# Patient Record
Sex: MALE | Race: BLACK OR AFRICAN AMERICAN | NOT HISPANIC OR LATINO | Employment: FULL TIME | URBAN - METROPOLITAN AREA
[De-identification: names, ages, dates, MRNs, and addresses within clinical notes are randomized per-mention and may not be internally consistent; named-entity substitution may affect disease eponyms.]

---

## 2017-01-03 ENCOUNTER — TELEPHONE (OUTPATIENT)
Dept: NEPHROLOGY | Facility: CLINIC | Age: 47
End: 2017-01-03

## 2017-01-03 NOTE — TELEPHONE ENCOUNTER
Travon, this is  office from [facility] at the [facility]. We are calling to remind you of your upcoming Nephrology appointment on 01/09./17 at 405pm. Please arrive about 1 hours prior to your appointment time for labs. Also, please bring an updated medication list or your labeled medication bottles with you to your appointment. If you have any questions or would like to cancel or reschedule your appointment, please call us at [phone].     You are welcome to have your labs done up to a week before your appointment at any [place] or [facility] facility. If you have your labs completed before your appointment, please still come 30 minutes early for check-in  ROEL ROBLERO CMA

## 2017-01-09 ENCOUNTER — OFFICE VISIT (OUTPATIENT)
Dept: NEPHROLOGY | Facility: CLINIC | Age: 47
End: 2017-01-09
Attending: INTERNAL MEDICINE
Payer: MEDICARE

## 2017-01-09 ENCOUNTER — RESEARCH ENCOUNTER (OUTPATIENT)
Dept: RESEARCH | Facility: CLINIC | Age: 47
End: 2017-01-09

## 2017-01-09 VITALS
HEART RATE: 87 BPM | SYSTOLIC BLOOD PRESSURE: 153 MMHG | OXYGEN SATURATION: 100 % | TEMPERATURE: 99.1 F | DIASTOLIC BLOOD PRESSURE: 91 MMHG | HEIGHT: 70 IN

## 2017-01-09 DIAGNOSIS — N18.9 ANEMIA IN CHRONIC KIDNEY DISEASE: ICD-10-CM

## 2017-01-09 DIAGNOSIS — N25.0 MIXED RENAL OSTEODYSTROPHY: ICD-10-CM

## 2017-01-09 DIAGNOSIS — D84.9 IMMUNOSUPPRESSED STATUS (H): ICD-10-CM

## 2017-01-09 DIAGNOSIS — D63.1 ANEMIA IN CHRONIC KIDNEY DISEASE: ICD-10-CM

## 2017-01-09 DIAGNOSIS — E55.9 VITAMIN D DEFICIENCY: ICD-10-CM

## 2017-01-09 DIAGNOSIS — T86.19 OTHER COMPLICATION OF KIDNEY TRANSPLANT: Primary | ICD-10-CM

## 2017-01-09 DIAGNOSIS — T86.11 ANTIBODY MEDIATED REJECTION OF KIDNEY TRANSPLANT: ICD-10-CM

## 2017-01-09 DIAGNOSIS — E55.9 HYPOVITAMINOSIS D: ICD-10-CM

## 2017-01-09 DIAGNOSIS — N18.4 CKD (CHRONIC KIDNEY DISEASE) STAGE 4, GFR 15-29 ML/MIN (H): ICD-10-CM

## 2017-01-09 DIAGNOSIS — Z94.0 KIDNEY REPLACED BY TRANSPLANT: ICD-10-CM

## 2017-01-09 DIAGNOSIS — T86.11 KIDNEY TRANSPLANT REJECTION: ICD-10-CM

## 2017-01-09 PROCEDURE — 99213 OFFICE O/P EST LOW 20 MIN: CPT | Mod: ZF

## 2017-01-09 ASSESSMENT — PAIN SCALES - GENERAL: PAINLEVEL: NO PAIN (0)

## 2017-01-09 NOTE — Clinical Note
1/9/2017       RE: Tristian Mckeon  [address]  [facility] [address]     Dear Colleague,    Thank you for referring your patient, Tristian Mckeon, to the [facility] NEPHROLOGY at [facility]. Please see a copy of my visit note below.    No notes on file    Again, thank you for allowing me to participate in the care of your patient.      Sincerely,    Kole Collins MD

## 2017-01-09 NOTE — NURSING NOTE
"Chief Complaint   Patient presents with     RECHECK     follow up post KD       Initial /91 mmHg  Pulse 87  Temp(Src) 99.1  F (37.3  C) (Oral)  Ht 1.778 m (5' 10\")  SpO2 100% Estimated body mass index is 33.59 kg/(m^2) as calculated from the following:    Height as of this encounter: 1.778 m (5' 10\").    Weight as of 5/19/16: 106.187 kg (234 lb 1.6 oz).  BP completed using cuff size: dontrell Castro M.cma      "

## 2017-01-12 DIAGNOSIS — Z79.899 ENCOUNTER FOR LONG-TERM CURRENT USE OF MEDICATION: ICD-10-CM

## 2017-01-12 DIAGNOSIS — E55.9 HYPOVITAMINOSIS D: ICD-10-CM

## 2017-01-12 DIAGNOSIS — D63.1 ANEMIA IN CHRONIC KIDNEY DISEASE: ICD-10-CM

## 2017-01-12 DIAGNOSIS — T86.19 OTHER COMPLICATION OF KIDNEY TRANSPLANT: ICD-10-CM

## 2017-01-12 DIAGNOSIS — Z48.298 AFTERCARE FOLLOWING ORGAN TRANSPLANT: ICD-10-CM

## 2017-01-12 DIAGNOSIS — Z94.0 KIDNEY REPLACED BY TRANSPLANT: ICD-10-CM

## 2017-01-12 DIAGNOSIS — N18.9 ANEMIA IN CHRONIC KIDNEY DISEASE: ICD-10-CM

## 2017-01-12 LAB
ALBUMIN SERPL-MCNC: 3.6 G/DL (ref 3.4–5)
ANION GAP SERPL CALCULATED.3IONS-SCNC: 9 MMOL/L (ref 3–14)
BUN SERPL-MCNC: 48 MG/DL (ref 7–30)
CALCIUM SERPL-MCNC: 7.6 MG/DL (ref 8.5–10.1)
CD3 CELLS # BLD: 125 CELLS/UL (ref 603–2990)
CD3 CELLS NFR BLD: 51 % (ref 49–84)
CD3+CD4+ CELLS # BLD: 60 CELLS/UL (ref 441–2156)
CD3+CD4+ CELLS NFR BLD: 24 % (ref 28–63)
CD3+CD4+ CELLS/CD3+CD8+ CLL BLD: 0.92 % (ref 1.4–2.6)
CD3+CD8+ CELLS # BLD: 64 CELLS/UL (ref 125–1312)
CD3+CD8+ CELLS NFR BLD: 26 % (ref 10–40)
CHLORIDE SERPL-SCNC: 112 MMOL/L (ref 94–109)
CO2 SERPL-SCNC: 20 MMOL/L (ref 20–32)
CREAT SERPL-MCNC: 2.95 MG/DL (ref 0.66–1.25)
DEPRECATED CALCIDIOL+CALCIFEROL SERPL-MC: 33 UG/L (ref 20–75)
ERYTHROCYTE [DISTWIDTH] IN BLOOD BY AUTOMATED COUNT: 15.1 % (ref 10–15)
FERRITIN SERPL-MCNC: 371 NG/ML (ref 26–388)
FOLATE SERPL-MCNC: 9.7 NG/ML
GFR SERPL CREATININE-BSD FRML MDRD: 23 ML/MIN/1.7M2
GLUCOSE SERPL-MCNC: 134 MG/DL (ref 70–99)
HCT VFR BLD AUTO: 27.8 % (ref 40–53)
HGB BLD-MCNC: 8.9 G/DL (ref 13.3–17.7)
IFC SPECIMEN: ABNORMAL
IMMUNODEFICIENCY MARKERS SPEC-IMP: ABNORMAL
IRON SATN MFR SERPL: 38 % (ref 15–46)
IRON SERPL-MCNC: 74 UG/DL (ref 35–180)
MCH RBC QN AUTO: 27.9 PG (ref 26.5–33)
MCHC RBC AUTO-ENTMCNC: 32 G/DL (ref 31.5–36.5)
MCV RBC AUTO: 87 FL (ref 78–100)
PHOSPHATE SERPL-MCNC: 3.9 MG/DL (ref 2.5–4.5)
PLATELET # BLD AUTO: 129 10E9/L (ref 150–450)
POTASSIUM SERPL-SCNC: 4.4 MMOL/L (ref 3.4–5.3)
PTH-INTACT SERPL-MCNC: 134 PG/ML (ref 12–72)
RBC # BLD AUTO: 3.19 10E12/L (ref 4.4–5.9)
SODIUM SERPL-SCNC: 140 MMOL/L (ref 133–144)
TACROLIMUS BLD-MCNC: ABNORMAL UG/L (ref 5–15)
TIBC SERPL-MCNC: 194 UG/DL (ref 240–430)
TME LAST DOSE: ABNORMAL H
VIT B12 SERPL-MCNC: 1093 PG/ML (ref 193–986)
WBC # BLD AUTO: 3 10E9/L (ref 4–11)

## 2017-01-12 PROCEDURE — 86359 T CELLS TOTAL COUNT: CPT | Performed by: INTERNAL MEDICINE

## 2017-01-12 PROCEDURE — 80180 DRUG SCRN QUAN MYCOPHENOLATE: CPT | Performed by: INTERNAL MEDICINE

## 2017-01-12 PROCEDURE — 80197 ASSAY OF TACROLIMUS: CPT | Performed by: INTERNAL MEDICINE

## 2017-01-12 PROCEDURE — 82306 VITAMIN D 25 HYDROXY: CPT | Performed by: INTERNAL MEDICINE

## 2017-01-12 PROCEDURE — 86360 T CELL ABSOLUTE COUNT/RATIO: CPT | Performed by: INTERNAL MEDICINE

## 2017-01-12 PROCEDURE — 83970 ASSAY OF PARATHORMONE: CPT | Performed by: INTERNAL MEDICINE

## 2017-01-12 PROCEDURE — 82746 ASSAY OF FOLIC ACID SERUM: CPT | Performed by: INTERNAL MEDICINE

## 2017-01-13 ENCOUNTER — TELEPHONE (OUTPATIENT)
Dept: TRANSPLANT | Facility: CLINIC | Age: 47
End: 2017-01-13

## 2017-01-13 DIAGNOSIS — Z94.0 KIDNEY TRANSPLANTED: Primary | ICD-10-CM

## 2017-01-13 DIAGNOSIS — D84.9 IMMUNOSUPPRESSED STATUS (H): ICD-10-CM

## 2017-01-13 DIAGNOSIS — Z94.0 IMMUNOSUPPRESSIVE MANAGEMENT ENCOUNTER FOLLOWING KIDNEY TRANSPLANT: ICD-10-CM

## 2017-01-13 DIAGNOSIS — T86.10 COMPLICATIONS, KIDNEY TRANSPLANT: ICD-10-CM

## 2017-01-13 DIAGNOSIS — Z48.298 AFTERCARE FOLLOWING ORGAN TRANSPLANT: ICD-10-CM

## 2017-01-13 DIAGNOSIS — Z79.899 IMMUNOSUPPRESSIVE MANAGEMENT ENCOUNTER FOLLOWING KIDNEY TRANSPLANT: ICD-10-CM

## 2017-01-13 DIAGNOSIS — Z79.899 ENCOUNTER FOR LONG-TERM CURRENT USE OF MEDICATION: ICD-10-CM

## 2017-01-13 DIAGNOSIS — Z94.0 S/P KIDNEY TRANSPLANT: ICD-10-CM

## 2017-01-13 DIAGNOSIS — Z94.0 KIDNEY REPLACED BY TRANSPLANT: ICD-10-CM

## 2017-01-13 NOTE — TELEPHONE ENCOUNTER
ISSUE: tacrolimus = > 3.0    Confirmed no missed doses. Confirmed good 12 hour level.  Confirmed current dose is Astagraf XL 4 caps (20 mg) daily.  Increase to 5 caps (25 mg).  Recheck levels in 1 - 2 weeks. -- Orders placed.

## 2017-01-16 DIAGNOSIS — Z94.0 KIDNEY TRANSPLANTED: ICD-10-CM

## 2017-01-16 DIAGNOSIS — T86.11 KIDNEY TRANSPLANT REJECTION: Primary | ICD-10-CM

## 2017-01-16 LAB
MYCOPHENOLATE SERPL LC/MS/MS-MCNC: 4.51 MG/L (ref 1–3.5)
MYCOPHENOLATE-G SERPL LC/MS/MS-MCNC: 100.9 MG/L (ref 30–95)
TME LAST DOSE: ABNORMAL H

## 2017-01-16 RX ORDER — PREDNISONE 5 MG/1
5 TABLET ORAL DAILY
Qty: 30 TABLET | Refills: 0 | Status: CANCELLED | OUTPATIENT
Start: 2017-01-16

## 2017-01-16 RX ORDER — MYCOPHENOLIC ACID 360 MG/1
1080 TABLET, DELAYED RELEASE ORAL 2 TIMES DAILY
Qty: 180 TABLET | Refills: 0 | Status: CANCELLED | OUTPATIENT
Start: 2017-01-16

## 2017-01-16 NOTE — TELEPHONE ENCOUNTER
Drug Name: prednisone 5mg  Last Fill Date: 12/20/16  Quantity: 30    Drug Name: mycophenolate 360mg  Last Fill Date: 12/20/16  Quantity: 180    Mely Davis   [facility]  [phone]

## 2017-01-16 NOTE — PROGRESS NOTES
ASSESSMENT AND PLAN:   1.  Status post kidney transplant in a highly sensitized patient complicated by multiple episodes of rejection, last one  showing mild glomerulonephritis,persistent, with early transplant glomerulopathy, and severe arteriopathy.  At this point, it very important to maintain his immunosuppression therapeutic and he was switched to long-acting Prograf, which may have had less variability.  Fortunately his creatinine is stable at 3.6mg/dl. He is not high interested in retransplant   2.  Lower extremity edema.  Continue lasix.  3.  Secondary hyperparathyroidism, status post parathyroidectomy on calcium supplements.   4.  Metabolic acidosis secondary to CKAD continue bicarbonate.   5.  Anemia.  Hemoglobin is low at 9.3 gram/dL but stable, may be due to decreased kidney function.  He does not have recent iron studies which I will order for next time.  6. Renal osteodystrophy     Will check labs once a month.    REASON FOR VISIT  Mr. Mckeon is here for follow up.    HPI: Tristian Mckeon is a 46 year old male with ESKD from HTN and is status post third DDKT on 3/12/15. He has  had  2 previous kidney transplants in 1994 and 2000, both of which had failed and he was back on hemodialysis since 2007. He received   DDKT 3/12/15 complicated by DGF.  He is highly sensitized and did have low level historical DSA to A1 (797) and A30 (1092) from 2012 serum sample, but no DSA at time of transplant and crossmatch was negative.  Patient underwent a kidney transplant biopsy on 3/25/15 due to no improvement in kidney function and was found to have T cell mediated and antibody-mediated rejection.  In addition, it did show moderate interstitial fibrosis and tubular atrophy. He was treated with PP/IVIG and Thymoglobulin. Creatinine subsequently improved to around 2.2 mg/dL; however, has crept up to more than 3.5 mg/dL;  kidney biopsy was repeated on 05/08/2015 and showed T cell mediated rejection and acute antibody  mediated rejection.   He was subsequently treated with steroids, IVIG, plasmapheresis and Bortezomib. He tolerated treatment well with improved creatinine to about 2s. Creatinine again increased to 3.0mg/dl and he underwent under biopsy on 9/28 /2015, which showed isolated V lesion  -thought to be secondary to AMR, although [place] 2 A T cell emdiated rejection was considered.He was again treated with IVIG/PP with improvement in creatinine and subsequent biopsy on 12/23/2015 showed no V lesion but mild glomerulitis and capillary C4d staining consistent with mild, persistent antibody-mediated rejection. Patient has significant moderate to severe arteriopathy. Patient received a course of steroids. He has had fluctuating prograf level and has been Diltiazem. He was switched to Astagraf in Jan 2016.   Since he was seen last, he had no new complaints. Works part time. His leg swelling has been manageable. He has no fevers or chills     Transplant Hx:         Tx: DDKT  Date: 3/12/15       Present Maintenance IS: Tacrolimus, Mycophenolic acid and Prednisone       Baseline Creatinine: 2-2.5        Recent DSA: Yes(  trending down) PRA last checked ( July 2015) Class I: 99 Class II: 99       Biopsy: Yes, 3/25/15; borderline acute cellular rejection, antibody-mediated rejection and moderate interstitial fibrosis and tubular atrophy; 5/8-AMR, borderline T cell mediated rejection,  9/28/15: Antibody mediated Rejection, 12/23/2015-mild glomerulitis and capillary C4d staining consistent with mild, persistent antibody-mediated rejection, moderate to severe arteriopathy      ROS:    A comprehensive review of systems was obtained and negative, except as noted in the HPI or PMH.    Active Medical Problems:  Patient Active Problem List   Diagnosis     Hypertension     Tobacco abuse     Depression     Kidney replaced by transplant     Immunosuppressed status (H)     Anemia in chronic renal disease     High risk medication use      "Secondary renal hyperparathyroidism (H)     Vitamin D deficiency     Kidney transplant rejection     Steroid-induced hyperglycemia     Metabolic acidosis     Antibody mediated rejection of kidney transplant     Hyperglycemia     Hemorrhage following kidney biopsy     Thrombocytopenia (H)       Personal Hx:  Social History     Social History     Marital Status: Single     Spouse Name: N/A     Number of Children: 2     Years of Education: N/A     Occupational History      Unemployed     Social History Main Topics     Smoking status: Light Tobacco Smoker     Types: Cigars     Smokeless tobacco: Not on file     Alcohol Use: 0.0 oz/week     0 Standard drinks or equivalent per week      Comment: Minimal     Drug Use: No     Sexual Activity: Not on file     Other Topics Concern     Blood Transfusions Yes     Caffeine Concern No     Social History Narrative       Allergies:  Allergies   Allergen Reactions     Cephalosporins Unknown     Cyclosporine      Duricef [Cefadroxil]        Vitals:  /91 mmHg  Pulse 87  Temp(Src) 99.1  F (37.3  C) (Oral)  Ht 1.778 m (5' 10\")  SpO2 100%  Exam:   GENERAL APPEARANCE: alert and no distress  HENT: mouth without ulcers or lesions  LYMPHATICS: no cervical or supraclavicular nodes, palpable left inguinal node  RESP: lungs clear to auscultation - no rales, rhonchi or wheezes  CV: regular rhythm, normal rate, no rub, no murmur  EDEMA: 1+ LE edema bilaterally ( L >R)  ABDOMEN: soft, nondistended, nontender, bowel sounds normal  MS: extremities normal - no gross deformities noted, no evidence of inflammation in joints, no muscle tenderness  SKIN: no rash    Results:  Recent Results (from the past 336 hour(s))   T cell subset profile    Collection Time: 01/12/17 11:21 AM   Result Value Ref Range    IFC Specimen Blood     CD3 Mature T 51 49 - 84 %    CD4 [place] T 24 (L) 28 - 63 %    CD8 Suppressor T 26 10 - 40 %    CD4:CD8 Ratio 0.92 (L) 1.40 - 2.60    Absolute CD3 125 (L) 603 - 2990 " cells/uL    Absolute CD4 60 (L) 441 - 2156 cells/uL    Absolute CD8 64 (L) 125 - 1312 cells/uL    T Cell Subset Profile Antibody Interpretation << Do Not Report >>    Renal panel    Collection Time: 01/12/17 11:21 AM   Result Value Ref Range    Sodium 140 133 - 144 mmol/L    Potassium 4.4 3.4 - 5.3 mmol/L    Chloride 112 (H) 94 - 109 mmol/L    Carbon Dioxide 20 20 - 32 mmol/L    Anion Gap 9 3 - 14 mmol/L    Glucose 134 (H) 70 - 99 mg/dL    Urea Nitrogen 48 (H) 7 - 30 mg/dL    Creatinine 2.95 (H) 0.66 - 1.25 mg/dL    GFR Estimate 23 (L) >60 mL/min/1.7m2    GFR Estimate If Black 28 (L) >60 mL/min/1.7m2    Calcium 7.6 (L) 8.5 - 10.1 mg/dL    Phosphorus 3.9 2.5 - 4.5 mg/dL    Albumin 3.6 3.4 - 5.0 g/dL   Iron and iron binding capacity    Collection Time: 01/12/17 11:21 AM   Result Value Ref Range    Iron 74 35 - 180 ug/dL    Iron Binding Cap 194 (L) 240 - 430 ug/dL    Iron Saturation Index 38 15 - 46 %   Ferritin    Collection Time: 01/12/17 11:21 AM   Result Value Ref Range    Ferritin 371 26 - 388 ng/mL   Vitamin B12    Collection Time: 01/12/17 11:21 AM   Result Value Ref Range    Vitamin B12 1093 (H) 193 - 986 pg/mL   Vitamin D Deficiency    Collection Time: 01/12/17 11:21 AM   Result Value Ref Range    Vitamin D Deficiency screening 33 20 - 75 ug/L   Tacrolimus level    Collection Time: 01/12/17 11:21 AM   Result Value Ref Range    Tacrolimus Last Dose 01/11/17   2130     Tacrolimus Level (L) 5.0 - 15.0 ug/L     <3.0  Tacrolimus Reference Range   Kidney Transplant   Pediatric                      ug/L     0-3 months post transplant   10-12     3-6 months post transplant   8-10     6-12 months post transplant  6-8     >12 months post transplant   4-7   Adult     0-6 months post transplant   8-10     6-12 months post transplant  6-8     >12 months post transplant   4-6     >5 years post transplant     3-5   Heart Transplant   Pediatric     0-12 months post transplant  10-15     >12 months post transplant   5-10    Adult     0-3 months post transplant   10-15     3-6 months post transplant   8-12     6-12 months post transplant  6-12     >12 months post transplant   6-10   Lung Transplant     0-12 months post transplant  10-15     >12 months post transplant   8-12   Liver Transplant   Pediatric     0-3 months post transplant   10-15     3-6 months post transplant   8-10     >6 months post transplant    6-8   Adult     0-3 months post transplant   10-12     3-6 months post transplant   8-10     >6  months post transplant    6-8   Pancreas Transplant     0-6 months post transplant   8-10     >6 months post transplant    5-8   This test was developed and its performance characteristics determined by the   [facility],  Special [facility] Laboratory. It has   not been cleared or approved by the FDA. The laboratory is regulated under CLIA   as qualified to perform high-complexity testing. This test is used for clinical   purposes. It should not be regarded as investigational or for research.     CBC with platelets    Collection Time: 01/12/17 11:21 AM   Result Value Ref Range    WBC 3.0 (L) 4.0 - 11.0 10e9/L    RBC Count 3.19 (L) 4.4 - 5.9 10e12/L    Hemoglobin 8.9 (L) 13.3 - 17.7 g/dL    Hematocrit 27.8 (L) 40.0 - 53.0 %    MCV 87 78 - 100 fl    MCH 27.9 26.5 - 33.0 pg    MCHC 32.0 31.5 - 36.5 g/dL    RDW 15.1 (H) 10.0 - 15.0 %    Platelet Count 129 (L) 150 - 450 10e9/L   Folate    Collection Time: 01/12/17 11:21 AM   Result Value Ref Range    Folate 9.7 >5.4 ng/mL   Parathyroid Hormone Intact    Collection Time: 01/12/17 11:21 AM   Result Value Ref Range    Parathyroid Hormone Intact 134 (H) 12 - 72 pg/mL   Mycophenolic acid    Collection Time: 01/12/17 11:21 AM   Result Value Ref Range    Last Dose Mycophenolic Acid 01/11/17  2130     Mycophenolic Acid Mg/L 4.51 (H) 1.00 - 3.50 mg/L    MPA Glucuronide Level 100.9 (H) 30.0 - 95.0 mg/L

## 2017-01-17 DIAGNOSIS — I10 HTN (HYPERTENSION): Primary | ICD-10-CM

## 2017-01-17 RX ORDER — HYDRALAZINE HYDROCHLORIDE 25 MG/1
50 TABLET, FILM COATED ORAL 3 TIMES DAILY
Qty: 180 TABLET | Refills: 11 | Status: SHIPPED
Start: 2017-01-17 | End: 2018-05-07

## 2017-01-17 NOTE — TELEPHONE ENCOUNTER
Last Office Visit with Nephrologist:  1/16/17.  Medication refilled per Nephrology Clinic protocol.     Erin Jefferson RN

## 2017-01-17 NOTE — TELEPHONE ENCOUNTER
Hydralazine 25mg tabs      Last Written Prescription Date: 12/16/2016  Last Fill Quantity: 90,  # refills: 1   Last Office Visit with [facility], [facility] or [facility] prescribing provider: 01/09/2017

## 2017-01-18 RX ORDER — PREDNISONE 5 MG/1
5 TABLET ORAL DAILY
Qty: 30 TABLET | Refills: 0 | Status: SHIPPED | OUTPATIENT
Start: 2017-01-18 | End: 2017-02-27

## 2017-01-18 RX ORDER — MYCOPHENOLIC ACID 360 MG/1
1080 TABLET, DELAYED RELEASE ORAL 2 TIMES DAILY
Qty: 180 TABLET | Refills: 0 | Status: SHIPPED | OUTPATIENT
Start: 2017-01-18 | End: 2018-02-14

## 2017-01-30 ENCOUNTER — TELEPHONE (OUTPATIENT)
Dept: TRANSPLANT | Facility: CLINIC | Age: 47
End: 2017-01-30

## 2017-01-30 DIAGNOSIS — Z94.0 IMMUNOSUPPRESSIVE MANAGEMENT ENCOUNTER FOLLOWING KIDNEY TRANSPLANT: ICD-10-CM

## 2017-01-30 DIAGNOSIS — Z79.899 IMMUNOSUPPRESSIVE MANAGEMENT ENCOUNTER FOLLOWING KIDNEY TRANSPLANT: ICD-10-CM

## 2017-01-30 DIAGNOSIS — Z94.0 KIDNEY TRANSPLANTED: Primary | ICD-10-CM

## 2017-01-30 DIAGNOSIS — Z48.298 AFTERCARE FOLLOWING ORGAN TRANSPLANT: ICD-10-CM

## 2017-01-30 RX ORDER — MYCOPHENOLIC ACID 360 MG/1
1080 TABLET, DELAYED RELEASE ORAL 2 TIMES DAILY
Qty: 180 TABLET | Refills: 11 | Status: SHIPPED | OUTPATIENT
Start: 2017-01-30 | End: 2018-05-07

## 2017-01-30 NOTE — TELEPHONE ENCOUNTER
D: MA Lapsed  I/A: This writer spoke with pharmacy who reported patient still does not have MA and has almost a $1,000.00 bill with pharmacy. This writer called patient and spoke with him about his insurance. Patient reported he reapplied earlier in the month and spoke with the county last week who reported his application is still being processed. Explained that if patient's MA is not reinstated soon, he will need to owe money for his medications as MA is only able to back pay for three months. Patient stated he understood.   P: This writer updated pharmacy. Await approval from MA.     Richelle Downey [facility]    Kidney/Pancreas/Auto Islet Transplant Programs

## 2017-02-13 DIAGNOSIS — R60.0 BILATERAL LOWER EXTREMITY EDEMA: ICD-10-CM

## 2017-02-13 RX ORDER — FUROSEMIDE 40 MG
40 TABLET ORAL 3 TIMES DAILY
Qty: 270 TABLET | Refills: 3 | Status: SHIPPED | OUTPATIENT
Start: 2017-02-13 | End: 2018-04-11

## 2017-02-13 NOTE — TELEPHONE ENCOUNTER
Last Office Visit with Nephrologist:  1/9/17.  Medication refilled per Nephrology Clinic protocol.     Erin Jefferson RN

## 2017-02-13 NOTE — TELEPHONE ENCOUNTER
Furosemide      Last Written Prescription Date: 1/19/16  Last Fill Quantity: 180,  # refills: 8   Last Office Visit with [facility] or [facility] prescribing provider: 1/9/17                                             [facility]  [phone] and [phone]

## 2017-02-22 NOTE — PROGRESS NOTES
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    The subject was told:  -that the study involves research  -the purpose of the research study  -the expected duration of the study and the approximate number of subject sought  -of procedures that are identified as experimental  -of reasonably foreseeable risks or discomforts to the subject  -of any benefits to the subject or others that may be expected from the research  -of alternative procedures and/or treatment  -how the confidentiality of records would be maintained  -whether or not compensation and medical treatments are available should injury occur as a result of the study  -who to contact if they have questions related to the research study or questions regarding research subjects' rights  -that participation is completely voluntary and that their decision to or not to participate will have no impact on their relationships with the N and the staff    No study procedures were completed prior to the consent being obtained.  The use of historical information (lab or assessments) used for the purpose of the study was approved by subject.  The subject was fully aware that we would be reviewing their medical record for the study.  The subject demonstrated an understanding of what the study involved.  Specifically, how this study differed from standard of care at our center and what was required of the subject as part of the study.  The subject reviewed the consent form and was given the opportunity to ask questions before signing.  Questions and concerns were answered by the study staff and/or study physician.  The subject  was offered a copy of the signed informed consent but declined.  The consent require the use of a :   [] Yes [x]  No     A 'short form' consent was used:     [] Yes [x] No         If yes, provide name of witness: N/A  The subject required a legally-authorized representative (LAR) to sign on their behalf:                                                    [] Yes  [x] No   If yes, please record name of LAR: N/A    Questions to Evaluate Subject Comprehension of Study  Adult or Legally Authorized Representative (LAR) for a Dependent:  Question: Adequate Response? If No, explain what actions were taken   What is the purpose of this study? [x] Yes  [] No    What will you be asked to do to participate in this study?  [x] Yes  [] No    How many study visits will there be? [x] Yes  [] No

## 2017-02-27 DIAGNOSIS — T86.11 KIDNEY TRANSPLANT REJECTION: ICD-10-CM

## 2017-02-27 RX ORDER — PREDNISONE 5 MG/1
5 TABLET ORAL DAILY
Qty: 30 TABLET | Refills: 3 | Status: SHIPPED | OUTPATIENT
Start: 2017-02-27 | End: 2017-03-07

## 2017-02-27 NOTE — TELEPHONE ENCOUNTER
Prednisone 5mg      Last Written Prescription Date: 01/18/2017   Last Fill Quantity: 30,  # refills: 0   Last Office Visit with [facility], [facility] or [facility] prescribing provider: 01/09/2017    Vijay Varghese  [facility]  [phone]

## 2017-03-07 ENCOUNTER — TELEPHONE (OUTPATIENT)
Dept: PHARMACY | Facility: CLINIC | Age: 47
End: 2017-03-07

## 2017-03-07 DIAGNOSIS — D84.9 IMMUNOSUPPRESSED STATUS (H): ICD-10-CM

## 2017-03-07 DIAGNOSIS — T86.11 KIDNEY TRANSPLANT REJECTION: ICD-10-CM

## 2017-03-07 DIAGNOSIS — Z94.0 S/P KIDNEY TRANSPLANT: ICD-10-CM

## 2017-03-07 RX ORDER — SULFAMETHOXAZOLE AND TRIMETHOPRIM 400; 80 MG/1; MG/1
1 TABLET ORAL
Qty: 12 TABLET | Refills: 11 | Status: SHIPPED | OUTPATIENT
Start: 2017-03-08 | End: 2018-05-07

## 2017-03-07 RX ORDER — PREDNISONE 5 MG/1
5 TABLET ORAL DAILY
Qty: 30 TABLET | Refills: 3 | Status: SHIPPED | OUTPATIENT
Start: 2017-03-07 | End: 2017-07-17

## 2017-03-23 DIAGNOSIS — I10 ESSENTIAL HYPERTENSION: ICD-10-CM

## 2017-03-23 RX ORDER — DILTIAZEM HYDROCHLORIDE 240 MG/1
240 CAPSULE, COATED, EXTENDED RELEASE ORAL DAILY
Qty: 90 CAPSULE | Refills: 3 | Status: SHIPPED | OUTPATIENT
Start: 2017-03-23 | End: 2018-02-12

## 2017-03-23 NOTE — TELEPHONE ENCOUNTER
Drug: Diltiazem  Last Fill Date: 12/27/2016  Quantity: 90    [facility]  Phone: [phone]

## 2017-07-17 DIAGNOSIS — T86.11 KIDNEY TRANSPLANT REJECTION: ICD-10-CM

## 2017-07-17 NOTE — TELEPHONE ENCOUNTER
Prednisone 5mg      Last Written Prescription Date: 03/07/17  Last Fill Quantity: 30,  # refills: 3   Last Office Visit with [facility], [facility] or [facility] prescribing provider: 01/09/2017    Vijay Varghese  [facility]  [phone]

## 2017-07-18 RX ORDER — PREDNISONE 5 MG/1
5 TABLET ORAL DAILY
Qty: 30 TABLET | Refills: 0 | Status: SHIPPED | OUTPATIENT
Start: 2017-07-18 | End: 2017-08-14

## 2017-08-03 ENCOUNTER — RESULTS ONLY (OUTPATIENT)
Dept: OTHER | Facility: CLINIC | Age: 47
End: 2017-08-03

## 2017-08-03 DIAGNOSIS — Z79.899 IMMUNOSUPPRESSIVE MANAGEMENT ENCOUNTER FOLLOWING KIDNEY TRANSPLANT: ICD-10-CM

## 2017-08-03 DIAGNOSIS — Z48.298 AFTERCARE FOLLOWING ORGAN TRANSPLANT: ICD-10-CM

## 2017-08-03 DIAGNOSIS — Z79.899 ENCOUNTER FOR LONG-TERM CURRENT USE OF MEDICATION: ICD-10-CM

## 2017-08-03 DIAGNOSIS — D84.9 IMMUNOSUPPRESSED STATUS (H): ICD-10-CM

## 2017-08-03 DIAGNOSIS — T86.10 COMPLICATIONS, KIDNEY TRANSPLANT: ICD-10-CM

## 2017-08-03 DIAGNOSIS — Z94.0 KIDNEY REPLACED BY TRANSPLANT: ICD-10-CM

## 2017-08-03 DIAGNOSIS — Z94.0 IMMUNOSUPPRESSIVE MANAGEMENT ENCOUNTER FOLLOWING KIDNEY TRANSPLANT: ICD-10-CM

## 2017-08-03 DIAGNOSIS — Z94.0 S/P KIDNEY TRANSPLANT: ICD-10-CM

## 2017-08-03 DIAGNOSIS — Z94.0 KIDNEY TRANSPLANTED: ICD-10-CM

## 2017-08-03 LAB
ANION GAP SERPL CALCULATED.3IONS-SCNC: 10 MMOL/L (ref 3–14)
BUN SERPL-MCNC: 48 MG/DL (ref 7–30)
CALCIUM SERPL-MCNC: 8.2 MG/DL (ref 8.5–10.1)
CHLORIDE SERPL-SCNC: 110 MMOL/L (ref 94–109)
CO2 SERPL-SCNC: 19 MMOL/L (ref 20–32)
CREAT SERPL-MCNC: 2.74 MG/DL (ref 0.66–1.25)
CREAT UR-MCNC: 110 MG/DL
ERYTHROCYTE [DISTWIDTH] IN BLOOD BY AUTOMATED COUNT: 15.8 % (ref 10–15)
GFR SERPL CREATININE-BSD FRML MDRD: 25 ML/MIN/1.7M2
GLUCOSE SERPL-MCNC: 80 MG/DL (ref 70–99)
HCT VFR BLD AUTO: 31.7 % (ref 40–53)
HGB BLD-MCNC: 10 G/DL (ref 13.3–17.7)
MCH RBC QN AUTO: 28.1 PG (ref 26.5–33)
MCHC RBC AUTO-ENTMCNC: 31.5 G/DL (ref 31.5–36.5)
MCV RBC AUTO: 89 FL (ref 78–100)
PLATELET # BLD AUTO: 140 10E9/L (ref 150–450)
POTASSIUM SERPL-SCNC: 4.1 MMOL/L (ref 3.4–5.3)
PRA DONOR SPECIFIC ABY: NORMAL
PROT UR-MCNC: 0.17 G/L
PROT/CREAT 24H UR: 0.16 G/G CR (ref 0–0.2)
RBC # BLD AUTO: 3.56 10E12/L (ref 4.4–5.9)
SODIUM SERPL-SCNC: 139 MMOL/L (ref 133–144)
TACROLIMUS BLD-MCNC: 3.9 UG/L (ref 5–15)
TME LAST DOSE: ABNORMAL H
WBC # BLD AUTO: 3.4 10E9/L (ref 4–11)

## 2017-08-03 PROCEDURE — 86833 HLA CLASS II HIGH DEFIN QUAL: CPT | Performed by: INTERNAL MEDICINE

## 2017-08-03 PROCEDURE — 87799 DETECT AGENT NOS DNA QUANT: CPT | Performed by: INTERNAL MEDICINE

## 2017-08-03 PROCEDURE — 86832 HLA CLASS I HIGH DEFIN QUAL: CPT | Performed by: INTERNAL MEDICINE

## 2017-08-03 PROCEDURE — 80197 ASSAY OF TACROLIMUS: CPT | Performed by: INTERNAL MEDICINE

## 2017-08-04 LAB
BKV DNA # SPEC NAA+PROBE: NORMAL COPIES/ML
BKV DNA SPEC NAA+PROBE-LOG#: NORMAL LOG COPIES/ML
SPECIMEN SOURCE: NORMAL

## 2017-08-14 DIAGNOSIS — T86.11 KIDNEY TRANSPLANT REJECTION: Primary | ICD-10-CM

## 2017-08-14 RX ORDER — PREDNISONE 5 MG/1
5 TABLET ORAL DAILY
Qty: 30 TABLET | Refills: 11 | Status: SHIPPED | OUTPATIENT
Start: 2017-08-14 | End: 2018-05-07

## 2017-08-17 LAB
A1: 1430
A30: 4294
B13: 1338
CW6: 2139
DONOR IDENTIFICATION: NORMAL
DSA COMMENTS: NORMAL
DSA PRESENT: YES
DSA TEST METHOD: NORMAL
ORGAN: NORMAL
SA1 CELL: NORMAL
SA1 COMMENTS: NORMAL
SA1 HI RISK ABY: NORMAL
SA1 MOD RISK ABY: NORMAL
SA1 TEST METHOD: NORMAL
SA2 CELL: NORMAL
SA2 COMMENTS: NORMAL
SA2 HI RISK ABY UA: NORMAL
SA2 MOD RISK ABY: NORMAL
SA2 TEST METHOD: NORMAL
UNOS CPRA: 100

## 2018-01-18 DIAGNOSIS — D84.9 IMMUNOSUPPRESSED STATUS (H): ICD-10-CM

## 2018-01-18 DIAGNOSIS — Z94.0 KIDNEY REPLACED BY TRANSPLANT: ICD-10-CM

## 2018-01-18 DIAGNOSIS — Z94.0 S/P KIDNEY TRANSPLANT: ICD-10-CM

## 2018-01-18 NOTE — LETTER
January 18, 2018    Dear Tristian Mckeon,    The federal rules and regulations that govern the refilling of medications by physicians and pharmacies no longer allow us to refill mediations without a yearly face-to-face visit between the patient and physician.      In your case, this requires you to make an appointment to see a Kidney Transplant Nephrologist before we can refill your anti-rejection medication for more than one month at a time.  Please call [phone] and ask to speak with a Transplant  to make a kidney transplant follow-up appointment with a Transplant Nephrologist and monthly lab work.    We also require follow-up laboratory work to monitor the effects of your immunosuppressive medications.  A copy of your lab order is included with this letter.    Sincerely,    Post Kidney Transplant Coordinator

## 2018-01-23 ENCOUNTER — TELEPHONE (OUTPATIENT)
Dept: TRANSPLANT | Facility: CLINIC | Age: 48
End: 2018-01-23

## 2018-01-23 NOTE — TELEPHONE ENCOUNTER
Patient Call: Medication Refill  Instruct the patient to first contact their pharmacy. If they have called their pharmacy and require further assistance, route to LPN.  [facility]  Pharmacy Location: [facility]  Name of Medication: Asrograf  When will the patient be out of this medication? OUT

## 2018-01-23 NOTE — TELEPHONE ENCOUNTER
ISSUE:  Call back to patient. Reports he is currently unable to pay for his copay for Astagraf.     PLAN:  Spoke w/ pharmacy, financial advocates, and sent message to SW.   Patient able to  his medication today. Working on getting his insurance reinstated.

## 2018-01-23 NOTE — TELEPHONE ENCOUNTER
Transplant Social Work Services Phone Call      Data: MA lapsed  Intervention: Patient's MA has lapsed and his renewal (which he states he has submitted) has not been approved. Patient has one dose of his Astagraf left. This writer contacted [facility] pharmacy and informed them that patient is MA pending since his renewal has been submitted just hasn't been approved. Pharmacy to put medication on an account. If MA is not renewed, bill weill be patient's responsibility (one time use of transplant funds may be used to help patient if he cannot afford it).  Assessment: Patient often has issues with his MA at the beginning of each year and they typically get resolved.  Education provided by SW: MA pending status  Plan: This writer contacted patient and informed him of plan to put medication on an account. Patient to  prescriptions at [facility] pharmacy.     Richelle Downey [facility]    Kidney/Pancreas/Auto Islet Transplant Programs

## 2018-02-12 ENCOUNTER — TELEPHONE (OUTPATIENT)
Dept: TRANSPLANT | Facility: CLINIC | Age: 48
End: 2018-02-12

## 2018-02-12 DIAGNOSIS — I10 ESSENTIAL HYPERTENSION: ICD-10-CM

## 2018-02-12 RX ORDER — DILTIAZEM HYDROCHLORIDE 240 MG/1
240 CAPSULE, COATED, EXTENDED RELEASE ORAL DAILY
Qty: 90 CAPSULE | Refills: 3 | Status: SHIPPED | OUTPATIENT
Start: 2018-02-12 | End: 2018-06-08

## 2018-02-12 NOTE — TELEPHONE ENCOUNTER
Patient Call: Medication Refill  Instruct the patient to first contact their pharmacy. If they have called their pharmacy and require further assistance, route to LPN.  Pharmacy Name: [facility]  Pharmacy Location: [facility]  Name of Medication: Cardizem  When will the patient be out of this medication? Out this weekend- call pt back and he will drive over to

## 2018-02-14 DIAGNOSIS — Z94.0 KIDNEY TRANSPLANTED: ICD-10-CM

## 2018-02-14 RX ORDER — MYCOPHENOLIC ACID 360 MG/1
1080 TABLET, DELAYED RELEASE ORAL 2 TIMES DAILY
Qty: 180 TABLET | Refills: 11 | Status: SHIPPED | OUTPATIENT
Start: 2018-02-14 | End: 2019-02-22

## 2018-03-06 ENCOUNTER — TELEPHONE (OUTPATIENT)
Dept: PHARMACY | Facility: CLINIC | Age: 48
End: 2018-03-06

## 2018-04-11 ENCOUNTER — TELEPHONE (OUTPATIENT)
Dept: NEPHROLOGY | Facility: CLINIC | Age: 48
End: 2018-04-11

## 2018-04-11 DIAGNOSIS — R60.0 BILATERAL LOWER EXTREMITY EDEMA: ICD-10-CM

## 2018-04-11 RX ORDER — FUROSEMIDE 40 MG
40 TABLET ORAL 3 TIMES DAILY
Qty: 270 TABLET | Refills: 3 | Status: SHIPPED | OUTPATIENT
Start: 2018-04-11 | End: 2019-01-24

## 2018-04-12 DIAGNOSIS — Z48.298 AFTERCARE FOLLOWING ORGAN TRANSPLANT: Primary | ICD-10-CM

## 2018-04-12 DIAGNOSIS — D84.9 IMMUNOSUPPRESSED STATUS (H): ICD-10-CM

## 2018-04-12 DIAGNOSIS — Z94.0 KIDNEY REPLACED BY TRANSPLANT: ICD-10-CM

## 2018-04-12 DIAGNOSIS — Z94.0 S/P KIDNEY TRANSPLANT: ICD-10-CM

## 2018-04-13 ENCOUNTER — TELEPHONE (OUTPATIENT)
Dept: TRANSPLANT | Facility: CLINIC | Age: 48
End: 2018-04-13

## 2018-05-01 DIAGNOSIS — D63.1 ANEMIA IN CHRONIC KIDNEY DISEASE (CODE): ICD-10-CM

## 2018-05-01 DIAGNOSIS — E55.9 VITAMIN D DEFICIENCY: ICD-10-CM

## 2018-05-01 DIAGNOSIS — Z94.0 KIDNEY REPLACED BY TRANSPLANT: Primary | ICD-10-CM

## 2018-05-02 ENCOUNTER — TELEPHONE (OUTPATIENT)
Dept: NEPHROLOGY | Facility: CLINIC | Age: 48
End: 2018-05-02

## 2018-05-02 NOTE — TELEPHONE ENCOUNTER
Left voicemail for patient to call back (follow up from last transplant appointment).    Erin Jefferson RN

## 2018-05-03 NOTE — TELEPHONE ENCOUNTER
Spoke with patient. States he's doing well. Would like to discuss possible re-evaluation for another transplant. Denied questions at this time.    Erin Jefferson RN

## 2018-05-07 ENCOUNTER — OFFICE VISIT (OUTPATIENT)
Dept: NEPHROLOGY | Facility: CLINIC | Age: 48
End: 2018-05-07
Attending: INTERNAL MEDICINE
Payer: MEDICARE

## 2018-05-07 VITALS
BODY MASS INDEX: 29.69 KG/M2 | WEIGHT: 207.4 LBS | SYSTOLIC BLOOD PRESSURE: 174 MMHG | HEIGHT: 70 IN | DIASTOLIC BLOOD PRESSURE: 92 MMHG | HEART RATE: 73 BPM | RESPIRATION RATE: 18 BRPM

## 2018-05-07 DIAGNOSIS — D84.9 IMMUNOSUPPRESSED STATUS (H): ICD-10-CM

## 2018-05-07 DIAGNOSIS — Z94.0 S/P KIDNEY TRANSPLANT: ICD-10-CM

## 2018-05-07 DIAGNOSIS — I15.1 HYPERTENSION SECONDARY TO OTHER RENAL DISORDERS: ICD-10-CM

## 2018-05-07 DIAGNOSIS — E55.9 VITAMIN D DEFICIENCY: ICD-10-CM

## 2018-05-07 DIAGNOSIS — N25.81 SECONDARY RENAL HYPERPARATHYROIDISM (H): Primary | ICD-10-CM

## 2018-05-07 DIAGNOSIS — Z94.0 KIDNEY REPLACED BY TRANSPLANT: ICD-10-CM

## 2018-05-07 DIAGNOSIS — T86.11 KIDNEY TRANSPLANT REJECTION: ICD-10-CM

## 2018-05-07 PROCEDURE — G0463 HOSPITAL OUTPT CLINIC VISIT: HCPCS | Mod: ZF

## 2018-05-07 RX ORDER — SODIUM BICARBONATE 650 MG/1
1300 TABLET ORAL 2 TIMES DAILY
Qty: 360 TABLET | Refills: 3 | Status: SHIPPED | OUTPATIENT
Start: 2018-05-07 | End: 2019-01-24

## 2018-05-07 RX ORDER — PREDNISONE 5 MG/1
5 TABLET ORAL DAILY
Qty: 90 TABLET | Refills: 3 | Status: SHIPPED | OUTPATIENT
Start: 2018-05-07 | End: 2018-11-13

## 2018-05-07 RX ORDER — HYDRALAZINE HYDROCHLORIDE 25 MG/1
50 TABLET, FILM COATED ORAL 3 TIMES DAILY
Qty: 180 TABLET | Refills: 11 | Status: SHIPPED | OUTPATIENT
Start: 2018-05-07 | End: 2018-05-23 | Stop reason: SINTOL

## 2018-05-07 ASSESSMENT — PAIN SCALES - GENERAL: PAINLEVEL: NO PAIN (0)

## 2018-05-07 NOTE — NURSING NOTE
Chief Complaint   Patient presents with     RECHECK     Kidney follow up     Pt is roomed and ready,medicatios are updated  ROEL ROBLERO CMA

## 2018-05-07 NOTE — MR AVS SNAPSHOT
After Visit Summary   5/7/2018    Tristian Mckeon    MRN: [id number]           Patient Information     Date Of Birth          1970        Visit Information        Provider Department      5/7/2018 4:05 PM Kole Collins MD [facility]        Today's Diagnoses     Secondary renal hyperparathyroidism (H)    -  1    S/P kidney transplant        Immunosuppressed status (H)        Vitamin D deficiency        Kidney transplant rejection        Kidney replaced by transplant        Hypertension secondary to other renal disorders           Follow-ups after your visit        Follow-up notes from your care team     Return in about 3 months (around 8/7/2018).      Your next 10 appointments already scheduled     May 08, 2018  8:00 AM CDT   LAB with  LAB   [facility] ([facility])    [address] [id number] [phone]           Please do not eat 10-12 hours before your appointment if you are coming in fasting for labs on lipids, cholesterol, or glucose (sugar). This does not apply to pregnant women. Water, hot tea and black coffee (with nothing added) are okay. Do not drink other fluids, diet soda or chew gum.            Aug 09, 2018  8:00 AM CDT   Lab with  LAB   [facility] ([facility])    [address] [id number] [phone]            Aug 10, 2018  1:05 PM CDT   (Arrive by 12:35 PM)   Return Kidney Transplant with  Kidney/Pancreas Recipient   [facility] ([facility])    [address] [place] [address]  [address] [place] [id number] [phone]              Future tests that were ordered for you today     Open Future Orders        Priority Expected Expires Ordered    T cell subset profile Routine  5/7/2019 5/7/2018    Renal panel Routine  6/6/2018 5/7/2018    Parathyroid Hormone Intact Routine  6/6/2018 5/7/2018    Vitamin D  "Deficiency Routine  6/6/2018 5/7/2018    Renal panel Routine  6/6/2018 5/7/2018    CBC with platelets differential Routine  6/6/2018 5/7/2018    Mycophenolic acid Routine  6/6/2018 5/7/2018    Tacrolimus level Routine  6/6/2018 5/7/2018            Who to contact     If you have questions or need follow up information about today's clinic visit or your schedule please contact [facility] directly at [phone].  Normal or non-critical lab and imaging results will be communicated to you by [employer], letter or phone within 4 business days after the clinic has received the results. If you do not hear from us within 7 days, please contact the clinic through [employer] or phone. If you have a critical or abnormal lab result, we will notify you by phone as soon as possible.  Submit refill requests through [employer] or call your pharmacy and they will forward the refill request to us. Please allow 3 business days for your refill to be completed.          Additional Information About Your Visit        [employer] Information     [employer] gives you secure access to your electronic health record. If you see a primary care provider, you can also send messages to your care team and make appointments. If you have questions, please call your primary care clinic.  If you do not have a primary care provider, please call [phone] and they will assist you.        Care EveryWhere ID     This is your Care EveryWhere ID. This could be used by other organizations to access your [place] medical records  [id number]        Your Vitals Were     Pulse Respirations Height BMI (Body Mass Index)          73 18 1.778 m (5' 10\") 29.76 kg/m2         Blood Pressure from Last 3 Encounters:   05/07/18 (!) 174/92   01/09/17 (!) 153/91   05/19/16 146/68    Weight from Last 3 Encounters:   05/07/18 94.1 kg (207 lb 6.4 oz)   05/19/16 106.2 kg (234 lb 1.6 oz)   01/24/16 111.1 kg (245 lb)                 Today's Medication Changes          These " changes are accurate as of 5/7/18  4:33 PM.  If you have any questions, ask your nurse or doctor.               These medicines have changed or have updated prescriptions.        Dose/Directions    ASTAGRAF XL 5 MG 24 hr capsule   This may have changed:  how much to take   Used for:  Kidney replaced by transplant, S/P kidney transplant, Immunosuppressed status (H)   Generic drug:  tacrolimus   Changed by:  Kole Collins MD        Dose:  20 mg   Take 4 capsules (20 mg) by mouth every morning (before breakfast)   Quantity:  150 capsule   Refills:  1       mycophenolic acid 360 MG EC tablet   This may have changed:  Another medication with the same name was removed. Continue taking this medication, and follow the directions you see here.   Used for:  Kidney transplanted   Changed by:  Kole Collins MD        Dose:  1080 mg   Take 3 tablets (1,080 mg) by mouth 2 times daily   Quantity:  180 tablet   Refills:  11         Stop taking these medicines if you haven't already. Please contact your care team if you have questions.     sulfamethoxazole-trimethoprim 400-80 MG per tablet   Commonly known as:  BACTRIM/SEPTRA   Stopped by:  Kole Collins MD                Where to get your medicines      These medications were sent to [address] [place] [id number]    Hours:  TRANSPLANT PHONE NUMBER [phone] Phone:  [phone]     hydrALAZINE 25 MG tablet    predniSONE 5 MG tablet    sodium bicarbonate 650 MG tablet                Primary Care Provider Fax #    Physician No Ref-Primary [phone]       No address on file        Equal Access to Services     [facility] AH: Juancho Garrett, waaxda luqadaha, qaybta kaalmada yared, hunter morrissey. So [facility] [phone].    ATENCIÓN: Si habla español, tiene a altman disposición servicios gratuitos de asistencia lingüística. Llame  al [phone].    We comply with applicable federal civil rights laws and [place] laws. We do not discriminate on the basis of race, color, national origin, age, disability, sex, sexual orientation, or gender identity.            Thank you!     Thank you for choosing [facility]  for your care. Our goal is always to provide you with excellent care. Hearing back from our patients is one way we can continue to improve our services. Please take a few minutes to complete the written survey that you may receive in the mail after your visit with us. Thank you!             Your Updated Medication List - Protect others around you: Learn how to safely use, store and throw away your medicines at www.disposemymeds.org.          This list is accurate as of 5/7/18  4:33 PM.  Always use your most recent med list.                   Brand Name Dispense Instructions for use Diagnosis    ASTAGRAF XL 5 MG 24 hr capsule   Generic drug:  tacrolimus     150 capsule    Take 4 capsules (20 mg) by mouth every morning (before breakfast)    Kidney replaced by transplant, S/P kidney transplant, Immunosuppressed status (H)       Blood Pressure Monitor Kit     1 kit    Automatic Blood Pressure Monitor    Hypertension       calcium carbonate-vitamin D 500-400 MG-UNIT Tabs per tablet     360 tablet    Take 1 tablet by mouth 4 times daily    S/P kidney transplant       COMPOUNDED NON-CONTROLLED SUBSTANCE - PHARMACY TO MIX COMPOUNDED MEDICATION    CMPD RX    5 mL    COMPOUND (CMPD RX) - PHARMACY TO MIX COMPOUNDED MEDICATION      5 mL      3      Sig: Trimix # 8 inject 0.1 to 0.2 ml intra corporeally        Notes to Pharmacy: 20 mcgm alprostadil, 1 mg phentolamine, 27 mg papaverine 5 cc total volume Use 0.1 to 0.2 cc initially intra corporeally. Provide 1 cc 30 gauge (1/2 inch needle) syringe for intra corporeal injection    ED (erectile dysfunction)       cyanocobalamin 1000 MCG tablet    vitamin  B-12     Take 1,000 mcg by mouth daily         DAILY MULTIVITAMIN PO      Take by mouth daily    Organ transplant candidate, ESRD (end stage renal disease) on dialysis (H)       diltiazem 240 MG 24 hr capsule    CARDIZEM CD    90 capsule    Take 1 capsule (240 mg) by mouth daily    Essential hypertension       furosemide 40 MG tablet    LASIX    270 tablet    Take 1 tablet (40 mg) by mouth 3 times daily    Bilateral lower extremity edema       hydrALAZINE 25 MG tablet    APRESOLINE    180 tablet    Take 2 tablets (50 mg) by mouth 3 times daily    Hypertension secondary to other renal disorders       mycophenolic acid 360 MG EC tablet     180 tablet    Take 3 tablets (1,080 mg) by mouth 2 times daily    Kidney transplanted       predniSONE 5 MG tablet    DELTASONE    90 tablet    Take 1 tablet (5 mg) by mouth daily    Kidney transplant rejection       sodium bicarbonate 650 MG tablet     360 tablet    Take 2 tablets (1,300 mg) by mouth 2 times daily    S/P kidney transplant

## 2018-05-07 NOTE — LETTER
5/7/2018      RE: Tristian Mckeon  [address]  [facility] [id number]       ASSESSMENT AND PLAN:   1.  Status post kidney transplant in a highly sensitized patient complicated by multiple episodes of rejection, last one  showing mild glomerulonephritis,persistent, with early transplant glomerulopathy, and severe arteriopathy.  At this point, it very important to maintain his immunosuppression therapeutic. We switched him to long acting tacrolimus with detectable levels and stable creatinine. Unfortunately he has not been getting his labs as frequent as he should of the degree of the renal dysfunction. We discussed compliance. He is interested in reevaluation for repeat kidney transplant. This will be his 4th engraftment episode. 6 months compliance will be needed.    2.  Lower extremity edema.  Well controlled on lasix   3.  Secondary hyperparathyroidism, status post parathyroidectomy on calcium supplements. Will update PTH   4.  Metabolic acidosis secondary to CKAD resume  Bicarbonate. He self stopped.   5.  Anemia stable, will refer to anemia clinic based on his repeat labs   6. Renal osteodystrophy   7. Hypertension: will resume his hydralazine 50 mg po TID, next step will be adding Imdur.   8. Non adherence: we discussed this and its implication on repeat transplant. The reason that we think he may do better is, he is now tolerant of his prograf with stable level.   9. Immunosuppression prophylaxis, will need to resume SS bactrim on MWF    REASON FOR VISIT  Mr. Mckeon is here for follow up.    HPI: Tristian Mckeon is a 46 year old male with ESKD from HTN and is status post third DDKT on 3/12/15. He has  had  2 previous kidney transplants in 1994 and 2000, both of which had failed and he was back on hemodialysis since 2007. He received   DDKT 3/12/15 complicated by DGF.  He is highly sensitized and did have low level historical DSA to A1 (797) and A30 (1092) from 2012 serum sample, but no DSA at time  of transplant and crossmatch was negative.  Patient underwent a kidney transplant biopsy on 3/25/15 due to no improvement in kidney function and was found to have T cell mediated and antibody-mediated rejection.  In addition, it did show moderate interstitial fibrosis and tubular atrophy. He was treated with PP/IVIG and Thymoglobulin. Creatinine subsequently improved to around 2.2 mg/dL; however, has crept up to more than 3.5 mg/dL;  kidney biopsy was repeated on 05/08/2015 and showed T cell mediated rejection and acute antibody mediated rejection.   He was subsequently treated with steroids, IVIG, plasmapheresis and Bortezomib. He tolerated treatment well with improved creatinine to about 2s. Creatinine again increased to 3.0mg/dl and he underwent under biopsy on 9/28 /2015, which showed isolated V lesion  -thought to be secondary to AMR, although [place] 2 A T cell emdiated rejection was considered.He was again treated with IVIG/PP with improvement in creatinine and subsequent biopsy on 12/23/2015 showed no V lesion but mild glomerulitis and capillary C4d staining consistent with mild, persistent antibody-mediated rejection. Patient has significant moderate to severe arteriopathy. Patient received a course of steroids. He has had fluctuating prograf level and has been Diltiazem. He was switched to Astagraf in Jan 2016.   Since he was seen last, he has not been keeping with is labs. Stopped several agents, his bicarbonate and hydralazine. We discussed this at length. He is willing to go back on them and he is interested in a repeat kidney transplant.     Transplant Hx:         Tx: DDKT  Date: 3/12/15       Present Maintenance IS: Tacrolimus, Mycophenolic acid and Prednisone       Baseline Creatinine: 2-2.5        Recent DSA: Yes(  trending down) PRA last checked ( July 2015) Class I: 99 Class II: 99       Biopsy: Yes, 3/25/15; borderline acute cellular rejection, antibody-mediated rejection and moderate interstitial  "fibrosis and tubular atrophy; 5/8-AMR, borderline T cell mediated rejection,  9/28/15: Antibody mediated Rejection, 12/23/2015-mild glomerulitis and capillary C4d staining consistent with mild, persistent antibody-mediated rejection, moderate to severe arteriopathy      ROS:    A comprehensive review of systems was obtained and negative, except as noted in the HPI or PMH.    Active Medical Problems:  Patient Active Problem List   Diagnosis     Hypertension     Tobacco abuse     Depression     Kidney replaced by transplant     Immunosuppressed status (H)     Anemia in chronic renal disease     High risk medication use     Secondary renal hyperparathyroidism (H)     Vitamin D deficiency     Kidney transplant rejection     Steroid-induced hyperglycemia     Metabolic acidosis     Antibody mediated rejection of kidney transplant     Hyperglycemia     Hemorrhage following kidney biopsy     Thrombocytopenia (H)       Personal Hx:  Social History     Social History     Marital status: Single     Spouse name: N/A     Number of children: 2     Years of education: N/A     Occupational History      Unemployed     Social History Main Topics     Smoking status: Light Tobacco Smoker     Types: Cigars     Smokeless tobacco: Never Used     Alcohol use 0.0 oz/week     0 Standard drinks or equivalent per week      Comment: Minimal     Drug use: No     Sexual activity: Yes     Partners: Female     Other Topics Concern     Blood Transfusions Yes     Caffeine Concern No     Social History Narrative       Allergies:  Allergies   Allergen Reactions     Cephalosporins Unknown     Cyclosporine      Duricef [Cefadroxil]        Vitals:  BP (!) 174/92  Pulse 73  Resp 18  Ht 1.778 m (5' 10\")  Wt 94.1 kg (207 lb 6.4 oz)  BMI 29.76 kg/m2  Exam:   GENERAL APPEARANCE: alert and no distress  HENT: mouth without ulcers or lesions  LYMPHATICS: no cervical or supraclavicular nodes, palpable left inguinal node  RESP: lungs clear to auscultation - no " rales, rhonchi or wheezes  CV: regular rhythm, normal rate, no rub, no murmur  EDEMA: trace LE edema bilaterally historically ( L >R) more even today   ABDOMEN: soft, nondistended, nontender, bowel sounds normal  MS: extremities normal - no gross deformities noted, no evidence of inflammation in joints, no muscle tenderness  SKIN: no rash    Results:  Recent Results (from the past 336 hour(s))   Renal panel    Collection Time: 05/08/18  8:12 AM   Result Value Ref Range    Sodium 140 133 - 144 mmol/L    Potassium 4.5 3.4 - 5.3 mmol/L    Chloride 113 (H) 94 - 109 mmol/L    Carbon Dioxide 18 (L) 20 - 32 mmol/L    Anion Gap 9 3 - 14 mmol/L    Glucose 138 (H) 70 - 99 mg/dL    Urea Nitrogen 45 (H) 7 - 30 mg/dL    Creatinine 2.73 (H) 0.66 - 1.25 mg/dL    GFR Estimate 25 (L) >60 mL/min/1.7m2    GFR Estimate If Black 30 (L) >60 mL/min/1.7m2    Calcium 8.3 (L) 8.5 - 10.1 mg/dL    Phosphorus 4.0 2.5 - 4.5 mg/dL    Albumin 3.9 3.4 - 5.0 g/dL   Protein  random urine with Creat Ratio    Collection Time: 05/08/18  8:12 AM   Result Value Ref Range    Protein Random Urine 0.17 g/L    Protein Total Urine g/gr Creatinine 0.15 0 - 0.2 g/g Cr   Ferritin    Collection Time: 05/08/18  8:12 AM   Result Value Ref Range    Ferritin 414 (H) 26 - 388 ng/mL   Iron and iron binding capacity    Collection Time: 05/08/18  8:12 AM   Result Value Ref Range    Iron 46 35 - 180 ug/dL    Iron Binding Cap 204 (L) 240 - 430 ug/dL    Iron Saturation Index 23 15 - 46 %   Parathyroid Hormone Intact    Collection Time: 05/08/18  8:12 AM   Result Value Ref Range    Parathyroid Hormone Intact 110 (H) 18 - 80 pg/mL   Vitamin D Deficiency    Collection Time: 05/08/18  8:12 AM   Result Value Ref Range    Vitamin D Deficiency screening 42 20 - 75 ug/L   T cell subset profile    Collection Time: 05/08/18  8:12 AM   Result Value Ref Range    IFC Specimen Blood     CD3 Mature T 52 49 - 84 %    CD4 Shedd T 28 28 - 63 %    CD8 Suppressor T 27 10 - 40 %    CD4:CD8  Ratio 1.04 (L) 1.40 - 2.60    Absolute CD3 249 (L) 603 - 2990 cells/uL    Absolute CD4 136 (L) 441 - 2156 cells/uL    Absolute CD8 132 125 - 1312 cells/uL   CBC with platelets differential    Collection Time: 05/08/18  8:12 AM   Result Value Ref Range    WBC 3.5 (L) 4.0 - 11.0 10e9/L    RBC Count 4.00 (L) 4.4 - 5.9 10e12/L    Hemoglobin 11.2 (L) 13.3 - 17.7 g/dL    Hematocrit 36.2 (L) 40.0 - 53.0 %    MCV 91 78 - 100 fl    MCH 28.0 26.5 - 33.0 pg    MCHC 30.9 (L) 31.5 - 36.5 g/dL    RDW 14.9 10.0 - 15.0 %    Platelet Count 145 (L) 150 - 450 10e9/L    Diff Method Automated Method     % Neutrophils 71.8 %    % Lymphocytes 9.8 %    % Monocytes 15.8 %    % Eosinophils 1.7 %    % Basophils 0.6 %    % Immature Granulocytes 0.3 %    Nucleated RBCs 0 0 /100    Absolute Neutrophil 2.5 1.6 - 8.3 10e9/L    Absolute Lymphocytes 0.3 (L) 0.8 - 5.3 10e9/L    Absolute Monocytes 0.6 0.0 - 1.3 10e9/L    Absolute Eosinophils 0.1 0.0 - 0.7 10e9/L    Absolute Basophils 0.0 0.0 - 0.2 10e9/L    Abs Immature Granulocytes 0.0 0 - 0.4 10e9/L    Absolute Nucleated RBC 0.0    Tacrolimus level    Collection Time: 05/08/18  8:13 AM   Result Value Ref Range    Tacrolimus Last Dose 0900 05/07/2018     Tacrolimus Level 3.5 (L) 5.0 - 15.0 ug/L       Kole Collins MD

## 2018-05-07 NOTE — PROGRESS NOTES
ASSESSMENT AND PLAN:   1.  Status post kidney transplant in a highly sensitized patient complicated by multiple episodes of rejection, last one  showing mild glomerulonephritis,persistent, with early transplant glomerulopathy, and severe arteriopathy.  At this point, it very important to maintain his immunosuppression therapeutic. We switched him to long acting tacrolimus with detectable levels and stable creatinine. Unfortunately he has not been getting his labs as frequent as he should of the degree of the renal dysfunction. We discussed compliance. He is interested in reevaluation for repeat kidney transplant. This will be his 4th engraftment episode. 6 months compliance will be needed.    2.  Lower extremity edema.  Well controlled on lasix   3.  Secondary hyperparathyroidism, status post parathyroidectomy on calcium supplements. Will update PTH   4.  Metabolic acidosis secondary to CKAD resume  Bicarbonate. He self stopped.   5.  Anemia stable, will refer to anemia clinic based on his repeat labs   6. Renal osteodystrophy   7. Hypertension: will resume his hydralazine 50 mg po TID, next step will be adding Imdur.   8. Non adherence: we discussed this and its implication on repeat transplant. The reason that we think he may do better is, he is now tolerant of his prograf with stable level.   9. Immunosuppression prophylaxis, will need to resume SS bactrim on MWF    REASON FOR VISIT  Mr. Mckeon is here for follow up.    HPI: Tristian Mckeon is a 46 year old male with ESKD from HTN and is status post third DDKT on 3/12/15. He has  had  2 previous kidney transplants in 1994 and 2000, both of which had failed and he was back on hemodialysis since 2007. He received   DDKT 3/12/15 complicated by DGF.  He is highly sensitized and did have low level historical DSA to A1 (797) and A30 (1092) from 2012 serum sample, but no DSA at time of transplant and crossmatch was negative.  Patient underwent a kidney transplant biopsy  on 3/25/15 due to no improvement in kidney function and was found to have T cell mediated and antibody-mediated rejection.  In addition, it did show moderate interstitial fibrosis and tubular atrophy. He was treated with PP/IVIG and Thymoglobulin. Creatinine subsequently improved to around 2.2 mg/dL; however, has crept up to more than 3.5 mg/dL;  kidney biopsy was repeated on 05/08/2015 and showed T cell mediated rejection and acute antibody mediated rejection.   He was subsequently treated with steroids, IVIG, plasmapheresis and Bortezomib. He tolerated treatment well with improved creatinine to about 2s. Creatinine again increased to 3.0mg/dl and he underwent under biopsy on 9/28 /2015, which showed isolated V lesion  -thought to be secondary to [facility], although [place] 2 A T cell emdiated rejection was considered.He was again treated with IVIG/PP with improvement in creatinine and subsequent biopsy on 12/23/2015 showed no V lesion but mild glomerulitis and capillary C4d staining consistent with mild, persistent antibody-mediated rejection. Patient has significant moderate to severe arteriopathy. Patient received a course of steroids. He has had fluctuating prograf level and has been Diltiazem. He was switched to Astagraf in Jan 2016.   Since he was seen last, he has not been keeping with is labs. Stopped several agents, his bicarbonate and hydralazine. We discussed this at length. He is willing to go back on them and he is interested in a repeat kidney transplant.     Transplant Hx:         Tx: DDKT  Date: 3/12/15       Present Maintenance IS: Tacrolimus, Mycophenolic acid and Prednisone       Baseline Creatinine: 2-2.5        Recent DSA: Yes(  trending down) PRA last checked ( July 2015) Class I: 99 Class II: 99       Biopsy: Yes, 3/25/15; borderline acute cellular rejection, antibody-mediated rejection and moderate interstitial fibrosis and tubular atrophy; 5/8-[facility], borderline T cell mediated rejection,  9/28/15:  "Antibody mediated Rejection, 12/23/2015-mild glomerulitis and capillary C4d staining consistent with mild, persistent antibody-mediated rejection, moderate to severe arteriopathy      ROS:    A comprehensive review of systems was obtained and negative, except as noted in the HPI or PMH.    Active Medical Problems:  Patient Active Problem List   Diagnosis     Hypertension     Tobacco abuse     Depression     Kidney replaced by transplant     Immunosuppressed status (H)     Anemia in chronic renal disease     High risk medication use     Secondary renal hyperparathyroidism (H)     Vitamin D deficiency     Kidney transplant rejection     Steroid-induced hyperglycemia     Metabolic acidosis     Antibody mediated rejection of kidney transplant     Hyperglycemia     Hemorrhage following kidney biopsy     Thrombocytopenia (H)       Personal Hx:  Social History     Social History     Marital status: Single     Spouse name: N/A     Number of children: 2     Years of education: N/A     Occupational History      Unemployed     Social History Main Topics     Smoking status: Light Tobacco Smoker     Types: Cigars     Smokeless tobacco: Never Used     Alcohol use 0.0 oz/week     0 Standard drinks or equivalent per week      Comment: Minimal     Drug use: No     Sexual activity: Yes     Partners: Female     Other Topics Concern     Blood Transfusions Yes     Caffeine Concern No     Social History Narrative       Allergies:  Allergies   Allergen Reactions     Cephalosporins Unknown     Cyclosporine      Duricef [Cefadroxil]        Vitals:  BP (!) 174/92  Pulse 73  Resp 18  Ht 1.778 m (5' 10\")  Wt 94.1 kg (207 lb 6.4 oz)  BMI 29.76 kg/m2  Exam:   GENERAL APPEARANCE: alert and no distress  HENT: mouth without ulcers or lesions  LYMPHATICS: no cervical or supraclavicular nodes, palpable left inguinal node  RESP: lungs clear to auscultation - no rales, rhonchi or wheezes  CV: regular rhythm, normal rate, no rub, no murmur  EDEMA: " trace LE edema bilaterally historically ( L >R) more even today   ABDOMEN: soft, nondistended, nontender, bowel sounds normal  MS: extremities normal - no gross deformities noted, no evidence of inflammation in joints, no muscle tenderness  SKIN: no rash    Results:  Recent Results (from the past 336 hour(s))   Renal panel    Collection Time: 05/08/18  8:12 AM   Result Value Ref Range    Sodium 140 133 - 144 mmol/L    Potassium 4.5 3.4 - 5.3 mmol/L    Chloride 113 (H) 94 - 109 mmol/L    Carbon Dioxide 18 (L) 20 - 32 mmol/L    Anion Gap 9 3 - 14 mmol/L    Glucose 138 (H) 70 - 99 mg/dL    Urea Nitrogen 45 (H) 7 - 30 mg/dL    Creatinine 2.73 (H) 0.66 - 1.25 mg/dL    GFR Estimate 25 (L) >60 mL/min/1.7m2    GFR Estimate If Black 30 (L) >60 mL/min/1.7m2    Calcium 8.3 (L) 8.5 - 10.1 mg/dL    Phosphorus 4.0 2.5 - 4.5 mg/dL    Albumin 3.9 3.4 - 5.0 g/dL   Protein  random urine with Creat Ratio    Collection Time: 05/08/18  8:12 AM   Result Value Ref Range    Protein Random Urine 0.17 g/L    Protein Total Urine g/gr Creatinine 0.15 0 - 0.2 g/g Cr   Ferritin    Collection Time: 05/08/18  8:12 AM   Result Value Ref Range    Ferritin 414 (H) 26 - 388 ng/mL   Iron and iron binding capacity    Collection Time: 05/08/18  8:12 AM   Result Value Ref Range    Iron 46 35 - 180 ug/dL    Iron Binding Cap 204 (L) 240 - 430 ug/dL    Iron Saturation Index 23 15 - 46 %   Parathyroid Hormone Intact    Collection Time: 05/08/18  8:12 AM   Result Value Ref Range    Parathyroid Hormone Intact 110 (H) 18 - 80 pg/mL   Vitamin D Deficiency    Collection Time: 05/08/18  8:12 AM   Result Value Ref Range    Vitamin D Deficiency screening 42 20 - 75 ug/L   T cell subset profile    Collection Time: 05/08/18  8:12 AM   Result Value Ref Range    IFC Specimen Blood     CD3 Mature T 52 49 - 84 %    CD4 Hull T 28 28 - 63 %    CD8 Suppressor T 27 10 - 40 %    CD4:CD8 Ratio 1.04 (L) 1.40 - 2.60    Absolute CD3 249 (L) 603 - 2990 cells/uL    Absolute CD4 136  (L) 441 - 2156 cells/uL    Absolute CD8 132 125 - 1312 cells/uL   CBC with platelets differential    Collection Time: 05/08/18  8:12 AM   Result Value Ref Range    WBC 3.5 (L) 4.0 - 11.0 10e9/L    RBC Count 4.00 (L) 4.4 - 5.9 10e12/L    Hemoglobin 11.2 (L) 13.3 - 17.7 g/dL    Hematocrit 36.2 (L) 40.0 - 53.0 %    MCV 91 78 - 100 fl    MCH 28.0 26.5 - 33.0 pg    MCHC 30.9 (L) 31.5 - 36.5 g/dL    RDW 14.9 10.0 - 15.0 %    Platelet Count 145 (L) 150 - 450 10e9/L    Diff Method Automated Method     % Neutrophils 71.8 %    % Lymphocytes 9.8 %    % Monocytes 15.8 %    % Eosinophils 1.7 %    % Basophils 0.6 %    % Immature Granulocytes 0.3 %    Nucleated RBCs 0 0 /100    Absolute Neutrophil 2.5 1.6 - 8.3 10e9/L    Absolute Lymphocytes 0.3 (L) 0.8 - 5.3 10e9/L    Absolute Monocytes 0.6 0.0 - 1.3 10e9/L    Absolute Eosinophils 0.1 0.0 - 0.7 10e9/L    Absolute Basophils 0.0 0.0 - 0.2 10e9/L    Abs Immature Granulocytes 0.0 0 - 0.4 10e9/L    Absolute Nucleated RBC 0.0    Tacrolimus level    Collection Time: 05/08/18  8:13 AM   Result Value Ref Range    Tacrolimus Last Dose 0900 05/07/2018     Tacrolimus Level 3.5 (L) 5.0 - 15.0 ug/L

## 2018-05-08 ENCOUNTER — RESULTS ONLY (OUTPATIENT)
Dept: OTHER | Facility: CLINIC | Age: 48
End: 2018-05-08

## 2018-05-08 ENCOUNTER — TELEPHONE (OUTPATIENT)
Dept: NEPHROLOGY | Facility: CLINIC | Age: 48
End: 2018-05-08

## 2018-05-08 DIAGNOSIS — D84.9 IMMUNOSUPPRESSED STATUS (H): ICD-10-CM

## 2018-05-08 DIAGNOSIS — Z48.298 AFTERCARE FOLLOWING ORGAN TRANSPLANT: ICD-10-CM

## 2018-05-08 DIAGNOSIS — E55.9 VITAMIN D DEFICIENCY: ICD-10-CM

## 2018-05-08 DIAGNOSIS — Z94.0 STATUS POST KIDNEY TRANSPLANT: ICD-10-CM

## 2018-05-08 DIAGNOSIS — D63.1 ANEMIA IN CHRONIC KIDNEY DISEASE (CODE): ICD-10-CM

## 2018-05-08 DIAGNOSIS — Z94.0 KIDNEY REPLACED BY TRANSPLANT: ICD-10-CM

## 2018-05-08 DIAGNOSIS — D64.9 ANEMIA: Primary | ICD-10-CM

## 2018-05-08 LAB
ALBUMIN SERPL-MCNC: 3.9 G/DL (ref 3.4–5)
ANION GAP SERPL CALCULATED.3IONS-SCNC: 9 MMOL/L (ref 3–14)
BASOPHILS # BLD AUTO: 0 10E9/L (ref 0–0.2)
BASOPHILS NFR BLD AUTO: 0.6 %
BUN SERPL-MCNC: 45 MG/DL (ref 7–30)
CALCIUM SERPL-MCNC: 8.3 MG/DL (ref 8.5–10.1)
CD3 CELLS # BLD: 249 CELLS/UL (ref 603–2990)
CD3 CELLS NFR BLD: 52 % (ref 49–84)
CD3+CD4+ CELLS # BLD: 136 CELLS/UL (ref 441–2156)
CD3+CD4+ CELLS NFR BLD: 28 % (ref 28–63)
CD3+CD4+ CELLS/CD3+CD8+ CLL BLD: 1.04 % (ref 1.4–2.6)
CD3+CD8+ CELLS # BLD: 132 CELLS/UL (ref 125–1312)
CD3+CD8+ CELLS NFR BLD: 27 % (ref 10–40)
CHLORIDE SERPL-SCNC: 113 MMOL/L (ref 94–109)
CO2 SERPL-SCNC: 18 MMOL/L (ref 20–32)
CREAT SERPL-MCNC: 2.73 MG/DL (ref 0.66–1.25)
DEPRECATED CALCIDIOL+CALCIFEROL SERPL-MC: 42 UG/L (ref 20–75)
DIFFERENTIAL METHOD BLD: ABNORMAL
EOSINOPHIL # BLD AUTO: 0.1 10E9/L (ref 0–0.7)
EOSINOPHIL NFR BLD AUTO: 1.7 %
ERYTHROCYTE [DISTWIDTH] IN BLOOD BY AUTOMATED COUNT: 14.9 % (ref 10–15)
FERRITIN SERPL-MCNC: 414 NG/ML (ref 26–388)
GFR SERPL CREATININE-BSD FRML MDRD: 25 ML/MIN/1.7M2
GLUCOSE SERPL-MCNC: 138 MG/DL (ref 70–99)
HCT VFR BLD AUTO: 36.2 % (ref 40–53)
HGB BLD-MCNC: 11.2 G/DL (ref 13.3–17.7)
IFC SPECIMEN: ABNORMAL
IMM GRANULOCYTES # BLD: 0 10E9/L (ref 0–0.4)
IMM GRANULOCYTES NFR BLD: 0.3 %
IRON SATN MFR SERPL: 23 % (ref 15–46)
IRON SERPL-MCNC: 46 UG/DL (ref 35–180)
LYMPHOCYTES # BLD AUTO: 0.3 10E9/L (ref 0.8–5.3)
LYMPHOCYTES NFR BLD AUTO: 9.8 %
MCH RBC QN AUTO: 28 PG (ref 26.5–33)
MCHC RBC AUTO-ENTMCNC: 30.9 G/DL (ref 31.5–36.5)
MCV RBC AUTO: 91 FL (ref 78–100)
MONOCYTES # BLD AUTO: 0.6 10E9/L (ref 0–1.3)
MONOCYTES NFR BLD AUTO: 15.8 %
NEUTROPHILS # BLD AUTO: 2.5 10E9/L (ref 1.6–8.3)
NEUTROPHILS NFR BLD AUTO: 71.8 %
NRBC # BLD AUTO: 0 10*3/UL
NRBC BLD AUTO-RTO: 0 /100
PHOSPHATE SERPL-MCNC: 4 MG/DL (ref 2.5–4.5)
PLATELET # BLD AUTO: 145 10E9/L (ref 150–450)
POTASSIUM SERPL-SCNC: 4.5 MMOL/L (ref 3.4–5.3)
PROT UR-MCNC: 0.17 G/L
PROT/CREAT 24H UR: 0.15 G/G CR (ref 0–0.2)
PTH-INTACT SERPL-MCNC: 110 PG/ML (ref 18–80)
RBC # BLD AUTO: 4 10E12/L (ref 4.4–5.9)
SODIUM SERPL-SCNC: 140 MMOL/L (ref 133–144)
TACROLIMUS BLD-MCNC: 3.5 UG/L (ref 5–15)
TIBC SERPL-MCNC: 204 UG/DL (ref 240–430)
TME LAST DOSE: ABNORMAL H
WBC # BLD AUTO: 3.5 10E9/L (ref 4–11)

## 2018-05-08 PROCEDURE — 87799 DETECT AGENT NOS DNA QUANT: CPT | Performed by: INTERNAL MEDICINE

## 2018-05-08 PROCEDURE — 80197 ASSAY OF TACROLIMUS: CPT | Performed by: INTERNAL MEDICINE

## 2018-05-08 PROCEDURE — 86359 T CELLS TOTAL COUNT: CPT | Performed by: INTERNAL MEDICINE

## 2018-05-08 PROCEDURE — 83970 ASSAY OF PARATHORMONE: CPT | Performed by: INTERNAL MEDICINE

## 2018-05-08 PROCEDURE — 86832 HLA CLASS I HIGH DEFIN QUAL: CPT | Performed by: INTERNAL MEDICINE

## 2018-05-08 PROCEDURE — 86833 HLA CLASS II HIGH DEFIN QUAL: CPT | Performed by: INTERNAL MEDICINE

## 2018-05-08 PROCEDURE — 82306 VITAMIN D 25 HYDROXY: CPT | Performed by: INTERNAL MEDICINE

## 2018-05-08 PROCEDURE — 86360 T CELL ABSOLUTE COUNT/RATIO: CPT | Performed by: INTERNAL MEDICINE

## 2018-05-08 PROCEDURE — 80180 DRUG SCRN QUAN MYCOPHENOLATE: CPT | Performed by: INTERNAL MEDICINE

## 2018-05-08 RX ORDER — FERROUS SULFATE 325(65) MG
1 TABLET ORAL DAILY
Qty: 90 TABLET | Refills: 3 | Status: ON HOLD | OUTPATIENT
Start: 2018-05-08 | End: 2021-02-26

## 2018-05-08 RX ORDER — SULFAMETHOXAZOLE AND TRIMETHOPRIM 400; 80 MG/1; MG/1
1 TABLET ORAL
Qty: 45 TABLET | Refills: 3 | Status: SHIPPED | OUTPATIENT
Start: 2018-05-09 | End: 2020-11-02

## 2018-05-08 NOTE — TELEPHONE ENCOUNTER
Per Dr. Collins:    Needs to start daily iron supplement 325 mg daily    Called patient, who agreed to plan. Rx sent.    Erin Jefferson RN

## 2018-05-09 LAB
BKV DNA # SPEC NAA+PROBE: NORMAL COPIES/ML
BKV DNA SPEC NAA+PROBE-LOG#: NORMAL LOG COPIES/ML
MYCOPHENOLATE SERPL LC/MS/MS-MCNC: 1.63 MG/L (ref 1–3.5)
MYCOPHENOLATE-G SERPL LC/MS/MS-MCNC: 128.9 MG/L (ref 30–95)
PRA DONOR SPECIFIC ABY: NORMAL
PROTOCOL CUTOFF: NORMAL
SPECIMEN SOURCE: NORMAL
TME LAST DOSE: ABNORMAL H
UNACCEPTABLE ANTIGEN: NORMAL

## 2018-05-10 DIAGNOSIS — Z94.0 S/P KIDNEY TRANSPLANT: ICD-10-CM

## 2018-05-10 DIAGNOSIS — Z94.0 KIDNEY REPLACED BY TRANSPLANT: Primary | ICD-10-CM

## 2018-05-10 DIAGNOSIS — D84.9 IMMUNOSUPPRESSED STATUS (H): ICD-10-CM

## 2018-05-10 LAB
A1: 1334
A30: 4052
B13: 1340
CW6: 2002
DONOR IDENTIFICATION: NORMAL
DSA COMMENTS: NORMAL
DSA PRESENT: YES
DSA TEST METHOD: NORMAL
ORGAN: NORMAL
SA1 CELL: NORMAL
SA1 COMMENTS: NORMAL
SA1 HI RISK ABY: NORMAL
SA1 MOD RISK ABY: NORMAL
SA1 TEST METHOD: NORMAL
SA2 CELL: NORMAL
SA2 COMMENTS: NORMAL
SA2 HI RISK ABY UA: NORMAL
SA2 MOD RISK ABY: NORMAL
SA2 TEST METHOD: NORMAL

## 2018-05-10 NOTE — TELEPHONE ENCOUNTER
Kole Collins MD Etcheverry, Katherine, RN                     Please increase enversus to 22 mg daily   Ask him to refill bactrim ss to take M,w,f (I sent the script ot the mail order pharmacy

## 2018-05-10 NOTE — TELEPHONE ENCOUNTER
Call placed to patient:  Patient verbalize understanding to increase his astagraf dose to 22 mg QD, begin bactrim as ordered and repeat labs next week. Order placed.

## 2018-05-22 ENCOUNTER — CARE COORDINATION (OUTPATIENT)
Dept: NEPHROLOGY | Facility: CLINIC | Age: 48
End: 2018-05-22

## 2018-05-23 NOTE — PROGRESS NOTES
Reason for Call    Spoke with patient. He has not been checking BP at home. He stopped the medication a week ago due to severe headaches. He agreed to start checking BP daily.    Collaboration    Dr. Collins updated.    Patient Education    1. Call this nurse if systolic (top number) blood pressure is consistently <110 or >160 for further instructions.     Plan    1. Start monitoring BP daily  2. Follow up in 2 weeks    Patient was given an opportunity to ask questions and have those questions answered to his satisfaction.  Patient verbalized understanding of instructions provided and agreed to plan of care.    Erin Jefferson RN

## 2018-05-24 DIAGNOSIS — N25.81 SECONDARY RENAL HYPERPARATHYROIDISM (H): ICD-10-CM

## 2018-05-24 DIAGNOSIS — E55.9 VITAMIN D DEFICIENCY: ICD-10-CM

## 2018-05-24 DIAGNOSIS — Z48.298 AFTERCARE FOLLOWING ORGAN TRANSPLANT: ICD-10-CM

## 2018-05-24 DIAGNOSIS — Z94.0 KIDNEY REPLACED BY TRANSPLANT: ICD-10-CM

## 2018-05-24 DIAGNOSIS — D84.9 IMMUNOSUPPRESSED STATUS (H): ICD-10-CM

## 2018-05-24 DIAGNOSIS — Z94.0 S/P KIDNEY TRANSPLANT: ICD-10-CM

## 2018-05-24 LAB
ALBUMIN SERPL-MCNC: 3.6 G/DL (ref 3.4–5)
ANION GAP SERPL CALCULATED.3IONS-SCNC: 7 MMOL/L (ref 3–14)
BUN SERPL-MCNC: 37 MG/DL (ref 7–30)
CALCIUM SERPL-MCNC: 7.7 MG/DL (ref 8.5–10.1)
CHLORIDE SERPL-SCNC: 110 MMOL/L (ref 94–109)
CO2 SERPL-SCNC: 22 MMOL/L (ref 20–32)
CREAT SERPL-MCNC: 2.97 MG/DL (ref 0.66–1.25)
DEPRECATED CALCIDIOL+CALCIFEROL SERPL-MC: 39 UG/L (ref 20–75)
ERYTHROCYTE [DISTWIDTH] IN BLOOD BY AUTOMATED COUNT: 15.3 % (ref 10–15)
GFR SERPL CREATININE-BSD FRML MDRD: 23 ML/MIN/1.7M2
GLUCOSE SERPL-MCNC: 105 MG/DL (ref 70–99)
HCT VFR BLD AUTO: 31.5 % (ref 40–53)
HGB BLD-MCNC: 9.9 G/DL (ref 13.3–17.7)
MCH RBC QN AUTO: 28.4 PG (ref 26.5–33)
MCHC RBC AUTO-ENTMCNC: 31.4 G/DL (ref 31.5–36.5)
MCV RBC AUTO: 91 FL (ref 78–100)
PHOSPHATE SERPL-MCNC: 4.1 MG/DL (ref 2.5–4.5)
PLATELET # BLD AUTO: 110 10E9/L (ref 150–450)
POTASSIUM SERPL-SCNC: 4.5 MMOL/L (ref 3.4–5.3)
PTH-INTACT SERPL-MCNC: 120 PG/ML (ref 18–80)
RBC # BLD AUTO: 3.48 10E12/L (ref 4.4–5.9)
SODIUM SERPL-SCNC: 140 MMOL/L (ref 133–144)
TACROLIMUS BLD-MCNC: <3 UG/L (ref 5–15)
TME LAST DOSE: ABNORMAL H
WBC # BLD AUTO: 3 10E9/L (ref 4–11)

## 2018-05-24 PROCEDURE — 80197 ASSAY OF TACROLIMUS: CPT | Performed by: INTERNAL MEDICINE

## 2018-05-24 PROCEDURE — 82306 VITAMIN D 25 HYDROXY: CPT | Performed by: INTERNAL MEDICINE

## 2018-05-24 PROCEDURE — 83970 ASSAY OF PARATHORMONE: CPT | Performed by: INTERNAL MEDICINE

## 2018-05-25 ENCOUNTER — TELEPHONE (OUTPATIENT)
Dept: TRANSPLANT | Facility: CLINIC | Age: 48
End: 2018-05-25

## 2018-05-25 DIAGNOSIS — Z94.0 KIDNEY TRANSPLANTED: Primary | ICD-10-CM

## 2018-05-25 DIAGNOSIS — Z94.0 KIDNEY REPLACED BY TRANSPLANT: ICD-10-CM

## 2018-05-25 DIAGNOSIS — D84.9 IMMUNOSUPPRESSED STATUS (H): ICD-10-CM

## 2018-05-25 DIAGNOSIS — Z94.0 S/P KIDNEY TRANSPLANT: ICD-10-CM

## 2018-05-25 NOTE — TELEPHONE ENCOUNTER
ISSUE:  Tacrolimus level <3.  Goal 4-6    OUTCOME:   Spoke with pt regarding drug level.  Pt verbalizes good trough level and denies missing any doses.  Pt confirms he has been taking Astagraf 22 mg QD since 5/10/18. Pt encouraged to increase dose to 25 mg QD and will recheck labs next week.      PT questions answered, pt v/u.

## 2018-06-05 DIAGNOSIS — Z94.0 KIDNEY TRANSPLANTED: ICD-10-CM

## 2018-06-05 DIAGNOSIS — Z48.298 AFTERCARE FOLLOWING ORGAN TRANSPLANT: ICD-10-CM

## 2018-06-05 DIAGNOSIS — Z94.0 KIDNEY REPLACED BY TRANSPLANT: ICD-10-CM

## 2018-06-05 LAB
ANION GAP SERPL CALCULATED.3IONS-SCNC: 8 MMOL/L (ref 3–14)
BUN SERPL-MCNC: 34 MG/DL (ref 7–30)
CALCIUM SERPL-MCNC: 7.9 MG/DL (ref 8.5–10.1)
CHLORIDE SERPL-SCNC: 110 MMOL/L (ref 94–109)
CO2 SERPL-SCNC: 23 MMOL/L (ref 20–32)
CREAT SERPL-MCNC: 2.99 MG/DL (ref 0.66–1.25)
ERYTHROCYTE [DISTWIDTH] IN BLOOD BY AUTOMATED COUNT: 15.1 % (ref 10–15)
GFR SERPL CREATININE-BSD FRML MDRD: 23 ML/MIN/1.7M2
GLUCOSE SERPL-MCNC: 77 MG/DL (ref 70–99)
HCT VFR BLD AUTO: 33.5 % (ref 40–53)
HGB BLD-MCNC: 10.2 G/DL (ref 13.3–17.7)
MCH RBC QN AUTO: 27.6 PG (ref 26.5–33)
MCHC RBC AUTO-ENTMCNC: 30.4 G/DL (ref 31.5–36.5)
MCV RBC AUTO: 91 FL (ref 78–100)
PLATELET # BLD AUTO: 118 10E9/L (ref 150–450)
POTASSIUM SERPL-SCNC: 4.3 MMOL/L (ref 3.4–5.3)
RBC # BLD AUTO: 3.69 10E12/L (ref 4.4–5.9)
SODIUM SERPL-SCNC: 141 MMOL/L (ref 133–144)
TACROLIMUS BLD-MCNC: <3 UG/L (ref 5–15)
TME LAST DOSE: ABNORMAL H
WBC # BLD AUTO: 3 10E9/L (ref 4–11)

## 2018-06-05 PROCEDURE — 80197 ASSAY OF TACROLIMUS: CPT | Performed by: INTERNAL MEDICINE

## 2018-06-06 ENCOUNTER — TELEPHONE (OUTPATIENT)
Dept: TRANSPLANT | Facility: CLINIC | Age: 48
End: 2018-06-06

## 2018-06-06 DIAGNOSIS — Z94.0 KIDNEY TRANSPLANTED: Primary | ICD-10-CM

## 2018-06-06 NOTE — TELEPHONE ENCOUNTER
ISSUE:  Tacrolimus level <3    OUTCOME:   Confirm accurate trough level.  Pt denies missing any doses, recent illnesses or medication changes.  Pt currently taking astagraf 25 mg QD.  Pt will increase to 27 mg QD and recheck level next Tuesday 6/12/18.   Pt questions answered, pt v/u.

## 2018-06-07 ENCOUNTER — TELEPHONE (OUTPATIENT)
Dept: TRANSPLANT | Facility: CLINIC | Age: 48
End: 2018-06-07

## 2018-06-07 DIAGNOSIS — I10 ESSENTIAL HYPERTENSION: ICD-10-CM

## 2018-06-07 NOTE — TELEPHONE ENCOUNTER
Post Kidney and Pancreas Transplant Team Conference  Date: 6/7/2018  Transplant Coordinator: Aundrea Cruz, Lety Ornelas     Attendees:    []  Dr. Chavis  [x] Yelitza Cruz, ELIU  [x] Enedina Araiza LPN    []  Dr. Collins  [] Lorie Jones RN  [] Linn Paiz LPN  [x]  Dr. Marrero   [] Daja Thompson RN  []  Dr. Johnson   [x] Katarzyna Wadsworth RN  [] Dr. Canas  [] Aundrea Oreilly RN  [] Dr. Romeo   [] Cipriano Art RN  [] Dr. Ordonez [x] Maggy Brian RN  [] Surgery Fellow [x] Jocelyn Najera RN  [] Violette Chairez NP    Verbal Plan Read Back:   Increase Cardizem 480 mg daily.  Check to see if patient is taking Astagraf on an empty stomach?      Routed to RN Coordinator   Enedina Araiza

## 2018-06-08 RX ORDER — DILTIAZEM HYDROCHLORIDE 240 MG/1
480 CAPSULE, COATED, EXTENDED RELEASE ORAL DAILY
Qty: 90 CAPSULE | Refills: 3 | Status: SHIPPED | OUTPATIENT
Start: 2018-06-08 | End: 2018-06-15

## 2018-06-08 NOTE — TELEPHONE ENCOUNTER
ISSUE:  Pt reviewed in [facility] Kidney meeting.  Pt tac level <3 on astagraf 27 mg QD.     PLAN:   Increase Cardizem 480 mg daily.  Check to see if patient is taking Astagraf on an empty stomach?      OUTCOME:   Spoke with pt regarding above plan.  Pt verbalizes he is taking Astagraf on an empty stomach-pt encouraged to cont taking on empty stomach d/t absorption.  Pt will increase his Cardizem dose to 480 starting tomorrow.  Pt educated to monitor BP and HR daily. Pt will f/u with Erin CAMARILLO RN regarding his vital signs.  Pt to report any dizziness or lightheadedness from possible low BP.  Erin CAMARILLO RN notified of dose increase.    Pt questions answered, pt v/u.

## 2018-06-14 DIAGNOSIS — Z94.0 KIDNEY TRANSPLANTED: ICD-10-CM

## 2018-06-14 LAB
TACROLIMUS BLD-MCNC: 3.2 UG/L (ref 5–15)
TME LAST DOSE: ABNORMAL H

## 2018-06-14 PROCEDURE — 80197 ASSAY OF TACROLIMUS: CPT | Performed by: INTERNAL MEDICINE

## 2018-06-15 DIAGNOSIS — Z94.0 S/P KIDNEY TRANSPLANT: ICD-10-CM

## 2018-06-15 DIAGNOSIS — I10 ESSENTIAL HYPERTENSION: ICD-10-CM

## 2018-06-15 DIAGNOSIS — D84.9 IMMUNOSUPPRESSED STATUS (H): ICD-10-CM

## 2018-06-15 DIAGNOSIS — Z94.0 KIDNEY REPLACED BY TRANSPLANT: Primary | ICD-10-CM

## 2018-06-15 RX ORDER — DILTIAZEM HYDROCHLORIDE 240 MG/1
240 CAPSULE, COATED, EXTENDED RELEASE ORAL DAILY
Qty: 90 CAPSULE | Refills: 3 | Status: SHIPPED | OUTPATIENT
Start: 2018-06-15 | End: 2019-01-24

## 2018-06-15 NOTE — TELEPHONE ENCOUNTER
ISSUE:  Tacro level 3.2.  Goal 4-6.  Current Astagraf dose 27 mg QD.      PLAN:   Per Verbal order from Dr Collins:  Pt to decrease his diltiazem back to 240 mg QD.  Pt to report his BP and HR from this last week.  Pt to cont monitoring BP and HR and report it to nephrology nurses.    Pt to increase his Astagraf to 30 mg QD and repeat labs in 1 week following dose change.      LPN TASK:   Please call pt to review above plan with him.  Please ensure accurate 12 hour trough.

## 2018-06-15 NOTE — TELEPHONE ENCOUNTER
Call placed to patient. Patient confirms current Astrograf dose of 25 mg QD and accurate trough level. Patient v\u to.... Send [employer] message to nephrology nurse with updated BP and HR... Decrease diltiazem to 240 mg QD... Increase Astrograf to 28 mg QD and repeat level in 1 week. Order/rx sent

## 2018-06-28 ENCOUNTER — TELEPHONE (OUTPATIENT)
Dept: TRANSPLANT | Facility: CLINIC | Age: 48
End: 2018-06-28

## 2018-06-28 NOTE — TELEPHONE ENCOUNTER
ISSUE:  Astagraf dose change 6/15/18.  No repeat lab    OUTCOME:   Attempted to reach pt, no answer.  Left detailed message letting him know we will want him to repeat labs to check his drug level this week or beginning of next week.  Pt encouraged to return call.

## 2018-06-29 ENCOUNTER — CARE COORDINATION (OUTPATIENT)
Dept: NEPHROLOGY | Facility: CLINIC | Age: 48
End: 2018-06-29

## 2018-07-03 ENCOUNTER — DOCUMENTATION ONLY (OUTPATIENT)
Dept: TRANSPLANT | Facility: CLINIC | Age: 48
End: 2018-07-03

## 2018-07-03 NOTE — PROGRESS NOTES
Per workflow and recommendations from PFR Maureen Pittman,, kidney transplant referral is closed due to insurance issues.

## 2018-07-05 ENCOUNTER — TELEPHONE (OUTPATIENT)
Dept: TRANSPLANT | Facility: CLINIC | Age: 48
End: 2018-07-05

## 2018-07-05 NOTE — TELEPHONE ENCOUNTER
ISSUE:  No repeat labs since Astagraf dose change 6/15/18  Prior tacrolimus levels have been subtherapeutic.     OUTCOME:   Spoke with pt regarding need to repeat drug level to ensure appropriate Asagraf dose. Pt confirmed he is on astagraf 28 mg QD.    Pt verbalized he will have transplant labs done first of next week.   Pt has standing orders.     Pt questions answered, pt v/u.

## 2018-07-18 DIAGNOSIS — Z94.0 KIDNEY REPLACED BY TRANSPLANT: ICD-10-CM

## 2018-07-18 DIAGNOSIS — Z48.298 AFTERCARE FOLLOWING ORGAN TRANSPLANT: ICD-10-CM

## 2018-07-18 LAB
ANION GAP SERPL CALCULATED.3IONS-SCNC: 8 MMOL/L (ref 3–14)
BUN SERPL-MCNC: 41 MG/DL (ref 7–30)
CALCIUM SERPL-MCNC: 7.7 MG/DL (ref 8.5–10.1)
CHLORIDE SERPL-SCNC: 112 MMOL/L (ref 94–109)
CO2 SERPL-SCNC: 19 MMOL/L (ref 20–32)
CREAT SERPL-MCNC: 2.95 MG/DL (ref 0.66–1.25)
ERYTHROCYTE [DISTWIDTH] IN BLOOD BY AUTOMATED COUNT: 15.4 % (ref 10–15)
GFR SERPL CREATININE-BSD FRML MDRD: 23 ML/MIN/1.7M2
GLUCOSE SERPL-MCNC: 105 MG/DL (ref 70–99)
HCT VFR BLD AUTO: 32.6 % (ref 40–53)
HGB BLD-MCNC: 10.2 G/DL (ref 13.3–17.7)
MCH RBC QN AUTO: 28.2 PG (ref 26.5–33)
MCHC RBC AUTO-ENTMCNC: 31.3 G/DL (ref 31.5–36.5)
MCV RBC AUTO: 90 FL (ref 78–100)
PLATELET # BLD AUTO: 125 10E9/L (ref 150–450)
POTASSIUM SERPL-SCNC: 4.4 MMOL/L (ref 3.4–5.3)
RBC # BLD AUTO: 3.62 10E12/L (ref 4.4–5.9)
SODIUM SERPL-SCNC: 139 MMOL/L (ref 133–144)
TACROLIMUS BLD-MCNC: 4 UG/L (ref 5–15)
TME LAST DOSE: ABNORMAL H
WBC # BLD AUTO: 3.1 10E9/L (ref 4–11)

## 2018-07-18 PROCEDURE — 80197 ASSAY OF TACROLIMUS: CPT | Performed by: INTERNAL MEDICINE

## 2018-07-19 ENCOUNTER — TELEPHONE (OUTPATIENT)
Dept: TRANSPLANT | Facility: CLINIC | Age: 48
End: 2018-07-19

## 2018-07-19 NOTE — TELEPHONE ENCOUNTER
ISSUE:  Tacrolimus level 4 at goal    OUTCOME:   Spoke with pt regarding level.  Pt confirms he is taking Astagraf 28 mg every day.  Pt will get transplant labs in 2 weeks to ensure tacrolimus level remains at goal.  Pt will cont hydrating well.      Pt questions answered, pt v/u.

## 2018-07-20 ENCOUNTER — CARE COORDINATION (OUTPATIENT)
Dept: NEPHROLOGY | Facility: CLINIC | Age: 48
End: 2018-07-20

## 2018-07-20 NOTE — PROGRESS NOTES
Reason for Call    Spoke with patient. States he's feeling well, denied any symptoms or concerns at this time. BP remains steady around 140/90's. He has questions about transplant eval workup, thought he was supposed to have an appointment yesterday. Sent message to pre-transplant coordinator to follow up.    He also reported a change in phone numbers. His new personal cell phone will be [phone]. His work number will now be [phone]. Demographics updated.    Patient Education    1. Call this nurse if systolic (top number) blood pressure is consistently <110 or >160 for further instructions.     Plan    1. Continue to monitor BP  2. Follow up in clinic as scheduled on 8/10    Patient was given an opportunity to ask questions and have those questions answered to his satisfaction.  Patient verbalized understanding of instructions provided and agreed to plan of care.    Erin Jefferson RN

## 2018-08-08 ENCOUNTER — RESULTS ONLY (OUTPATIENT)
Dept: OTHER | Facility: CLINIC | Age: 48
End: 2018-08-08

## 2018-08-08 DIAGNOSIS — N18.9 CHRONIC RENAL FAILURE: ICD-10-CM

## 2018-08-08 DIAGNOSIS — N39.0 CHRONIC UTI (URINARY TRACT INFECTION): ICD-10-CM

## 2018-08-08 DIAGNOSIS — E78.5 HYPERLIPIDEMIA: ICD-10-CM

## 2018-08-08 DIAGNOSIS — Z11.59 ENCOUNTER FOR SCREENING FOR OTHER VIRAL DISEASES (CODE): ICD-10-CM

## 2018-08-08 DIAGNOSIS — Z87.891 HISTORY OF TOBACCO USE: ICD-10-CM

## 2018-08-08 DIAGNOSIS — T86.12 KIDNEY TRANSPLANT FAILURE: ICD-10-CM

## 2018-08-08 DIAGNOSIS — Z91.199 PERSONAL HISTORY OF NONCOMPLIANCE WITH MEDICAL TREATMENT, PRESENTING HAZARDS TO HEALTH: ICD-10-CM

## 2018-08-08 DIAGNOSIS — I25.10 CARDIOVASCULAR DISEASE: ICD-10-CM

## 2018-08-08 DIAGNOSIS — T86.91 TRANSPLANT FAILURE DUE TO REJECTION: ICD-10-CM

## 2018-08-08 DIAGNOSIS — Z76.82 ORGAN TRANSPLANT CANDIDATE: ICD-10-CM

## 2018-08-08 DIAGNOSIS — I10 ESSENTIAL HYPERTENSION: ICD-10-CM

## 2018-08-08 DIAGNOSIS — Z12.5 ENCOUNTER FOR SCREENING FOR MALIGNANT NEOPLASM OF PROSTATE: ICD-10-CM

## 2018-08-08 LAB
ABO + RH BLD: NORMAL
ALBUMIN SERPL-MCNC: 3.5 G/DL (ref 3.4–5)
ALP SERPL-CCNC: 47 U/L (ref 40–150)
ALT SERPL W P-5'-P-CCNC: 12 U/L (ref 0–70)
ANION GAP SERPL CALCULATED.3IONS-SCNC: 8 MMOL/L (ref 3–14)
APTT PPP: 35 SEC (ref 22–37)
AST SERPL W P-5'-P-CCNC: 12 U/L (ref 0–45)
BASOPHILS # BLD AUTO: 0 10E9/L (ref 0–0.2)
BASOPHILS NFR BLD AUTO: 0.7 %
BILIRUB SERPL-MCNC: 0.3 MG/DL (ref 0.2–1.3)
BLD GP AB SCN SERPL QL: NORMAL
BLD GP AB SCN TITR SERPL: NORMAL {TITER}
BLOOD BANK CMNT PATIENT-IMP: NORMAL
BLOOD BANK CMNT PATIENT-IMP: NORMAL
BUN SERPL-MCNC: 38 MG/DL (ref 7–30)
CALCIUM SERPL-MCNC: 8 MG/DL (ref 8.5–10.1)
CARDIOLIPIN ANTIBODY IGG: 1.7 GPL-U/ML (ref 0–19.9)
CARDIOLIPIN ANTIBODY IGM: 27.3 MPL-U/ML (ref 0–19.9)
CHLORIDE SERPL-SCNC: 109 MMOL/L (ref 94–109)
CHOLEST SERPL-MCNC: 129 MG/DL
CMV IGG SERPL QL IA: >8 AI (ref 0–0.8)
CO2 SERPL-SCNC: 21 MMOL/L (ref 20–32)
CREAT SERPL-MCNC: 2.6 MG/DL (ref 0.66–1.25)
DIFFERENTIAL METHOD BLD: ABNORMAL
EBV VCA IGG SER QL IA: 7.3 AI (ref 0–0.8)
EOSINOPHIL # BLD AUTO: 0.1 10E9/L (ref 0–0.7)
EOSINOPHIL NFR BLD AUTO: 2.1 %
ERYTHROCYTE [DISTWIDTH] IN BLOOD BY AUTOMATED COUNT: 15.2 % (ref 10–15)
GFR SERPL CREATININE-BSD FRML MDRD: 27 ML/MIN/1.7M2
GLUCOSE SERPL-MCNC: 76 MG/DL (ref 70–99)
HBV CORE AB SERPL QL IA: NONREACTIVE
HBV SURFACE AB SERPL IA-ACNC: 18.94 M[IU]/ML
HBV SURFACE AG SERPL QL IA: NONREACTIVE
HCT VFR BLD AUTO: 32.8 % (ref 40–53)
HCV AB SERPL QL IA: NONREACTIVE
HDLC SERPL-MCNC: 59 MG/DL
HGB BLD-MCNC: 10.3 G/DL (ref 13.3–17.7)
HIV 1+2 AB+HIV1 P24 AG SERPL QL IA: NONREACTIVE
IMM GRANULOCYTES # BLD: 0 10E9/L (ref 0–0.4)
IMM GRANULOCYTES NFR BLD: 0 %
INR PPP: 1.23 (ref 0.86–1.14)
LDLC SERPL CALC-MCNC: 58 MG/DL
LYMPHOCYTES # BLD AUTO: 0.3 10E9/L (ref 0.8–5.3)
LYMPHOCYTES NFR BLD AUTO: 8.6 %
MCH RBC QN AUTO: 27.8 PG (ref 26.5–33)
MCHC RBC AUTO-ENTMCNC: 31.4 G/DL (ref 31.5–36.5)
MCV RBC AUTO: 89 FL (ref 78–100)
MONOCYTES # BLD AUTO: 0.4 10E9/L (ref 0–1.3)
MONOCYTES NFR BLD AUTO: 14.4 %
NEUTROPHILS # BLD AUTO: 2.2 10E9/L (ref 1.6–8.3)
NEUTROPHILS NFR BLD AUTO: 74.2 %
NONHDLC SERPL-MCNC: 71 MG/DL
NRBC # BLD AUTO: 0 10*3/UL
NRBC BLD AUTO-RTO: 0 /100
PHOSPHATE SERPL-MCNC: 4.1 MG/DL (ref 2.5–4.5)
PLATELET # BLD AUTO: 116 10E9/L (ref 150–450)
POTASSIUM SERPL-SCNC: 4.3 MMOL/L (ref 3.4–5.3)
PROT SERPL-MCNC: 6.4 G/DL (ref 6.8–8.8)
PSA SERPL-ACNC: 1.71 UG/L (ref 0–4)
RBC # BLD AUTO: 3.7 10E12/L (ref 4.4–5.9)
SODIUM SERPL-SCNC: 139 MMOL/L (ref 133–144)
SPECIMEN EXP DATE BLD: NORMAL
SPECIMEN EXP DATE BLD: NORMAL
T PALLIDUM AB SER QL: NONREACTIVE
THROMBIN TIME: 16 SEC (ref 13–19)
TRIGL SERPL-MCNC: 63 MG/DL
VZV IGG SER QL IA: 4.6 AI (ref 0–0.8)
WBC # BLD AUTO: 2.9 10E9/L (ref 4–11)

## 2018-08-08 PROCEDURE — 86147 CARDIOLIPIN ANTIBODY EA IG: CPT | Performed by: PHYSICIAN ASSISTANT

## 2018-08-08 PROCEDURE — 86886 COOMBS TEST INDIRECT TITER: CPT | Performed by: PHYSICIAN ASSISTANT

## 2018-08-08 PROCEDURE — 86706 HEP B SURFACE ANTIBODY: CPT | Performed by: PHYSICIAN ASSISTANT

## 2018-08-08 PROCEDURE — 40000866 ZZHCL STATISTIC HIV 1/2 ANTIGEN/ANTIBODY PRETRANSPLANT ONLY: Performed by: PHYSICIAN ASSISTANT

## 2018-08-08 PROCEDURE — 86480 TB TEST CELL IMMUN MEASURE: CPT | Performed by: PHYSICIAN ASSISTANT

## 2018-08-08 PROCEDURE — 85597 PHOSPHOLIPID PLTLT NEUTRALIZ: CPT | Performed by: PHYSICIAN ASSISTANT

## 2018-08-08 PROCEDURE — 86787 VARICELLA-ZOSTER ANTIBODY: CPT | Performed by: PHYSICIAN ASSISTANT

## 2018-08-08 PROCEDURE — 85613 RUSSELL VIPER VENOM DILUTED: CPT | Performed by: PHYSICIAN ASSISTANT

## 2018-08-08 PROCEDURE — 86803 HEPATITIS C AB TEST: CPT | Performed by: PHYSICIAN ASSISTANT

## 2018-08-08 PROCEDURE — 85730 THROMBOPLASTIN TIME PARTIAL: CPT | Performed by: PHYSICIAN ASSISTANT

## 2018-08-08 PROCEDURE — G0499 HEPB SCREEN HIGH RISK INDIV: HCPCS | Performed by: PHYSICIAN ASSISTANT

## 2018-08-08 PROCEDURE — 86665 EPSTEIN-BARR CAPSID VCA: CPT | Performed by: PHYSICIAN ASSISTANT

## 2018-08-08 PROCEDURE — 86832 HLA CLASS I HIGH DEFIN QUAL: CPT | Performed by: INTERNAL MEDICINE

## 2018-08-08 PROCEDURE — 86780 TREPONEMA PALLIDUM: CPT | Performed by: PHYSICIAN ASSISTANT

## 2018-08-08 PROCEDURE — 86644 CMV ANTIBODY: CPT | Performed by: PHYSICIAN ASSISTANT

## 2018-08-08 PROCEDURE — 86905 BLOOD TYPING RBC ANTIGENS: CPT | Performed by: PHYSICIAN ASSISTANT

## 2018-08-08 PROCEDURE — 81376 HLA II TYPING 1 LOCUS LR: CPT | Performed by: INTERNAL MEDICINE

## 2018-08-08 PROCEDURE — 85732 THROMBOPLASTIN TIME PARTIAL: CPT | Performed by: PHYSICIAN ASSISTANT

## 2018-08-08 PROCEDURE — 86833 HLA CLASS II HIGH DEFIN QUAL: CPT | Performed by: INTERNAL MEDICINE

## 2018-08-08 PROCEDURE — 86704 HEP B CORE ANTIBODY TOTAL: CPT | Performed by: PHYSICIAN ASSISTANT

## 2018-08-08 PROCEDURE — 87799 DETECT AGENT NOS DNA QUANT: CPT | Performed by: PHYSICIAN ASSISTANT

## 2018-08-08 PROCEDURE — 85670 THROMBIN TIME PLASMA: CPT | Performed by: PHYSICIAN ASSISTANT

## 2018-08-09 LAB
BKV DNA # SPEC NAA+PROBE: NORMAL COPIES/ML
BKV DNA SPEC NAA+PROBE-LOG#: NORMAL LOG COPIES/ML
HLA TYPING COMPLETE SOT RECIPIENT: NORMAL
LA PPP-IMP: NEGATIVE
PRA SINGLE ANTIGEN IGG ANTIBODY: NORMAL
SPECIMEN SOURCE: NORMAL

## 2018-08-10 LAB
A1: 671
A30: 2273
B13: 998
CW6: 806
DONOR IDENTIFICATION: NORMAL
DSA COMMENTS: NORMAL
DSA PRESENT: YES
DSA TEST METHOD: NORMAL
GAMMA INTERFERON BACKGROUND BLD IA-ACNC: 0.03 IU/ML
M TB IFN-G BLD-IMP: NEGATIVE
M TB IFN-G CD4+ BCKGRND COR BLD-ACNC: 3.97 IU/ML
MITOGEN IGNF BCKGRD COR BLD-ACNC: 0 IU/ML
MITOGEN IGNF BCKGRD COR BLD-ACNC: 0 IU/ML
ORGAN: NORMAL
SA1 CELL: NORMAL
SA1 COMMENTS: NORMAL
SA1 HI RISK ABY: NORMAL
SA1 MOD RISK ABY: NORMAL
SA1 TEST METHOD: NORMAL
SA2 CELL: NORMAL
SA2 COMMENTS: NORMAL
SA2 HI RISK ABY UA: NORMAL
SA2 MOD RISK ABY: NORMAL
SA2 TEST METHOD: NORMAL

## 2018-08-13 LAB — COPATH REPORT: NORMAL

## 2018-09-18 ENCOUNTER — TELEPHONE (OUTPATIENT)
Dept: TRANSPLANT | Facility: CLINIC | Age: 48
End: 2018-09-18

## 2018-09-18 NOTE — TELEPHONE ENCOUNTER
Transplant Social Work Services Phone Call      Data: Out of Astagraf  Intervention: Received a message from pharmacy liaison requesting social work assistance for 8 days of Astagraf 1mg until Medicare pays again as patient ran out of 5mg which caused him to go through his 1mg's faster than normal while being out of town.  Assessment: Patient is unable to fill early due to Medicare guidelines. Transplant funds used to pay for 8 days of Astagraf, which cost $151.97.  Education provided by SW: n/a  Plan: Transplant funds used. SW to follow as needed.     Richelle Downey [facility]    Kidney/Pancreas/[facility] Transplant Programs

## 2018-10-29 ENCOUNTER — TELEPHONE (OUTPATIENT)
Dept: NEPHROLOGY | Facility: CLINIC | Age: 48
End: 2018-10-29

## 2018-10-29 NOTE — TELEPHONE ENCOUNTER
[facility] Call Center    Phone Message    May a detailed message be left on voicemail: yes    Reason for Call: Other: Anusha is a care coordinator for this pt, so she wanted you to know in case you needed anything regarding the tgransplant. Please call Anusha at [phone]. This is a private-direct line. Thanks.      Action Taken: Message routed to:  [facility]

## 2018-10-30 NOTE — TELEPHONE ENCOUNTER
RNCC spoke with Anusha, care coordinator through pt ins.  She is wondering if we have any questions or concerns with pt.  Anusha notified at this time we don't have any questions or concerns.      Call placed to Tristian in regards to needing updated labs.  Pt reports he will get in for labs this week. Pt encouraged to get labs monthly so we can cont monitoring his kidney fx and ensure drug level is at goal.   Pt questions answered, pt v/u.

## 2018-10-30 NOTE — TELEPHONE ENCOUNTER
Provider Call: General  Route to LPN    Reason for call: Care coordinator left  returning the call 10/30/18 at 1132a. Please return the call when available.     Call back needed? Yes    Return Call Needed  Same as documented in contacts section  When to return call?: Same day: Route High Priority

## 2018-11-13 DIAGNOSIS — D84.9 IMMUNOSUPPRESSED STATUS (H): ICD-10-CM

## 2018-11-13 DIAGNOSIS — T86.11 KIDNEY TRANSPLANT REJECTION: ICD-10-CM

## 2018-11-13 DIAGNOSIS — Z94.0 KIDNEY REPLACED BY TRANSPLANT: ICD-10-CM

## 2018-11-13 DIAGNOSIS — I10 ESSENTIAL HYPERTENSION: ICD-10-CM

## 2018-11-13 DIAGNOSIS — Z94.0 S/P KIDNEY TRANSPLANT: ICD-10-CM

## 2018-11-13 RX ORDER — PREDNISONE 5 MG/1
5 TABLET ORAL DAILY
Qty: 30 TABLET | Refills: 0 | Status: SHIPPED | OUTPATIENT
Start: 2018-11-13 | End: 2018-12-18

## 2018-11-13 NOTE — TELEPHONE ENCOUNTER
Drug: Astagraf 1 mg  Last Fill Date: 9/27/2018  Quantity: 90          Drug: Astagraf 5 mg  Last Fill Date: 10/16/2018  Quantity: 150          Drug: Prednisone  Last Fill Date: 4/9/2018  Quantity: 30

## 2018-12-18 DIAGNOSIS — Z94.0 S/P KIDNEY TRANSPLANT: ICD-10-CM

## 2018-12-18 DIAGNOSIS — T86.11 KIDNEY TRANSPLANT REJECTION: ICD-10-CM

## 2018-12-18 DIAGNOSIS — Z94.0 KIDNEY REPLACED BY TRANSPLANT: ICD-10-CM

## 2018-12-18 DIAGNOSIS — D84.9 IMMUNOSUPPRESSED STATUS (H): ICD-10-CM

## 2018-12-18 DIAGNOSIS — I10 ESSENTIAL HYPERTENSION: ICD-10-CM

## 2018-12-18 RX ORDER — PREDNISONE 5 MG/1
5 TABLET ORAL DAILY
Qty: 30 TABLET | Refills: 0 | Status: SHIPPED | OUTPATIENT
Start: 2018-12-18 | End: 2019-01-22

## 2018-12-18 NOTE — TELEPHONE ENCOUNTER
Please send asap- pt has zero left    Prednisone 5mg  Last FIlled Date 11/14  Qty dispensed 30    astagraf xl 5mg  Last FIlled Date 11/14  Qty dispensed 150    astagraf xl 1mg  Last FIlled Date 11/14  Qty dispensed 90    Thank you very kindly!  Maureen Zaragoza CPhT  [facility]

## 2019-01-21 ENCOUNTER — MYC REFILL (OUTPATIENT)
Dept: TRANSPLANT | Facility: CLINIC | Age: 49
End: 2019-01-21

## 2019-01-21 DIAGNOSIS — Z94.0 S/P KIDNEY TRANSPLANT: ICD-10-CM

## 2019-01-21 DIAGNOSIS — Z94.0 KIDNEY REPLACED BY TRANSPLANT: ICD-10-CM

## 2019-01-21 DIAGNOSIS — I10 ESSENTIAL HYPERTENSION: ICD-10-CM

## 2019-01-21 DIAGNOSIS — D84.9 IMMUNOSUPPRESSED STATUS (H): ICD-10-CM

## 2019-01-21 DIAGNOSIS — T86.11 KIDNEY TRANSPLANT REJECTION: ICD-10-CM

## 2019-01-21 RX ORDER — PREDNISONE 5 MG/1
5 TABLET ORAL DAILY
Qty: 30 TABLET | Refills: 0 | Status: CANCELLED | OUTPATIENT
Start: 2019-01-21

## 2019-01-22 DIAGNOSIS — I10 ESSENTIAL HYPERTENSION: ICD-10-CM

## 2019-01-22 DIAGNOSIS — Z94.0 KIDNEY REPLACED BY TRANSPLANT: ICD-10-CM

## 2019-01-22 DIAGNOSIS — Z94.0 S/P KIDNEY TRANSPLANT: ICD-10-CM

## 2019-01-22 DIAGNOSIS — D84.9 IMMUNOSUPPRESSED STATUS (H): ICD-10-CM

## 2019-01-22 DIAGNOSIS — T86.11 KIDNEY TRANSPLANT REJECTION: Primary | ICD-10-CM

## 2019-01-22 DIAGNOSIS — T86.11 KIDNEY TRANSPLANT REJECTION: ICD-10-CM

## 2019-01-22 RX ORDER — PREDNISONE 5 MG/1
5 TABLET ORAL DAILY
Qty: 30 TABLET | Refills: 0 | Status: SHIPPED | OUTPATIENT
Start: 2019-01-22 | End: 2019-01-25

## 2019-01-22 RX ORDER — PREDNISONE 5 MG/1
5 TABLET ORAL DAILY
Qty: 30 TABLET | Refills: 0 | Status: SHIPPED | OUTPATIENT
Start: 2019-01-22 | End: 2019-01-22

## 2019-01-22 NOTE — TELEPHONE ENCOUNTER
ISSUE:  Refill request for tac and pred.    Last set of transplant labs 7/2018.      OUTCOME:   Call placed to pt.  Pt educated of importance of lab compliance.  Pt v/u and reports he will be able to get in for his transplant labs this week.  Pt aware 1 month supply will be given with no refills.  Pt educated once he gets labs done, RNCC will be able to give further refills.     Pt questions answered, pt v/u.

## 2019-01-22 NOTE — TELEPHONE ENCOUNTER
[facility] Call Center    Phone Message    May a detailed message be left on voicemail: yes    Reason for Call: Other: Patient sent a mychart stating he is completely out of his medication and was wondering if it could be expedited     Action Taken: Message routed to:  [facility] Med Refill

## 2019-01-23 ENCOUNTER — RESULTS ONLY (OUTPATIENT)
Dept: OTHER | Facility: CLINIC | Age: 49
End: 2019-01-23

## 2019-01-23 DIAGNOSIS — Z48.298 AFTERCARE FOLLOWING ORGAN TRANSPLANT: ICD-10-CM

## 2019-01-23 DIAGNOSIS — Z94.0 KIDNEY REPLACED BY TRANSPLANT: ICD-10-CM

## 2019-01-23 LAB
ANION GAP SERPL CALCULATED.3IONS-SCNC: 9 MMOL/L (ref 3–14)
BUN SERPL-MCNC: 40 MG/DL (ref 7–30)
CALCIUM SERPL-MCNC: 8.1 MG/DL (ref 8.5–10.1)
CHLORIDE SERPL-SCNC: 109 MMOL/L (ref 94–109)
CO2 SERPL-SCNC: 21 MMOL/L (ref 20–32)
CREAT SERPL-MCNC: 2.88 MG/DL (ref 0.66–1.25)
CREAT UR-MCNC: 116 MG/DL
ERYTHROCYTE [DISTWIDTH] IN BLOOD BY AUTOMATED COUNT: 15.9 % (ref 10–15)
GFR SERPL CREATININE-BSD FRML MDRD: 25 ML/MIN/{1.73_M2}
GLUCOSE SERPL-MCNC: 95 MG/DL (ref 70–99)
HCT VFR BLD AUTO: 30.7 % (ref 40–53)
HGB BLD-MCNC: 9.3 G/DL (ref 13.3–17.7)
MCH RBC QN AUTO: 26.6 PG (ref 26.5–33)
MCHC RBC AUTO-ENTMCNC: 30.3 G/DL (ref 31.5–36.5)
MCV RBC AUTO: 88 FL (ref 78–100)
PLATELET # BLD AUTO: 122 10E9/L (ref 150–450)
POTASSIUM SERPL-SCNC: 4.2 MMOL/L (ref 3.4–5.3)
PROT UR-MCNC: 0.88 G/L
PROT/CREAT 24H UR: 0.76 G/G CR (ref 0–0.2)
RBC # BLD AUTO: 3.49 10E12/L (ref 4.4–5.9)
SODIUM SERPL-SCNC: 139 MMOL/L (ref 133–144)
TACROLIMUS BLD-MCNC: <3 UG/L (ref 5–15)
TME LAST DOSE: ABNORMAL H
WBC # BLD AUTO: 2.7 10E9/L (ref 4–11)

## 2019-01-23 PROCEDURE — 86833 HLA CLASS II HIGH DEFIN QUAL: CPT | Performed by: INTERNAL MEDICINE

## 2019-01-23 PROCEDURE — 87799 DETECT AGENT NOS DNA QUANT: CPT | Performed by: INTERNAL MEDICINE

## 2019-01-23 PROCEDURE — 80197 ASSAY OF TACROLIMUS: CPT | Performed by: INTERNAL MEDICINE

## 2019-01-23 PROCEDURE — 86832 HLA CLASS I HIGH DEFIN QUAL: CPT | Performed by: INTERNAL MEDICINE

## 2019-01-23 NOTE — TELEPHONE ENCOUNTER
ISSUE:  Tacrolimus <3  Current astagraf dose 28 mg daily  Goal 4-6  Pt also on dilt to help increase levels.   UPC elevated at 0.76    OUTCOME:   Call placed to pt, no answer.  Left message for pt to return call.

## 2019-01-24 ENCOUNTER — OFFICE VISIT (OUTPATIENT)
Dept: NEPHROLOGY | Facility: CLINIC | Age: 49
End: 2019-01-24
Attending: INTERNAL MEDICINE
Payer: MEDICARE

## 2019-01-24 ENCOUNTER — TELEPHONE (OUTPATIENT)
Dept: NEPHROLOGY | Facility: CLINIC | Age: 49
End: 2019-01-24

## 2019-01-24 VITALS
WEIGHT: 207.23 LBS | HEIGHT: 71 IN | DIASTOLIC BLOOD PRESSURE: 114 MMHG | SYSTOLIC BLOOD PRESSURE: 200 MMHG | BODY MASS INDEX: 29.01 KG/M2 | HEART RATE: 78 BPM | OXYGEN SATURATION: 100 %

## 2019-01-24 DIAGNOSIS — D84.9 IMMUNOSUPPRESSION (H): ICD-10-CM

## 2019-01-24 DIAGNOSIS — D63.1 ANEMIA IN STAGE 4 CHRONIC KIDNEY DISEASE (H): ICD-10-CM

## 2019-01-24 DIAGNOSIS — Z48.298 AFTERCARE FOLLOWING ORGAN TRANSPLANT: ICD-10-CM

## 2019-01-24 DIAGNOSIS — Z94.0 HTN, KIDNEY TRANSPLANT RELATED: Primary | ICD-10-CM

## 2019-01-24 DIAGNOSIS — Z94.0 S/P KIDNEY TRANSPLANT: ICD-10-CM

## 2019-01-24 DIAGNOSIS — Z94.0 KIDNEY REPLACED BY TRANSPLANT: ICD-10-CM

## 2019-01-24 DIAGNOSIS — N18.4 ANEMIA IN STAGE 4 CHRONIC KIDNEY DISEASE (H): ICD-10-CM

## 2019-01-24 DIAGNOSIS — E55.9 VITAMIN D DEFICIENCY: ICD-10-CM

## 2019-01-24 DIAGNOSIS — I15.1 HTN, KIDNEY TRANSPLANT RELATED: Primary | ICD-10-CM

## 2019-01-24 DIAGNOSIS — N25.81 SECONDARY RENAL HYPERPARATHYROIDISM (H): ICD-10-CM

## 2019-01-24 DIAGNOSIS — I10 ESSENTIAL HYPERTENSION: ICD-10-CM

## 2019-01-24 LAB
A1: 580
A30: 1878
B13: 934
CW6: 805
DONOR IDENTIFICATION: NORMAL
DSA COMMENTS: NORMAL
DSA PRESENT: YES
DSA TEST METHOD: NORMAL
ORGAN: NORMAL
SA1 CELL: NORMAL
SA1 COMMENTS: NORMAL
SA1 HI RISK ABY: NORMAL
SA1 MOD RISK ABY: NORMAL
SA1 TEST METHOD: NORMAL
SA2 CELL: NORMAL
SA2 COMMENTS: NORMAL
SA2 HI RISK ABY UA: NORMAL
SA2 MOD RISK ABY: NORMAL
SA2 TEST METHOD: NORMAL
UNACCEPTABLE ANTIGEN: NORMAL
UNOS CPRA: 100

## 2019-01-24 PROCEDURE — G0463 HOSPITAL OUTPT CLINIC VISIT: HCPCS | Mod: ZF

## 2019-01-24 RX ORDER — CHLORTHALIDONE 25 MG/1
25 TABLET ORAL DAILY
Qty: 90 TABLET | Refills: 3 | Status: SHIPPED | OUTPATIENT
Start: 2019-01-24 | End: 2020-03-17

## 2019-01-24 RX ORDER — LOSARTAN POTASSIUM 25 MG/1
25 TABLET ORAL DAILY
Qty: 90 TABLET | Refills: 11 | Status: SHIPPED | OUTPATIENT
Start: 2019-01-24 | End: 2020-02-04

## 2019-01-24 RX ORDER — DILTIAZEM HYDROCHLORIDE 240 MG/1
240 CAPSULE, COATED, EXTENDED RELEASE ORAL DAILY
Qty: 90 CAPSULE | Refills: 3 | Status: SHIPPED | OUTPATIENT
Start: 2019-01-24 | End: 2020-02-04

## 2019-01-24 ASSESSMENT — PAIN SCALES - GENERAL: PAINLEVEL: NO PAIN (0)

## 2019-01-24 ASSESSMENT — MIFFLIN-ST. JEOR: SCORE: 1837.13

## 2019-01-24 NOTE — PROGRESS NOTES
CHRONIC TRANSPLANT NEPHROLOGY VISIT    Assessment & Plan        # DDKT: Stable.  With drug compliance issues and multiple rejections he was relisted for the fourth Tx. He is a high cPRA patient with issues with compliance and repeated rejection. Will try to optimize the current transplant since he will need to show compliance with labs and medications prior to reconsideration for transplant.   - Baseline Cr ~ 2.6-2.9;    - Proteinuria: Minimal   - Date of DSA last checked: 8/2018 Latest DSA: Yes, A 30 MFI 2273, B 13 , Cw 806 MFI   - BK Viremia: No   - Kidney Tx Biopsy: 3/25/15; borderline acute cellular rejection, antibody-mediated rejection and moderate interstitial fibrosis and tubular atrophy;             5/8-[facility], borderline T cell mediated rejection             9/28/15: Antibody mediated Rejection,             12/23/2015-mild glomerulitis and capillary C4d staining consistent with mild, persistent antibody-mediated rejection, moderate to severe arteriopathy    # Immunosuppression: Tacrolimus extended release (goal  4-6), Mycophenolic acid (goal  1-3.5) and Prednisone (dose  5 mg daily)   - Changes: No    # Hypertension: Inadequate control; Goal BP: < 130/80   - Changes: Yes, he will continue on Diltiazem for tacrolimus (that has been undetectable) and will add Chlothalidone and Losartan   - Educated on low salt diet and monitoring BP at home and will get our nurse to call him for BP logs    # Prophylaxis :   - PJP: Sulfa/TMP (Bactrim)    # Anemia in chronic renal disease: Hgb: Stable   - Iron studies: Replete    # Mineral Bone Disorder:    - Secondary renal hyperparathyroidism; PTH level is: Minimally elevated, SP parathyroidectomy   - Vitamin D; level is: Normal   - Calcium; level is: Normal   - Phosphorus; level is: Normal     # Electrolytes:   - Potassium; level: Normal  - Magnesium; level: Not checked recently  - Bicarbonate; level: Normal    # Skin Cancer Risk:    - Discussed sun protection and  recommend regular follow up with Dermatology.    # Medical Compliance: No.  Discussed importance of checking labs regularly as recommended, taking medications as prescribed and attending scheduled medical appointments.  He has been non compliant with meds and has not communicated effectively any issues with his meds and stops his medications on his own. BP control and IS along with multiple rejection episodes have been complications of such behavior.  He was counseled at length that this will be a limitation for future transplant.    Return visit: Return in about 6 months (around 2019).    # Transplant History:  Etiology of kidney failure: hypertension  Tx: DDKT  Transplant: 3/12/2015 (Kidney), 1994 (Kidney), 10/1/2000 (Kidney)  Donor Type:  - Brain Death Donor Class: Standard Criteria Donor  Crossmatch at time of Tx: negative  DSA at time of Tx: Yes  History of BK viremia: No  History of CMV viremia: No  History of EBV viremia: No  Significant changes in immunosuppression: None  Significant transplant-related complications: None    Transplant Office Phone Number: [phone]    Assessment and plan was discussed with the patient and he voiced his understanding and agreement.    Estela Hines MD     Attestation:  This patient has been seen and evaluated by me, Jonathan Chavis MD.  I have reviewed the note and agree with plan of care as documented by the fellow.       Chief Complaint   Mr. Mckeon is a 48 year old here for routine follow up.    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 He has had 2 previous kidney transplants in  and , both of which had failed and he was back on hemodialysis since . He received DDKT 3/12/15 complicated by DGF.  He is highly sensitized and did have low level historical DSA to A1 (797) and A30 (1092) from 2012 serum sample, but no DSA at time of transplant and crossmatch was negative.  Patient underwent a  kidney transplant biopsy on 3/25/15 due to no improvement in kidney function and was found to have T cell mediated and antibody-mediated rejection.  In addition, it did show moderate interstitial fibrosis and tubular atrophy. He was treated with PP/IVIG and Thymoglobulin. Creatinine subsequently improved to around 2.2 mg/dL; however, has crept up to more than 3.5 mg/dL;  kidney biopsy was repeated on 05/08/2015 and showed T cell mediated rejection and acute antibody mediated rejection.   He was subsequently treated with steroids, IVIG, plasmapheresis and Bortezomib. He tolerated treatment well with improved creatinine to about 2s. Creatinine again increased to 3.0mg/dl and he underwent under biopsy on 9/28 /2015, which showed isolated V lesion  -thought to be secondary to [facility], although [place] 2 A T cell emdiated rejection was considered.He was again treated with IVIG/PP with improvement in creatinine and subsequent biopsy on 12/23/2015 showed no V lesion but mild glomerulitis and capillary C4d staining consistent with mild, persistent antibody-mediated rejection. Patient has significant moderate to severe arteriopathy. Patient received a course of steroids. He has had fluctuating prograf level and has been Diltiazem. He was switched to Astagraf in Jan 2016.   He reports no symptoms except work related fatigue  He has not taken lasix recently and stopped Hydralazine in summer after two doses sec to headaches.    Recent Hospitalizations:  [x] No [] Yes    New Medical Issues: [x] No [] Yes    Decreased energy: [] No [x] Yes Fatigue related to work   Chest pain or SOB with exertion:  [x] No [] Yes    Appetite change or weight change: [x] No [] Yes    Nausea, vomiting or diarrhea:  [x] No [] Yes    Fever, sweats or chills: [x] No [] Yes    Leg swelling: [] No [x] Yes On and off     Other medical issues:  No    Home BP: Not checked    Review of Systems   A comprehensive review of systems was obtained and negative, except  as noted in the HPI or PMH.    Problem List   Patient Active Problem List   Diagnosis     Hypertension     Tobacco abuse     Depression     Kidney replaced by transplant     Immunosuppressed status (H)     Anemia in chronic renal disease     High risk medication use     Secondary renal hyperparathyroidism (H)     Vitamin D deficiency     Kidney transplant rejection     Steroid-induced hyperglycemia     Metabolic acidosis     Antibody mediated rejection of kidney transplant     Hyperglycemia     Hemorrhage following kidney biopsy     Thrombocytopenia (H)       Social History   Social History     Tobacco Use     Smoking status: Light Tobacco Smoker     Types: Cigars     Smokeless tobacco: Never Used   Substance Use Topics     Alcohol use: Yes     Alcohol/week: 0.0 oz     Comment: Minimal     Drug use: No       Allergies   Allergies   Allergen Reactions     Cephalosporins Unknown     Cyclosporine      Duricef [Cefadroxil]        Medications   Current Outpatient Medications   Medication Sig     Blood Pressure Monitor KIT Automatic Blood Pressure Monitor     chlorthalidone (HYGROTON) 25 MG tablet Take 1 tablet (25 mg) by mouth daily     cyanocobalamin (VITAMIN  B-12) 1000 MCG tablet Take 1,000 mcg by mouth daily     diltiazem ER COATED BEADS (CARDIZEM CD) 240 MG 24 hr capsule Take 1 capsule (240 mg) by mouth daily     ferrous sulfate (IRON) 325 (65 Fe) MG tablet Take 1 tablet (325 mg) by mouth daily     losartan (COZAAR) 25 MG tablet Take 1 tablet (25 mg) by mouth daily     MYFORTIC (BRAND) 360 MG EC TABLET Take 3 tablets (1,080 mg) by mouth 2 times daily     predniSONE (DELTASONE) 5 MG tablet Take 5 mg by mouth daily.     sulfamethoxazole-trimethoprim (BACTRIM/SEPTRA) 400-80 MG per tablet Take 1 tablet by mouth Every Mon, Wed, Fri Morning     tacrolimus (ASTAGRAF XL) 1 MG 24 hr capsule Take 3 capsules (3 mg) by mouth every morning (before breakfast) Total dose 28 mg daily     tacrolimus (ASTAGRAF XL) 5 MG 24 hr capsule  "Take 5 capsules (25 mg) by mouth every morning (before breakfast) Total dose 28 mg daily     Multiple Vitamin (DAILY MULTIVITAMIN PO) Take by mouth daily     No current facility-administered medications for this visit.      Medications Discontinued During This Encounter   Medication Reason     diltiazem (CARDIZEM CD) 240 MG 24 hr capsule      sodium bicarbonate 650 MG tablet      calcium carbonate-vitamin D 500-400 MG-UNIT TABS tablt      COMPOUND (CMPD RX) - PHARMACY TO MIX COMPOUNDED MEDICATION      furosemide (LASIX) 40 MG tablet        Physical Exam   Vital Signs: BP (!) 200/114   Pulse 78   Ht 1.803 m (5' 11\")   Wt 94 kg (207 lb 3.7 oz)   SpO2 100%   BMI 28.90 kg/m      GENERAL APPEARANCE: alert and no distress  HENT: mouth without ulcers or lesions  LYMPHATICS: no cervical or supraclavicular nodes  RESP: lungs clear to auscultation - no rales, rhonchi or wheezes  CV: regular rhythm, normal rate, no rub, no murmur  EDEMA: no LE edema bilaterally  ABDOMEN: soft, nondistended, nontender, bowel sounds normal  MS: extremities normal - no gross deformities noted, no evidence of inflammation in joints, no muscle tenderness  SKIN: no rash      Data     Renal Latest Ref Rng & Units 1/23/2019 8/8/2018 7/18/2018   Na 133 - 144 mmol/L 139 139 139   K 3.4 - 5.3 mmol/L 4.2 4.3 4.4   Cl 94 - 109 mmol/L 109 109 112(H)   CO2 20 - 32 mmol/L 21 21 19(L)   BUN 7 - 30 mg/dL 40(H) 38(H) 41(H)   Cr 0.66 - 1.25 mg/dL 2.88(H) 2.60(H) 2.95(H)   Glucose 70 - 99 mg/dL 95 76 105(H)   Ca  8.5 - 10.1 mg/dL 8.1(L) 8.0(L) 7.7(L)   Mg 1.6 - 2.3 mg/dL - - -     Bone Health Latest Ref Rng & Units 8/8/2018 5/24/2018 5/8/2018   Phos 2.5 - 4.5 mg/dL 4.1 4.1 4.0   PTHi 18 - 80 pg/mL - 120(H) 110(H)   Vit D Def 20 - 75 ug/L - 39 42     Heme Latest Ref Rng & Units 1/23/2019 8/8/2018 7/18/2018   WBC 4.0 - 11.0 10e9/L 2.7(L) 2.9(L) 3.1(L)   Hgb 13.3 - 17.7 g/dL 9.3(L) 10.3(L) 10.2(L)   Plt 150 - 450 10e9/L 122(L) 116(L) 125(L)     Liver Latest Ref " Rng & Units 8/8/2018 5/24/2018 5/8/2018   AP 40 - 150 U/L 47 - -   TBili 0.2 - 1.3 mg/dL 0.3 - -   DBili 0.0 - 0.2 mg/dL - - -   ALT 0 - 70 U/L 12 - -   AST 0 - 45 U/L 12 - -   Tot Protein 6.8 - 8.8 g/dL 6.4(L) - -   Albumin 3.4 - 5.0 g/dL 3.5 3.6 3.9     Pancreas Latest Ref Rng & Units 5/8/2015 3/11/2015 1/23/2009   A1C 4.3 - 6.0 % - 5.2 5.3   Lipase 73 - 393 U/L 127 - -     Iron studies Latest Ref Rng & Units 5/8/2018 1/12/2017 3/20/2015   Iron 35 - 180 ug/dL 46 74 51   Iron sat 15 - 46 % 23 38 33   Ferritin 26 - 388 ng/mL 414(H) 371 1,172(H)     [facility] Txp Virology Latest Ref Rng & Units 1/23/2019 8/8/2018 5/8/2018   CMV IgG EU/mL - - -   CVM DNA Quant - - - -   BK Spec - Plasma, EDTA anticoagulant Plasma Plasma   BK Res BKNEG:BK Virus DNA Not Detected copies/mL PENDING BK Virus DNA Not Detected BK Virus DNA Not Detected   BK Log <2.7 Log copies/mL PENDING Not Calculated Not Calculated   EBV IgG - - - -   Hep B Core NR:Nonreactive - Nonreactive -   Hep B Surf - - - -   HIV 1&2 NEG - - -        Recent Labs   Lab Test 06/14/18  0923 07/18/18  0831 01/23/19  0846   DOSTAC 0900 06/13/2018 2100,7/17/18 1/22/19 0900   TACROL 3.2* 4.0* <3.0*     Recent Labs   Lab Test 01/19/16  1349 01/12/17  1121 05/08/18  0813   DOSMPA 2,130 01/11/17  2130 0900 05/07/2018   MPACID 11.03* 4.51* 1.63   MPAG 126.7* 100.9* 128.9*

## 2019-01-24 NOTE — NURSING NOTE
"Chief Complaint   Patient presents with     RECHECK     f/u for kidney tx     BP (!) 200/114   Pulse 78   Ht 1.803 m (5' 11\")   Wt 94 kg (207 lb 3.7 oz)   SpO2 100%   BMI 28.90 kg/m    Anusha Abel CMA    "

## 2019-01-24 NOTE — TELEPHONE ENCOUNTER
"Spoke with pt regarding his tacrolimus level <3.  Pt reports he ran out of his 5 mg Astagraf tabs 2 days ago and realized he is only taking 7 mg total, not 28 mg. Pt reports he hadn't been taking 28 mg daily as prescribed, instead he dropped his dose to 27 mg daily \"a while ago\".  Pt aware he should reach out to  mail order to overnight RX to his home.  Pt confirms he is still taking dilt.  Pt aware he is to increase his dose to 28 mg daily and recheck level next week.     Pt aware his UPC elevated.  Pt denies increase in protein intake in diet or s/s UTI.  Pt reports he hasn't been checking his BP at home.  Pt encouraged to start checking BP at home with his home monitor.  Pt aware elevated BP can increase his UPC.  Pt has an appointment in clinic today where this will be discussed further.     RNCC spoke with Robert pharmacist at  mail order pharmacy.  He voiced pt [place] [facility] [place] and is ready for pt to .     Call placed to pt.  Pt aware he should  RX while his is at clinic today.    Pt questions answered, pt v/u.   "

## 2019-01-24 NOTE — LETTER
1/24/2019      RE: Tristian Mckeon  [address]  [facility] [id number]       CHRONIC TRANSPLANT NEPHROLOGY VISIT    Assessment & Plan        # DDKT: Stable.  With drug compliance issues and multiple rejections he was relisted for the fourth Tx. He is a high cPRA patient with issues with compliance and repeated rejection. Will try to optimize the current transplant since he will need to show compliance with labs and medications prior to reconsideration for transplant.   - Baseline Cr ~ 2.6-2.9;    - Proteinuria: Minimal   - Date of DSA last checked: 8/2018 Latest DSA: Yes, A 30 MFI 2273, B 13 , Cw 806 MFI   - BK Viremia: No   - Kidney Tx Biopsy: 3/25/15; borderline acute cellular rejection, antibody-mediated rejection and moderate interstitial fibrosis and tubular atrophy;             5/8-[facility], borderline T cell mediated rejection             9/28/15: Antibody mediated Rejection,             12/23/2015-mild glomerulitis and capillary C4d staining consistent with mild, persistent antibody-mediated rejection, moderate to severe arteriopathy    # Immunosuppression: Tacrolimus extended release (goal  4-6), Mycophenolic acid (goal  1-3.5) and Prednisone (dose  5 mg daily)   - Changes: No    # Hypertension: Inadequate control; Goal BP: < 130/80   - Changes: Yes, he will continue on Diltiazem for tacrolimus (that has been undetectable) and will add Chlothalidone and Losartan   - Educated on low salt diet and monitoring BP at home and will get our nurse to call him for BP logs    # Prophylaxis :   - PJP: Sulfa/TMP (Bactrim)    # Anemia in chronic renal disease: Hgb: Stable   - Iron studies: Replete    # Mineral Bone Disorder:    - Secondary renal hyperparathyroidism; PTH level is: Minimally elevated, SP parathyroidectomy   - Vitamin D; level is: Normal   - Calcium; level is: Normal   - Phosphorus; level is: Normal     # Electrolytes:   - Potassium; level: Normal  - Magnesium; level: Not checked recently  -  Bicarbonate; level: Normal    # Skin Cancer Risk:    - Discussed sun protection and recommend regular follow up with Dermatology.    # Medical Compliance: No.  Discussed importance of checking labs regularly as recommended, taking medications as prescribed and attending scheduled medical appointments.  He has been non compliant with meds and has not communicated effectively any issues with his meds and stops his medications on his own. BP control and IS along with multiple rejection episodes have been complications of such behavior.  He was counseled at length that this will be a limitation for future transplant.    Return visit: Return in about 6 months (around 2019).    # Transplant History:  Etiology of kidney failure: hypertension  Tx: DDKT  Transplant: 3/12/2015 (Kidney), 1994 (Kidney), 10/1/2000 (Kidney)  Donor Type:  - Brain Death Donor Class: Standard Criteria Donor  Crossmatch at time of Tx: negative  DSA at time of Tx: Yes  History of BK viremia: No  History of CMV viremia: No  History of EBV viremia: No  Significant changes in immunosuppression: None  Significant transplant-related complications: None    Transplant Office Phone Number: [phone]    Assessment and plan was discussed with the patient and he voiced his understanding and agreement.    Estela Hines MD     Attestation:  This patient has been seen and evaluated by me, Jonathan Chavis MD.  I have reviewed the note and agree with plan of care as documented by the fellow.       Chief Complaint   Mr. Mckeno is a 48 year old here for routine follow up.    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 He has had 2 previous kidney transplants in  and , both of which had failed and he was back on hemodialysis since . He received DDKT 3/12/15 complicated by DGF.  He is highly sensitized and did have low level historical DSA to A1 (797) and A30 (1092) from 2012 serum sample, but  no DSA at time of transplant and crossmatch was negative.  Patient underwent a kidney transplant biopsy on 3/25/15 due to no improvement in kidney function and was found to have T cell mediated and antibody-mediated rejection.  In addition, it did show moderate interstitial fibrosis and tubular atrophy. He was treated with PP/IVIG and Thymoglobulin. Creatinine subsequently improved to around 2.2 mg/dL; however, has crept up to more than 3.5 mg/dL;  kidney biopsy was repeated on 05/08/2015 and showed T cell mediated rejection and acute antibody mediated rejection.   He was subsequently treated with steroids, IVIG, plasmapheresis and Bortezomib. He tolerated treatment well with improved creatinine to about 2s. Creatinine again increased to 3.0mg/dl and he underwent under biopsy on 9/28 /2015, which showed isolated V lesion  -thought to be secondary to [facility], although [place] 2 A T cell emdiated rejection was considered.He was again treated with IVIG/PP with improvement in creatinine and subsequent biopsy on 12/23/2015 showed no V lesion but mild glomerulitis and capillary C4d staining consistent with mild, persistent antibody-mediated rejection. Patient has significant moderate to severe arteriopathy. Patient received a course of steroids. He has had fluctuating prograf level and has been Diltiazem. He was switched to Astagraf in Jan 2016.   He reports no symptoms except work related fatigue  He has not taken lasix recently and stopped Hydralazine in summer after two doses sec to headaches.    Recent Hospitalizations:  [x] No [] Yes    New Medical Issues: [x] No [] Yes    Decreased energy: [] No [x] Yes Fatigue related to work   Chest pain or SOB with exertion:  [x] No [] Yes    Appetite change or weight change: [x] No [] Yes    Nausea, vomiting or diarrhea:  [x] No [] Yes    Fever, sweats or chills: [x] No [] Yes    Leg swelling: [] No [x] Yes On and off     Other medical issues:  No    Home BP: Not checked    Review of  Systems   A comprehensive review of systems was obtained and negative, except as noted in the HPI or PMH.    Problem List   Patient Active Problem List   Diagnosis     Hypertension     Tobacco abuse     Depression     Kidney replaced by transplant     Immunosuppressed status (H)     Anemia in chronic renal disease     High risk medication use     Secondary renal hyperparathyroidism (H)     Vitamin D deficiency     Kidney transplant rejection     Steroid-induced hyperglycemia     Metabolic acidosis     Antibody mediated rejection of kidney transplant     Hyperglycemia     Hemorrhage following kidney biopsy     Thrombocytopenia (H)       Social History   Social History     Tobacco Use     Smoking status: Light Tobacco Smoker     Types: Cigars     Smokeless tobacco: Never Used   Substance Use Topics     Alcohol use: Yes     Alcohol/week: 0.0 oz     Comment: Minimal     Drug use: No       Allergies   Allergies   Allergen Reactions     Cephalosporins Unknown     Cyclosporine      Duricef [Cefadroxil]        Medications   Current Outpatient Medications   Medication Sig     Blood Pressure Monitor KIT Automatic Blood Pressure Monitor     chlorthalidone (HYGROTON) 25 MG tablet Take 1 tablet (25 mg) by mouth daily     cyanocobalamin (VITAMIN  B-12) 1000 MCG tablet Take 1,000 mcg by mouth daily     diltiazem ER COATED BEADS (CARDIZEM CD) 240 MG 24 hr capsule Take 1 capsule (240 mg) by mouth daily     ferrous sulfate (IRON) 325 (65 Fe) MG tablet Take 1 tablet (325 mg) by mouth daily     losartan (COZAAR) 25 MG tablet Take 1 tablet (25 mg) by mouth daily     MYFORTIC (BRAND) 360 MG EC TABLET Take 3 tablets (1,080 mg) by mouth 2 times daily     predniSONE (DELTASONE) 5 MG tablet Take 5 mg by mouth daily.     sulfamethoxazole-trimethoprim (BACTRIM/SEPTRA) 400-80 MG per tablet Take 1 tablet by mouth Every Mon, Wed, Fri Morning     tacrolimus (ASTAGRAF XL) 1 MG 24 hr capsule Take 3 capsules (3 mg) by mouth every morning (before  "breakfast) Total dose 28 mg daily     tacrolimus (ASTAGRAF XL) 5 MG 24 hr capsule Take 5 capsules (25 mg) by mouth every morning (before breakfast) Total dose 28 mg daily     Multiple Vitamin (DAILY MULTIVITAMIN PO) Take by mouth daily     No current facility-administered medications for this visit.      Medications Discontinued During This Encounter   Medication Reason     diltiazem (CARDIZEM CD) 240 MG 24 hr capsule      sodium bicarbonate 650 MG tablet      calcium carbonate-vitamin D 500-400 MG-UNIT TABS tablt      COMPOUND (CMPD RX) - PHARMACY TO MIX COMPOUNDED MEDICATION      furosemide (LASIX) 40 MG tablet        Physical Exam   Vital Signs: BP (!) 200/114   Pulse 78   Ht 1.803 m (5' 11\")   Wt 94 kg (207 lb 3.7 oz)   SpO2 100%   BMI 28.90 kg/m       GENERAL APPEARANCE: alert and no distress  HENT: mouth without ulcers or lesions  LYMPHATICS: no cervical or supraclavicular nodes  RESP: lungs clear to auscultation - no rales, rhonchi or wheezes  CV: regular rhythm, normal rate, no rub, no murmur  EDEMA: no LE edema bilaterally  ABDOMEN: soft, nondistended, nontender, bowel sounds normal  MS: extremities normal - no gross deformities noted, no evidence of inflammation in joints, no muscle tenderness  SKIN: no rash      Data     Renal Latest Ref Rng & Units 1/23/2019 8/8/2018 7/18/2018   Na 133 - 144 mmol/L 139 139 139   K 3.4 - 5.3 mmol/L 4.2 4.3 4.4   Cl 94 - 109 mmol/L 109 109 112(H)   CO2 20 - 32 mmol/L 21 21 19(L)   BUN 7 - 30 mg/dL 40(H) 38(H) 41(H)   Cr 0.66 - 1.25 mg/dL 2.88(H) 2.60(H) 2.95(H)   Glucose 70 - 99 mg/dL 95 76 105(H)   Ca  8.5 - 10.1 mg/dL 8.1(L) 8.0(L) 7.7(L)   Mg 1.6 - 2.3 mg/dL - - -     Bone Health Latest Ref Rng & Units 8/8/2018 5/24/2018 5/8/2018   Phos 2.5 - 4.5 mg/dL 4.1 4.1 4.0   PTHi 18 - 80 pg/mL - 120(H) 110(H)   Vit D Def 20 - 75 ug/L - 39 42     Heme Latest Ref Rng & Units 1/23/2019 8/8/2018 7/18/2018   WBC 4.0 - 11.0 10e9/L 2.7(L) 2.9(L) 3.1(L)   Hgb 13.3 - 17.7 g/dL " 9.3(L) 10.3(L) 10.2(L)   Plt 150 - 450 10e9/L 122(L) 116(L) 125(L)     Liver Latest Ref Rng & Units 8/8/2018 5/24/2018 5/8/2018   AP 40 - 150 U/L 47 - -   TBili 0.2 - 1.3 mg/dL 0.3 - -   DBili 0.0 - 0.2 mg/dL - - -   ALT 0 - 70 U/L 12 - -   AST 0 - 45 U/L 12 - -   Tot Protein 6.8 - 8.8 g/dL 6.4(L) - -   Albumin 3.4 - 5.0 g/dL 3.5 3.6 3.9     Pancreas Latest Ref Rng & Units 5/8/2015 3/11/2015 1/23/2009   A1C 4.3 - 6.0 % - 5.2 5.3   Lipase 73 - 393 U/L 127 - -     Iron studies Latest Ref Rng & Units 5/8/2018 1/12/2017 3/20/2015   Iron 35 - 180 ug/dL 46 74 51   Iron sat 15 - 46 % 23 38 33   Ferritin 26 - 388 ng/mL 414(H) 371 1,172(H)     [facility] Txp Virology Latest Ref Rng & Units 1/23/2019 8/8/2018 5/8/2018   CMV IgG EU/mL - - -   CVM DNA Quant - - - -   BK Spec - Plasma, EDTA anticoagulant Plasma Plasma   BK Res BKNEG:BK Virus DNA Not Detected copies/mL PENDING BK Virus DNA Not Detected BK Virus DNA Not Detected   BK Log <2.7 Log copies/mL PENDING Not Calculated Not Calculated   EBV IgG - - - -   Hep B Core NR:Nonreactive - Nonreactive -   Hep B Surf - - - -   HIV 1&2 NEG - - -        Recent Labs   Lab Test 06/14/18  0923 07/18/18  0831 01/23/19  0846   DOSTAC 0900 06/13/2018 2100,7/17/18 1/22/19 0900   TACROL 3.2* 4.0* <3.0*     Recent Labs   Lab Test 01/19/16  1349 01/12/17  1121 05/08/18  0813   DOSMPA 2,130 01/11/17  2130 0900 05/07/2018   MPACID 11.03* 4.51* 1.63   MPAG 126.7* 100.9* 128.9*       Jonathan Chavis MD

## 2019-01-25 DIAGNOSIS — D84.9 IMMUNOSUPPRESSED STATUS (H): ICD-10-CM

## 2019-01-25 DIAGNOSIS — I10 ESSENTIAL HYPERTENSION: ICD-10-CM

## 2019-01-25 DIAGNOSIS — T86.11 KIDNEY TRANSPLANT REJECTION: ICD-10-CM

## 2019-01-25 DIAGNOSIS — Z94.0 KIDNEY REPLACED BY TRANSPLANT: Primary | ICD-10-CM

## 2019-01-25 DIAGNOSIS — Z94.0 S/P KIDNEY TRANSPLANT: ICD-10-CM

## 2019-01-25 RX ORDER — PREDNISONE 5 MG/1
5 TABLET ORAL DAILY
Qty: 30 TABLET | Refills: 11 | Status: SHIPPED | OUTPATIENT
Start: 2019-01-25 | End: 2020-02-04

## 2019-01-28 ENCOUNTER — TELEPHONE (OUTPATIENT)
Dept: NEPHROLOGY | Facility: CLINIC | Age: 49
End: 2019-01-28

## 2019-01-28 NOTE — TELEPHONE ENCOUNTER
[facility] Call Center    Phone Message    May a detailed message be left on voicemail: yes    Reason for Call: Other: Anusha RN, Case Coordinator, [facility], calling. She is requesting a call back for update information.  She is requesting lab values, most recent GFR, creatinine, and she is wondering if there are any compliance issues.  Please contact her back, [phone], confidential voicemail, please leave the information if you do not get her.      Action Taken: Message routed to:  [facility] ([facility] Nephrology

## 2019-01-29 NOTE — TELEPHONE ENCOUNTER
ADHERENCE: issues discussed  Returned call to Anusha NOWAK, /coordinator for [facility], to discuss kidney graft function and adherence.  - GFR 29, does not yet meet listing criteria  - adherence with medications and lab monitoring of graft function and immunosuppression is a concern  - would need to fulfill compliance contract before he would be a candidate for another kidney transplant  - retransplant also would be complicated by cPRA of 100%

## 2019-02-01 DIAGNOSIS — Z94.0 KIDNEY REPLACED BY TRANSPLANT: ICD-10-CM

## 2019-02-01 DIAGNOSIS — Z94.0 S/P KIDNEY TRANSPLANT: ICD-10-CM

## 2019-02-01 LAB
TACROLIMUS BLD-MCNC: 4.5 UG/L (ref 5–15)
TME LAST DOSE: ABNORMAL H

## 2019-02-01 PROCEDURE — 80197 ASSAY OF TACROLIMUS: CPT | Performed by: INTERNAL MEDICINE

## 2019-02-20 ENCOUNTER — TELEPHONE (OUTPATIENT)
Dept: PHARMACY | Facility: CLINIC | Age: 49
End: 2019-02-20

## 2019-02-22 DIAGNOSIS — Z94.0 KIDNEY TRANSPLANTED: Primary | ICD-10-CM

## 2019-02-22 RX ORDER — MYCOPHENOLIC ACID 360 MG/1
1080 TABLET, DELAYED RELEASE ORAL 2 TIMES DAILY
Qty: 180 TABLET | Refills: 3 | Status: SHIPPED | OUTPATIENT
Start: 2019-02-22 | End: 2019-08-01

## 2019-02-22 NOTE — TELEPHONE ENCOUNTER
Need proactive refill for pt  Myfortic 360mg  Last FIlled Date 2/13  Qty dispensed 180    Thank you!  Maureen Zaragoza CPhT  [facility]

## 2019-04-26 ENCOUNTER — TELEPHONE (OUTPATIENT)
Dept: NEPHROLOGY | Facility: CLINIC | Age: 49
End: 2019-04-26

## 2019-04-26 NOTE — TELEPHONE ENCOUNTER
[facility] Call Center    Phone Message    May a detailed message be left on voicemail: yes    Reason for Call: Other: Anusha, pt's transplant care coordinator called in and stated that she would like to speak with provider or care team to discuss any updates woth pt. Please reach out to Anusha when available. Thank you.     Action Taken: Message routed to:  [facility] ([facility] Neph

## 2019-08-01 DIAGNOSIS — Z94.0 KIDNEY TRANSPLANTED: ICD-10-CM

## 2019-08-01 RX ORDER — MYCOPHENOLIC ACID 360 MG/1
1080 TABLET, DELAYED RELEASE ORAL 2 TIMES DAILY
Qty: 180 TABLET | Refills: 0 | Status: SHIPPED | OUTPATIENT
Start: 2019-08-01 | End: 2019-09-09

## 2019-08-22 ENCOUNTER — RESULTS ONLY (OUTPATIENT)
Dept: OTHER | Facility: CLINIC | Age: 49
End: 2019-08-22

## 2019-08-22 DIAGNOSIS — Z94.0 KIDNEY REPLACED BY TRANSPLANT: ICD-10-CM

## 2019-08-22 DIAGNOSIS — Z79.899 ENCOUNTER FOR LONG-TERM CURRENT USE OF MEDICATION: ICD-10-CM

## 2019-08-22 DIAGNOSIS — Z94.0 KIDNEY REPLACED BY TRANSPLANT: Primary | ICD-10-CM

## 2019-08-22 DIAGNOSIS — Z48.298 AFTERCARE FOLLOWING ORGAN TRANSPLANT: ICD-10-CM

## 2019-08-22 LAB
ANION GAP SERPL CALCULATED.3IONS-SCNC: 5 MMOL/L (ref 3–14)
BUN SERPL-MCNC: 41 MG/DL (ref 7–30)
CALCIUM SERPL-MCNC: 7.7 MG/DL (ref 8.5–10.1)
CHLORIDE SERPL-SCNC: 113 MMOL/L (ref 94–109)
CO2 SERPL-SCNC: 20 MMOL/L (ref 20–32)
CREAT SERPL-MCNC: 3.11 MG/DL (ref 0.66–1.25)
ERYTHROCYTE [DISTWIDTH] IN BLOOD BY AUTOMATED COUNT: 16.5 % (ref 10–15)
GFR SERPL CREATININE-BSD FRML MDRD: 22 ML/MIN/{1.73_M2}
GLUCOSE SERPL-MCNC: 109 MG/DL (ref 70–99)
HCT VFR BLD AUTO: 32.5 % (ref 40–53)
HGB BLD-MCNC: 9.8 G/DL (ref 13.3–17.7)
MCH RBC QN AUTO: 27.5 PG (ref 26.5–33)
MCHC RBC AUTO-ENTMCNC: 30.2 G/DL (ref 31.5–36.5)
MCV RBC AUTO: 91 FL (ref 78–100)
PLATELET # BLD AUTO: 117 10E9/L (ref 150–450)
POTASSIUM SERPL-SCNC: 4.4 MMOL/L (ref 3.4–5.3)
PROT UR-MCNC: 0.61 G/L
PROT/CREAT 24H UR: 0.54 G/G CR (ref 0–0.2)
RBC # BLD AUTO: 3.57 10E12/L (ref 4.4–5.9)
SODIUM SERPL-SCNC: 138 MMOL/L (ref 133–144)
TACROLIMUS BLD-MCNC: 4.9 UG/L (ref 5–15)
TME LAST DOSE: ABNORMAL H
WBC # BLD AUTO: 3.2 10E9/L (ref 4–11)

## 2019-08-22 PROCEDURE — 86832 HLA CLASS I HIGH DEFIN QUAL: CPT | Performed by: INTERNAL MEDICINE

## 2019-08-22 PROCEDURE — 80197 ASSAY OF TACROLIMUS: CPT | Performed by: INTERNAL MEDICINE

## 2019-08-22 PROCEDURE — 86833 HLA CLASS II HIGH DEFIN QUAL: CPT | Performed by: INTERNAL MEDICINE

## 2019-08-23 ENCOUNTER — TELEPHONE (OUTPATIENT)
Dept: TRANSPLANT | Facility: CLINIC | Age: 49
End: 2019-08-23

## 2019-08-23 DIAGNOSIS — Z94.0 KIDNEY TRANSPLANTED: Primary | ICD-10-CM

## 2019-08-23 NOTE — TELEPHONE ENCOUNTER
ISSUE:  Creatinine 3.11, up from baseline 2.6-2.9  UPC slightly improved since 1-2019.    Calcium remains low at 7.7    PLAN:   Please call pt to   -ensure pt is drinking 2-3L/day, no new illness/fever/malaise/abdominal pain/edema, medication changes, or normal UOP. If the above is normal, encourage continued hydration and repeat labs in 2 weeks. Please place order for BMP.  -ask pt re BP readings.  Has pt started a high protein diet?  Pt to f/u with nephrology re BP readings if high.  This could also contribute to elevated UPC. Pt to have this rechecked in 2 months.   -Ensure pt still taking calcium supplement.  Pt should be getting 1000 mg calcium/day.  Pt to increase calcium intake.     OUTCOME:   Call placed to pt, no answer.  Left message for pt to return call.     ADDENDUM:  Pt returned call.  He is aware of above lab results. Pt reports poor hydration and increase in caffeine intake.  Pt denies recent illnesses, medication changes or s/s UTI.  Pt aware to decrease caffeine intake and increase his hydration to 2-3L/day.  Pt confirms he isn't on a fluid restriction.  Pt aware to repeat BMP in 2 weeks.    Pt reports BP running 120-140s/70-90s.  Pt denies protein rich diet.  Pt aware UPC has decreased since 1/2019.  Pt aware to recheck level in 1-2 months with next set of transplant labs.    Pt confirms he is currently on calcium and vit D supplement at home.  Pt reports he did run out of supplement last week but has since restarted again.     Pt questions answered, and v/u of above plan.  Orders placed.

## 2019-08-26 LAB
A1: 733
A30: 1860
B13: 1138
CW6: 958
DONOR IDENTIFICATION: NORMAL
DSA COMMENTS: NORMAL
DSA PRESENT: YES
DSA TEST METHOD: NORMAL
ORGAN: NORMAL
SA1 CELL: NORMAL
SA1 COMMENTS: NORMAL
SA1 HI RISK ABY: NORMAL
SA1 MOD RISK ABY: NORMAL
SA1 TEST METHOD: NORMAL
SA2 CELL: NORMAL
SA2 COMMENTS: NORMAL
SA2 HI RISK ABY UA: NORMAL
SA2 MOD RISK ABY: NORMAL
SA2 TEST METHOD: NORMAL
UNACCEPTABLE ANTIGEN: NORMAL
UNOS CPRA: 100

## 2019-09-09 DIAGNOSIS — Z94.0 KIDNEY TRANSPLANTED: Primary | ICD-10-CM

## 2019-09-09 RX ORDER — MYCOPHENOLIC ACID 360 MG/1
1080 TABLET, DELAYED RELEASE ORAL 2 TIMES DAILY
Qty: 180 TABLET | Refills: 11 | Status: SHIPPED | OUTPATIENT
Start: 2019-09-09 | End: 2020-09-01

## 2019-09-30 ENCOUNTER — HEALTH MAINTENANCE LETTER (OUTPATIENT)
Age: 49
End: 2019-09-30

## 2019-10-04 ENCOUNTER — DOCUMENTATION ONLY (OUTPATIENT)
Dept: TRANSPLANT | Facility: CLINIC | Age: 49
End: 2019-10-04

## 2019-10-04 NOTE — PROGRESS NOTES
In office reviewing delinquent/dormant transplant referral files.  Mike was referred for re-transplant May 2018 but team decided to wait to evaluate until closer to qualifying GFR.  Review of recent labs; kidney function stable (GFR 26).  I will close this referral now and new referral can be generated when indicated.

## 2019-12-10 ENCOUNTER — OFFICE VISIT (OUTPATIENT)
Dept: NEPHROLOGY | Facility: CLINIC | Age: 49
End: 2019-12-10
Attending: INTERNAL MEDICINE
Payer: MEDICARE

## 2019-12-10 VITALS
SYSTOLIC BLOOD PRESSURE: 176 MMHG | OXYGEN SATURATION: 98 % | WEIGHT: 217 LBS | HEART RATE: 73 BPM | DIASTOLIC BLOOD PRESSURE: 94 MMHG | BODY MASS INDEX: 30.27 KG/M2 | TEMPERATURE: 98.2 F

## 2019-12-10 DIAGNOSIS — R53.83 FATIGUE, UNSPECIFIED TYPE: ICD-10-CM

## 2019-12-10 DIAGNOSIS — Z48.298 AFTERCARE FOLLOWING ORGAN TRANSPLANT: ICD-10-CM

## 2019-12-10 DIAGNOSIS — I10 HYPERTENSION: Primary | ICD-10-CM

## 2019-12-10 DIAGNOSIS — Z48.298 AFTERCARE FOLLOWING ORGAN TRANSPLANT: Primary | ICD-10-CM

## 2019-12-10 DIAGNOSIS — Z94.0 KIDNEY REPLACED BY TRANSPLANT: Primary | ICD-10-CM

## 2019-12-10 DIAGNOSIS — Z94.0 KIDNEY REPLACED BY TRANSPLANT: ICD-10-CM

## 2019-12-10 DIAGNOSIS — Z79.899 ENCOUNTER FOR LONG-TERM CURRENT USE OF MEDICATION: ICD-10-CM

## 2019-12-10 LAB
ALBUMIN SERPL-MCNC: 3.5 G/DL (ref 3.4–5)
ANION GAP SERPL CALCULATED.3IONS-SCNC: 6 MMOL/L (ref 3–14)
BUN SERPL-MCNC: 52 MG/DL (ref 7–30)
CALCIUM SERPL-MCNC: 7.7 MG/DL (ref 8.5–10.1)
CHLORIDE SERPL-SCNC: 110 MMOL/L (ref 94–109)
CO2 SERPL-SCNC: 21 MMOL/L (ref 20–32)
CREAT SERPL-MCNC: 3.41 MG/DL (ref 0.66–1.25)
ERYTHROCYTE [DISTWIDTH] IN BLOOD BY AUTOMATED COUNT: 15.6 % (ref 10–15)
GFR SERPL CREATININE-BSD FRML MDRD: 20 ML/MIN/{1.73_M2}
GLUCOSE SERPL-MCNC: 106 MG/DL (ref 70–99)
HCT VFR BLD AUTO: 32.7 % (ref 40–53)
HGB BLD-MCNC: 9.8 G/DL (ref 13.3–17.7)
MAGNESIUM SERPL-MCNC: 2.9 MG/DL (ref 1.6–2.3)
MCH RBC QN AUTO: 27.5 PG (ref 26.5–33)
MCHC RBC AUTO-ENTMCNC: 30 G/DL (ref 31.5–36.5)
MCV RBC AUTO: 92 FL (ref 78–100)
PHOSPHATE SERPL-MCNC: 5.2 MG/DL (ref 2.5–4.5)
PLATELET # BLD AUTO: 125 10E9/L (ref 150–450)
POTASSIUM SERPL-SCNC: 4.6 MMOL/L (ref 3.4–5.3)
PTH-INTACT SERPL-MCNC: 146 PG/ML (ref 18–80)
RBC # BLD AUTO: 3.57 10E12/L (ref 4.4–5.9)
SODIUM SERPL-SCNC: 137 MMOL/L (ref 133–144)
TSH SERPL DL<=0.005 MIU/L-ACNC: 2.01 MU/L (ref 0.4–4)
VIT B12 SERPL-MCNC: 1237 PG/ML (ref 193–986)
WBC # BLD AUTO: 3.3 10E9/L (ref 4–11)

## 2019-12-10 PROCEDURE — G0463 HOSPITAL OUTPT CLINIC VISIT: HCPCS | Mod: ZF

## 2019-12-10 PROCEDURE — 82607 VITAMIN B-12: CPT | Performed by: INTERNAL MEDICINE

## 2019-12-10 PROCEDURE — 85027 COMPLETE CBC AUTOMATED: CPT | Performed by: INTERNAL MEDICINE

## 2019-12-10 PROCEDURE — 36415 COLL VENOUS BLD VENIPUNCTURE: CPT | Performed by: INTERNAL MEDICINE

## 2019-12-10 PROCEDURE — 84443 ASSAY THYROID STIM HORMONE: CPT | Performed by: INTERNAL MEDICINE

## 2019-12-10 PROCEDURE — 83970 ASSAY OF PARATHORMONE: CPT | Performed by: INTERNAL MEDICINE

## 2019-12-10 PROCEDURE — 80069 RENAL FUNCTION PANEL: CPT | Performed by: INTERNAL MEDICINE

## 2019-12-10 PROCEDURE — 80197 ASSAY OF TACROLIMUS: CPT | Performed by: INTERNAL MEDICINE

## 2019-12-10 PROCEDURE — 83735 ASSAY OF MAGNESIUM: CPT | Performed by: INTERNAL MEDICINE

## 2019-12-10 RX ORDER — KETOCONAZOLE 200 MG/1
100 TABLET ORAL DAILY
Qty: 45 TABLET | Refills: 3 | Status: SHIPPED | OUTPATIENT
Start: 2019-12-10 | End: 2020-02-17

## 2019-12-10 RX ORDER — CARVEDILOL 25 MG/1
25 TABLET ORAL 2 TIMES DAILY WITH MEALS
Qty: 180 TABLET | Refills: 3 | Status: SHIPPED | OUTPATIENT
Start: 2019-12-10 | End: 2020-11-02

## 2019-12-10 RX ORDER — CLONIDINE HYDROCHLORIDE 0.1 MG/1
0.1 TABLET ORAL 2 TIMES DAILY PRN
Qty: 60 TABLET | Refills: 1 | Status: SHIPPED | OUTPATIENT
Start: 2019-12-10 | End: 2020-11-02

## 2019-12-10 RX ORDER — CALCITRIOL 0.25 UG/1
0.25 CAPSULE, LIQUID FILLED ORAL DAILY
Qty: 90 CAPSULE | Refills: 0 | Status: SHIPPED | OUTPATIENT
Start: 2019-12-10 | End: 2020-04-27

## 2019-12-10 ASSESSMENT — PAIN SCALES - GENERAL: PAINLEVEL: NO PAIN (0)

## 2019-12-10 NOTE — PATIENT INSTRUCTIONS
1. Stop Diltiazem and start Coreg 25 mg   2. Start Ketoconazole 1/2 tablet daily to keep prograf at goal   3. Check labs weekly x 4

## 2019-12-10 NOTE — LETTER
12/10/2019      RE: Tristian Mckeon  [address]  [facility] [id number]       CHRONIC TRANSPLANT NEPHROLOGY VISIT    Assessment & Plan   # DDKT: Stable   - Baseline Cr ~ 2.6-2.9   - Proteinuria: Not checked recently   - Date DSA Last Checked: Aug/2019      Latest DSA: Yes   - BK Viremia: No   - Kidney Tx Biopsy: Dec 23, 2015; Result: Mild glomerulitis and capillary C4d staining consistent with mild, persistent antibody-mediated rejection. 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 Patient deferred today. Handout provided.    # Infection Prophylaxis:   - PJP: Sulfa/TMP (Bactrim)    # Hypertension: Inadequate control;  Goal BP: < 130/80   - Changes: Yes. Will discontinue diltiazem and continue combination 200mg ketoconazole and add carvedilol 25mg twice daily. We will need to check blood tests weekly after starting this new regimen.    # Anemia in Chronic Renal Disease: Hgb: Decreased        - Iron studies: Not checked recently    # Mineral Bone Disorder:   - Secondary renal hyperparathyroidism; PTH level: Not checked recently  - Vitamin D; level: Not checked recently        On Supplement: Yes  - Calcium; level: Low        On Supplement: Yes  - Phosphorus; level: Not checked recently          # Electrolytes:   - Potassium; level: Normal         - Magnesium; level: Not checked recently          - Bicarbonate; level: Normal          - Sodium; level: Normal           # Muscle cramping: Likely due to low calcium and vitamin D levels. I will provide calcitriol to alleviate low calcium levels.     # Skin Cancer Risk:    - Discussed sun protection and recommend regular follow up with Dermatology.    # Medical Compliance: Yes    # Transplant  History:  Etiology of Kidney Failure: Hypertension  Tx: DDKT  Transplant: 3/12/2015 (Kidney), 4/1/1994 (Kidney), 10/1/2000 (Kidney)  Donor Type: Donation after Brain Death Donor Class: Standard Criteria Donor  Significant changes in immunosuppression: None  Significant transplant-related complications: None    Transplant Office Phone Number: [phone]    Assessment and plan was discussed with the patient and he voiced his understanding and agreement.    Return visit: Return in about 3 months (around 3/10/2020).      Chief Complaint   Mr. Mckeon is a 49 year old here for routine follow up.    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 He has had 2 previous kidney transplants in 1994 and 2000, both of which had failed and he was back on hemodialysis since 2007. He received DDKT 3/12/15 complicated by DGF.  He is highly sensitized and did have low level historical DSA to A1 (797) and A30 (1092) from 2012 serum sample, but no DSA at time of transplant and crossmatch was negative.  Patient underwent a kidney transplant biopsy on 3/25/15 due to no improvement in kidney function and was found to have T cell mediated and antibody-mediated rejection.  In addition, it did show moderate interstitial fibrosis and tubular atrophy. He was treated with PP/IVIG and Thymoglobulin. Creatinine subsequently improved to around 2.2 mg/dL; however, has crept up to more than 3.5 mg/dL;  kidney biopsy was repeated on 05/08/2015 and showed T cell mediated rejection and acute antibody mediated rejection.   He was subsequently treated with steroids, IVIG, plasmapheresis and Bortezomib. He tolerated treatment well with improved creatinine to about 2s. Creatinine again increased to 3.0mg/dl and he underwent under biopsy on 9/28 /2015, which showed isolated V lesion  -thought to be secondary to AMR, although [place] 2 A T cell emdiated rejection was considered.He was again treated with IVIG/PP with  improvement in creatinine and subsequent biopsy on 12/23/2015 showed no V lesion but mild glomerulitis and capillary C4d staining consistent with mild, persistent antibody-mediated rejection. Patient has significant moderate to severe arteriopathy. Patient received a course of steroids. He has had fluctuating prograf level and has been Diltiazem. He was switched to Astagraf in Jan 2016.     Patient reports some concern with muscle cramping, especially after a long day at work. Otherwise, he denies any recent hospitalizations. Denies chest pain or shortness of breath with exertion. Energy level is stable. No recent changes in weight or appetite. No nausea, vomiting, or diarrhea. No fevers, chills, or sweats. Trace lower extremity edema.       Recent Hospitalizations:  [x] No [] Yes    New Medical Issues: [] No [x] Yes Muscle cramping   Decreased energy: [x] No [] Yes    Chest pain or SOB with exertion:  [x] No [] Yes    Appetite change or weight change: [x] No [] Yes    Nausea, vomiting or diarrhea:  [x] No [] Yes    Fever, sweats or chills: [x] No [] Yes    Leg swelling: [] No [x] Yes Trace      Home BP: elevated    Review of Systems   A comprehensive review of systems was obtained and negative, except as noted in the HPI or PMH.    Problem List   Patient Active Problem List   Diagnosis     HTN, kidney transplant related     Tobacco abuse     Depression     Kidney replaced by transplant     Immunosuppression (H)     Anemia in chronic renal disease     Aftercare following organ transplant     Secondary renal hyperparathyroidism (H)     Vitamin D deficiency     Kidney transplant rejection     Steroid-induced hyperglycemia     Metabolic acidosis     Antibody mediated rejection of kidney transplant     Hyperglycemia     Hemorrhage following kidney biopsy     Thrombocytopenia (H)       Social History   Social History     Tobacco Use     Smoking status: Light Tobacco Smoker     Types: Cigars     Smokeless tobacco: Never  Used   Substance Use Topics     Alcohol use: Yes     Alcohol/week: 0.0 standard drinks     Comment: Minimal     Drug use: No       Allergies   Allergies   Allergen Reactions     Cephalosporins Unknown     Cyclosporine      Duricef [Cefadroxil]        Medications   Current Outpatient Medications   Medication Sig     Blood Pressure Monitor KIT Automatic Blood Pressure Monitor     calcitRIOL (ROCALTROL) 0.25 MCG capsule Take 1 capsule (0.25 mcg) by mouth daily     carvedilol (COREG) 25 MG tablet Take 1 tablet (25 mg) by mouth 2 times daily (with meals)     chlorthalidone (HYGROTON) 25 MG tablet Take 1 tablet (25 mg) by mouth daily     cyanocobalamin (VITAMIN  B-12) 1000 MCG tablet Take 1,000 mcg by mouth daily     diltiazem ER COATED BEADS (CARDIZEM CD) 240 MG 24 hr capsule Take 1 capsule (240 mg) by mouth daily     ferrous sulfate (IRON) 325 (65 Fe) MG tablet Take 1 tablet (325 mg) by mouth daily     ketoconazole (NIZORAL) 200 MG tablet Take 0.5 tablets (100 mg) by mouth daily     losartan (COZAAR) 25 MG tablet Take 1 tablet (25 mg) by mouth daily     MYFORTIC (BRAND) 360 MG EC tablet Take 3 tablets (1,080 mg) by mouth 2 times daily     predniSONE (DELTASONE) 5 MG tablet Take 5 mg by mouth daily.     tacrolimus (ASTAGRAF XL) 1 MG 24 hr capsule Take 3 capsules (3 mg) by mouth every morning (before breakfast) Total dose 28 mg daily     tacrolimus (ASTAGRAF XL) 5 MG 24 hr capsule Take 5 capsules (25 mg) by mouth every morning (before breakfast) Total dose 28 mg daily     Multiple Vitamin (DAILY MULTIVITAMIN PO) Take by mouth daily     sulfamethoxazole-trimethoprim (BACTRIM/SEPTRA) 400-80 MG per tablet Take 1 tablet by mouth Every Mon, Wed, Fri Morning (Patient not taking: Reported on 12/10/2019)     No current facility-administered medications for this visit.      There are no discontinued medications.    Physical Exam   Vital Signs: BP (!) 176/94 (BP Location: Left arm, Cuff Size: Adult Large)   Pulse 73   Temp 98.2   F (36.8  C) (Oral)   Wt 98.4 kg (217 lb)   SpO2 98%   BMI 30.27 kg/m       GENERAL APPEARANCE: alert and no distress  HENT: mouth without ulcers or lesions  LYMPHATICS: no cervical or supraclavicular nodes  RESP: lungs clear to auscultation - no rales, rhonchi or wheezes  CV: regular rhythm, normal rate, no rub, no murmur  EDEMA: trace LE edema bilaterally  ABDOMEN: soft, nondistended, nontender, bowel sounds normal  MS: extremities normal - no gross deformities noted, no evidence of inflammation in joints, no muscle tenderness  SKIN: no rash      Data     Renal Latest Ref Rng & Units 12/10/2019 8/22/2019 1/23/2019   Na 133 - 144 mmol/L 137 138 139   K 3.4 - 5.3 mmol/L 4.6 4.4 4.2   Cl 94 - 109 mmol/L 110(H) 113(H) 109   CO2 20 - 32 mmol/L 21 20 21   BUN 7 - 30 mg/dL 52(H) 41(H) 40(H)   Cr 0.66 - 1.25 mg/dL 3.41(H) 3.11(H) 2.88(H)   Glucose 70 - 99 mg/dL 106(H) 109(H) 95   Ca  8.5 - 10.1 mg/dL 7.7(L) 7.7(L) 8.1(L)   Mg 1.6 - 2.3 mg/dL - - -     Bone Health Latest Ref Rng & Units 8/8/2018 5/24/2018 5/8/2018   Phos 2.5 - 4.5 mg/dL 4.1 4.1 4.0   PTHi 18 - 80 pg/mL - 120(H) 110(H)   Vit D Def 20 - 75 ug/L - 39 42     Heme Latest Ref Rng & Units 12/10/2019 8/22/2019 1/23/2019   WBC 4.0 - 11.0 10e9/L 3.3(L) 3.2(L) 2.7(L)   Hgb 13.3 - 17.7 g/dL 9.8(L) 9.8(L) 9.3(L)   Plt 150 - 450 10e9/L 125(L) 117(L) 122(L)     Liver Latest Ref Rng & Units 8/8/2018 5/24/2018 5/8/2018   AP 40 - 150 U/L 47 - -   TBili 0.2 - 1.3 mg/dL 0.3 - -   DBili 0.0 - 0.2 mg/dL - - -   ALT 0 - 70 U/L 12 - -   AST 0 - 45 U/L 12 - -   Tot Protein 6.8 - 8.8 g/dL 6.4(L) - -   Albumin 3.4 - 5.0 g/dL 3.5 3.6 3.9     Pancreas Latest Ref Rng & Units 5/8/2015 3/11/2015 1/23/2009   A1C 4.3 - 6.0 % - 5.2 5.3   Lipase 73 - 393 U/L 127 - -     Iron studies Latest Ref Rng & Units 5/8/2018 1/12/2017 3/20/2015   Iron 35 - 180 ug/dL 46 74 51   Iron sat 15 - 46 % 23 38 33   Ferritin 26 - 388 ng/mL 414(H) 371 1,172(H)     [facility] Txp Virology Latest Ref Rng & Units  1/23/2019 8/8/2018 5/8/2018   CMV IgG EU/mL - - -   CVM DNA Quant - - - -   BK Spec - Plasma, EDTA anticoagulant Plasma Plasma   BK Res BKNEG:BK Virus DNA Not Detected copies/mL BK Virus DNA Not Detected BK Virus DNA Not Detected BK Virus DNA Not Detected   BK Log <2.7 Log copies/mL Not Calculated Not Calculated Not Calculated   EBV IgG - - - -   Hep B Core NR:Nonreactive - Nonreactive -   Hep B Surf - - - -   HIV 1&2 NEG - - -        Recent Labs   Lab Test 01/23/19  0846 02/01/19  0735 08/22/19  1028   DOSTAC 1/22/19 0900 1/31/19 0830 0900 08/21/2019   TACROL <3.0* 4.5* 4.9*     Recent Labs   Lab Test 01/19/16  1349 01/12/17  1121 05/08/18  0813   DOSMPA 2,130 01/11/17  2130 0900 05/07/2018   MPACID 11.03* 4.51* 1.63   MPAG 126.7* 100.9* 128.9*       Scribe Disclosure:  I, Antione Caldwell, am serving as a scribe to document services personally performed by Koel Collins MD at this visit, based upon the provider's statements to me. All documentation has been reviewed by the aforementioned provider prior to being entered into the official medical record.    Kidney/Pancreas Recipient

## 2019-12-10 NOTE — NURSING NOTE
Tristian Mckeon was seen today in clinic by this writer. Transplant coordinators introduced, contact information given. Medications, lab orders, lab frequency, and necessary follow up discussed with patient. Patient voiced understanding and agreement of education and plan.     Pt requesting assistance navigating getting health insurance again for next year. Will send message to JOSE Najera RN

## 2019-12-10 NOTE — NURSING NOTE
"Chief Complaint   Patient presents with     RECHECK     Follow Up Kidney TX     Vital signs:  Temp: 98.2  F (36.8  C) Temp src: Oral BP: (!) 176/94 Pulse: 73     SpO2: 98 %       Weight: 98.4 kg (217 lb)  Estimated body mass index is 30.27 kg/m  as calculated from the following:    Height as of 1/24/19: 1.803 m (5' 11\").    Weight as of this encounter: 98.4 kg (217 lb).      Eleonora Cantu, CMA    "

## 2019-12-10 NOTE — PROGRESS NOTES
CHRONIC TRANSPLANT NEPHROLOGY VISIT    Assessment & Plan   # DDKT: Stable   - Baseline Cr ~ 2.6-2.9   - Proteinuria: Not checked recently   - Date DSA Last Checked: Aug/2019      Latest DSA: Yes   - BK Viremia: No   - Kidney Tx Biopsy: Dec 23, 2015; Result: Mild glomerulitis and capillary C4d staining consistent with mild, persistent antibody-mediated rejection. 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 Patient deferred today. Handout provided.    # Infection Prophylaxis:   - PJP: Sulfa/TMP (Bactrim)    # Hypertension: Inadequate control;  Goal BP: < 130/80   - Changes: Yes. Will discontinue diltiazem and continue combination 200mg ketoconazole and add carvedilol 25mg twice daily. We will need to check blood tests weekly after starting this new regimen.    # Anemia in Chronic Renal Disease: Hgb: Decreased        - Iron studies: Not checked recently    # Mineral Bone Disorder:   - Secondary renal hyperparathyroidism; PTH level: Not checked recently  - Vitamin D; level: Not checked recently        On Supplement: Yes  - Calcium; level: Low        On Supplement: Yes  - Phosphorus; level: Not checked recently          # Electrolytes:   - Potassium; level: Normal         - Magnesium; level: Not checked recently          - Bicarbonate; level: Normal          - Sodium; level: Normal           # Muscle cramping: Likely due to low calcium and vitamin D levels. I will provide calcitriol to alleviate low calcium levels.     # Skin Cancer Risk:    - Discussed sun protection and recommend regular follow up with Dermatology.    # Medical Compliance: Yes    # Transplant History:  Etiology of Kidney Failure: Hypertension  Tx: DDKT  Transplant: 3/12/2015 (Kidney),  4/1/1994 (Kidney), 10/1/2000 (Kidney)  Donor Type: Donation after Brain Death Donor Class: Standard Criteria Donor  Significant changes in immunosuppression: None  Significant transplant-related complications: None    Transplant Office Phone Number: [phone]    Assessment and plan was discussed with the patient and he voiced his understanding and agreement.    Return visit: Return in about 3 months (around 3/10/2020).      Chief Complaint   Mr. Mckeon is a 49 year old here for routine follow up.    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 He has had 2 previous kidney transplants in 1994 and 2000, both of which had failed and he was back on hemodialysis since 2007. He received DDKT 3/12/15 complicated by DGF.  He is highly sensitized and did have low level historical DSA to A1 (797) and A30 (1092) from 2012 serum sample, but no DSA at time of transplant and crossmatch was negative.  Patient underwent a kidney transplant biopsy on 3/25/15 due to no improvement in kidney function and was found to have T cell mediated and antibody-mediated rejection.  In addition, it did show moderate interstitial fibrosis and tubular atrophy. He was treated with PP/IVIG and Thymoglobulin. Creatinine subsequently improved to around 2.2 mg/dL; however, has crept up to more than 3.5 mg/dL;  kidney biopsy was repeated on 05/08/2015 and showed T cell mediated rejection and acute antibody mediated rejection.   He was subsequently treated with steroids, IVIG, plasmapheresis and Bortezomib. He tolerated treatment well with improved creatinine to about 2s. Creatinine again increased to 3.0mg/dl and he underwent under biopsy on 9/28 /2015, which showed isolated V lesion  -thought to be secondary to [facility], although [place] 2 A T cell emdiated rejection was considered.He was again treated with IVIG/PP with improvement in creatinine and subsequent biopsy on 12/23/2015 showed no V lesion but mild  glomerulitis and capillary C4d staining consistent with mild, persistent antibody-mediated rejection. Patient has significant moderate to severe arteriopathy. Patient received a course of steroids. He has had fluctuating prograf level and has been Diltiazem. He was switched to Astagraf in Jan 2016.     Patient reports some concern with muscle cramping, especially after a long day at work. Otherwise, he denies any recent hospitalizations. Denies chest pain or shortness of breath with exertion. Energy level is stable. No recent changes in weight or appetite. No nausea, vomiting, or diarrhea. No fevers, chills, or sweats. Trace lower extremity edema.       Recent Hospitalizations:  [x] No [] Yes    New Medical Issues: [] No [x] Yes Muscle cramping   Decreased energy: [x] No [] Yes    Chest pain or SOB with exertion:  [x] No [] Yes    Appetite change or weight change: [x] No [] Yes    Nausea, vomiting or diarrhea:  [x] No [] Yes    Fever, sweats or chills: [x] No [] Yes    Leg swelling: [] No [x] Yes Trace      Home BP: elevated    Review of Systems   A comprehensive review of systems was obtained and negative, except as noted in the HPI or PMH.    Problem List   Patient Active Problem List   Diagnosis     HTN, kidney transplant related     Tobacco abuse     Depression     Kidney replaced by transplant     Immunosuppression (H)     Anemia in chronic renal disease     Aftercare following organ transplant     Secondary renal hyperparathyroidism (H)     Vitamin D deficiency     Kidney transplant rejection     Steroid-induced hyperglycemia     Metabolic acidosis     Antibody mediated rejection of kidney transplant     Hyperglycemia     Hemorrhage following kidney biopsy     Thrombocytopenia (H)       Social History   Social History     Tobacco Use     Smoking status: Light Tobacco Smoker     Types: Cigars     Smokeless tobacco: Never Used   Substance Use Topics     Alcohol use: Yes     Alcohol/week: 0.0 standard drinks      Comment: Minimal     Drug use: No       Allergies   Allergies   Allergen Reactions     Cephalosporins Unknown     Cyclosporine      Duricef [Cefadroxil]        Medications   Current Outpatient Medications   Medication Sig     Blood Pressure Monitor KIT Automatic Blood Pressure Monitor     calcitRIOL (ROCALTROL) 0.25 MCG capsule Take 1 capsule (0.25 mcg) by mouth daily     carvedilol (COREG) 25 MG tablet Take 1 tablet (25 mg) by mouth 2 times daily (with meals)     chlorthalidone (HYGROTON) 25 MG tablet Take 1 tablet (25 mg) by mouth daily     cyanocobalamin (VITAMIN  B-12) 1000 MCG tablet Take 1,000 mcg by mouth daily     diltiazem ER COATED BEADS (CARDIZEM CD) 240 MG 24 hr capsule Take 1 capsule (240 mg) by mouth daily     ferrous sulfate (IRON) 325 (65 Fe) MG tablet Take 1 tablet (325 mg) by mouth daily     ketoconazole (NIZORAL) 200 MG tablet Take 0.5 tablets (100 mg) by mouth daily     losartan (COZAAR) 25 MG tablet Take 1 tablet (25 mg) by mouth daily     MYFORTIC (BRAND) 360 MG EC tablet Take 3 tablets (1,080 mg) by mouth 2 times daily     predniSONE (DELTASONE) 5 MG tablet Take 5 mg by mouth daily.     tacrolimus (ASTAGRAF XL) 1 MG 24 hr capsule Take 3 capsules (3 mg) by mouth every morning (before breakfast) Total dose 28 mg daily     tacrolimus (ASTAGRAF XL) 5 MG 24 hr capsule Take 5 capsules (25 mg) by mouth every morning (before breakfast) Total dose 28 mg daily     Multiple Vitamin (DAILY MULTIVITAMIN PO) Take by mouth daily     sulfamethoxazole-trimethoprim (BACTRIM/SEPTRA) 400-80 MG per tablet Take 1 tablet by mouth Every Mon, Wed, Fri Morning (Patient not taking: Reported on 12/10/2019)     No current facility-administered medications for this visit.      There are no discontinued medications.    Physical Exam   Vital Signs: BP (!) 176/94 (BP Location: Left arm, Cuff Size: Adult Large)   Pulse 73   Temp 98.2  F (36.8  C) (Oral)   Wt 98.4 kg (217 lb)   SpO2 98%   BMI 30.27 kg/m      GENERAL  APPEARANCE: alert and no distress  HENT: mouth without ulcers or lesions  LYMPHATICS: no cervical or supraclavicular nodes  RESP: lungs clear to auscultation - no rales, rhonchi or wheezes  CV: regular rhythm, normal rate, no rub, no murmur  EDEMA: trace LE edema bilaterally  ABDOMEN: soft, nondistended, nontender, bowel sounds normal  MS: extremities normal - no gross deformities noted, no evidence of inflammation in joints, no muscle tenderness  SKIN: no rash      Data     Renal Latest Ref Rng & Units 12/10/2019 8/22/2019 1/23/2019   Na 133 - 144 mmol/L 137 138 139   K 3.4 - 5.3 mmol/L 4.6 4.4 4.2   Cl 94 - 109 mmol/L 110(H) 113(H) 109   CO2 20 - 32 mmol/L 21 20 21   BUN 7 - 30 mg/dL 52(H) 41(H) 40(H)   Cr 0.66 - 1.25 mg/dL 3.41(H) 3.11(H) 2.88(H)   Glucose 70 - 99 mg/dL 106(H) 109(H) 95   Ca  8.5 - 10.1 mg/dL 7.7(L) 7.7(L) 8.1(L)   Mg 1.6 - 2.3 mg/dL - - -     Bone Health Latest Ref Rng & Units 8/8/2018 5/24/2018 5/8/2018   Phos 2.5 - 4.5 mg/dL 4.1 4.1 4.0   PTHi 18 - 80 pg/mL - 120(H) 110(H)   Vit D Def 20 - 75 ug/L - 39 42     Heme Latest Ref Rng & Units 12/10/2019 8/22/2019 1/23/2019   WBC 4.0 - 11.0 10e9/L 3.3(L) 3.2(L) 2.7(L)   Hgb 13.3 - 17.7 g/dL 9.8(L) 9.8(L) 9.3(L)   Plt 150 - 450 10e9/L 125(L) 117(L) 122(L)     Liver Latest Ref Rng & Units 8/8/2018 5/24/2018 5/8/2018   AP 40 - 150 U/L 47 - -   TBili 0.2 - 1.3 mg/dL 0.3 - -   DBili 0.0 - 0.2 mg/dL - - -   ALT 0 - 70 U/L 12 - -   AST 0 - 45 U/L 12 - -   Tot Protein 6.8 - 8.8 g/dL 6.4(L) - -   Albumin 3.4 - 5.0 g/dL 3.5 3.6 3.9     Pancreas Latest Ref Rng & Units 5/8/2015 3/11/2015 1/23/2009   A1C 4.3 - 6.0 % - 5.2 5.3   Lipase 73 - 393 U/L 127 - -     Iron studies Latest Ref Rng & Units 5/8/2018 1/12/2017 3/20/2015   Iron 35 - 180 ug/dL 46 74 51   Iron sat 15 - 46 % 23 38 33   Ferritin 26 - 388 ng/mL 414(H) 371 1,172(H)     [facility] Txp Virology Latest Ref Rng & Units 1/23/2019 8/8/2018 5/8/2018   CMV IgG EU/mL - - -   CVM DNA Quant - - - -   BK Spec - Plasma,  EDTA anticoagulant Plasma Plasma   BK Res BKNEG:BK Virus DNA Not Detected copies/mL BK Virus DNA Not Detected BK Virus DNA Not Detected BK Virus DNA Not Detected   BK Log <2.7 Log copies/mL Not Calculated Not Calculated Not Calculated   EBV IgG - - - -   Hep B Core NR:Nonreactive - Nonreactive -   Hep B Surf - - - -   HIV 1&2 NEG - - -        Recent Labs   Lab Test 01/23/19  0846 02/01/19  0735 08/22/19  1028   DOSTAC 1/22/19 0900 1/31/19 0830 0900 08/21/2019   TACROL <3.0* 4.5* 4.9*     Recent Labs   Lab Test 01/19/16  1349 01/12/17  1121 05/08/18  0813   DOSMPA 2,130 01/11/17  2130 0900 05/07/2018   MPACID 11.03* 4.51* 1.63   MPAG 126.7* 100.9* 128.9*       Scribe Disclosure:  I, Antione Caldwell, am serving as a scribe to document services personally performed by Kole Collins MD at this visit, based upon the provider's statements to me. All documentation has been reviewed by the aforementioned provider prior to being entered into the official medical record.

## 2019-12-11 ENCOUNTER — TELEPHONE (OUTPATIENT)
Dept: TRANSPLANT | Facility: CLINIC | Age: 49
End: 2019-12-11

## 2019-12-11 DIAGNOSIS — Z94.0 KIDNEY REPLACED BY TRANSPLANT: Primary | ICD-10-CM

## 2019-12-11 LAB
TACROLIMUS BLD-MCNC: 11.8 UG/L (ref 5–15)
TME LAST DOSE: NORMAL H

## 2019-12-11 NOTE — TELEPHONE ENCOUNTER
IMMUNOSUPPRESSION  Tacrolimus level 11.8, reported as 6-1/2 hour peak   (drawn in [facility] at nephrology clinic appointment)  Goal for 12-hour trough 4-6  Current Astagraf dose 28 mg daily    PLAN  Repeat tacrolimus level taking care to get good 12-hour level  Verify Astagraf dose    LPN TASK  Call with questions and instructions per plan

## 2019-12-11 NOTE — TELEPHONE ENCOUNTER
Call placed to patient. Patient confirms current Astagraf dose at 28 mg daily. Patient v\u to repeat level with a accurate 24 hour trough level. Order sent

## 2019-12-11 NOTE — TELEPHONE ENCOUNTER
Left a message to return call regarding insurance questions patient had during transplant coordinator visit.     Richelle Downey, [facility]    Kidney/Pancreas/Auto Islet Transplant Programs

## 2019-12-12 ENCOUNTER — TELEPHONE (OUTPATIENT)
Dept: NEPHROLOGY | Facility: CLINIC | Age: 49
End: 2019-12-12

## 2019-12-12 NOTE — TELEPHONE ENCOUNTER
Tristian Mckeon was identified as being non-adherent to his/her clonidine, diltiazem, and losartan (medication name) in the last 30-90 days.  Reason(s) identified for non-adherence: forgetfulness forgets to fill prescriptions  Intervention(s) offered to assist patient with adherence:  Referred patient to [facility].    Recommendation to Provider: Pt is now on mail order service and trying to align meds to fill together in one order each month.  Being followed by mail service pharmacist.  Recommended follow-up: One month to see if aligned.  Completed by: Sterling Saleh PharmD  [facility]   [place] Pharmacist on behalf of [facility]

## 2019-12-20 DIAGNOSIS — Z94.0 KIDNEY REPLACED BY TRANSPLANT: ICD-10-CM

## 2019-12-20 LAB
ANION GAP SERPL CALCULATED.3IONS-SCNC: 7 MMOL/L (ref 3–14)
BASOPHILS # BLD AUTO: 0 10E9/L (ref 0–0.2)
BASOPHILS NFR BLD AUTO: 0.6 %
BUN SERPL-MCNC: 58 MG/DL (ref 7–30)
CALCIUM SERPL-MCNC: 7.7 MG/DL (ref 8.5–10.1)
CHLORIDE SERPL-SCNC: 112 MMOL/L (ref 94–109)
CO2 SERPL-SCNC: 19 MMOL/L (ref 20–32)
CREAT SERPL-MCNC: 3.55 MG/DL (ref 0.66–1.25)
DIFFERENTIAL METHOD BLD: ABNORMAL
EOSINOPHIL # BLD AUTO: 0.1 10E9/L (ref 0–0.7)
EOSINOPHIL NFR BLD AUTO: 2.1 %
ERYTHROCYTE [DISTWIDTH] IN BLOOD BY AUTOMATED COUNT: 15.7 % (ref 10–15)
GFR SERPL CREATININE-BSD FRML MDRD: 19 ML/MIN/{1.73_M2}
GLUCOSE SERPL-MCNC: 96 MG/DL (ref 70–99)
HCT VFR BLD AUTO: 33.2 % (ref 40–53)
HGB BLD-MCNC: 9.8 G/DL (ref 13.3–17.7)
IMM GRANULOCYTES # BLD: 0 10E9/L (ref 0–0.4)
IMM GRANULOCYTES NFR BLD: 0.3 %
LYMPHOCYTES # BLD AUTO: 0.4 10E9/L (ref 0.8–5.3)
LYMPHOCYTES NFR BLD AUTO: 11.3 %
MCH RBC QN AUTO: 27.1 PG (ref 26.5–33)
MCHC RBC AUTO-ENTMCNC: 29.5 G/DL (ref 31.5–36.5)
MCV RBC AUTO: 92 FL (ref 78–100)
MONOCYTES # BLD AUTO: 0.5 10E9/L (ref 0–1.3)
MONOCYTES NFR BLD AUTO: 14.7 %
NEUTROPHILS # BLD AUTO: 2.3 10E9/L (ref 1.6–8.3)
NEUTROPHILS NFR BLD AUTO: 71 %
NRBC # BLD AUTO: 0 10*3/UL
NRBC BLD AUTO-RTO: 0 /100
PLATELET # BLD AUTO: 122 10E9/L (ref 150–450)
POTASSIUM SERPL-SCNC: 4.5 MMOL/L (ref 3.4–5.3)
RBC # BLD AUTO: 3.61 10E12/L (ref 4.4–5.9)
SODIUM SERPL-SCNC: 139 MMOL/L (ref 133–144)
TACROLIMUS BLD-MCNC: 4.7 UG/L (ref 5–15)
TME LAST DOSE: ABNORMAL H
WBC # BLD AUTO: 3.3 10E9/L (ref 4–11)

## 2019-12-20 PROCEDURE — 80197 ASSAY OF TACROLIMUS: CPT | Performed by: INTERNAL MEDICINE

## 2019-12-30 ENCOUNTER — TELEPHONE (OUTPATIENT)
Dept: TRANSPLANT | Facility: CLINIC | Age: 49
End: 2019-12-30

## 2019-12-30 DIAGNOSIS — Z94.0 KIDNEY TRANSPLANTED: Primary | ICD-10-CM

## 2019-12-30 DIAGNOSIS — D84.9 IMMUNOSUPPRESSED STATUS (H): ICD-10-CM

## 2019-12-30 DIAGNOSIS — Z48.298 AFTERCARE FOLLOWING ORGAN TRANSPLANT: ICD-10-CM

## 2019-12-30 NOTE — TELEPHONE ENCOUNTER
needs labs weekly until we see stable tac   Received: 3 days ago   Message Contents   Dennis, MD Marce Rivers Christin Rebecca, RN             I took him of diltiazem because its a poor BP option but because its a tac booster,  I used a different booster that is ketoconazole 200 mg.   This later can be reduced to 100 mg if tac is stable. Will need LFTs periodically and we need to make sure the BP is stable with the changes

## 2019-12-30 NOTE — TELEPHONE ENCOUNTER
"ISSUE:   Creatinine elevated to 3.55. Baseline 2.6-2.9. Per Dr. Collins, \"Persistent antibody-mediated rejection\"    12 hr trough Tac goal is 4-6. Last level 4.7 on 12/20/19.    PLAN:   Please call Tristian to make sure he is completing weekly labs. Needs to go in this week, if possible 12/31/19. Any updated BP readings?     OUTCOME:   RNCC left VM message for Drake letting him know his Creatinine continues to rise. His Tacrolimus was at goal but need to continue weekly labs. When are you going in for labs this week? BP reading?      "

## 2019-12-31 NOTE — TELEPHONE ENCOUNTER
Assessment Plan:   How are you feeling?  Any recent illness/fever?  Any new or missed medications?  Are you drinking 2-3L water/day?  Volume status/weight/edema?  Changes in UOP? Color?  Pain over graft sites?  BP?    Plan for weekly labs per Dr. Collins. RNCC attempted to call Tristian again to address his elevated Creatinine and elevated Tacrolimus and was met with busy dial tone. Unable to leave message. Would like to see when Tristian is repeating labs as well.

## 2020-01-02 NOTE — TELEPHONE ENCOUNTER
Call placed to patient. No answer. Voice message left requesting patient return call to answer questions listed below. Instructions left for patient to improve hydration and plan for weekly labs.

## 2020-01-08 ENCOUNTER — TELEPHONE (OUTPATIENT)
Dept: NEPHROLOGY | Facility: CLINIC | Age: 50
End: 2020-01-08

## 2020-01-08 DIAGNOSIS — Z94.0 S/P KIDNEY TRANSPLANT: ICD-10-CM

## 2020-01-08 DIAGNOSIS — D84.9 IMMUNOSUPPRESSED STATUS (H): ICD-10-CM

## 2020-01-08 DIAGNOSIS — Z94.0 KIDNEY REPLACED BY TRANSPLANT: ICD-10-CM

## 2020-01-08 DIAGNOSIS — T86.11 KIDNEY TRANSPLANT REJECTION: ICD-10-CM

## 2020-01-08 DIAGNOSIS — I10 ESSENTIAL HYPERTENSION: ICD-10-CM

## 2020-01-10 ENCOUNTER — TELEPHONE (OUTPATIENT)
Dept: NEPHROLOGY | Facility: CLINIC | Age: 50
End: 2020-01-10

## 2020-01-10 NOTE — TELEPHONE ENCOUNTER
Patient returned call and states that they are working on renewal of their Medicaid insurance. They will work on submitting it today.  Was given NEISHA Peoples contact info if they have further questions. Patient will return call to Liaison if the have any updates or questions as well.    Any eligible claims should be rebilled to CO[employer] secondary insurance [place] Medicaid once the Medicaid insurance is fixed/reactivated. Patient aware of this.  Notifying LINDSEY Hernandes via [employer] tech Juanita CHUN Who is aware of this as well.

## 2020-01-10 NOTE — TELEPHONE ENCOUNTER
"Received notification from [facility] pharmacy that patient was requesting a bill be sent to them for their Astagraf and for liaison to reach out to patient about insurance issues.    Patient only has Medicare Part B insurance at this time. [place] Medicaid showing as termed on 11/30/19.  Unable to locate any other Medicare Part B supplemental insurance at this time.  If Medicaid will not be reactivated or fixed patient would be responsible for %20 coinsurance of Astagraf 1mg = $91.02 for 30 days and Astagraf 5mg = $721.70 for 30 days. Mycophenolic 360mg = $195.01 for 30 days.      [facility] has given patient a 5 day supply of Mycophenolic and 10 day supply of Astagraf.    Unable to leave a message for patient to return my call. No voicemail.  ([facility] Free Drug is an option. There is no Astagraf Free Drug program.)    Routing to  and [place] coordinator with how to proceed. Also see previous RN note \"Pt requesting assistance navigating getting health insurance again for next year.\" (Encounter 12/10/19)      "

## 2020-01-16 ENCOUNTER — TELEPHONE (OUTPATIENT)
Dept: TRANSPLANT | Facility: CLINIC | Age: 50
End: 2020-01-16

## 2020-01-16 DIAGNOSIS — Z94.0 KIDNEY TRANSPLANTED: Primary | ICD-10-CM

## 2020-01-16 DIAGNOSIS — Z48.298 AFTERCARE FOLLOWING ORGAN TRANSPLANT: ICD-10-CM

## 2020-01-16 NOTE — TELEPHONE ENCOUNTER
RNCC spoke with Tristian. He agreed to get lab work done. He has been struggling to get in at the right time for an accurate drug level. He asked if [facility] lab was open on Saturday. Provided him a phone number to call for hours. States he will call right now to set up an appt. He also verbalized understanding that he would need hepatic panel in addition to renal transplant labs.

## 2020-01-16 NOTE — TELEPHONE ENCOUNTER
labs   Received: Today   Message Contents   Arely Art RN Blaisdell, Aby Duong RN          Previous Messages      ----- Message -----   From: Kole Collins MD   Sent: 1/16/2020   2:30 PM CST   To: Arely Art RN     Needs updated labs and LFTs

## 2020-01-18 DIAGNOSIS — Z94.0 KIDNEY TRANSPLANTED: ICD-10-CM

## 2020-01-18 DIAGNOSIS — Z94.0 KIDNEY REPLACED BY TRANSPLANT: ICD-10-CM

## 2020-01-18 DIAGNOSIS — Z48.298 AFTERCARE FOLLOWING ORGAN TRANSPLANT: ICD-10-CM

## 2020-01-18 DIAGNOSIS — D84.9 IMMUNOSUPPRESSED STATUS (H): ICD-10-CM

## 2020-01-18 LAB
ALBUMIN SERPL-MCNC: 3.5 G/DL (ref 3.4–5)
ALP SERPL-CCNC: 52 U/L (ref 40–150)
ALT SERPL W P-5'-P-CCNC: 17 U/L (ref 0–70)
ANION GAP SERPL CALCULATED.3IONS-SCNC: 8 MMOL/L (ref 3–14)
AST SERPL W P-5'-P-CCNC: 6 U/L (ref 0–45)
BASOPHILS # BLD AUTO: 0 10E9/L (ref 0–0.2)
BASOPHILS NFR BLD AUTO: 0.4 %
BILIRUB DIRECT SERPL-MCNC: 0.1 MG/DL (ref 0–0.2)
BILIRUB SERPL-MCNC: 0.3 MG/DL (ref 0.2–1.3)
BUN SERPL-MCNC: 64 MG/DL (ref 7–30)
CALCIUM SERPL-MCNC: 8.1 MG/DL (ref 8.5–10.1)
CHLORIDE SERPL-SCNC: 113 MMOL/L (ref 94–109)
CO2 SERPL-SCNC: 19 MMOL/L (ref 20–32)
CREAT SERPL-MCNC: 3.53 MG/DL (ref 0.66–1.25)
DIFFERENTIAL METHOD BLD: ABNORMAL
EOSINOPHIL # BLD AUTO: 0.1 10E9/L (ref 0–0.7)
EOSINOPHIL NFR BLD AUTO: 2.1 %
ERYTHROCYTE [DISTWIDTH] IN BLOOD BY AUTOMATED COUNT: 15.5 % (ref 10–15)
GFR SERPL CREATININE-BSD FRML MDRD: 19 ML/MIN/{1.73_M2}
GLUCOSE SERPL-MCNC: 113 MG/DL (ref 70–99)
HCT VFR BLD AUTO: 31.9 % (ref 40–53)
HGB BLD-MCNC: 9.7 G/DL (ref 13.3–17.7)
IMM GRANULOCYTES # BLD: 0 10E9/L (ref 0–0.4)
IMM GRANULOCYTES NFR BLD: 0.4 %
LYMPHOCYTES # BLD AUTO: 0.3 10E9/L (ref 0.8–5.3)
LYMPHOCYTES NFR BLD AUTO: 11.3 %
MCH RBC QN AUTO: 27.1 PG (ref 26.5–33)
MCHC RBC AUTO-ENTMCNC: 30.4 G/DL (ref 31.5–36.5)
MCV RBC AUTO: 89 FL (ref 78–100)
MONOCYTES # BLD AUTO: 0.5 10E9/L (ref 0–1.3)
MONOCYTES NFR BLD AUTO: 17.7 %
NEUTROPHILS # BLD AUTO: 1.9 10E9/L (ref 1.6–8.3)
NEUTROPHILS NFR BLD AUTO: 68.1 %
NRBC # BLD AUTO: 0 10*3/UL
NRBC BLD AUTO-RTO: 0 /100
PLATELET # BLD AUTO: 146 10E9/L (ref 150–450)
POTASSIUM SERPL-SCNC: 4.7 MMOL/L (ref 3.4–5.3)
PROT SERPL-MCNC: 6.2 G/DL (ref 6.8–8.8)
RBC # BLD AUTO: 3.58 10E12/L (ref 4.4–5.9)
SODIUM SERPL-SCNC: 140 MMOL/L (ref 133–144)
TACROLIMUS BLD-MCNC: 16.6 UG/L (ref 5–15)
TME LAST DOSE: ABNORMAL H
WBC # BLD AUTO: 2.8 10E9/L (ref 4–11)

## 2020-01-18 PROCEDURE — 80197 ASSAY OF TACROLIMUS: CPT | Performed by: INTERNAL MEDICINE

## 2020-01-20 ENCOUNTER — TELEPHONE (OUTPATIENT)
Dept: TRANSPLANT | Facility: CLINIC | Age: 50
End: 2020-01-20

## 2020-01-20 DIAGNOSIS — R79.89 ELEVATED SERUM CREATININE: ICD-10-CM

## 2020-01-20 DIAGNOSIS — Z48.298 AFTERCARE FOLLOWING ORGAN TRANSPLANT: ICD-10-CM

## 2020-01-20 DIAGNOSIS — Z94.0 KIDNEY TRANSPLANTED: Primary | ICD-10-CM

## 2020-01-20 NOTE — TELEPHONE ENCOUNTER
Message   Received: Yesterday   Message Contents   Kole Collins MD Blaisdell, Christin Rebecca, RN             Please check if tac was a true trough or not

## 2020-01-20 NOTE — TELEPHONE ENCOUNTER
"ISSUE:   Tacrolimus XR level 16.6 on 1/18/20 at 11:23 AM, goal 4-6, dose 28 mg Astagraf daily.    PLAN:   Please call patient and confirm this was an accurate 24-hour trough. Verify Tacrolimus XR Astagraf dose 28 mg daily. Confirm no new medications or illness. Confirm no missed doses. If accurate trough and accurate dose, decrease Tacrolimus XR dose to 25 mg daily and repeat labs in 1 week.    OUTCOME:   Spoke with patient, he states that he knew that doctor wanted his liver function labs so he was trying to appease by doing so therefore the tac level was inaccurately timed.     He needs help finding a [facility] lab to repeat labs. Pt verbalized that he wanted to keep doing labs within the [facility] system. [facility] suggested for possibilities [facility], [facility], [facility]. Most are open from 7 am-5 or 7 pm.    Tristian voiced understanding of need for accurate trough level and will repeat his level this week.  __________________________________________  Discussed low GFR and creatinine elevation. May refer for repeat kidney transplant but first talk to patient. Patient wants to hold off at this time. He states he is not maintaining hydration like he should be and will work on that. Creatinine 3.53- likely due to \"Persistent antibody-mediated rejection.\" Repeating creatinine level this week.  "

## 2020-01-23 ENCOUNTER — TELEPHONE (OUTPATIENT)
Dept: TRANSPLANT | Facility: CLINIC | Age: 50
End: 2020-01-23

## 2020-01-23 DIAGNOSIS — Z48.298 AFTERCARE FOLLOWING ORGAN TRANSPLANT: ICD-10-CM

## 2020-01-23 DIAGNOSIS — Z94.0 KIDNEY TRANSPLANTED: Primary | ICD-10-CM

## 2020-01-23 DIAGNOSIS — Z94.0 KIDNEY TRANSPLANTED: ICD-10-CM

## 2020-01-23 LAB
ALBUMIN SERPL-MCNC: 3.6 G/DL (ref 3.4–5)
ALP SERPL-CCNC: 58 U/L (ref 40–150)
ALT SERPL W P-5'-P-CCNC: 13 U/L (ref 0–70)
AST SERPL W P-5'-P-CCNC: 10 U/L (ref 0–45)
BILIRUB DIRECT SERPL-MCNC: <0.1 MG/DL (ref 0–0.2)
BILIRUB SERPL-MCNC: 0.2 MG/DL (ref 0.2–1.3)
PROT SERPL-MCNC: 6.6 G/DL (ref 6.8–8.8)

## 2020-01-23 PROCEDURE — 36415 COLL VENOUS BLD VENIPUNCTURE: CPT | Performed by: INTERNAL MEDICINE

## 2020-01-23 PROCEDURE — 80076 HEPATIC FUNCTION PANEL: CPT | Performed by: INTERNAL MEDICINE

## 2020-01-23 NOTE — TELEPHONE ENCOUNTER
Patient Call: Transplant Lab/Orders  Route to LPN      Reason for Call: Missing labs/orders; which labs/orders? Hepatic Panel (If patient is at a clinic without orders, Marisol then page LPN)  Callback needed? No   Patient had the Hepatic Panel Drawn at a [place] lab but there is no order. Please put order in asap so the sample can be processed.

## 2020-01-23 NOTE — TELEPHONE ENCOUNTER
Order entered for hepatic panel to be processed. [facility] was contacted to let them know lab orders were in.

## 2020-02-04 DIAGNOSIS — T86.11 KIDNEY TRANSPLANT REJECTION: Primary | ICD-10-CM

## 2020-02-04 DIAGNOSIS — I10 ESSENTIAL HYPERTENSION: ICD-10-CM

## 2020-02-04 DIAGNOSIS — Z94.0 S/P KIDNEY TRANSPLANT: ICD-10-CM

## 2020-02-04 DIAGNOSIS — D84.9 IMMUNOSUPPRESSED STATUS (H): ICD-10-CM

## 2020-02-04 DIAGNOSIS — Z94.0 KIDNEY REPLACED BY TRANSPLANT: ICD-10-CM

## 2020-02-04 DIAGNOSIS — I15.1 HTN, KIDNEY TRANSPLANT RELATED: ICD-10-CM

## 2020-02-04 DIAGNOSIS — Z94.0 HTN, KIDNEY TRANSPLANT RELATED: ICD-10-CM

## 2020-02-04 RX ORDER — PREDNISONE 5 MG/1
5 TABLET ORAL DAILY
Qty: 30 TABLET | Refills: 11 | Status: ON HOLD | OUTPATIENT
Start: 2020-02-04 | End: 2021-02-26

## 2020-02-04 RX ORDER — DILTIAZEM HYDROCHLORIDE 240 MG/1
CAPSULE, EXTENDED RELEASE ORAL
Qty: 90 CAPSULE | Refills: 1 | Status: SHIPPED | OUTPATIENT
Start: 2020-02-04 | End: 2020-08-07

## 2020-02-04 RX ORDER — LOSARTAN POTASSIUM 25 MG/1
TABLET ORAL
Qty: 90 TABLET | Refills: 8 | Status: SHIPPED | OUTPATIENT
Start: 2020-02-04 | End: 2020-11-02

## 2020-02-04 NOTE — TELEPHONE ENCOUNTER
Patient was supposed to recheck labs last week. Please have him do so because tacrolimus may need to be adjusted.

## 2020-02-04 NOTE — LETTER
PHYSICIAN ORDERS      DATE & TIME ISSUED: 2020 9:07 AM  PATIENT NAME: Tristian Mckeon   : 1970     [facility] MR# [if applicable]: [id number]     DIAGNOSIS:  Kidney Transplant  ICD-10 CODE: Z94.0     Please repeat the following labs:  Tacrolimus drug level  CBC  BMP    Any questions please call: [phone]

## 2020-02-05 ENCOUNTER — TELEPHONE (OUTPATIENT)
Dept: NEPHROLOGY | Facility: CLINIC | Age: 50
End: 2020-02-05

## 2020-02-05 NOTE — TELEPHONE ENCOUNTER
[facility] Call Center    Phone Message    May a detailed message be left on voicemail: yes     Reason for Call: Other:       Tristian is calling in stating his new medication does not seem to be working for him.  Please call him back to discuss.       Action Taken: Message routed to:  [facility]): Nephrology    Travel Screening: Not Applicable

## 2020-02-05 NOTE — TELEPHONE ENCOUNTER
Pt states that Dr. Collins had changed medications around at last visit; Changes made noted on 12/10/19 under patient instructions. He calls to state that the carvedilol/coreg caused him to hold fluid and have swelling; UOP decreased and he was sleepier.     He switched back to his diltiazem 240 mg which was reordered yesterday 2/4/20 by nephrology nurses. He confirms he is taken the losartan, chlorthalidone, clonidine as needed, and ketoconazole. His BP remains elevated above goal of <130/80. On average he states his BP is around mid 160/90s. Last BP reading was 151/74. He would like to know if there is anything else he should be doing.     Nephrology Nurses:  Please connect with Patient regarding BP plan and follow up.

## 2020-02-06 NOTE — TELEPHONE ENCOUNTER
Spoke with Tristian. He switched off of carvedilol and back to diltiazem 2 weeks ago due to excessive fatigue, edema, and decreased urine output. Fatigue still remains, especially after taking clonidine. BP has been in the 150-160/90's with pulse in the mid 80's.     He is currently taking diltiazem 240mg daily, losartan 25mg daily, clonidine 0.1mg BID prn, and chlorthalidone 25mg daily.    He had been on other BP meds in the past with success: lisinopril, norvasc, and atenolol. Wondering if he should switch.    Update sent to Dr. Collins and patient's coordinator.    Erin Jefferson RN

## 2020-02-10 NOTE — TELEPHONE ENCOUNTER
Made second attempt to contact patient. Call rang out, no voicemail. Will try again at a later time.    Erin Jefferson RN

## 2020-02-17 ENCOUNTER — TELEPHONE (OUTPATIENT)
Dept: TRANSPLANT | Facility: CLINIC | Age: 50
End: 2020-02-17

## 2020-02-17 NOTE — TELEPHONE ENCOUNTER
Was able to reach Tristian today. He verbalized understanding to stop the ketoconazole and repeat labs as soon as possible. Let him know he should be getting weekly labs. He states he will get labs on Wednesday 2/19/20. He is unable to tell me what his BP is. He has not read the [employer] message from Erin CAMARILLO sent last week.

## 2020-02-17 NOTE — TELEPHONE ENCOUNTER
From: Kole Collins MD   Sent: 2/6/2020   8:52 PM CST   To: Aby Zheng RN, *     Needs labs asap   Needs to stop Ketoconazole immediately   He needs to call us before making any changes   Thanks   SR

## 2020-02-20 ENCOUNTER — TELEPHONE (OUTPATIENT)
Dept: PHARMACY | Facility: CLINIC | Age: 50
End: 2020-02-20

## 2020-02-20 NOTE — TELEPHONE ENCOUNTER
Tristian reports feeling good.  He knew his meds well.  His only concern was getting his insurance figured out.  His last BP was 150/90 not at goal.  He has not been taking his bp meds because he can not afford them.  A  figured out his insurance and he was instructed to call medicare and have them inform medicaid that he no longer has medicare.      Robert Mccoy [facility]  Specialty Pharmacist [phone]

## 2020-02-21 ENCOUNTER — RESULTS ONLY (OUTPATIENT)
Dept: OTHER | Facility: CLINIC | Age: 50
End: 2020-02-21

## 2020-02-21 DIAGNOSIS — Z79.899 ENCOUNTER FOR LONG-TERM CURRENT USE OF MEDICATION: ICD-10-CM

## 2020-02-21 DIAGNOSIS — Z94.0 KIDNEY REPLACED BY TRANSPLANT: ICD-10-CM

## 2020-02-21 DIAGNOSIS — Z48.298 AFTERCARE FOLLOWING ORGAN TRANSPLANT: ICD-10-CM

## 2020-02-21 LAB
ANION GAP SERPL CALCULATED.3IONS-SCNC: 9 MMOL/L (ref 3–14)
BASOPHILS # BLD AUTO: 0 10E9/L (ref 0–0.2)
BASOPHILS NFR BLD AUTO: 0.8 %
BUN SERPL-MCNC: 68 MG/DL (ref 7–30)
CALCIUM SERPL-MCNC: 8.1 MG/DL (ref 8.5–10.1)
CHLORIDE SERPL-SCNC: 111 MMOL/L (ref 94–109)
CO2 SERPL-SCNC: 20 MMOL/L (ref 20–32)
CREAT SERPL-MCNC: 3.76 MG/DL (ref 0.66–1.25)
DIFFERENTIAL METHOD BLD: ABNORMAL
EOSINOPHIL # BLD AUTO: 0.1 10E9/L (ref 0–0.7)
EOSINOPHIL NFR BLD AUTO: 2 %
ERYTHROCYTE [DISTWIDTH] IN BLOOD BY AUTOMATED COUNT: 15.6 % (ref 10–15)
GFR SERPL CREATININE-BSD FRML MDRD: 18 ML/MIN/{1.73_M2}
GLUCOSE SERPL-MCNC: 89 MG/DL (ref 70–99)
HCT VFR BLD AUTO: 27.5 % (ref 40–53)
HGB BLD-MCNC: 8.4 G/DL (ref 13.3–17.7)
LYMPHOCYTES # BLD AUTO: 0.4 10E9/L (ref 0.8–5.3)
LYMPHOCYTES NFR BLD AUTO: 11 %
MCH RBC QN AUTO: 26.4 PG (ref 26.5–33)
MCHC RBC AUTO-ENTMCNC: 30.5 G/DL (ref 31.5–36.5)
MCV RBC AUTO: 87 FL (ref 78–100)
MONOCYTES # BLD AUTO: 0.5 10E9/L (ref 0–1.3)
MONOCYTES NFR BLD AUTO: 13 %
NEUTROPHILS # BLD AUTO: 2.6 10E9/L (ref 1.6–8.3)
NEUTROPHILS NFR BLD AUTO: 73.2 %
PLATELET # BLD AUTO: 170 10E9/L (ref 150–450)
POTASSIUM SERPL-SCNC: 4.4 MMOL/L (ref 3.4–5.3)
PROT UR-MCNC: 0.26 G/L
PROT/CREAT 24H UR: 0.37 G/G CR (ref 0–0.2)
RBC # BLD AUTO: 3.18 10E12/L (ref 4.4–5.9)
SODIUM SERPL-SCNC: 140 MMOL/L (ref 133–144)
TACROLIMUS BLD-MCNC: 3.9 UG/L (ref 5–15)
TME LAST DOSE: ABNORMAL H
WBC # BLD AUTO: 3.5 10E9/L (ref 4–11)

## 2020-02-21 PROCEDURE — 80048 BASIC METABOLIC PNL TOTAL CA: CPT | Performed by: INTERNAL MEDICINE

## 2020-02-21 PROCEDURE — 86833 HLA CLASS II HIGH DEFIN QUAL: CPT | Performed by: INTERNAL MEDICINE

## 2020-02-21 PROCEDURE — 85025 COMPLETE CBC W/AUTO DIFF WBC: CPT | Performed by: INTERNAL MEDICINE

## 2020-02-21 PROCEDURE — 36415 COLL VENOUS BLD VENIPUNCTURE: CPT | Performed by: INTERNAL MEDICINE

## 2020-02-21 PROCEDURE — 84156 ASSAY OF PROTEIN URINE: CPT | Performed by: INTERNAL MEDICINE

## 2020-02-21 PROCEDURE — 86832 HLA CLASS I HIGH DEFIN QUAL: CPT | Performed by: INTERNAL MEDICINE

## 2020-02-21 PROCEDURE — 80197 ASSAY OF TACROLIMUS: CPT | Performed by: INTERNAL MEDICINE

## 2020-02-25 ENCOUNTER — TELEPHONE (OUTPATIENT)
Dept: TRANSPLANT | Facility: CLINIC | Age: 50
End: 2020-02-25

## 2020-02-25 DIAGNOSIS — Z94.0 KIDNEY REPLACED BY TRANSPLANT: ICD-10-CM

## 2020-02-25 DIAGNOSIS — D64.9 ANEMIA: Primary | ICD-10-CM

## 2020-02-25 DIAGNOSIS — D64.9 LOW HEMOGLOBIN: ICD-10-CM

## 2020-02-25 LAB
A30: 1452
B13: 785
CW6: 796
DONOR IDENTIFICATION: NORMAL
DSA COMMENTS: NORMAL
DSA PRESENT: YES
DSA TEST METHOD: NORMAL
ORGAN: NORMAL
SA1 CELL: NORMAL
SA1 COMMENTS: NORMAL
SA1 HI RISK ABY: NORMAL
SA1 MOD RISK ABY: NORMAL
SA1 TEST METHOD: NORMAL
SA2 CELL: NORMAL
SA2 COMMENTS: NORMAL
SA2 HI RISK ABY UA: NORMAL
SA2 MOD RISK ABY: NORMAL
SA2 TEST METHOD: NORMAL
UNACCEPTABLE ANTIGEN: NORMAL
UNOS CPRA: 100

## 2020-02-25 NOTE — TELEPHONE ENCOUNTER
"ISSUE:   1) Creatinine 3.76 on 2/21/20. Baseline 2.6-2.9. GFR <20.   2) Hemoglobin 8.4 on 2/21/20; last 9.7 on 1/18/20.     PLAN:   Message   Received: Yesterday   Message Contents   Kole Collins MD Blaisdell, Christin Rebecca, ELIU             Ok to place referral    Previous Messages            Associated Results     Result Notes for CBC with platelets differential     Notes recorded by Aby Zheng, RN on 2/21/2020 at 2:35 PM CST  BAHMAN Huggins. Creatinine continues to rise. We have discussed this before as \"Persistent antibody-mediated rejection.\"  ------    Notes recorded by Aby Zheng, RN on 2/21/2020 at 10:16 AM CST  Dr. Collins, May I place an anemia referral for Hgb dropping?     OUTCOME:   Anemia referral placed. Left patient VM message that he should be getting a phone call from them.   "

## 2020-02-27 ENCOUNTER — DOCUMENTATION ONLY (OUTPATIENT)
Dept: TRANSPLANT | Facility: CLINIC | Age: 50
End: 2020-02-27

## 2020-02-27 NOTE — PROGRESS NOTES
RNCC notified that [place] Medicaid has removed Medicare as the primary ins, but now his meds need PAs. [facility] is working on PAs.

## 2020-02-28 ENCOUNTER — TELEPHONE (OUTPATIENT)
Dept: NEPHROLOGY | Facility: CLINIC | Age: 50
End: 2020-02-28

## 2020-02-28 ENCOUNTER — TELEPHONE (OUTPATIENT)
Dept: ONCOLOGY | Facility: CLINIC | Age: 50
End: 2020-02-28

## 2020-02-28 NOTE — TELEPHONE ENCOUNTER
Prior Authorization Approval    Authorization Effective Date: 2/1/2020  Authorization Expiration Date: 2/29/2020  Medication: ASTAGRAF XL 5MG  Approved Dose/Quantity: 150  Reference #:     Insurance Company: [place] Medicaid ([employer]) - Phone [phone] Fax [phone]  Expected CoPay:       CoPay Card Available:      Foundation Assistance Needed:    Which Pharmacy is filling the prescription (Not needed for infusion/clinic administered): [facility] - [place] [address]  Pharmacy Notified: Yes  Patient Notified: Yes

## 2020-02-28 NOTE — TELEPHONE ENCOUNTER
Prior Authorization Approval    Authorization Effective Date: 2/1/2020  Authorization Expiration Date: 2/29/2020  Medication: MYCOPHENOLIC ACID   Approved Dose/Quantity: 180  Reference #:     Insurance Company: [place] Medicaid ([facility]) - Phone [phone] Fax [phone]  Expected CoPay:       CoPay Card Available:      Foundation Assistance Needed:    Which Pharmacy is filling the prescription (Not needed for infusion/clinic administered): [facility] - [place] [address]  Pharmacy Notified: Yes  Patient Notified: Yes

## 2020-02-28 NOTE — TELEPHONE ENCOUNTER
I left a voicemail for Tristian requesting a return call to schedule a med onc appt for dx anemia as requested via IB from Kathleen SAXENA (referrals)

## 2020-02-28 NOTE — TELEPHONE ENCOUNTER
Prior Authorization Approval    Authorization Effective Date: 2/1/2020  Authorization Expiration Date: 2/29/2020  Medication: ASTAGRAF XL 1MG  Approved Dose/Quantity: 90  Reference #:     Insurance Company: [place] Medicaid ([facility]) - Phone [phone] Fax [phone]  Expected CoPay:       CoPay Card Available:      Foundation Assistance Needed:    Which Pharmacy is filling the prescription (Not needed for infusion/clinic administered): [facility] - [place] [address]  Pharmacy Notified: Yes  Patient Notified: Yes

## 2020-02-28 NOTE — TELEPHONE ENCOUNTER
RE: New Anemia Services Referral   Received: Today   Message Contents   Kary Montejo Carrie; bAy Zheng, ELIU             I called Tristian & left a voicemail requesting a return call to schedule.     Thank you,   Kary

## 2020-03-03 ENCOUNTER — TELEPHONE (OUTPATIENT)
Dept: NEPHROLOGY | Facility: CLINIC | Age: 50
End: 2020-03-03

## 2020-03-03 NOTE — TELEPHONE ENCOUNTER
Prior Authorization Approval    Authorization Effective Date: 3/1/2020  Authorization Expiration Date: 3/3/2021  Medication: astagraf  Approved Dose/Quantity: 1mo  Reference #:     Insurance Company: [employer] - Phone [phone] Fax [phone]  Expected CoPay:       CoPay Card Available:      Foundation Assistance Needed:    Which Pharmacy is filling the prescription (Not needed for infusion/clinic administered): [facility] - [place] [address]  Pharmacy Notified: Yes  Patient Notified: Yes      [facility] Prior Authorization Team   Phone: [phone]  Fax: [phone]

## 2020-03-03 NOTE — TELEPHONE ENCOUNTER
Prior Authorization Approval    Authorization Effective Date: 3/1/2020  Authorization Expiration Date: 3/3/2021  Medication: mycophenolic  Approved Dose/Quantity: 180  Reference #:     Insurance Company: [employer] [facility] - Phone [phone] Fax [phone]  Expected CoPay:       CoPay Card Available:      Foundation Assistance Needed:    Which Pharmacy is filling the prescription (Not needed for infusion/clinic administered): [facility] - [place] [address]  Pharmacy Notified: Yes  Patient Notified: Yes      [facility] Prior Authorization Team   Phone: [phone]  Fax: [phone]

## 2020-03-15 ENCOUNTER — HEALTH MAINTENANCE LETTER (OUTPATIENT)
Age: 50
End: 2020-03-15

## 2020-03-16 DIAGNOSIS — I15.1 HTN, KIDNEY TRANSPLANT RELATED: ICD-10-CM

## 2020-03-16 DIAGNOSIS — Z94.0 HTN, KIDNEY TRANSPLANT RELATED: ICD-10-CM

## 2020-03-17 RX ORDER — CHLORTHALIDONE 25 MG/1
TABLET ORAL
Qty: 90 TABLET | Refills: 0 | Status: SHIPPED | OUTPATIENT
Start: 2020-03-17 | End: 2020-06-30

## 2020-04-27 DIAGNOSIS — R53.83 FATIGUE, UNSPECIFIED TYPE: ICD-10-CM

## 2020-04-27 DIAGNOSIS — Z48.298 AFTERCARE FOLLOWING ORGAN TRANSPLANT: ICD-10-CM

## 2020-04-27 RX ORDER — CALCITRIOL 0.25 UG/1
0.25 CAPSULE, LIQUID FILLED ORAL DAILY
Qty: 90 CAPSULE | Refills: 0 | Status: SHIPPED | OUTPATIENT
Start: 2020-04-27 | End: 2020-08-07

## 2020-05-05 ENCOUNTER — TELEPHONE (OUTPATIENT)
Dept: TRANSPLANT | Facility: CLINIC | Age: 50
End: 2020-05-05

## 2020-05-05 NOTE — LETTER
To Whom It Concerns:    Tristian Mckeon has no restrictions from working from the [facility] Solid Organ Transplant Department. He is able to work with precautions that align with CDC guidelines to protect him which can be found on the following website:     https://www.cdc.gov/coronavirus/2019-ncov/prevent-getting-sick/prevention.html    If necessary, please consider modifying his work responsibilities to limit exposure. Any accommodations are to be agreed upon between employee and employer. If you have any questions or concerns please contact the Solid Organ Transplant Office at [phone], option 5.      Thank you for your partnership.      Sincerely,     [facility]   Solid Organ Transplant Services

## 2020-06-30 DIAGNOSIS — Z94.0 HTN, KIDNEY TRANSPLANT RELATED: ICD-10-CM

## 2020-06-30 DIAGNOSIS — I15.1 HTN, KIDNEY TRANSPLANT RELATED: ICD-10-CM

## 2020-06-30 RX ORDER — CHLORTHALIDONE 25 MG/1
25 TABLET ORAL DAILY
Qty: 90 TABLET | Refills: 0 | Status: SHIPPED | OUTPATIENT
Start: 2020-06-30 | End: 2020-12-31

## 2020-08-06 DIAGNOSIS — I15.1 HTN, KIDNEY TRANSPLANT RELATED: ICD-10-CM

## 2020-08-06 DIAGNOSIS — Z94.0 HTN, KIDNEY TRANSPLANT RELATED: ICD-10-CM

## 2020-08-06 DIAGNOSIS — Z48.298 AFTERCARE FOLLOWING ORGAN TRANSPLANT: ICD-10-CM

## 2020-08-06 DIAGNOSIS — R53.83 FATIGUE, UNSPECIFIED TYPE: ICD-10-CM

## 2020-08-07 RX ORDER — DILTIAZEM HYDROCHLORIDE 240 MG/1
CAPSULE, EXTENDED RELEASE ORAL
Qty: 90 CAPSULE | Refills: 0 | Status: SHIPPED | OUTPATIENT
Start: 2020-08-07 | End: 2020-12-30

## 2020-08-07 RX ORDER — CALCITRIOL 0.25 UG/1
CAPSULE, LIQUID FILLED ORAL
Qty: 90 CAPSULE | Refills: 0 | Status: SHIPPED | OUTPATIENT
Start: 2020-08-07 | End: 2020-11-03

## 2020-08-24 NOTE — PATIENT INSTRUCTIONS
Continue on diltiazem.    Start chlorthalidone 25 mg daily (take in the morning).    Start losartan 25 mg nightly.    Labs in 10-14 days.   negative

## 2020-09-01 DIAGNOSIS — Z94.0 KIDNEY TRANSPLANTED: Primary | ICD-10-CM

## 2020-09-01 RX ORDER — MYCOPHENOLIC ACID 360 MG/1
1080 TABLET, DELAYED RELEASE ORAL 2 TIMES DAILY
Qty: 180 TABLET | Refills: 0 | Status: SHIPPED | OUTPATIENT
Start: 2020-09-01 | End: 2020-09-02

## 2020-09-02 ENCOUNTER — TELEPHONE (OUTPATIENT)
Dept: TRANSPLANT | Facility: CLINIC | Age: 50
End: 2020-09-02

## 2020-09-02 DIAGNOSIS — Z94.0 KIDNEY TRANSPLANTED: ICD-10-CM

## 2020-09-02 RX ORDER — MYCOPHENOLIC ACID 360 MG/1
1080 TABLET, DELAYED RELEASE ORAL 2 TIMES DAILY
Qty: 180 TABLET | Refills: 0 | Status: SHIPPED | OUTPATIENT
Start: 2020-09-02 | End: 2020-10-05

## 2020-09-02 NOTE — TELEPHONE ENCOUNTER
Medication/Refill approved per CPA:    [facility] COLLABORATIVE AGREEMENT FOR  IMMUNOSUPPRESSENT PRESCRIPTION MODIFICATION.      Routing encounter to Transplant as an FYI.    Thanks,  Laverne PalmerD  Specialty Pharmacy/Transplant Pharmacist  [facility]

## 2020-09-18 ENCOUNTER — TELEPHONE (OUTPATIENT)
Dept: TRANSPLANT | Facility: CLINIC | Age: 50
End: 2020-09-18

## 2020-09-18 DIAGNOSIS — Z94.0 KIDNEY TRANSPLANTED: ICD-10-CM

## 2020-09-18 DIAGNOSIS — N18.4 ANEMIA DUE TO STAGE 4 CHRONIC KIDNEY DISEASE (H): Primary | ICD-10-CM

## 2020-09-18 DIAGNOSIS — D63.1 ANEMIA DUE TO STAGE 4 CHRONIC KIDNEY DISEASE (H): Primary | ICD-10-CM

## 2020-09-18 DIAGNOSIS — Z94.0 KIDNEY REPLACED BY TRANSPLANT: ICD-10-CM

## 2020-09-18 LAB
ANION GAP SERPL CALCULATED.3IONS-SCNC: 7 MMOL/L (ref 3–14)
BASOPHILS # BLD AUTO: 0 10E9/L (ref 0–0.2)
BASOPHILS NFR BLD AUTO: 0.5 %
BUN SERPL-MCNC: 55 MG/DL (ref 7–30)
CALCIUM SERPL-MCNC: 7.1 MG/DL (ref 8.5–10.1)
CHLORIDE SERPL-SCNC: 108 MMOL/L (ref 94–109)
CO2 SERPL-SCNC: 21 MMOL/L (ref 20–32)
CREAT SERPL-MCNC: 3.8 MG/DL (ref 0.66–1.25)
DIFFERENTIAL METHOD BLD: ABNORMAL
EOSINOPHIL # BLD AUTO: 0.1 10E9/L (ref 0–0.7)
EOSINOPHIL NFR BLD AUTO: 2.5 %
ERYTHROCYTE [DISTWIDTH] IN BLOOD BY AUTOMATED COUNT: 16.2 % (ref 10–15)
GFR SERPL CREATININE-BSD FRML MDRD: 17 ML/MIN/{1.73_M2}
GLUCOSE SERPL-MCNC: 93 MG/DL (ref 70–99)
HCT VFR BLD AUTO: 28.2 % (ref 40–53)
HGB BLD-MCNC: 8.8 G/DL (ref 13.3–17.7)
LYMPHOCYTES # BLD AUTO: 0.4 10E9/L (ref 0.8–5.3)
LYMPHOCYTES NFR BLD AUTO: 8.8 %
MCH RBC QN AUTO: 27.7 PG (ref 26.5–33)
MCHC RBC AUTO-ENTMCNC: 31.2 G/DL (ref 31.5–36.5)
MCV RBC AUTO: 89 FL (ref 78–100)
MONOCYTES # BLD AUTO: 0.6 10E9/L (ref 0–1.3)
MONOCYTES NFR BLD AUTO: 15.7 %
NEUTROPHILS # BLD AUTO: 3 10E9/L (ref 1.6–8.3)
NEUTROPHILS NFR BLD AUTO: 72.5 %
PLATELET # BLD AUTO: 175 10E9/L (ref 150–450)
POTASSIUM SERPL-SCNC: 5.2 MMOL/L (ref 3.4–5.3)
RBC # BLD AUTO: 3.18 10E12/L (ref 4.4–5.9)
SODIUM SERPL-SCNC: 136 MMOL/L (ref 133–144)
WBC # BLD AUTO: 4.1 10E9/L (ref 4–11)

## 2020-09-18 PROCEDURE — 80197 ASSAY OF TACROLIMUS: CPT | Performed by: INTERNAL MEDICINE

## 2020-09-18 PROCEDURE — 85025 COMPLETE CBC W/AUTO DIFF WBC: CPT | Performed by: INTERNAL MEDICINE

## 2020-09-18 PROCEDURE — 80048 BASIC METABOLIC PNL TOTAL CA: CPT | Performed by: INTERNAL MEDICINE

## 2020-09-18 PROCEDURE — 36415 COLL VENOUS BLD VENIPUNCTURE: CPT | Performed by: INTERNAL MEDICINE

## 2020-09-18 NOTE — TELEPHONE ENCOUNTER
ISSUE: Hemoglobin 8.8 on 9/18/20. On last low hemoglobin level 8.4 in February an anemia services referral was placed.     PLAN: Call patient to discuss lab finding. Message to to Rachel Raman in regards to anemia referral sent Feb 2020.     OUTCOME: VM message left for Drake, informing him that his hemoglobin is still low and he should be hearing from anemia nurse in regards to referral.

## 2020-09-19 LAB
TACROLIMUS BLD-MCNC: 3 UG/L (ref 5–15)
TME LAST DOSE: ABNORMAL H

## 2020-09-21 ENCOUNTER — TELEPHONE (OUTPATIENT)
Dept: TRANSPLANT | Facility: CLINIC | Age: 50
End: 2020-09-21

## 2020-09-21 ENCOUNTER — TELEPHONE (OUTPATIENT)
Dept: PHARMACY | Facility: CLINIC | Age: 50
End: 2020-09-21

## 2020-09-21 DIAGNOSIS — D63.1 ANEMIA OF CHRONIC RENAL FAILURE, STAGE 4 (SEVERE) (H): ICD-10-CM

## 2020-09-21 DIAGNOSIS — N18.4 CKD (CHRONIC KIDNEY DISEASE) STAGE 4, GFR 15-29 ML/MIN (H): Primary | ICD-10-CM

## 2020-09-21 DIAGNOSIS — N18.4 ANEMIA OF CHRONIC RENAL FAILURE, STAGE 4 (SEVERE) (H): ICD-10-CM

## 2020-09-21 NOTE — TELEPHONE ENCOUNTER
Anemia Management Note - Enrollment  SUBJECTIVE/OBJECTIVE:    Referred by Dr. Kole Collins on 2020  Primary Diagnosis: Anemia in Chronic Kidney Disease (N18.4, D63.1)     Secondary Diagnosis:  Chronic Kidney Disease, Stage 4 (N18.4)   Kidney Tx: , , and   Hgb goal range:  9-10  Epo/Darbo: Aranesp  60 mcg  every two weeks for gb <10.   In clinic  Iron regimen:  Ferrous Sulfate  once daily  Labs : 2021  Recent RAYMOND use, transfusion, IV iron: Procrit , , and .  RX/TX plans : 2021    No history of stroke, MI and blood clots or cancers.    Contact:  NO Consent to Communicate on File.     Anemia Latest Ref Rng & Units 2019 2019 12/10/2019 2019 2020 2020 2020   Hemoglobin 13.3 - 17.7 g/dL 9.3(L) 9.8(L) 9.8(L) 9.8(L) 9.7(L) 8.4(L) 8.8(L)   TSAT 15 - 46 % - - - - - - -   Ferritin 26 - 388 ng/mL - - - - - - -       BP Readings from Last 3 Encounters:   12/10/19 (!) 176/94   19 (!) 200/114   18 (!) 174/92     Wt Readings from Last 2 Encounters:   12/10/19 98.4 kg (217 lb)   19 94 kg (207 lb 3.7 oz)     Current Outpatient Medications   Medication Sig Dispense Refill     Blood Pressure Monitor KIT Automatic Blood Pressure Monitor 1 kit 0     calcitRIOL (ROCALTROL) 0.25 MCG capsule TAKE ONE CAPSULE BY MOUTH ONCE DAILY 90 capsule 0     CARTIA  MG 24 hr capsule TAKE ONE CAPSULE BY MOUTH ONCE DAILY 90 capsule 0     carvedilol (COREG) 25 MG tablet Take 1 tablet (25 mg) by mouth 2 times daily (with meals) 180 tablet 3     chlorthalidone (HYGROTON) 25 MG tablet Take 1 tablet (25 mg) by mouth daily 90 tablet 0     cloNIDine (CATAPRES) 0.1 MG tablet Take 1 tablet (0.1 mg) by mouth 2 times daily as needed (for SBP > 170) 60 tablet 1     cyanocobalamin (VITAMIN  B-12) 1000 MCG tablet Take 1,000 mcg by mouth daily       ferrous sulfate (IRON) 325 (65 Fe) MG tablet Take 1 tablet (325 mg) by mouth daily 90 tablet 3     losartan (COZAAR) 25  MG tablet TAKE ONE TABLET BY MOUTH ONCE DAILY 90 tablet 8     Multiple Vitamin (DAILY MULTIVITAMIN PO) Take by mouth daily       MYFORTIC (BRAND) 360 MG EC tablet Take 3 tablets (1,080 mg) by mouth 2 times daily 180 tablet 0     predniSONE (DELTASONE) 5 MG tablet Take 1 tablet (5 mg) by mouth daily 30 tablet 11     sulfamethoxazole-trimethoprim (BACTRIM/SEPTRA) 400-80 MG per tablet Take 1 tablet by mouth Every Mon, Wed, Fri Morning (Patient not taking: Reported on 12/10/2019) 45 tablet 3     tacrolimus (ASTAGRAF XL) 1 MG 24 hr capsule Take 3 capsules (3 mg) by mouth every morning (before breakfast) Total dose 28 mg daily 90 capsule 11     tacrolimus (ASTAGRAF XL) 5 MG 24 hr capsule Take 5 capsules (25 mg) by mouth every morning (before breakfast) Total dose 28 mg daily 150 capsule 11     ASSESSMENT:  Hgb Not at goal/Initiation of therapy   Ferritin: Due for labs  TSat: Due for labs  Iron regimen recommended: Taking Ferrous Sulfate 1 tab daily. Iron levels are due  Recommended RAYMOND regimen: Aranesp 60mcg every 14 days  Blood Pressure: HTN.  Monitor closely.     PLAN:  1. LVM on cell  today for enrollment in Anemia Management Service.  2. Need to discuss:  anemia overview, monitoring service and goal hemoglobin range and rationale and risks of RAYMOND blood clots, stroke and increase in blood pressure  3. [place]  [facility]  4. Labs: [place]  5. Pharmacy: NA      LVM for Drake    Next call date:  9/24/20  New pt, did he call back?    Rachel Raman RN   Anemia Services  [facility]  [address] [place] [address]   [email or web address]   Office : [phone]  Fax: [phone]

## 2020-09-21 NOTE — TELEPHONE ENCOUNTER
"ISSUE:   Tacrolimus XR level 3.0 on 9/18/20 at 1036 AM, goal 4-6, dose Astragraf XL 28 mg daily. Last dose date and time recorded as 9/17/20 at 10 am.    Creatinine 3.8. Weekly labs are ordered.     PLAN:   Please call patient and confirm this was an accurate 24-hour trough. Verify Tacrolimus XR dose 28 mg daily. Confirm no new medications or illness. Confirm no missed doses. If accurate trough and accurate dose, stay on same dose Tacrolimus XR dose 28 mg daily and repeat labs in 1 week.    OUTCOME: Patient temporary phone number [phone].  Spoke with patient, he states his lab appointment was at 11 so his level may be a little late but he states no missed doses. He denies any s/s of infection or UTI, hydrates well. Confirms current dose Astragraf 28 mg daily and has been for a long time. Will review Creatinine and Tacrolimus levels with Provider for recommendation.     November 10, 2020 Tristian has transplant nephrology telephone appointment.   _________________________________________  Reviewed with Dr. Collins on 9/23/20. Tristian needs to monitor his BP 3 times per week; repeat tacrolimus level within next 4-5 days, be seen in chronic clinic-if possible need to move up Nov 10 appt.     Spoke with Tristian 9/24/20 he confirms he is taking the medications:  -Cartia  mg (1 capsule) daily  -Carvedilol 25 mg twice daily  -Chlorthalidone 25 mg daily  -Losartan 25 mg daily   -Clonidine 0.1 mg (1 tablet) 2 times daily for systolic (top number) blood pressure >170    Not taking Ketoconazole to raise his tacrolimus level. States when he was on ketoconazole \"it makes me sleepy and it increases my fluid retention significantly.\"    Voiced understanding to monitor his blood pressure & make lab appt. (Lab orders are already active in Epic.)  "

## 2020-09-24 ENCOUNTER — TEAM CONFERENCE (OUTPATIENT)
Dept: TRANSPLANT | Facility: CLINIC | Age: 50
End: 2020-09-24

## 2020-09-24 NOTE — TELEPHONE ENCOUNTER
3rd attempt to reach pt.  Called work # per pt request, No answer. Unable to LVM.    Rachel Raman RN   [facility]  [address] [address]   [email or web address]   Office : [phone]  Fax: [phone]

## 2020-09-24 NOTE — TELEPHONE ENCOUNTER
2nd  left for Drake to enroll into Anemia Services.    Rachel Raman RN   Anemia Services  [address] [address]   [email or web address]   Office : [phone]  Fax: [phone]

## 2020-09-24 NOTE — TELEPHONE ENCOUNTER
Post Kidney and Pancreas Transplant Team Conference  Date: 9/24/2020  Transplant Coordinator: Aby Zheng     Attendees:  []  Dr. Chavis  [] Enedina Araiza LPN     []  Dr. Collins [] Lorie Jones RN [] Linn Paiz LPN   []  Dr. Adams [] Daja Thompson RN    []  Dr. Pizano [] Katarzyna Wadsworth RN [] Neto Rossi, LaverneD   [] Dr. Romeo [] Aundrea Oreilly RN    [] Dr. Ordonez [] Cipriano Art RN    [] Dr. Gonzalez [] Aby Zheng RN    [] Dr. Lewis [] Jocelyn Najera RN    []  Dr. Simons [] Holly Ponce, RN    [] Surgery Fellow [] Talisha Chow RN    [] Violette Chairez NP [] Yelitza Siddiqui RN        Verbal Plan Read Back:   BP 3-5 times a week  Drug level in 4-5 days   Restart Cartia    Routed to RN Coordinator   Linn Paiz LPN

## 2020-09-29 ENCOUNTER — TELEPHONE (OUTPATIENT)
Dept: PHARMACY | Facility: CLINIC | Age: 50
End: 2020-09-29

## 2020-09-29 DIAGNOSIS — N18.4 ANEMIA OF CHRONIC RENAL FAILURE, STAGE 4 (SEVERE) (H): ICD-10-CM

## 2020-09-29 DIAGNOSIS — N18.4 CKD (CHRONIC KIDNEY DISEASE) STAGE 4, GFR 15-29 ML/MIN (H): Primary | ICD-10-CM

## 2020-09-29 DIAGNOSIS — D63.1 ANEMIA OF CHRONIC RENAL FAILURE, STAGE 4 (SEVERE) (H): ICD-10-CM

## 2020-09-29 NOTE — TELEPHONE ENCOUNTER
Anemia Management Note - Enrollment  SUBJECTIVE/OBJECTIVE:    Referred by Dr. Kole Collins on 2020  Primary Diagnosis: Anemia in Chronic Kidney Disease (N18.4, D63.1)     Secondary Diagnosis:  Chronic Kidney Disease, Stage 4 (N18.4)   Kidney Tx: , , and   Hgb goal range:  9-10  Epo/Darbo: Procrit 10,000 units weekly for Hgb <10. At home.   Iron regimen:  Ferrous Sulfate  once daily  Labs : 2021  Recent RAYMOND use, transfusion, IV iron: Procrit , , and .  RX/TX plans : 2021     No history of stroke, MI and blood clots or cancers.     Contact:            NO Consent to Communicate on File.     Anemia Latest Ref Rng & Units 2019 2019 12/10/2019 2019 2020 2020 2020   Hemoglobin 13.3 - 17.7 g/dL 9.3(L) 9.8(L) 9.8(L) 9.8(L) 9.7(L) 8.4(L) 8.8(L)   TSAT 15 - 46 % - - - - - - -   Ferritin 26 - 388 ng/mL - - - - - - -       BP Readings from Last 3 Encounters:   12/10/19 (!) 176/94   19 (!) 200/114   18 (!) 174/92     Wt Readings from Last 2 Encounters:   12/10/19 98.4 kg (217 lb)   19 94 kg (207 lb 3.7 oz)     Current Outpatient Medications   Medication Sig Dispense Refill     Blood Pressure Monitor KIT Automatic Blood Pressure Monitor 1 kit 0     calcitRIOL (ROCALTROL) 0.25 MCG capsule TAKE ONE CAPSULE BY MOUTH ONCE DAILY 90 capsule 0     CARTIA  MG 24 hr capsule TAKE ONE CAPSULE BY MOUTH ONCE DAILY 90 capsule 0     carvedilol (COREG) 25 MG tablet Take 1 tablet (25 mg) by mouth 2 times daily (with meals) 180 tablet 3     chlorthalidone (HYGROTON) 25 MG tablet Take 1 tablet (25 mg) by mouth daily 90 tablet 0     cloNIDine (CATAPRES) 0.1 MG tablet Take 1 tablet (0.1 mg) by mouth 2 times daily as needed (for SBP > 170) 60 tablet 1     cyanocobalamin (VITAMIN  B-12) 1000 MCG tablet Take 1,000 mcg by mouth daily       ferrous sulfate (IRON) 325 (65 Fe) MG tablet Take 1 tablet (325 mg) by mouth daily 90 tablet 3     losartan  (COZAAR) 25 MG tablet TAKE ONE TABLET BY MOUTH ONCE DAILY 90 tablet 8     Multiple Vitamin (DAILY MULTIVITAMIN PO) Take by mouth daily       MYFORTIC (BRAND) 360 MG EC tablet Take 3 tablets (1,080 mg) by mouth 2 times daily 180 tablet 0     predniSONE (DELTASONE) 5 MG tablet Take 1 tablet (5 mg) by mouth daily 30 tablet 11     sulfamethoxazole-trimethoprim (BACTRIM/SEPTRA) 400-80 MG per tablet Take 1 tablet by mouth Every Mon, Wed, Fri Morning (Patient not taking: Reported on 12/10/2019) 45 tablet 3     tacrolimus (ASTAGRAF XL) 1 MG 24 hr capsule Take 3 capsules (3 mg) by mouth every morning (before breakfast) Total dose 28 mg daily 90 capsule 11     tacrolimus (ASTAGRAF XL) 5 MG 24 hr capsule Take 5 capsules (25 mg) by mouth every morning (before breakfast) Total dose 28 mg daily 150 capsule 11     ASSESSMENT:  Hgb Not at goal/Initiation of therapy   Ferritin: Due for labs  TSat: Due for labs  Iron regimen recommended: Taking Ferrous Sulfate 1 tab daily. Iron levels are due  Recommended RAYMOND regimen: Aranesp 60mcg every 14 days  Blood Pressure: HTN.  Monitor closely.     PLAN:  1. Patient called today for enrollment in Anemia Management Service.  2. Discussed:  anemia overview, monitoring service and goal hemoglobin range and rationale and risks of RAYMOND blood clots, stroke and increase in blood pressure  3. Dose location: at home  4. Labs: [place]  5. Pharmacy:  Specialty       3 prior unsuccessful attempts to reach Drake to enroll him into Anemia Services .    9/29/20: Spoke with Drake briefly (he was at work), he would like to dose at home vs going to the clinic every 2 weeks. Will put through Rx for Procrit.     Next call date:  10/1/20    Rcahel Raman RN   Anemia Services  [facility]  [address] [place] [address]   [email or web address]   Office : [phone]  Fax: [phone]

## 2020-10-01 NOTE — TELEPHONE ENCOUNTER
Procrit was approved by the insurance with a $3 copay.  Specialty Pharmacy LVM for Drake on 9/29/20 to set up a delivery. No return call at this time.     Called Tristian on work #.  Cell is not working (per Tristian).  He will call the pharmacy to schedule a delivery.    Rachel Raman RN   [address] [address]   [email or web address]   Office : [phone]  Fax: [phone]

## 2020-10-05 ENCOUNTER — TELEPHONE (OUTPATIENT)
Dept: TRANSPLANT | Facility: CLINIC | Age: 50
End: 2020-10-05

## 2020-10-05 ENCOUNTER — TELEPHONE (OUTPATIENT)
Dept: NEPHROLOGY | Facility: CLINIC | Age: 50
End: 2020-10-05

## 2020-10-05 DIAGNOSIS — Z94.0 KIDNEY TRANSPLANTED: Primary | ICD-10-CM

## 2020-10-05 RX ORDER — MYCOPHENOLIC ACID 360 MG/1
1080 TABLET, DELAYED RELEASE ORAL 2 TIMES DAILY
Qty: 180 TABLET | Refills: 11 | Status: SHIPPED | OUTPATIENT
Start: 2020-10-05 | End: 2021-01-08

## 2020-10-05 NOTE — TELEPHONE ENCOUNTER
October 5, 2020 2:07 PM -  AIVERSE1: called pt to joana or move up neph appt from 11/10 to 10/23 left vm on mobile home vm full , wants to switch to in person

## 2020-10-05 NOTE — TELEPHONE ENCOUNTER
Tristian was to repeat his Tacrolimus Level and BMP within 4-5 days of 9/24/20. He did not complete. Called Tristian a second time requesting labs to be repeated. Caught him at a bad time while on the freeway. He states his grandson got a hold of his meds last weekend and they went into the toilet. Because of this he didn't take his Tacrolimus at time he usually does as he had to get more medication. He is aware he needs to go this week ASAP for tacrolimus level. Also informed him he needs other post-transplant labs done weekly as his GFR has reached 20 and for anemia. Let him know I would send message to  intake pod for re-transplant; referral sent. Also, informed Tristian that Dr. Collins wanted to see him sooner than November. Move appointment up in-person. Message sent to schedulers below:    Dr. oCllins would like patient appt moved up from November 10, 2020. There are appt Monday Oct 19, 2020. This patient would be better served in-person.     Appointment Request: Transplant nephrology  New or Return Visit: Return  In Person Visit?: Yes, if available Oct 19  Reason for Visit/Diagnosis: Worsening Kidney fxn, Anemia, HTN, Low IS levels   Orders Placed: N/A  Appointment Timeframe Requested: Move up if possible from Nov 10  Patient Aware? Yes, please call to set up appt date and time  Physician Override Approved? Per Dr Collins move up in schedule    Thank you,   Aby Zheng RN

## 2020-10-06 NOTE — TELEPHONE ENCOUNTER
Procrit is scheduled to arrive some time today (10/6/20).    Rachel Raman RN   [address] [address]   [email or web address]   Office : [phone]  Fax: [phone]

## 2020-10-07 ENCOUNTER — TELEPHONE (OUTPATIENT)
Dept: PHARMACY | Facility: CLINIC | Age: 50
End: 2020-10-07

## 2020-10-07 NOTE — TELEPHONE ENCOUNTER
Follow-up with anemia management service:    Per previous conversation with Drake, cell phone is not working.      LVM on Work # for Drake to return my call.     Did he receive Procrit?    Anemia Latest Ref Rng & Units 1/23/2019 8/22/2019 12/10/2019 12/20/2019 1/18/2020 2/21/2020 9/18/2020   Hemoglobin 13.3 - 17.7 g/dL 9.3(L) 9.8(L) 9.8(L) 9.8(L) 9.7(L) 8.4(L) 8.8(L)   TSAT 15 - 46 % - - - - - - -   Ferritin 26 - 388 ng/mL - - - - - - -           Follow-up call date: 10/9/2020    Rachel Raman RN   Anemia Services  [address] [address]   [email or web address]   Office : [phone]  Fax: [phone]

## 2020-10-09 ENCOUNTER — TELEPHONE (OUTPATIENT)
Dept: PHARMACY | Facility: CLINIC | Age: 50
End: 2020-10-09

## 2020-10-09 NOTE — TELEPHONE ENCOUNTER
Follow-up with anemia management service:    2nd Attempt: Left another VM today for Tristian.      Has he received his Retacrit delivery?    Anemia Latest Ref Rng & Units 1/23/2019 8/22/2019 12/10/2019 12/20/2019 1/18/2020 2/21/2020 9/18/2020   Hemoglobin 13.3 - 17.7 g/dL 9.3(L) 9.8(L) 9.8(L) 9.8(L) 9.7(L) 8.4(L) 8.8(L)   TSAT 15 - 46 % - - - - - - -   Ferritin 26 - 388 ng/mL - - - - - - -           Follow-up call date: 10/13/2020    Rachel Raman RN   Anemia Services  [address] [address]   [email or web address]   Office : [phone]  Fax: [phone]

## 2020-10-13 ENCOUNTER — TELEPHONE (OUTPATIENT)
Dept: PHARMACY | Facility: CLINIC | Age: 50
End: 2020-10-13

## 2020-10-13 NOTE — TELEPHONE ENCOUNTER
Follow-up with anemia management service:    3rd attempt to reach Tristian re Procrit delivery.     LVM on Cell     Spoke with Tristian on work #. He did get the Procrit delivery. He wants to have his labs drawn again before he starts. He said he will try and get in for labs this week.    Anemia Latest Ref Rng & Units 1/23/2019 8/22/2019 12/10/2019 12/20/2019 1/18/2020 2/21/2020 9/18/2020   Hemoglobin 13.3 - 17.7 g/dL 9.3(L) 9.8(L) 9.8(L) 9.8(L) 9.7(L) 8.4(L) 8.8(L)   TSAT 15 - 46 % - - - - - - -   Ferritin 26 - 388 ng/mL - - - - - - -         Follow-up call date: 10/20/20  Did he start Procrit?    Rachel Raman RN   Anemia Services  [address] [address]   [email or web address]   Office : [phone]  Fax: [phone]

## 2020-10-14 ENCOUNTER — PATIENT OUTREACH (OUTPATIENT)
Dept: NEPHROLOGY | Facility: CLINIC | Age: 50
End: 2020-10-14

## 2020-10-14 NOTE — PROGRESS NOTES
"CKD to ESRD Checklist    CKD Education:   Status: referred Date: 10/6/20   Definition/Purpose: During the encounter the patient is instructed about one or more of the following: the etiology and prognosis of their CKD; the stability (or lack thereof) of their eGFR or CrCl; medication dosing, IV contrast avoidance, or specific medications to avoid; sodium, potassium, or phosphorus restriction (or other dietary counseling).  Referral to an educator or program that offers any CKD-specific counseling is applicable. Having completed education in the previous 12 months to the encounter date is also applicable.     Modality Education:   Status: referred Date: 10/6/20   Definition/Purpose: During the encounter the patient is directly counseled and/or referred to education regarding the options for dialysis including in-center, home therapies, transplant, and/or hospice.  Patients who are at low risk of progression (by various risk calculators) may be deemed \"not a candidate.\" Completion of education in the past is also applicable.     Preferred Modality:   Definition/Purpose: During the encounter the patient indicates that they have chosen a modality of dialysis, irrespective of the estimated time to ESRD.  The goal of this measure is to reflect education and understanding.  This may change visit to visit as the patient s health status and other conditions dictate.  \"Not a Candidate\" should be selected for patients choosing hospice, are at low risk for progression (based on various scoring and/or clinician judgment), or for other medically documented exclusion.     Access Surgeon Referral:   Definition/Purpose: This measure indicates that the patient has been referred for discussion and/or evaluation by a dialysis access surgeon.  \"Not a Candidate\" should be selected for patients choosing hospice, are at low risk for progression (based on various scoring and/or clinician judgment), or for other medically documented " "exclusion.     Access Placed:   Definition/Purpose: This measure indicates that the patient has undergone surgery for dialysis access placement.   \"Not a Candidate\" should be selected for patients choosing hospice, are at low risk for progression (based on various scoring and/or clinician judgment), or for other medically documented exclusion.     Transplant Listing:   Status: referred Date: 10/7/20   Definition/Purpose: This measure indicates that the patient has actively engaged in the transplant listing process.   \"Not Eligible\" should be selected for patients choosing hospice, are at low risk for progression (based on various scoring and/or clinician judgment), or for other medically documented exclusion.         "

## 2020-10-27 ENCOUNTER — TELEPHONE (OUTPATIENT)
Dept: PHARMACY | Facility: CLINIC | Age: 50
End: 2020-10-27

## 2020-10-27 NOTE — TELEPHONE ENCOUNTER
Follow-up with anemia management service:    4th call regarding starting Procrit.    Has not started the Procrit this week.  He wants to try and have labs this week before he starts the injections.    Anemia Latest Ref Rng & Units 1/23/2019 8/22/2019 12/10/2019 12/20/2019 1/18/2020 2/21/2020 9/18/2020   Hemoglobin 13.3 - 17.7 g/dL 9.3(L) 9.8(L) 9.8(L) 9.8(L) 9.7(L) 8.4(L) 8.8(L)   TSAT 15 - 46 % - - - - - - -   Ferritin 26 - 388 ng/mL - - - - - - -           Follow-up call date: 11/3/2020    Rachel Raman RN   Anemia Services  [address] [address]   [email or web address]   Office : [phone]  Fax: [phone]

## 2020-10-30 DIAGNOSIS — D63.1 ANEMIA OF CHRONIC RENAL FAILURE, STAGE 4 (SEVERE) (H): ICD-10-CM

## 2020-10-30 DIAGNOSIS — N18.4 CKD (CHRONIC KIDNEY DISEASE) STAGE 4, GFR 15-29 ML/MIN (H): ICD-10-CM

## 2020-10-30 DIAGNOSIS — N18.4 ANEMIA OF CHRONIC RENAL FAILURE, STAGE 4 (SEVERE) (H): ICD-10-CM

## 2020-10-30 DIAGNOSIS — Z94.0 KIDNEY REPLACED BY TRANSPLANT: ICD-10-CM

## 2020-10-30 LAB
ANION GAP SERPL CALCULATED.3IONS-SCNC: 9 MMOL/L (ref 3–14)
BASOPHILS # BLD AUTO: 0 10E9/L (ref 0–0.2)
BASOPHILS NFR BLD AUTO: 0.3 %
BUN SERPL-MCNC: 49 MG/DL (ref 7–30)
CALCIUM SERPL-MCNC: 7.3 MG/DL (ref 8.5–10.1)
CHLORIDE SERPL-SCNC: 112 MMOL/L (ref 94–109)
CO2 SERPL-SCNC: 20 MMOL/L (ref 20–32)
CREAT SERPL-MCNC: 4.16 MG/DL (ref 0.66–1.25)
DIFFERENTIAL METHOD BLD: ABNORMAL
EOSINOPHIL # BLD AUTO: 0.1 10E9/L (ref 0–0.7)
EOSINOPHIL NFR BLD AUTO: 1.5 %
ERYTHROCYTE [DISTWIDTH] IN BLOOD BY AUTOMATED COUNT: 16.2 % (ref 10–15)
FERRITIN SERPL-MCNC: 445 NG/ML (ref 26–388)
GFR SERPL CREATININE-BSD FRML MDRD: 16 ML/MIN/{1.73_M2}
GLUCOSE SERPL-MCNC: 83 MG/DL (ref 70–99)
HCT VFR BLD AUTO: 28.2 % (ref 40–53)
HGB BLD-MCNC: 8.4 G/DL (ref 13.3–17.7)
IMM GRANULOCYTES # BLD: 0 10E9/L (ref 0–0.4)
IMM GRANULOCYTES NFR BLD: 0.3 %
IRON SATN MFR SERPL: 21 % (ref 15–46)
IRON SERPL-MCNC: 43 UG/DL (ref 35–180)
LYMPHOCYTES # BLD AUTO: 0.3 10E9/L (ref 0.8–5.3)
LYMPHOCYTES NFR BLD AUTO: 7.8 %
MCH RBC QN AUTO: 27.5 PG (ref 26.5–33)
MCHC RBC AUTO-ENTMCNC: 29.8 G/DL (ref 31.5–36.5)
MCV RBC AUTO: 93 FL (ref 78–100)
MONOCYTES # BLD AUTO: 0.5 10E9/L (ref 0–1.3)
MONOCYTES NFR BLD AUTO: 12.5 %
NEUTROPHILS # BLD AUTO: 3.1 10E9/L (ref 1.6–8.3)
NEUTROPHILS NFR BLD AUTO: 77.6 %
NRBC # BLD AUTO: 0 10*3/UL
NRBC BLD AUTO-RTO: 0 /100
PLATELET # BLD AUTO: 144 10E9/L (ref 150–450)
POTASSIUM SERPL-SCNC: 4.6 MMOL/L (ref 3.4–5.3)
RBC # BLD AUTO: 3.05 10E12/L (ref 4.4–5.9)
SODIUM SERPL-SCNC: 141 MMOL/L (ref 133–144)
TIBC SERPL-MCNC: 202 UG/DL (ref 240–430)
WBC # BLD AUTO: 4 10E9/L (ref 4–11)

## 2020-10-30 PROCEDURE — 80048 BASIC METABOLIC PNL TOTAL CA: CPT | Performed by: PATHOLOGY

## 2020-10-30 PROCEDURE — 83540 ASSAY OF IRON: CPT | Performed by: PATHOLOGY

## 2020-10-30 PROCEDURE — 85025 COMPLETE CBC W/AUTO DIFF WBC: CPT | Performed by: PATHOLOGY

## 2020-10-30 PROCEDURE — 82728 ASSAY OF FERRITIN: CPT | Performed by: PATHOLOGY

## 2020-10-30 PROCEDURE — 36415 COLL VENOUS BLD VENIPUNCTURE: CPT | Performed by: PATHOLOGY

## 2020-10-30 PROCEDURE — 83550 IRON BINDING TEST: CPT | Performed by: PATHOLOGY

## 2020-11-02 ENCOUNTER — TELEPHONE (OUTPATIENT)
Dept: TRANSPLANT | Facility: CLINIC | Age: 50
End: 2020-11-02

## 2020-11-02 ENCOUNTER — TELEPHONE (OUTPATIENT)
Dept: NEPHROLOGY | Facility: CLINIC | Age: 50
End: 2020-11-02

## 2020-11-02 ENCOUNTER — VIRTUAL VISIT (OUTPATIENT)
Dept: NEPHROLOGY | Facility: CLINIC | Age: 50
End: 2020-11-02
Attending: INTERNAL MEDICINE
Payer: COMMERCIAL

## 2020-11-02 VITALS — DIASTOLIC BLOOD PRESSURE: 108 MMHG | SYSTOLIC BLOOD PRESSURE: 205 MMHG

## 2020-11-02 DIAGNOSIS — Z48.298 AFTERCARE FOLLOWING ORGAN TRANSPLANT: ICD-10-CM

## 2020-11-02 DIAGNOSIS — Z94.0 KIDNEY TRANSPLANTED: Primary | ICD-10-CM

## 2020-11-02 DIAGNOSIS — D63.1 ANEMIA DUE TO STAGE 4 CHRONIC KIDNEY DISEASE (H): ICD-10-CM

## 2020-11-02 DIAGNOSIS — Z94.0 KIDNEY REPLACED BY TRANSPLANT: ICD-10-CM

## 2020-11-02 DIAGNOSIS — I15.1 HTN, KIDNEY TRANSPLANT RELATED: Primary | ICD-10-CM

## 2020-11-02 DIAGNOSIS — N18.4 ANEMIA DUE TO STAGE 4 CHRONIC KIDNEY DISEASE (H): ICD-10-CM

## 2020-11-02 DIAGNOSIS — D84.9 IMMUNOSUPPRESSION (H): ICD-10-CM

## 2020-11-02 DIAGNOSIS — Z94.0 HTN, KIDNEY TRANSPLANT RELATED: Primary | ICD-10-CM

## 2020-11-02 DIAGNOSIS — N18.4 CHRONIC KIDNEY DISEASE, STAGE IV (SEVERE) (H): ICD-10-CM

## 2020-11-02 PROCEDURE — 99214 OFFICE O/P EST MOD 30 MIN: CPT | Mod: GT

## 2020-11-02 RX ORDER — CLONIDINE HYDROCHLORIDE 0.1 MG/1
0.1 TABLET ORAL 2 TIMES DAILY
Qty: 60 TABLET | Refills: 1 | Status: SHIPPED | OUTPATIENT
Start: 2020-11-02 | End: 2020-11-18

## 2020-11-02 RX ORDER — LOSARTAN POTASSIUM 50 MG/1
50 TABLET ORAL DAILY
Qty: 90 TABLET | Refills: 2 | Status: SHIPPED | OUTPATIENT
Start: 2020-11-02 | End: 2021-01-05

## 2020-11-02 RX ORDER — BUMETANIDE 1 MG/1
2 TABLET ORAL 2 TIMES DAILY
Qty: 120 TABLET | Refills: 1 | Status: ON HOLD | OUTPATIENT
Start: 2020-11-02 | End: 2021-02-26

## 2020-11-02 ASSESSMENT — PAIN SCALES - GENERAL: PAINLEVEL: NO PAIN (0)

## 2020-11-02 NOTE — LETTER
"11/2/2020       RE: Tristian Mckeon  [address]  [place] [address]     Dear Colleague,    Thank you for referring your patient, Tristian Mckeon, to the [facility] [place] at [facility]. Please see a copy of my visit note below.    Tristian Mckeon is a 50 year old male who is being evaluated via a billable video visit.      The patient has been notified of following:     \"This video visit will be conducted via a call between you and your physician/provider. We have found that certain health care needs can be provided without the need for an in-person physical exam.  This service lets us provide the care you need with a video conversation.  If a prescription is necessary we can send it directly to your pharmacy.  If lab work is needed we can place an order for that and you can then stop by our lab to have the test done at a later time.    Video visits are billed at different rates depending on your insurance coverage.  Please reach out to your insurance provider with any questions.    If during the course of the call the physician/provider feels a video visit is not appropriate, you will not be charged for this service.\"    Patient has given verbal consent for Video visit? Yes  How would you like to obtain your AVS? MyChart  If you are dropped from the video visit, the video invite should be resent to: Send to e-mail at: [email or web address]  Will anyone else be joining your video visit? No        Video-Visit Details    Type of service:  Video Visit    Video Start Time: 1:55pm  Video End Time: 2:22PM    Originating Location (pt. Location): Home    Distant Location (provider location):  [facility] [place]     Platform used for Video Visit: Jesús Pizano MD        CHRONIC TRANSPLANT NEPHROLOGY VISIT    Assessment & Plan   # DDKT: Trend up   - Baseline Cr ~ 3.5-3.8   - Proteinuria: Minimal " (0.2-0.5 grams)   - Date DSA Last Checked: Aug/2019      Latest DSA: Yes   - BK Viremia: No   - Kidney Tx Biopsy: Dec 23, 2015; Result: Mild glomerulitis and capillary C4d staining consistent with mild, persistent antibody-mediated rejection. 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 No uremic symptoms but BP getting harder to control. Referred to [facility] for ESRD planning. Referred for evaluation for repeat transplant    # Immunosuppression: Tacrolimus extended release (goal 4-6), Mycophenolic acid (goal 1.0-3.5) and Prednisone (dose 5 mg daily)   - Changes: No    - With this poor of kidney function would not change to Belatacept at this point    # Infection Prophylaxis:   - PJP: None    # Hypertension: Inadequate control;  Goal BP: < 130/80   - Changes: Yes, change losartan to 50mg daily, start bumex 2mg BID, change clonidine to 0.1mg BID, continue diltiazem 240mg daily. May need to consider berkley as well.    -Asked coordinator to call regarding Bps. Labs must be done later this week    # Anemia in Chronic Renal Disease: Hgb: Decreased        - Iron studies: Replete Referred to anemia clinic for RAYMOND    # Mineral Bone Disorder:   - Secondary renal hyperparathyroidism; PTH level: Not checked recently. Check with next labs  - Vitamin D; level: Not checked recently        On Supplement: Yes  - Calcium; level: Low        On Supplement: Yes  - Phosphorus; level: Not checked recently. With CKD IV, check with next labs    # Electrolytes:   - Potassium; level: Normal       But has been high. Check later this week with increased losartan dose  - Magnesium; level: Not checked recently          - Bicarbonate; level: Normal          - Sodium; level: Normal           # Skin Cancer Risk:    - Discussed sun protection and recommend regular follow up with Dermatology.    # Medical Compliance: Yes    # Transplant  History:  Etiology of Kidney Failure: Hypertension  Tx: DDKT  Transplant: 3/12/2015 (Kidney), 4/1/1994 (Kidney), 10/1/2000 (Kidney)  Donor Type: Donation after Brain Death Donor Class: Standard Criteria Donor  Significant changes in immunosuppression: None  Significant transplant-related complications: None    Transplant Office Phone Number: [phone]    Assessment and plan was discussed with the patient and he voiced his understanding and agreement.    Return visit: No follow-ups on file. 1 month      Chief Complaint   Mr. Mckeon is a 50 year old here for routine follow up.    History of Present Illness   He  Had ESKD from HTN and is status post third DDKT on 3/12/15. He has had 2 previous kidney transplants in 1994 and 2000, both of which had failed and he was back on hemodialysis since 2007. He received DDKT 3/12/15 complicated by DGF.  He is highly sensitized and did have low level historical DSA to A1 (797) and A30 (1092) from 2012 serum sample, but no DSA at time of transplant and crossmatch was negative.  Patient underwent a kidney transplant biopsy on 3/25/15 due to no improvement in kidney function and was found to have T cell mediated and antibody-mediated rejection.  In addition, it did show moderate interstitial fibrosis and tubular atrophy. He was treated with PP/IVIG and Thymoglobulin. Creatinine subsequently improved to around 2.2 mg/dL; however, has crept up to more than 3.5 mg/dL;  kidney biopsy was repeated on 05/08/2015 and showed T cell mediated rejection and acute antibody mediated rejection.   He was subsequently treated with steroids, IVIG, plasmapheresis and Bortezomib. He tolerated treatment well with improved creatinine to about 2s. Creatinine again increased to 3.0mg/dl and he underwent under biopsy on 9/28 /2015, which showed isolated V lesion thought to be secondary to AMR, although [place] 2 A T cell mediated rejection was considered.He was again treated with IVIG/PP with improvement  in creatinine and subsequent biopsy on 12/23/2015 showed no V lesion but mild glomerulitis and capillary C4d staining consistent with mild, persistent antibody-mediated rejection. Patient has significant moderate to severe arteriopathy. Patient received a course of steroids. He has had fluctuating prograf level and has been Diltiazem. He was switched to Astagraf in Jan 2016.     Patient reports his BP has been elevated and was associated occasionally with tension type headaches. He denies chest pain. He states that he has had shortness of breath since he was started on Astagraf a few years ago. He denies missing medications. He states that BP has been consistently high for the past 3 weeks. He is aware that his kidney is failing. He states that he has increasing edema of his Ls.       Recent Hospitalizations:  [x] No [] Yes    New Medical Issues: [] No [x] Yes Elevated BPs   Decreased energy: [] No [x] Yes With coreg   Chest pain or SOB with exertion:  [x] No [] Yes    Appetite change or weight change: [x] No [] Yes    Nausea, vomiting or diarrhea:  [x] No [] Yes    Fever, sweats or chills: [x] No [] Yes    Leg swelling: [] No [x] Yes increasing     Home BP: 160s-200 systolic this morning    Review of Systems   A comprehensive review of systems was obtained and negative, except as noted in the HPI or PMH.    Problem List   Patient Active Problem List   Diagnosis     HTN, kidney transplant related     Tobacco abuse     Depression     Kidney replaced by transplant     Immunosuppression (H)     Anemia in chronic renal disease     Aftercare following organ transplant     Secondary renal hyperparathyroidism (H)     Vitamin D deficiency     Kidney transplant rejection     Steroid-induced hyperglycemia     Metabolic acidosis     Antibody mediated rejection of kidney transplant     Hyperglycemia     Hemorrhage following kidney biopsy     Thrombocytopenia (H)     CKD (chronic kidney disease) stage 4, GFR 15-29 ml/min (H)      Anemia of chronic renal failure, stage 4 (severe) (H)       Social History   Social History     Tobacco Use     Smoking status: Light Tobacco Smoker     Types: Cigars     Smokeless tobacco: Never Used   Substance Use Topics     Alcohol use: Yes     Alcohol/week: 0.0 standard drinks     Comment: Minimal     Drug use: No       Allergies   Allergies   Allergen Reactions     Cephalosporins Unknown     Cyclosporine      Duricef [Cefadroxil]        Medications   Current Outpatient Medications   Medication Sig     Blood Pressure Monitor KIT Automatic Blood Pressure Monitor     calcitRIOL (ROCALTROL) 0.25 MCG capsule TAKE ONE CAPSULE BY MOUTH ONCE DAILY     CARTIA  MG 24 hr capsule TAKE ONE CAPSULE BY MOUTH ONCE DAILY     carvedilol (COREG) 25 MG tablet Take 1 tablet (25 mg) by mouth 2 times daily (with meals)     cyanocobalamin (VITAMIN  B-12) 1000 MCG tablet Take 1,000 mcg by mouth daily     ferrous sulfate (IRON) 325 (65 Fe) MG tablet Take 1 tablet (325 mg) by mouth daily     losartan (COZAAR) 25 MG tablet TAKE ONE TABLET BY MOUTH ONCE DAILY     Multiple Vitamin (DAILY MULTIVITAMIN PO) Take by mouth daily     MYFORTIC (BRAND) 360 MG EC tablet Take 3 tablets (1,080 mg) by mouth 2 times daily     predniSONE (DELTASONE) 5 MG tablet Take 1 tablet (5 mg) by mouth daily     tacrolimus (ASTAGRAF XL) 1 MG 24 hr capsule Take 3 capsules (3 mg) by mouth every morning (before breakfast) Total dose 28 mg daily     tacrolimus (ASTAGRAF XL) 5 MG 24 hr capsule Take 5 capsules (25 mg) by mouth every morning (before breakfast) Total dose 28 mg daily     chlorthalidone (HYGROTON) 25 MG tablet Take 1 tablet (25 mg) by mouth daily (Patient not taking: Reported on 11/2/2020)     cloNIDine (CATAPRES) 0.1 MG tablet Take 1 tablet (0.1 mg) by mouth 2 times daily as needed (for SBP > 170)     epoetin sharon (EPOGEN/PROCRIT) 67554 UNIT/ML injection Inject 1 mL (10,000 Units) Subcutaneous every 7 days Administer for Hgb <10. HOLD dose if  systolic BP >180. (Patient not taking: Reported on 11/2/2020)     sulfamethoxazole-trimethoprim (BACTRIM/SEPTRA) 400-80 MG per tablet Take 1 tablet by mouth Every Mon, Wed, Fri Morning (Patient not taking: Reported on 11/2/2020)     No current facility-administered medications for this visit.      There are no discontinued medications.    Physical Exam   Vital Signs: BP (!) 205/108     GENERAL APPEARANCE: alert and no distress  HENT: no obvious abnormalities on appearance  RESP: breathing appears unremarkable with normal rate, no audible wheezing or cough and no apparent shortness of breath with conversation  MS: extremities normal - no gross deformities noted  SKIN: no apparent rash and normal skin tone  NEURO: speech is clear with no obvious neurological deficits  PSYCH: mentation appears normal and affect normal      Data     Renal Latest Ref Rng & Units 10/30/2020 9/18/2020 2/21/2020   Na 133 - 144 mmol/L 141 136 140   K 3.4 - 5.3 mmol/L 4.6 5.2 4.4   Cl 94 - 109 mmol/L 112(H) 108 111(H)   CO2 20 - 32 mmol/L 20 21 20   BUN 7 - 30 mg/dL 49(H) 55(H) 68(H)   Cr 0.66 - 1.25 mg/dL 4.16(H) 3.80(H) 3.76(H)   Glucose 70 - 99 mg/dL 83 93 89   Ca  8.5 - 10.1 mg/dL 7.3(L) 7.1(L) 8.1(L)   Mg 1.6 - 2.3 mg/dL - - -     Bone Health Latest Ref Rng & Units 12/10/2019 8/8/2018 5/24/2018   Phos 2.5 - 4.5 mg/dL 5.2(H) 4.1 4.1   PTHi 18 - 80 pg/mL 146(H) - 120(H)   Vit D Def 20 - 75 ug/L - - 39     Heme Latest Ref Rng & Units 10/30/2020 9/18/2020 2/21/2020   WBC 4.0 - 11.0 10e9/L 4.0 4.1 3.5(L)   Hgb 13.3 - 17.7 g/dL 8.4(L) 8.8(L) 8.4(L)   Plt 150 - 450 10e9/L 144(L) 175 170   ABSOLUTE NEUTROPHIL 1.6 - 8.3 10e9/L 3.1 3.0 2.6   ABSOLUTE LYMPHOCYTES 0.8 - 5.3 10e9/L 0.3(L) 0.4(L) 0.4(L)   ABSOLUTE MONOCYTES 0.0 - 1.3 10e9/L 0.5 0.6 0.5   ABSOLUTE EOSINOPHILS 0.0 - 0.7 10e9/L 0.1 0.1 0.1   ABSOLUTE BASOPHILS 0.0 - 0.2 10e9/L 0.0 0.0 0.0   ABS IMMATURE GRANULOCYTES 0 - 0.4 10e9/L 0.0 - -   ABSOLUTE NUCLEATED RBC - 0.0 - -     Liver  Latest Ref Rng & Units 1/23/2020 1/18/2020 12/10/2019   AP 40 - 150 U/L 58 52 -   TBili 0.2 - 1.3 mg/dL 0.2 0.3 -   DBili 0.0 - 0.2 mg/dL <0.1 0.1 -   ALT 0 - 70 U/L 13 17 -   AST 0 - 45 U/L 10 6 -   Tot Protein 6.8 - 8.8 g/dL 6.6(L) 6.2(L) -   Albumin 3.4 - 5.0 g/dL 3.6 3.5 3.5     Pancreas Latest Ref Rng & Units 5/8/2015 3/11/2015 1/23/2009   A1C 4.3 - 6.0 % - 5.2 5.3   Lipase 73 - 393 U/L 127 - -     Iron studies Latest Ref Rng & Units 10/30/2020 5/8/2018 1/12/2017   Iron 35 - 180 ug/dL 43 46 74   Iron sat 15 - 46 % 21 23 38   Ferritin 26 - 388 ng/mL 445(H) 414(H) 371     [facility] Txp Virology Latest Ref Rng & Units 1/23/2019 8/8/2018 5/8/2018   CMV IgG EU/mL - - -   CVM DNA Quant - - - -   CMV QUANT IU/ML CMVND [IU]/mL - - -   LOG IU/ML OF CMVQNT <2.1 [Log:IU]/mL - - -   BK Spec - Plasma, EDTA anticoagulant Plasma Plasma   BK Res BKNEG:BK Virus DNA Not Detected copies/mL BK Virus DNA Not Detected BK Virus DNA Not Detected BK Virus DNA Not Detected   BK Log <2.7 Log copies/mL Not Calculated Not Calculated Not Calculated   EBV IgG - - - -   EBV CAPSID ANTIBODY IGG 0.0 - 0.8 AI - 7.3(H) -   EBV DNA COPIES/ML EBVNEG [Copies]/mL - - -   EBV DNA LOG OF COPIES <2.7 [Log:copies]/mL - - -   Hep B Core NR:Nonreactive - Nonreactive -   Hep B Surf - - - -   HIV 1&2 NEG - - -        Recent Labs   Lab Test 01/18/20  1123 02/21/20  0844 09/18/20  1036   DOSTAC 1/17/20 2100 02/20/2020 09:00. 9/18/2020 10:00.   TACROL 16.6* 3.9* 3.0*     Recent Labs   Lab Test 01/19/16  1349 01/12/17  1121 05/08/18  0813   DOSMPA 2,130 01/11/17  2130 0900 05/07/2018   MPACID 11.03* 4.51* 1.63   MPAG 126.7* 100.9* 128.9*             Again, thank you for allowing me to participate in the care of your patient.      Sincerely,    Kidney/Pancreas Recipient

## 2020-11-02 NOTE — TELEPHONE ENCOUNTER
Message  Received: Today  Message Contents   Cayetano Pizano MD Blaisdell, Aby Duong, RN; Ignacia Brooks, RN             BP was obviously very high today and has been for the last 2 weeks. Kidney is failing. Please have him follow up in 1 month. Increase losartan to 50mg daily, continue dilt 240mg daily, increase clonidine to 0.1mg BID, not taking coreg because of fatigue. Start bumex 2mg BID. Please obtain labs this week. He will get them on Friday.     Ignacia,  Please check in with him this week on his Bps so I can make changes on Friday based on his labs. Thanks.    Message  Received: Today  Message Contents   Cayetano Pizano MD Blaisdell, Christin Rebecca, RN             Please make sure he is referred to [facility] for CKD IV and access planning if he doesn't already have dialysis access. He needs phos and PTH sent with next labs please. Thanks

## 2020-11-02 NOTE — PATIENT INSTRUCTIONS
Stop coreg  Start losartan 50mg twice daily  Start bumex 2mg twice daily  Change clonidine to 0.1mg twice daily  Continue Cartia XL 240mg daily

## 2020-11-02 NOTE — TELEPHONE ENCOUNTER
Tristian was calling the call center to speak to his post coordinator and was routed to me. His blood pressure is high and he would like to be seen. I sent a message to his post coordinator. Also, sent message to intake to try him again at [phone] to start his referral for re-transplant.

## 2020-11-02 NOTE — TELEPHONE ENCOUNTER
Call to patient regarding Video or in person visit today as there is an opening at 205 with Dr. Pizano. Unable to reach patient. Left voice message about appointment. Provided number for call back.  Varsha Jordan LPN  Nephrology  [phone]

## 2020-11-02 NOTE — PROGRESS NOTES
"Tristian Mckeon is a 50 year old male who is being evaluated via a billable video visit.      The patient has been notified of following:     \"This video visit will be conducted via a call between you and your physician/provider. We have found that certain health care needs can be provided without the need for an in-person physical exam.  This service lets us provide the care you need with a video conversation.  If a prescription is necessary we can send it directly to your pharmacy.  If lab work is needed we can place an order for that and you can then stop by our lab to have the test done at a later time.    Video visits are billed at different rates depending on your insurance coverage.  Please reach out to your insurance provider with any questions.    If during the course of the call the physician/provider feels a video visit is not appropriate, you will not be charged for this service.\"    Patient has given verbal consent for Video visit? Yes  How would you like to obtain your AVS? MyChart  If you are dropped from the video visit, the video invite should be resent to: Send to e-mail at: [email or web address]  Will anyone else be joining your video visit? No        Video-Visit Details    Type of service:  Video Visit    Video Start Time: 1:55pm  Video End Time: 2:22PM    Originating Location (pt. Location): Home    Distant Location (provider location):  [facility] [place]     Platform used for Video Visit: Jesús Pizano MD        CHRONIC TRANSPLANT NEPHROLOGY VISIT    Assessment & Plan   # DDKT: Trend up   - Baseline Cr ~ 3.5-3.8   - Proteinuria: Minimal (0.2-0.5 grams)   - Date DSA Last Checked: Aug/2019      Latest DSA: Yes   - BK Viremia: No   - Kidney Tx Biopsy: Dec 23, 2015; Result: Mild glomerulitis and capillary C4d staining consistent with mild, persistent antibody-mediated rejection. 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 No uremic symptoms but BP getting harder to control. Referred to kidney smart for ESRD planning. Referred for evaluation for repeat transplant    # Immunosuppression: Tacrolimus extended release (goal 4-6), Mycophenolic acid (goal 1.0-3.5) and Prednisone (dose 5 mg daily)   - Changes: No    - With this poor of kidney function would not change to Belatacept at this point    # Infection Prophylaxis:   - PJP: None    # Hypertension: Inadequate control;  Goal BP: < 130/80   - Changes: Yes, change losartan to 50mg daily, start bumex 2mg BID, change clonidine to 0.1mg BID, continue diltiazem 240mg daily. May need to consider berkley as well.    -Asked coordinator to call regarding Bps. Labs must be done later this week    # Anemia in Chronic Renal Disease: Hgb: Decreased        - Iron studies: Replete Referred to anemia clinic for RAYMOND    # Mineral Bone Disorder:   - Secondary renal hyperparathyroidism; PTH level: Not checked recently. Check with next labs  - Vitamin D; level: Not checked recently        On Supplement: Yes  - Calcium; level: Low        On Supplement: Yes  - Phosphorus; level: Not checked recently. With CKD IV, check with next labs    # Electrolytes:   - Potassium; level: Normal       But has been high. Check later this week with increased losartan dose  - Magnesium; level: Not checked recently          - Bicarbonate; level: Normal          - Sodium; level: Normal           # Skin Cancer Risk:    - Discussed sun protection and recommend regular follow up with Dermatology.    # Medical Compliance: Yes    # Transplant History:  Etiology of Kidney Failure: Hypertension  Tx: DDKT  Transplant: 3/12/2015 (Kidney), 4/1/1994 (Kidney), 10/1/2000 (Kidney)  Donor Type: Donation after Brain Death Donor Class: Standard Criteria Donor  Significant changes in immunosuppression: None  Significant transplant-related complications:  None    Transplant Office Phone Number: [phone]    Assessment and plan was discussed with the patient and he voiced his understanding and agreement.    Return visit: No follow-ups on file. 1 month      Chief Complaint   Mr. Mckeon is a 50 year old here for routine follow up.    History of Present Illness   He  Had ESKD from HTN and is status post third DDKT on 3/12/15. He has had 2 previous kidney transplants in 1994 and 2000, both of which had failed and he was back on hemodialysis since 2007. He received DDKT 3/12/15 complicated by DGF.  He is highly sensitized and did have low level historical DSA to A1 (797) and A30 (1092) from 2012 serum sample, but no DSA at time of transplant and crossmatch was negative.  Patient underwent a kidney transplant biopsy on 3/25/15 due to no improvement in kidney function and was found to have T cell mediated and antibody-mediated rejection.  In addition, it did show moderate interstitial fibrosis and tubular atrophy. He was treated with PP/IVIG and Thymoglobulin. Creatinine subsequently improved to around 2.2 mg/dL; however, has crept up to more than 3.5 mg/dL;  kidney biopsy was repeated on 05/08/2015 and showed T cell mediated rejection and acute antibody mediated rejection.   He was subsequently treated with steroids, IVIG, plasmapheresis and Bortezomib. He tolerated treatment well with improved creatinine to about 2s. Creatinine again increased to 3.0mg/dl and he underwent under biopsy on 9/28 /2015, which showed isolated V lesion thought to be secondary to AMR, although [place] 2 A T cell mediated rejection was considered.He was again treated with IVIG/PP with improvement in creatinine and subsequent biopsy on 12/23/2015 showed no V lesion but mild glomerulitis and capillary C4d staining consistent with mild, persistent antibody-mediated rejection. Patient has significant moderate to severe arteriopathy. Patient received a course of steroids. He has had fluctuating  prograf level and has been Diltiazem. He was switched to Astagraf in Jan 2016.     Patient reports his BP has been elevated and was associated occasionally with tension type headaches. He denies chest pain. He states that he has had shortness of breath since he was started on Astagraf a few years ago. He denies missing medications. He states that BP has been consistently high for the past 3 weeks. He is aware that his kidney is failing. He states that he has increasing edema of his Ls.       Recent Hospitalizations:  [x] No [] Yes    New Medical Issues: [] No [x] Yes Elevated BPs   Decreased energy: [] No [x] Yes With coreg   Chest pain or SOB with exertion:  [x] No [] Yes    Appetite change or weight change: [x] No [] Yes    Nausea, vomiting or diarrhea:  [x] No [] Yes    Fever, sweats or chills: [x] No [] Yes    Leg swelling: [] No [x] Yes increasing     Home BP: 160s-200 systolic this morning    Review of Systems   A comprehensive review of systems was obtained and negative, except as noted in the HPI or PMH.    Problem List   Patient Active Problem List   Diagnosis     HTN, kidney transplant related     Tobacco abuse     Depression     Kidney replaced by transplant     Immunosuppression (H)     Anemia in chronic renal disease     Aftercare following organ transplant     Secondary renal hyperparathyroidism (H)     Vitamin D deficiency     Kidney transplant rejection     Steroid-induced hyperglycemia     Metabolic acidosis     Antibody mediated rejection of kidney transplant     Hyperglycemia     Hemorrhage following kidney biopsy     Thrombocytopenia (H)     CKD (chronic kidney disease) stage 4, GFR 15-29 ml/min (H)     Anemia of chronic renal failure, stage 4 (severe) (H)       Social History   Social History     Tobacco Use     Smoking status: Light Tobacco Smoker     Types: Cigars     Smokeless tobacco: Never Used   Substance Use Topics     Alcohol use: Yes     Alcohol/week: 0.0 standard drinks     Comment:  Minimal     Drug use: No       Allergies   Allergies   Allergen Reactions     Cephalosporins Unknown     Cyclosporine      Duricef [Cefadroxil]        Medications   Current Outpatient Medications   Medication Sig     Blood Pressure Monitor KIT Automatic Blood Pressure Monitor     calcitRIOL (ROCALTROL) 0.25 MCG capsule TAKE ONE CAPSULE BY MOUTH ONCE DAILY     CARTIA  MG 24 hr capsule TAKE ONE CAPSULE BY MOUTH ONCE DAILY     carvedilol (COREG) 25 MG tablet Take 1 tablet (25 mg) by mouth 2 times daily (with meals)     cyanocobalamin (VITAMIN  B-12) 1000 MCG tablet Take 1,000 mcg by mouth daily     ferrous sulfate (IRON) 325 (65 Fe) MG tablet Take 1 tablet (325 mg) by mouth daily     losartan (COZAAR) 25 MG tablet TAKE ONE TABLET BY MOUTH ONCE DAILY     Multiple Vitamin (DAILY MULTIVITAMIN PO) Take by mouth daily     MYFORTIC (BRAND) 360 MG EC tablet Take 3 tablets (1,080 mg) by mouth 2 times daily     predniSONE (DELTASONE) 5 MG tablet Take 1 tablet (5 mg) by mouth daily     tacrolimus (ASTAGRAF XL) 1 MG 24 hr capsule Take 3 capsules (3 mg) by mouth every morning (before breakfast) Total dose 28 mg daily     tacrolimus (ASTAGRAF XL) 5 MG 24 hr capsule Take 5 capsules (25 mg) by mouth every morning (before breakfast) Total dose 28 mg daily     chlorthalidone (HYGROTON) 25 MG tablet Take 1 tablet (25 mg) by mouth daily (Patient not taking: Reported on 11/2/2020)     cloNIDine (CATAPRES) 0.1 MG tablet Take 1 tablet (0.1 mg) by mouth 2 times daily as needed (for SBP > 170)     epoetin sharon (EPOGEN/PROCRIT) 57792 UNIT/ML injection Inject 1 mL (10,000 Units) Subcutaneous every 7 days Administer for Hgb <10. HOLD dose if systolic BP >180. (Patient not taking: Reported on 11/2/2020)     sulfamethoxazole-trimethoprim (BACTRIM/SEPTRA) 400-80 MG per tablet Take 1 tablet by mouth Every Mon, Wed, Fri Morning (Patient not taking: Reported on 11/2/2020)     No current facility-administered medications for this visit.       There are no discontinued medications.    Physical Exam   Vital Signs: BP (!) 205/108     GENERAL APPEARANCE: alert and no distress  HENT: no obvious abnormalities on appearance  RESP: breathing appears unremarkable with normal rate, no audible wheezing or cough and no apparent shortness of breath with conversation  MS: extremities normal - no gross deformities noted  SKIN: no apparent rash and normal skin tone  NEURO: speech is clear with no obvious neurological deficits  PSYCH: mentation appears normal and affect normal      Data     Renal Latest Ref Rng & Units 10/30/2020 9/18/2020 2/21/2020   Na 133 - 144 mmol/L 141 136 140   K 3.4 - 5.3 mmol/L 4.6 5.2 4.4   Cl 94 - 109 mmol/L 112(H) 108 111(H)   CO2 20 - 32 mmol/L 20 21 20   BUN 7 - 30 mg/dL 49(H) 55(H) 68(H)   Cr 0.66 - 1.25 mg/dL 4.16(H) 3.80(H) 3.76(H)   Glucose 70 - 99 mg/dL 83 93 89   Ca  8.5 - 10.1 mg/dL 7.3(L) 7.1(L) 8.1(L)   Mg 1.6 - 2.3 mg/dL - - -     Bone Health Latest Ref Rng & Units 12/10/2019 8/8/2018 5/24/2018   Phos 2.5 - 4.5 mg/dL 5.2(H) 4.1 4.1   PTHi 18 - 80 pg/mL 146(H) - 120(H)   Vit D Def 20 - 75 ug/L - - 39     Heme Latest Ref Rng & Units 10/30/2020 9/18/2020 2/21/2020   WBC 4.0 - 11.0 10e9/L 4.0 4.1 3.5(L)   Hgb 13.3 - 17.7 g/dL 8.4(L) 8.8(L) 8.4(L)   Plt 150 - 450 10e9/L 144(L) 175 170   ABSOLUTE NEUTROPHIL 1.6 - 8.3 10e9/L 3.1 3.0 2.6   ABSOLUTE LYMPHOCYTES 0.8 - 5.3 10e9/L 0.3(L) 0.4(L) 0.4(L)   ABSOLUTE MONOCYTES 0.0 - 1.3 10e9/L 0.5 0.6 0.5   ABSOLUTE EOSINOPHILS 0.0 - 0.7 10e9/L 0.1 0.1 0.1   ABSOLUTE BASOPHILS 0.0 - 0.2 10e9/L 0.0 0.0 0.0   ABS IMMATURE GRANULOCYTES 0 - 0.4 10e9/L 0.0 - -   ABSOLUTE NUCLEATED RBC - 0.0 - -     Liver Latest Ref Rng & Units 1/23/2020 1/18/2020 12/10/2019   AP 40 - 150 U/L 58 52 -   TBili 0.2 - 1.3 mg/dL 0.2 0.3 -   DBili 0.0 - 0.2 mg/dL <0.1 0.1 -   ALT 0 - 70 U/L 13 17 -   AST 0 - 45 U/L 10 6 -   Tot Protein 6.8 - 8.8 g/dL 6.6(L) 6.2(L) -   Albumin 3.4 - 5.0 g/dL 3.6 3.5 3.5     Pancreas Latest  Ref Rng & Units 5/8/2015 3/11/2015 1/23/2009   A1C 4.3 - 6.0 % - 5.2 5.3   Lipase 73 - 393 U/L 127 - -     Iron studies Latest Ref Rng & Units 10/30/2020 5/8/2018 1/12/2017   Iron 35 - 180 ug/dL 43 46 74   Iron sat 15 - 46 % 21 23 38   Ferritin 26 - 388 ng/mL 445(H) 414(H) 371     [facility] Txp Virology Latest Ref Rng & Units 1/23/2019 8/8/2018 5/8/2018   CMV IgG EU/mL - - -   CVM DNA Quant - - - -   CMV QUANT IU/ML CMVND [IU]/mL - - -   LOG IU/ML OF CMVQNT <2.1 [Log:IU]/mL - - -   BK Spec - Plasma, EDTA anticoagulant Plasma Plasma   BK Res BKNEG:BK Virus DNA Not Detected copies/mL BK Virus DNA Not Detected BK Virus DNA Not Detected BK Virus DNA Not Detected   BK Log <2.7 Log copies/mL Not Calculated Not Calculated Not Calculated   EBV IgG - - - -   EBV CAPSID ANTIBODY IGG 0.0 - 0.8 AI - 7.3(H) -   EBV DNA COPIES/ML EBVNEG [Copies]/mL - - -   EBV DNA LOG OF COPIES <2.7 [Log:copies]/mL - - -   Hep B Core NR:Nonreactive - Nonreactive -   Hep B Surf - - - -   HIV 1&2 NEG - - -        Recent Labs   Lab Test 01/18/20  1123 02/21/20  0844 09/18/20  1036   DOSTAC 1/17/20 2100 02/20/2020 09:00. 9/18/2020 10:00.   TACROL 16.6* 3.9* 3.0*     Recent Labs   Lab Test 01/19/16  1349 01/12/17  1121 05/08/18  0813   DOSMPA 2,130 01/11/17  2130 0900 05/07/2018   MPACID 11.03* 4.51* 1.63   MPAG 126.7* 100.9* 128.9*

## 2020-11-03 ENCOUNTER — TELEPHONE (OUTPATIENT)
Dept: PHARMACY | Facility: CLINIC | Age: 50
End: 2020-11-03

## 2020-11-03 DIAGNOSIS — R53.83 FATIGUE, UNSPECIFIED TYPE: ICD-10-CM

## 2020-11-03 DIAGNOSIS — Z48.298 AFTERCARE FOLLOWING ORGAN TRANSPLANT: ICD-10-CM

## 2020-11-03 RX ORDER — CALCITRIOL 0.25 UG/1
CAPSULE, LIQUID FILLED ORAL
Qty: 90 CAPSULE | Refills: 3 | Status: ON HOLD | OUTPATIENT
Start: 2020-11-03 | End: 2022-01-06

## 2020-11-03 NOTE — TELEPHONE ENCOUNTER
Anemia Management Note  SUBJECTIVE/OBJECTIVE:  Referred by Dr. Kole Collins on 2020  Primary Diagnosis: Anemia in Chronic Kidney Disease (N18.4, D63.1)     Secondary Diagnosis:  Chronic Kidney Disease, Stage 4 (N18.4)   Kidney Tx: , , and   Hgb goal range:  9-10  Epo/Darbo: Procrit 10,000 units weekly for Hgb <10. At home.   Iron regimen:  Ferrous Sulfate  once daily  Labs : 2021  Recent RAYMOND use, transfusion, IV iron: Procrit , , and .  RX/TX plans : 2021     No history of stroke, MI and blood clots or cancers.     Contact:            NO Consent to Communicate on File.     Anemia Latest Ref Rng & Units 2019 12/10/2019 2019 2020 2020 2020 10/30/2020   Hemoglobin 13.3 - 17.7 g/dL 9.8(L) 9.8(L) 9.8(L) 9.7(L) 8.4(L) 8.8(L) 8.4(L)   TSAT 15 - 46 % - - - - - - 21   Ferritin 26 - 388 ng/mL - - - - - - 445(H)     BP Readings from Last 3 Encounters:   20 (!) 205/108   12/10/19 (!) 176/94   19 (!) 200/114     Wt Readings from Last 2 Encounters:   12/10/19 98.4 kg (217 lb)   19 94 kg (207 lb 3.7 oz)           ASSESSMENT:  Hgb:Not at goal/Known  TSat: not at goal of >30% Ferritin: At goal (>100ng/mL)    PLAN:  Spoke with Tristian he states that he will give himself the Procrit this evening when he gets home from work.      Orders needed to be renewed (for next follow-up date) in EPIC: None    Iron labs due:  4 weeks    Plan discussed with:  Ravindra  Plan provided by:  jesusita    NEXT FOLLOW-UP DATE:  11/10/20 Did he dose last week?    Rachel Raman RN   Anemia Services  [address] [address]   [email or web address]   Office : [phone]  Fax: [phone]

## 2020-11-06 ENCOUNTER — TELEPHONE (OUTPATIENT)
Dept: NEPHROLOGY | Facility: CLINIC | Age: 50
End: 2020-11-06

## 2020-11-06 NOTE — TELEPHONE ENCOUNTER
Called patient to see what his blood pressure readings have been since appointment this week.     Attempted to reach patient. No answer, left voicemail.

## 2020-11-06 NOTE — TELEPHONE ENCOUNTER
Called patient at work number again and was able to talk with him. Patient said he does have blood pressure readings at home but not with him right now. He said he will send in a [employer] message with all the readings when he gets home tonight. Patient also stated he wasn't able to get to the lab today. Told patient he needs to go as soon as he can. Patient verbalized understanding.     Will notify provider.

## 2020-11-10 ENCOUNTER — TELEPHONE (OUTPATIENT)
Dept: PHARMACY | Facility: CLINIC | Age: 50
End: 2020-11-10

## 2020-11-10 ENCOUNTER — VIRTUAL VISIT (OUTPATIENT)
Dept: NEPHROLOGY | Facility: CLINIC | Age: 50
End: 2020-11-10
Attending: INTERNAL MEDICINE
Payer: COMMERCIAL

## 2020-11-10 DIAGNOSIS — D84.9 IMMUNOSUPPRESSION (H): Primary | ICD-10-CM

## 2020-11-10 NOTE — TELEPHONE ENCOUNTER
Follow-up with anemia management service:    BOBBI for Tristian.  Has he been dosing his Procrit?    Anemia Latest Ref Rng & Units 8/22/2019 12/10/2019 12/20/2019 1/18/2020 2/21/2020 9/18/2020 10/30/2020   Hemoglobin 13.3 - 17.7 g/dL 9.8(L) 9.8(L) 9.8(L) 9.7(L) 8.4(L) 8.8(L) 8.4(L)   TSAT 15 - 46 % - - - - - - 21   Ferritin 26 - 388 ng/mL - - - - - - 445(H)           Follow-up call date: 11/12/2020    Rachel Raman RN   Anemia Services  [address] [address]   [email or web address]   Office : [phone]  Fax: [phone]

## 2020-11-10 NOTE — LETTER
11/10/2020       RE: Tristian Mckeon  [address]  [facility] [address]     Dear Colleague,    Thank you for referring your patient, Tristian Mckeon, to the [facility]PH[facility] [place] at [facility]. Please see a copy of my visit note below.    No show        Again, thank you for allowing me to participate in the care of your patient.      Sincerely,    Kidney/Pancreas Recipient

## 2020-11-11 ENCOUNTER — TELEPHONE (OUTPATIENT)
Dept: NEPHROLOGY | Facility: CLINIC | Age: 50
End: 2020-11-11

## 2020-11-12 ENCOUNTER — TELEPHONE (OUTPATIENT)
Dept: PHARMACY | Facility: CLINIC | Age: 50
End: 2020-11-12

## 2020-11-12 NOTE — TELEPHONE ENCOUNTER
Anemia Management Note  SUBJECTIVE/OBJECTIVE:  Referred by Dr. Kole Collins on 2020  Primary Diagnosis: Anemia in Chronic Kidney Disease (N18.4, D63.1)     Secondary Diagnosis:  Chronic Kidney Disease, Stage 4 (N18.4)   Kidney Tx: , , and   Hgb goal range:  9-10  Epo/Darbo: Procrit 10,000 units weekly for Hgb <10. At home.   Iron regimen:  Ferrous Sulfate  once daily  Labs : 2021  Recent RAYMOND use, transfusion, IV iron: Procrit , , and .  RX/TX plans : 2021     No history of stroke, MI and blood clots or cancers.     Contact:            NO Consent to Communicate on File.     Anemia Latest Ref Rng & Units 12/10/2019 2019 2020 2020 2020 10/30/2020 2020   RAYMOND Dose - - - - - - - 10,000 units   Hemoglobin 13.3 - 17.7 g/dL 9.8(L) 9.8(L) 9.7(L) 8.4(L) 8.8(L) 8.4(L) -   TSAT 15 - 46 % - - - - - 21 -   Ferritin 26 - 388 ng/mL - - - - - 445(H) -     BP Readings from Last 3 Encounters:   20 (!) 205/108   12/10/19 (!) 176/94   19 (!) 200/114     Wt Readings from Last 2 Encounters:   12/10/19 98.4 kg (217 lb)   19 94 kg (207 lb 3.7 oz)       Spoke with Tristian, He states he took his fist dose on Sat 20 and will dose again on 20.     ASSESSMENT:  Hgb:Not at goal/Initiation of therapy  TSat: not at goal of >30% Ferritin: At goal (>100ng/mL)    PLAN:  Dose with procrit and RTC for hgb then procrit if needed in 1 week(s)    Orders needed to be renewed (for next follow-up date) in EPIC: None    Iron labs due:  2021    Plan discussed with:  Tristian   Plan provided by:  jesusita    NEXT FOLLOW-UP DATE:  20    Rachel Raman RN   [facility]  [address] [address]   [email or web address]   Office : [phone]  Fax: [phone]

## 2020-11-17 ENCOUNTER — PATIENT OUTREACH (OUTPATIENT)
Dept: NEPHROLOGY | Facility: CLINIC | Age: 50
End: 2020-11-17

## 2020-11-17 NOTE — PROGRESS NOTES
Nephrology Note: Nursing Outreach Encounter    REASON FOR CALL:                                                      REASON FOR CALL: Care Coordination                                          SITUATION/BACKROUND:                                                    Patient is being treated for CKD Stage 4.    Patient never sent in BP readings or went in for lab work. Patient needs to make follow up appointments.       ASSESSMENT:                                                    Patient stated he wasn't aware he needed lab work. Patient said he would call to make a lab appointment when he is done with work. Patient also stated he would send in BP readings through [employer] when he is done with work.     Patient said he is feeling ok and not having any problems right now.     PLAN:                                                      Follow Up:   Patient to call/[employer] message with updates.     Patient verbalized understanding and will contact the clinic with any further questions or concerns.     Yelitza BILLINGSLEY RN, BSN  Nephrology Care Coordinator  [facility]

## 2020-11-18 ENCOUNTER — TELEPHONE (OUTPATIENT)
Dept: NEPHROLOGY | Facility: CLINIC | Age: 50
End: 2020-11-18

## 2020-11-18 DIAGNOSIS — I15.1 HTN, KIDNEY TRANSPLANT RELATED: ICD-10-CM

## 2020-11-18 DIAGNOSIS — Z94.0 HTN, KIDNEY TRANSPLANT RELATED: ICD-10-CM

## 2020-11-18 RX ORDER — CLONIDINE HYDROCHLORIDE 0.1 MG/1
0.2 TABLET ORAL 2 TIMES DAILY
Qty: 60 TABLET | Refills: 1 | Status: SHIPPED | OUTPATIENT
Start: 2020-11-18 | End: 2021-01-05

## 2020-11-18 NOTE — TELEPHONE ENCOUNTER
Cayetano Pizano MD  You 7 minutes ago (3:06 PM)     Please increase clonidine to 0.2mg bid. We need labs to consider changes to losartan or addition of berkley. Thanks    Patient notified of above recommendations. Patient stated he is going in for labs on Friday. He had no other questions or concerns.

## 2020-11-19 ENCOUNTER — TELEPHONE (OUTPATIENT)
Dept: PHARMACY | Facility: CLINIC | Age: 50
End: 2020-11-19

## 2020-11-19 NOTE — TELEPHONE ENCOUNTER
Anemia Management Note  SUBJECTIVE/OBJECTIVE:  Referred by Dr. Kole Collins on 2020  Primary Diagnosis: Anemia in Chronic Kidney Disease (N18.4, D63.1)     Secondary Diagnosis:  Chronic Kidney Disease, Stage 4 (N18.4)   Kidney Tx: , , and   Hgb goal range:  9-10  Epo/Darbo: Procrit 10,000 units weekly for Hgb <10. At home.   Iron regimen:  Ferrous Sulfate  once daily  Labs : 2021  Recent RAYMOND use, transfusion, IV iron: Procrit , , and .  RX/TX plans : 2021     No history of stroke, MI and blood clots or cancers.     Contact:            NO Consent to Communicate on File.     Anemia Latest Ref Rng & Units 2019 2020 2020 2020 10/30/2020 2020 2020   RAYMOND Dose - - - - - - 10,000 units 10,000 units   Hemoglobin 13.3 - 17.7 g/dL 9.8(L) 9.7(L) 8.4(L) 8.8(L) 8.4(L) - -   TSAT 15 - 46 % - - - - 21 - -   Ferritin 26 - 388 ng/mL - - - - 445(H) - -     BP Readings from Last 3 Encounters:   20 (!) 205/108   12/10/19 (!) 176/94   19 (!) 200/114     Wt Readings from Last 2 Encounters:   12/10/19 98.4 kg (217 lb)   19 94 kg (207 lb 3.7 oz)     LV for Drake to discuss when labs will be drawn again. He needs to continue to dose his Procrit every Saturday      NEXT FOLLOW-UP DATE:  20  Document dose from 20    Rachel Raman RN   Anemia Services  [address] [address]   [email or web address]   Office : [phone]  Fax: [phone]

## 2020-11-23 ENCOUNTER — TELEPHONE (OUTPATIENT)
Dept: PHARMACY | Facility: CLINIC | Age: 50
End: 2020-11-23

## 2020-11-23 NOTE — TELEPHONE ENCOUNTER
Anemia Management Note  SUBJECTIVE/OBJECTIVE:  Referred by Dr. Kole Collins on 2020  Primary Diagnosis: Anemia in Chronic Kidney Disease (N18.4, D63.1)     Secondary Diagnosis:  Chronic Kidney Disease, Stage 4 (N18.4)   Kidney Tx: , , and   Hgb goal range:  9-10  Epo/Darbo: Procrit 10,000 units weekly for Hgb <10. At home.   Iron regimen:  Ferrous Sulfate  once daily  Labs : 2021  Recent RAYMOND use, transfusion, IV iron: Procrit , , and .  RX/TX plans : 2021     No history of stroke, MI and blood clots or cancers.     Contact:            NO Consent to Communicate on File.     Anemia Latest Ref Rng & Units 2020 2020 2020 10/30/2020 2020 2020 2020   RAYMOND Dose - - - - - 10,000 units 10,000 units 10,000 units   Hemoglobin 13.3 - 17.7 g/dL 9.7(L) 8.4(L) 8.8(L) 8.4(L) - - -   TSAT 15 - 46 % - - - 21 - - -   Ferritin 26 - 388 ng/mL - - - 445(H) - - -     BP Readings from Last 3 Encounters:   20 (!) 205/108   12/10/19 (!) 176/94   19 (!) 200/114     Wt Readings from Last 2 Encounters:   12/10/19 98.4 kg (217 lb)   19 94 kg (207 lb 3.7 oz)           ASSESSMENT:  Hgb:  DUe  TSat: not at goal of >30% Ferritin: At goal (>100ng/mL)    PLAN:  Dose with procrit. Lab are due    Orders needed to be renewed (for next follow-up date) in EPIC: None    Iron labs due:  End of 2020    Plan discussed with:  No call today, Spoke with Tristian last week.   Plan provided by:  jesusita    NEXT FOLLOW-UP DATE:  20    Rachel Raman RN   Anemia Services  [address] [address]   [email or web address]   Office : [phone]  Fax: [phone]

## 2020-11-25 ENCOUNTER — TELEPHONE (OUTPATIENT)
Dept: NEPHROLOGY | Facility: CLINIC | Age: 50
End: 2020-11-25

## 2020-11-25 NOTE — TELEPHONE ENCOUNTER
Left message for patient to call to make appointment for labs and for appointment he no showed last week.

## 2020-12-04 ENCOUNTER — TELEPHONE (OUTPATIENT)
Dept: PHARMACY | Facility: CLINIC | Age: 50
End: 2020-12-04

## 2020-12-04 NOTE — TELEPHONE ENCOUNTER
Follow-up with anemia management service:    BOBBI for Tristian to check in re Anemia Services. Has he been dosing weekly?  Needs to have labs drawn.     Anemia Latest Ref Rng & Units 1/18/2020 2/21/2020 9/18/2020 10/30/2020 11/7/2020 11/14/2020 11/21/2020   RAYMOND Dose - - - - - 10,000 units 10,000 units 10,000 units   Hemoglobin 13.3 - 17.7 g/dL 9.7(L) 8.4(L) 8.8(L) 8.4(L) - - -   TSAT 15 - 46 % - - - 21 - - -   Ferritin 26 - 388 ng/mL - - - 445(H) - - -           Follow-up call date: 12/10/20    Rachel Raman RN   Anemia Services  [address] [address]   [email or web address]   Office : [phone]  Fax: [phone]

## 2020-12-10 ENCOUNTER — TELEPHONE (OUTPATIENT)
Dept: PHARMACY | Facility: CLINIC | Age: 50
End: 2020-12-10

## 2020-12-10 NOTE — TELEPHONE ENCOUNTER
Follow-up with anemia management service:    2nd message left for Tristian to check in re Anemia Services.   Requested a return call.     No lab appt scheduled.    Anemia Latest Ref Rng & Units 1/18/2020 2/21/2020 9/18/2020 10/30/2020 11/7/2020 11/14/2020 11/21/2020   RAYMOND Dose - - - - - 10,000 units 10,000 units 10,000 units   Hemoglobin 13.3 - 17.7 g/dL 9.7(L) 8.4(L) 8.8(L) 8.4(L) - - -   TSAT 15 - 46 % - - - 21 - - -   Ferritin 26 - 388 ng/mL - - - 445(H) - - -           Follow-up call date: 12/17/20    Rachel Raman RN   Anemia Services  [address] [address]   [email or web address]   Office : [phone]  Fax: [phone]

## 2020-12-11 ENCOUNTER — PATIENT OUTREACH (OUTPATIENT)
Dept: NEPHROLOGY | Facility: CLINIC | Age: 50
End: 2020-12-11

## 2020-12-11 NOTE — PROGRESS NOTES
Patient has not followed up as needed. Has not went in for lab work that is needed. Has not rescheduled appointment that was no show.     Attempted to reach patient at home, cell and work phone. Unable to leave voicemail at either number.

## 2020-12-18 ENCOUNTER — TELEPHONE (OUTPATIENT)
Dept: NEPHROLOGY | Facility: CLINIC | Age: 50
End: 2020-12-18

## 2020-12-30 DIAGNOSIS — I15.1 HTN, KIDNEY TRANSPLANT RELATED: ICD-10-CM

## 2020-12-30 DIAGNOSIS — Z94.0 HTN, KIDNEY TRANSPLANT RELATED: ICD-10-CM

## 2020-12-31 ENCOUNTER — TELEPHONE (OUTPATIENT)
Dept: PHARMACY | Facility: CLINIC | Age: 50
End: 2020-12-31

## 2020-12-31 DIAGNOSIS — Z94.0 KIDNEY TRANSPLANTED: Primary | ICD-10-CM

## 2020-12-31 DIAGNOSIS — I15.1 HTN, KIDNEY TRANSPLANT RELATED: ICD-10-CM

## 2020-12-31 DIAGNOSIS — Z94.0 HTN, KIDNEY TRANSPLANT RELATED: ICD-10-CM

## 2020-12-31 RX ORDER — DILTIAZEM HYDROCHLORIDE 240 MG/1
CAPSULE, COATED, EXTENDED RELEASE ORAL
Qty: 90 CAPSULE | Refills: 3 | Status: SHIPPED | OUTPATIENT
Start: 2020-12-31 | End: 2021-01-05

## 2020-12-31 RX ORDER — CHLORTHALIDONE 25 MG/1
TABLET ORAL
Qty: 90 TABLET | Refills: 0 | Status: SHIPPED | OUTPATIENT
Start: 2020-12-31 | End: 2021-01-05

## 2020-12-31 NOTE — TELEPHONE ENCOUNTER
Pt is out of Astagraf XL 1mg caps, epic won't let me select it to refill    Thank you!  Maureen Zaragoza CPhT  [facility]

## 2020-12-31 NOTE — TELEPHONE ENCOUNTER
Follow-up with anemia management service:    Has not had labs drawn since 10/30/20. Per  Specialty pharmacy, he has not ordered Procrit since 9/29/20.     Unable to reach Tristian after multiple attempts. Will follow up again in 1 month, if no labs or RTC will close out Procrit rx and Anemia Services.     Anemia Latest Ref Rng & Units 1/18/2020 2/21/2020 9/18/2020 10/30/2020 11/7/2020 11/14/2020 11/21/2020   RAYMOND Dose - - - - - 10,000 units 10,000 units 10,000 units   Hemoglobin 13.3 - 17.7 g/dL 9.7(L) 8.4(L) 8.8(L) 8.4(L) - - -   TSAT 15 - 46 % - - - 21 - - -   Ferritin 26 - 388 ng/mL - - - 445(H) - - -       Follow-up call date: 1/28/2021    Rachel Raman RN   Anemia Services  [address] [address]   [email or web address]   Office : [phone]  Fax: [phone]

## 2021-01-05 DIAGNOSIS — I15.1 HTN, KIDNEY TRANSPLANT RELATED: ICD-10-CM

## 2021-01-05 DIAGNOSIS — Z94.0 HTN, KIDNEY TRANSPLANT RELATED: ICD-10-CM

## 2021-01-05 RX ORDER — LOSARTAN POTASSIUM 50 MG/1
50 TABLET ORAL DAILY
Qty: 90 TABLET | Refills: 2 | Status: SHIPPED | OUTPATIENT
Start: 2021-01-05 | End: 2021-01-06

## 2021-01-05 RX ORDER — CLONIDINE HYDROCHLORIDE 0.1 MG/1
0.2 TABLET ORAL 2 TIMES DAILY
Qty: 60 TABLET | Refills: 3 | Status: SHIPPED | OUTPATIENT
Start: 2021-01-05 | End: 2021-01-06

## 2021-01-05 RX ORDER — CHLORTHALIDONE 25 MG/1
25 TABLET ORAL DAILY
Qty: 90 TABLET | Refills: 3 | Status: ON HOLD | OUTPATIENT
Start: 2021-01-05 | End: 2021-01-11

## 2021-01-05 RX ORDER — DILTIAZEM HYDROCHLORIDE 240 MG/1
CAPSULE, COATED, EXTENDED RELEASE ORAL
Qty: 90 CAPSULE | Refills: 3 | Status: ON HOLD | OUTPATIENT
Start: 2021-01-05 | End: 2021-02-26

## 2021-01-06 ENCOUNTER — TELEPHONE (OUTPATIENT)
Dept: TRANSPLANT | Facility: CLINIC | Age: 51
End: 2021-01-06

## 2021-01-06 DIAGNOSIS — I15.1 HTN, KIDNEY TRANSPLANT RELATED: ICD-10-CM

## 2021-01-06 DIAGNOSIS — Z94.0 KIDNEY TRANSPLANTED: ICD-10-CM

## 2021-01-06 DIAGNOSIS — Z94.0 HTN, KIDNEY TRANSPLANT RELATED: ICD-10-CM

## 2021-01-06 RX ORDER — CLONIDINE HYDROCHLORIDE 0.1 MG/1
0.1 TABLET ORAL 2 TIMES DAILY
Qty: 180 TABLET | Refills: 3 | Status: SHIPPED | OUTPATIENT
Start: 2021-01-06 | End: 2021-01-07

## 2021-01-06 RX ORDER — LOSARTAN POTASSIUM 50 MG/1
50 TABLET ORAL 2 TIMES DAILY
Qty: 180 TABLET | Refills: 3 | Status: ON HOLD | OUTPATIENT
Start: 2021-01-06 | End: 2021-01-11

## 2021-01-06 NOTE — TELEPHONE ENCOUNTER
Patient called regarding his Losartan medication stated that pharmacy said he was not able to get it. Please touch base with patient.

## 2021-01-06 NOTE — TELEPHONE ENCOUNTER
Call to pharmacy, sent in prescriptions per Dr. Pizano 11/2/2020. Patient aware and will come by today for  as he is out of losartan.  Varsha Jordan LPN  Nephrology  [phone]

## 2021-01-07 ENCOUNTER — APPOINTMENT (OUTPATIENT)
Dept: CARDIOLOGY | Facility: CLINIC | Age: 51
End: 2021-01-07
Attending: EMERGENCY MEDICINE
Payer: COMMERCIAL

## 2021-01-07 ENCOUNTER — APPOINTMENT (OUTPATIENT)
Dept: GENERAL RADIOLOGY | Facility: CLINIC | Age: 51
End: 2021-01-07
Attending: EMERGENCY MEDICINE
Payer: COMMERCIAL

## 2021-01-07 ENCOUNTER — HOSPITAL ENCOUNTER (EMERGENCY)
Facility: CLINIC | Age: 51
Discharge: LEFT BEFORE DISCHARGE | End: 2021-01-07
Attending: EMERGENCY MEDICINE | Admitting: EMERGENCY MEDICINE
Payer: COMMERCIAL

## 2021-01-07 VITALS
TEMPERATURE: 98.6 F | WEIGHT: 203 LBS | OXYGEN SATURATION: 100 % | RESPIRATION RATE: 18 BRPM | SYSTOLIC BLOOD PRESSURE: 195 MMHG | BODY MASS INDEX: 28.31 KG/M2 | HEART RATE: 65 BPM | DIASTOLIC BLOOD PRESSURE: 76 MMHG

## 2021-01-07 DIAGNOSIS — E87.70 HYPERVOLEMIA, UNSPECIFIED HYPERVOLEMIA TYPE: ICD-10-CM

## 2021-01-07 DIAGNOSIS — E87.79 OTHER HYPERVOLEMIA: ICD-10-CM

## 2021-01-07 DIAGNOSIS — Z11.52 ENCOUNTER FOR SCREENING LABORATORY TESTING FOR SEVERE ACUTE RESPIRATORY SYNDROME CORONAVIRUS 2 (SARS-COV-2): ICD-10-CM

## 2021-01-07 DIAGNOSIS — I31.39 EFFUSION, PERICARDIUM: ICD-10-CM

## 2021-01-07 DIAGNOSIS — R06.09 DYSPNEA ON EXERTION: ICD-10-CM

## 2021-01-07 LAB
ALBUMIN SERPL-MCNC: 3.2 G/DL (ref 3.4–5)
ALBUMIN UR-MCNC: 30 MG/DL
ALP SERPL-CCNC: 59 U/L (ref 40–150)
ALT SERPL W P-5'-P-CCNC: 14 U/L (ref 0–70)
ANION GAP SERPL CALCULATED.3IONS-SCNC: 10 MMOL/L (ref 3–14)
APPEARANCE UR: CLEAR
AST SERPL W P-5'-P-CCNC: 7 U/L (ref 0–45)
BASOPHILS # BLD AUTO: 0 10E9/L (ref 0–0.2)
BASOPHILS NFR BLD AUTO: 0.2 %
BILIRUB SERPL-MCNC: 0.3 MG/DL (ref 0.2–1.3)
BILIRUB UR QL STRIP: NEGATIVE
BUN SERPL-MCNC: 69 MG/DL (ref 7–30)
CALCIUM SERPL-MCNC: 7.5 MG/DL (ref 8.5–10.1)
CHLORIDE SERPL-SCNC: 113 MMOL/L (ref 94–109)
CO2 SERPL-SCNC: 14 MMOL/L (ref 20–32)
COLOR UR AUTO: YELLOW
CREAT SERPL-MCNC: 4.47 MG/DL (ref 0.66–1.25)
DIFFERENTIAL METHOD BLD: ABNORMAL
EOSINOPHIL # BLD AUTO: 0.1 10E9/L (ref 0–0.7)
EOSINOPHIL NFR BLD AUTO: 1.2 %
ERYTHROCYTE [DISTWIDTH] IN BLOOD BY AUTOMATED COUNT: 15.8 % (ref 10–15)
FLUAV RNA RESP QL NAA+PROBE: NEGATIVE
FLUBV RNA RESP QL NAA+PROBE: NEGATIVE
GFR SERPL CREATININE-BSD FRML MDRD: 14 ML/MIN/{1.73_M2}
GLUCOSE SERPL-MCNC: 152 MG/DL (ref 70–99)
GLUCOSE UR STRIP-MCNC: NEGATIVE MG/DL
HCT VFR BLD AUTO: 23.2 % (ref 40–53)
HGB BLD-MCNC: 7 G/DL (ref 13.3–17.7)
HGB UR QL STRIP: NEGATIVE
IMM GRANULOCYTES # BLD: 0.1 10E9/L (ref 0–0.4)
IMM GRANULOCYTES NFR BLD: 0.5 %
INTERPRETATION ECG - MUSE: NORMAL
KETONES UR STRIP-MCNC: NEGATIVE MG/DL
LABORATORY COMMENT REPORT: NORMAL
LEUKOCYTE ESTERASE UR QL STRIP: ABNORMAL
LYMPHOCYTES # BLD AUTO: 0.2 10E9/L (ref 0.8–5.3)
LYMPHOCYTES NFR BLD AUTO: 2.3 %
MCH RBC QN AUTO: 27.2 PG (ref 26.5–33)
MCHC RBC AUTO-ENTMCNC: 30.2 G/DL (ref 31.5–36.5)
MCV RBC AUTO: 90 FL (ref 78–100)
MONOCYTES # BLD AUTO: 0.6 10E9/L (ref 0–1.3)
MONOCYTES NFR BLD AUTO: 5.9 %
NEUTROPHILS # BLD AUTO: 8.5 10E9/L (ref 1.6–8.3)
NEUTROPHILS NFR BLD AUTO: 89.9 %
NITRATE UR QL: NEGATIVE
NRBC # BLD AUTO: 0 10*3/UL
NRBC BLD AUTO-RTO: 0 /100
NT-PROBNP SERPL-MCNC: ABNORMAL PG/ML (ref 0–450)
PH UR STRIP: 5.5 PH (ref 5–7)
PLATELET # BLD AUTO: 161 10E9/L (ref 150–450)
POTASSIUM SERPL-SCNC: 4.5 MMOL/L (ref 3.4–5.3)
PROT SERPL-MCNC: 6.5 G/DL (ref 6.8–8.8)
RBC # BLD AUTO: 2.57 10E12/L (ref 4.4–5.9)
RBC #/AREA URNS AUTO: 1 /HPF (ref 0–2)
RSV RNA SPEC QL NAA+PROBE: NEGATIVE
SARS-COV-2 RNA RESP QL NAA+PROBE: NEGATIVE
SODIUM SERPL-SCNC: 137 MMOL/L (ref 133–144)
SOURCE: ABNORMAL
SP GR UR STRIP: 1.01 (ref 1–1.03)
SPECIMEN SOURCE: NORMAL
TROPONIN I SERPL-MCNC: 0.01 UG/L (ref 0–0.04)
UROBILINOGEN UR STRIP-MCNC: NORMAL MG/DL (ref 0–2)
WBC # BLD AUTO: 9.5 10E9/L (ref 4–11)
WBC #/AREA URNS AUTO: 5 /HPF (ref 0–5)

## 2021-01-07 PROCEDURE — 84484 ASSAY OF TROPONIN QUANT: CPT | Performed by: EMERGENCY MEDICINE

## 2021-01-07 PROCEDURE — 85025 COMPLETE CBC W/AUTO DIFF WBC: CPT | Performed by: EMERGENCY MEDICINE

## 2021-01-07 PROCEDURE — 81001 URINALYSIS AUTO W/SCOPE: CPT | Performed by: EMERGENCY MEDICINE

## 2021-01-07 PROCEDURE — 93306 TTE W/DOPPLER COMPLETE: CPT

## 2021-01-07 PROCEDURE — 99285 EMERGENCY DEPT VISIT HI MDM: CPT | Mod: 25 | Performed by: EMERGENCY MEDICINE

## 2021-01-07 PROCEDURE — 93306 TTE W/DOPPLER COMPLETE: CPT | Mod: 26 | Performed by: INTERNAL MEDICINE

## 2021-01-07 PROCEDURE — 96374 THER/PROPH/DIAG INJ IV PUSH: CPT | Performed by: EMERGENCY MEDICINE

## 2021-01-07 PROCEDURE — 250N000011 HC RX IP 250 OP 636: Performed by: EMERGENCY MEDICINE

## 2021-01-07 PROCEDURE — 87636 SARSCOV2 & INF A&B AMP PRB: CPT | Performed by: EMERGENCY MEDICINE

## 2021-01-07 PROCEDURE — 93005 ELECTROCARDIOGRAM TRACING: CPT | Performed by: EMERGENCY MEDICINE

## 2021-01-07 PROCEDURE — 83880 ASSAY OF NATRIURETIC PEPTIDE: CPT | Performed by: EMERGENCY MEDICINE

## 2021-01-07 PROCEDURE — 71045 X-RAY EXAM CHEST 1 VIEW: CPT

## 2021-01-07 PROCEDURE — 71045 X-RAY EXAM CHEST 1 VIEW: CPT | Mod: 26 | Performed by: RADIOLOGY

## 2021-01-07 PROCEDURE — 80053 COMPREHEN METABOLIC PANEL: CPT | Performed by: EMERGENCY MEDICINE

## 2021-01-07 PROCEDURE — 93010 ELECTROCARDIOGRAM REPORT: CPT | Performed by: EMERGENCY MEDICINE

## 2021-01-07 PROCEDURE — 120N000001 HC R&B MED SURG/OB

## 2021-01-07 PROCEDURE — C9803 HOPD COVID-19 SPEC COLLECT: HCPCS | Performed by: EMERGENCY MEDICINE

## 2021-01-07 RX ORDER — AMOXICILLIN 250 MG
1 CAPSULE ORAL 2 TIMES DAILY PRN
Status: DISCONTINUED | OUTPATIENT
Start: 2021-01-07 | End: 2021-01-07 | Stop reason: HOSPADM

## 2021-01-07 RX ORDER — PROCHLORPERAZINE MALEATE 10 MG
10 TABLET ORAL EVERY 6 HOURS PRN
Status: DISCONTINUED | OUTPATIENT
Start: 2021-01-07 | End: 2021-01-07 | Stop reason: HOSPADM

## 2021-01-07 RX ORDER — FUROSEMIDE 10 MG/ML
100 INJECTION INTRAMUSCULAR; INTRAVENOUS ONCE
Status: COMPLETED | OUTPATIENT
Start: 2021-01-07 | End: 2021-01-07

## 2021-01-07 RX ORDER — CLONIDINE HYDROCHLORIDE 0.1 MG/1
0.1 TABLET ORAL 2 TIMES DAILY
Status: ON HOLD | COMMUNITY
End: 2022-01-06

## 2021-01-07 RX ORDER — CYCLOBENZAPRINE HCL 10 MG
10 TABLET ORAL 3 TIMES DAILY PRN
Status: ON HOLD | COMMUNITY
End: 2021-02-26

## 2021-01-07 RX ORDER — AMOXICILLIN 250 MG
2 CAPSULE ORAL 2 TIMES DAILY PRN
Status: DISCONTINUED | OUTPATIENT
Start: 2021-01-07 | End: 2021-01-07 | Stop reason: HOSPADM

## 2021-01-07 RX ORDER — LIDOCAINE 40 MG/G
CREAM TOPICAL
Status: DISCONTINUED | OUTPATIENT
Start: 2021-01-07 | End: 2021-01-07 | Stop reason: HOSPADM

## 2021-01-07 RX ORDER — ACETAMINOPHEN 325 MG/1
650 TABLET ORAL EVERY 4 HOURS PRN
Status: DISCONTINUED | OUTPATIENT
Start: 2021-01-07 | End: 2021-01-07 | Stop reason: HOSPADM

## 2021-01-07 RX ORDER — PROCHLORPERAZINE 25 MG
25 SUPPOSITORY, RECTAL RECTAL EVERY 12 HOURS PRN
Status: DISCONTINUED | OUTPATIENT
Start: 2021-01-07 | End: 2021-01-07 | Stop reason: HOSPADM

## 2021-01-07 RX ORDER — POLYETHYLENE GLYCOL 3350 17 G/17G
17 POWDER, FOR SOLUTION ORAL DAILY PRN
Status: DISCONTINUED | OUTPATIENT
Start: 2021-01-07 | End: 2021-01-07 | Stop reason: HOSPADM

## 2021-01-07 RX ORDER — ONDANSETRON 2 MG/ML
4 INJECTION INTRAMUSCULAR; INTRAVENOUS EVERY 6 HOURS PRN
Status: DISCONTINUED | OUTPATIENT
Start: 2021-01-07 | End: 2021-01-07 | Stop reason: HOSPADM

## 2021-01-07 RX ORDER — ONDANSETRON 4 MG/1
4 TABLET, ORALLY DISINTEGRATING ORAL EVERY 6 HOURS PRN
Status: DISCONTINUED | OUTPATIENT
Start: 2021-01-07 | End: 2021-01-07 | Stop reason: HOSPADM

## 2021-01-07 RX ADMIN — FUROSEMIDE 100 MG: 10 INJECTION, SOLUTION INTRAVENOUS at 18:03

## 2021-01-07 ASSESSMENT — ENCOUNTER SYMPTOMS
FEVER: 0
EYE REDNESS: 0
COUGH: 1
HEADACHES: 1
COLOR CHANGE: 0
CONFUSION: 0
UNEXPECTED WEIGHT CHANGE: 0
ABDOMINAL PAIN: 0
CHILLS: 1
NECK STIFFNESS: 0
DIFFICULTY URINATING: 0
SHORTNESS OF BREATH: 1
ARTHRALGIAS: 0

## 2021-01-07 NOTE — ED PROVIDER NOTES
[place] EMERGENCY DEPARTMENT ([facility])  January 7, 2021  History     Chief Complaint   Patient presents with     Shortness of Breath     Cough     HPI  Tristian Mckeon is a 50 year old male with a PMH of kidney transplant S/P rejection (2016), immunosuppression, HTN, secondary renal hyperparathyroidism, CKD stage IV, anemia, thrombocytopenia, tobacco abuse, osteoporosis, AVN, and h/o blood transfusion who presents the ED today complaining of shortness of breath and cough.  Patient reports that 7 days ago he woke up from sleep feeling short of breath, shivering, and experiencing chills.  Patient is now complaining of GRULLON.  He states his cough has been improving with Robitussin in the evening.  He states his sputum is normally clear, but sometimes bloody.  He reports his symptoms started suddenly.  He states he has never experienced anything like this before.  Patient endorses a tingling sensation in his lungs when lying down that makes him feel like he has to cough, and states that he is still experiencing chills and shivering.  Patient does endorse occasional leg swelling, but states this is his baseline.  Patient also endorses a mild headache that started within the past week.  Patient reports that losartan and another medication are new medications for him.  Patient denies chest pain, recent weight gain, nausea, vomiting, diarrhea, fever, diaphoresis, loss of taste or smell, myalgias, urinary symptoms, or lightheadedness or dizziness on standing.    I have reviewed the Medications, Allergies, Past Medical and Surgical History, and Social History in the [employer] system.  PAST MEDICAL HISTORY:   Past Medical History:   Diagnosis Date     Anemia      AVN (avascular necrosis of bone) (H)     Left femoral head     Depression      DJD (degenerative joint disease)      Erectile dysfunction      Genital condyloma, male     S/p resection     Gout      History of blood transfusion      Hyperlipidemia       Hypertension      Hypogonadism male      Kidney replaced by transplant     LDKT.  Early ACR, ureteral strictures.  Failed  d/t chronic rejection.  S/p graft nephrectomy.     Kidney replaced by transplant     LDKT.  ACR, non-compliance, CAN.  Failed in .  S/p graft nephrectomy.     Kidney replaced by transplant 3/12/2015    DDKT.  Induction w/ thymo 600mg.     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  3/25/15 DSA:  A1 mfi 1604, A30 mfi 4387, B13 mfi 1013.     Medical non-compliance      Obesity     S/p gastric bypass      Organ transplant candidate 2009     Osteoporosis      Thyroid disease      Vitamin D deficiency        PAST SURGICAL HISTORY:   Past Surgical History:   Procedure Laterality Date     BIOPSY      Kidney     C HIP CORE DECOMPRESSION Left      C TOTAL HIP ARTHROPLASTY Right      CYSTOSCOPY, REMOVE STENT(S), COMBINED Right 2015    Procedure: COMBINED CYSTOSCOPY, REMOVE STENT(S);  Surgeon: Ren Romeo MD;  Location: [facility] OR     GASTRIC BYPASS  2005     HERNIA REPAIR Right     inguinal     HYDROCELECTOMY INGUINAL Right      NEPHRECTOMY Right 1998    allograft nephrectomy     NEPHRECTOMY Left     allograft nephrectomy     PARATHYROIDECTOMY       TRANSPLANT KIDNEY RECIPIENT  DONOR N/A 3/12/2015    Procedure: TRANSPLANT KIDNEY RECIPIENT  DONOR;  Surgeon: Ren Romeo MD;  Location: [facility] OR     TRANSPLANT KIDNEY RECIPIENT LIVING RELATED      s/p right allograft nephrectomy     TRANSPLANT KIDNEY RECIPIENT LIVING RELATED      s/p left allograft nephrectomy     VASCULAR SURGERY         Past medical history, past surgical history, medications, and allergies were reviewed with the patient. Additional pertinent items: None    FAMILY HISTORY:   Family History   Problem Relation Age of Onset     Diabetes Brother      Blood Disease Sister         sickle cell     Hypertension Father      Heart Disease Father       Cerebrovascular Disease Brother      Arthritis Mother      Heart Disease Mother        SOCIAL HISTORY:   Social History     Tobacco Use     Smoking status: Light Tobacco Smoker     Types: Cigars     Smokeless tobacco: Never Used   Substance Use Topics     Alcohol use: Yes     Alcohol/week: 0.0 standard drinks     Comment: Minimal     Social history was reviewed with the patient. Additional pertinent items: None      Discharge Medication List as of 1/7/2021  6:17 PM      CONTINUE these medications which have NOT CHANGED    Details   bumetanide (BUMEX) 1 MG tablet Take 2 tablets (2 mg) by mouth 2 times daily, Disp-120 tablet, R-1, E-Prescribe      calcitRIOL (ROCALTROL) 0.25 MCG capsule TAKE ONE CAPSULE BY MOUTH ONCE DAILY, Disp-90 capsule, R-3, E-Prescribe      chlorthalidone (HYGROTON) 25 MG tablet Take 1 tablet (25 mg) by mouth daily, Disp-90 tablet, R-3, E-Prescribe      diltiazem ER COATED BEADS (CARTIA XT) 240 MG 24 hr capsule TAKE ONE CAPSULE BY MOUTH ONCE DAILY, Disp-90 capsule, R-3, E-Prescribe      ferrous sulfate (IRON) 325 (65 Fe) MG tablet Take 1 tablet (325 mg) by mouth daily, Disp-90 tablet, R-3, E-Prescribe      losartan (COZAAR) 50 MG tablet Take 1 tablet (50 mg) by mouth 2 times daily, Disp-180 tablet, R-3, E-PrescribePer Dr. Pizano 11/2/2020      Multiple Vitamin (DAILY MULTIVITAMIN PO) Take by mouth daily, Historical      MYFORTIC (BRAND) 360 MG EC tablet Take 3 tablets (1,080 mg) by mouth 2 times daily, Disp-180 tablet, R-11, E-Prescribe      predniSONE (DELTASONE) 5 MG tablet Take 1 tablet (5 mg) by mouth daily, Disp-30 tablet, R-11, E-Prescribe      !! tacrolimus (ASTAGRAF XL) 1 MG 24 hr capsule Take 3 capsules (3 mg) by mouth every morning (before breakfast) Total dose 28 mg daily, Disp-90 capsule, R-11, E-Prescribe      !! tacrolimus (ASTAGRAF XL) 5 MG 24 hr capsule Take 5 capsules (25 mg) by mouth every morning (before breakfast) Total dose 28 mg daily, Disp-150 capsule, R-11,  E-Prescribe      UNABLE TO FIND 1 tablet by Oral or Feeding Tube route daily MEDICATION NAME: calcium 500 mg - Vitamin B12, Historical      Blood Pressure Monitor KIT Automatic Blood Pressure Monitor, Disp-1 kit, R-0, E-Prescribe      cyclobenzaprine (FLEXERIL) 10 MG tablet Take 10 mg by mouth 3 times daily as needed for muscle spasms, Historical      epoetin sharon (EPOGEN/PROCRIT) 35036 UNIT/ML injection Inject 1 mL (10,000 Units) Subcutaneous every 7 days Administer for Hgb <10. HOLD dose if systolic BP >180., Disp-4 mL, R-11, E-Prescribe      cloNIDine (CATAPRES) 0.1 MG tablet Take 1 tablet (0.1 mg) by mouth 2 times daily, Disp-180 tablet, R-3, E-PrescribePer Dr. Pizano 11/2/2020      !! tacrolimus (ASTAGRAF XL) 1 MG 24 hr capsule Take 3 capsules (3 mg) by mouth every morning (before breakfast), Disp-90 capsule, R-11, E-Prescribe      !! tacrolimus (ASTAGRAF XL) 5 MG 24 hr capsule Take 5 capsules (25 mg) by mouth every morning (before breakfast), Disp-150 capsule, R-11, E-Prescribe       !! - Potential duplicate medications found. Please discuss with provider.             Allergies   Allergen Reactions     Cephalosporins Unknown     Cyclosporine      Duricef [Cefadroxil]         Review of Systems   Constitutional: Positive for chills. Negative for fever and unexpected weight change.   HENT: Negative for congestion.    Eyes: Negative for redness.   Respiratory: Positive for cough and shortness of breath.    Cardiovascular: Negative for chest pain.   Gastrointestinal: Negative for abdominal pain.   Genitourinary: Negative for difficulty urinating.   Musculoskeletal: Negative for arthralgias and neck stiffness.   Skin: Negative for color change.   Neurological: Positive for headaches (mild).   Psychiatric/Behavioral: Negative for confusion.   All other systems reviewed and are negative.    A complete review of systems was performed with pertinent positives and negatives noted in the HPI, and all other systems  negative.    Physical Exam   BP: (!) 179/55  Pulse: 62  Temp: 97.4  F (36.3  C)  Resp: 18  Weight: 92.1 kg (203 lb)  SpO2: 98 %      Physical Exam  Constitutional:       General: He is not in acute distress.     Appearance: He is not diaphoretic.   HENT:      Head: Atraumatic.   Eyes:      General: No scleral icterus.     Pupils: Pupils are equal, round, and reactive to light.   Neck:      Vascular: Hepatojugular reflux and JVD present.   Cardiovascular:      Rate and Rhythm: Normal rate and regular rhythm.      Chest Wall: PMI is displaced.      Heart sounds: Normal heart sounds.   Pulmonary:      Effort: No respiratory distress.      Breath sounds: Normal breath sounds.   Abdominal:      General: Bowel sounds are normal.      Palpations: Abdomen is soft.      Tenderness: There is no abdominal tenderness.   Musculoskeletal:         General: No tenderness.      Right lower le+ Edema present.      Left lower le+ Edema present.   Skin:     General: Skin is warm.      Findings: No rash.         ED Course   12:32 PM  The patient was seen and examined by Joao Montana MD in Room ED23.        Procedures             EKG Interpretation:      Interpreted by Joao Montana MD  Time reviewed: 12:12 PM  Symptoms at time of EKG: Dyspnea  Rhythm: normal sinus   Rate: Normal  Axis: Normal  Ectopy: none  Conduction: 1st degree AV block  ST Segments/ T Waves: QTc prolongation 487 ms  Q Waves: none  Comparison to prior: Compared with EKG from 2015, first-degree AV block and prolonged QT are now present    Clinical Impression: prolonged QT interval and 1st degree AV block                   Results for orders placed or performed during the hospital encounter of 21 (from the past 24 hour(s))   CBC with platelets differential   Result Value Ref Range    WBC 9.5 4.0 - 11.0 10e9/L    RBC Count 2.57 (L) 4.4 - 5.9 10e12/L    Hemoglobin 7.0 (L) 13.3 - 17.7 g/dL    Hematocrit 23.2 (L) 40.0 - 53.0 %    MCV 90 78 - 100  fl    MCH 27.2 26.5 - 33.0 pg    MCHC 30.2 (L) 31.5 - 36.5 g/dL    RDW 15.8 (H) 10.0 - 15.0 %    Platelet Count 161 150 - 450 10e9/L    Diff Method Automated Method     % Neutrophils 89.9 %    % Lymphocytes 2.3 %    % Monocytes 5.9 %    % Eosinophils 1.2 %    % Basophils 0.2 %    % Immature Granulocytes 0.5 %    Nucleated RBCs 0 0 /100    Absolute Neutrophil 8.5 (H) 1.6 - 8.3 10e9/L    Absolute Lymphocytes 0.2 (L) 0.8 - 5.3 10e9/L    Absolute Monocytes 0.6 0.0 - 1.3 10e9/L    Absolute Eosinophils 0.1 0.0 - 0.7 10e9/L    Absolute Basophils 0.0 0.0 - 0.2 10e9/L    Abs Immature Granulocytes 0.1 0 - 0.4 10e9/L    Absolute Nucleated RBC 0.0    Comprehensive metabolic panel   Result Value Ref Range    Sodium 137 133 - 144 mmol/L    Potassium 4.5 3.4 - 5.3 mmol/L    Chloride 113 (H) 94 - 109 mmol/L    Carbon Dioxide 14 (L) 20 - 32 mmol/L    Anion Gap 10 3 - 14 mmol/L    Glucose 152 (H) 70 - 99 mg/dL    Urea Nitrogen 69 (H) 7 - 30 mg/dL    Creatinine 4.47 (H) 0.66 - 1.25 mg/dL    GFR Estimate 14 (L) >60 mL/min/[1.73_m2]    GFR Estimate If Black 17 (L) >60 mL/min/[1.73_m2]    Calcium 7.5 (L) 8.5 - 10.1 mg/dL    Bilirubin Total 0.3 0.2 - 1.3 mg/dL    Albumin 3.2 (L) 3.4 - 5.0 g/dL    Protein Total 6.5 (L) 6.8 - 8.8 g/dL    Alkaline Phosphatase 59 40 - 150 U/L    ALT 14 0 - 70 U/L    AST 7 0 - 45 U/L   Nt probnp inpatient (BNP)   Result Value Ref Range    N-Terminal Pro BNP Inpatient 15,053 (H) 0 - 450 pg/mL   Symptomatic Influenza A/B & SARS-CoV2 (COVID-19) Virus PCR Multiplex    Specimen: Nasopharyngeal   Result Value Ref Range    Flu A/B & SARS-COV-2 PCR Source Nasopharyngeal     SARS-CoV-2 PCR Result NEGATIVE     Influenza A PCR Negative NEG^Negative    Influenza B PCR Negative NEG^Negative    Respiratory Syncytial Virus PCR Negative NEG^Negative    Flu A/B & SARS-CoV-2 PCR Comment (Note)    Troponin I   Result Value Ref Range    Troponin I ES 0.015 0.000 - 0.045 ug/L   EKG 12 lead   Result Value Ref Range     Interpretation ECG Click View Image link to view waveform and result    XR Chest Port 1 View    Narrative    Exam: XR CHEST PORT 1 VW, 2021 12:22 PM    Indication: Shortness of breath, cough    Comparison: Semiupright AP view of the chest.    Findings:   Semiupright, AP view the chest. Trachea is midline. Cardiac silhouette  enlargement. No focal airspace opacities. Small bilateral pleural  effusions. No pneumothorax. Visualized osseous structures and upper  abdomen are unremarkable.      Impression    Impression: Profound cardiac silhouette enlargement, not seen on prior  exams, is concerning for a pericardial effusion. Left basilar  atelectasis/consolidation.    I have personally reviewed the examination and initial interpretation  and I agree with the findings.    CHEO CURTIS MD   UA reflex to Microscopic and Culture    Specimen: Urine clean catch; Midstream Urine   Result Value Ref Range    Color Urine Yellow     Appearance Urine Clear     Glucose Urine Negative NEG^Negative mg/dL    Bilirubin Urine Negative NEG^Negative    Ketones Urine Negative NEG^Negative mg/dL    Specific Gravity Urine 1.013 1.003 - 1.035    Blood Urine Negative NEG^Negative    pH Urine 5.5 5.0 - 7.0 pH    Protein Albumin Urine 30 (A) NEG^Negative mg/dL    Urobilinogen mg/dL Normal 0.0 - 2.0 mg/dL    Nitrite Urine Negative NEG^Negative    Leukocyte Esterase Urine Trace (A) NEG^Negative    Source Midstream Urine     RBC Urine 1 0 - 2 /HPF    WBC Urine 5 0 - 5 /HPF   Echocardiogram Complete    Narrative    [id number]  [id number]  [id number]  [id number]^ESHA^CATRACHITO^E           [facility]  Echocardiography Laboratory  [address] [place] [id number]     Name: AARON CHINO  MRN: [id number]  : 1970  Study Date: 2021 02:33 PM  Age: 50 yrs  Gender: Male  Patient Location: [facility]  Reason For Study: Cardiomegaly  Ordering Physician: CATRACHITO BALBUENA  Performed By: DURGA Park     BSA: 2.1  m2  Height: 71 in  Weight: 203 lb  HR: 63  BP: 184/60 mmHg  _____________________________________________________________________________  __        Procedure  Complete Portable Echo Adult. The final echo results were communicated to Dr. Montana in ED.  _____________________________________________________________________________  __        Interpretation Summary  Global and regional left ventricular function is normal with an EF of 60-65%.  Grade II or moderate diastolic dysfunction.  Global right ventricular function is normal.  Moderate pulmonary hypertension. Estimated pulmonary artery systolic pressure  is 37 mmHg plus right atrial pressure.  IVC diameter >2.1 cm collapsing <50% with sniff suggests a high RA pressure  estimated at 15 mmHg or greater.  Moderate, circumferential pericardial effusion. Largest diameter of 2.2cm  posteriorly. No chamber compression or echocardiographic evidence for  tamponade.     This study was compared with the study from 6/6/14. Pericardial effusion was  trivial at that time. Estimated PA pressure has increased as well.  _____________________________________________________________________________  __        Left Ventricle  Global and regional left ventricular function is normal with an EF of 60-65%.  Left ventricular size is normal. Mild concentric wall thickening consistent  with left ventricular hypertrophy is present. Grade II or moderate diastolic  dysfunction. No regional wall motion abnormalities are seen.     Right Ventricle  The right ventricle is normal size. Global right ventricular function is  normal.     Atria  Severe biatrial enlargement is present.     Mitral Valve  The mitral valve is normal. Trace mitral insufficiency is present.        Aortic Valve  Aortic valve is normal in structure and function. The aortic valve is  tricuspid.     Tricuspid Valve  The tricuspid valve is normal. Mild tricuspid insufficiency is present.  Estimated pulmonary artery systolic  pressure is 37 mmHg plus right atrial  pressure. Moderate pulmonary hypertension is present.     Pulmonic Valve  The pulmonic valve is normal. Trace pulmonic insufficiency is present.     Vessels  Normal diameter aortic root and proximal ascending aorta. IVC diameter >2.1 cm  collapsing <50% with sniff suggests a high RA pressure estimated at 15 mmHg or  greater.     Pericardium  Moderate, circumferential pericardiac effusion. Largest diameter of 2.2cm  posteriorly. Chamber compression is not present; there is no evidence for  tamponade.        Compared to Previous Study  This study was compared with the study from 14 .  _____________________________________________________________________________  __  MMode/2D Measurements & Calculations     RVDd: 4.2 cm  IVSd: 1.2 cm  LVIDd: 5.4 cm  LVIDs: 3.4 cm  LVPWd: 1.2 cm  FS: 37.4 %  LV mass(C)d: 279.8 grams  LV mass(C)dI: 131.8 grams/m2  asc Aorta Diam: 3.4 cm  LVOT diam: 2.5 cm  LVOT area: 4.8 cm2     EF(MOD-bp): 59.2 %  LA Volume Index (BP): 90.0 ml/m2  RWT: 0.46  TAPSE: 3.5 cm           Doppler Measurements & Calculations  MV E max peri: 117.1 cm/sec  MV A max peri: 123.1 cm/sec  MV E/A: 0.95  MV dec slope: 390.7 cm/sec2  MV dec time: 0.30 sec  PA acc time: 0.06 sec  TR max peir: 296.4 cm/sec  TR max P.2 mmHg  E/E' avg: 15.7  Lateral E/e': 12.8  Medial E/e': 18.6     _____________________________________________________________________________  __           Report approved by: Dontrell Jacob 2021 03:36 PM        Medications   lidocaine 1 % 0.1-1 mL (has no administration in time range)   lidocaine (LMX4) cream (has no administration in time range)   sodium chloride (PF) 0.9% PF flush 3 mL (has no administration in time range)   sodium chloride (PF) 0.9% PF flush 3 mL (has no administration in time range)   melatonin tablet 1 mg (has no administration in time range)   acetaminophen (TYLENOL) tablet 650 mg (has no administration in time range)    senna-docusate (SENOKOT-S/PERICOLACE) 8.6-50 MG per tablet 1 tablet (has no administration in time range)     Or   senna-docusate (SENOKOT-S/PERICOLACE) 8.6-50 MG per tablet 2 tablet (has no administration in time range)   polyethylene glycol (MIRALAX) Packet 17 g (has no administration in time range)   ondansetron (ZOFRAN-ODT) ODT tab 4 mg (has no administration in time range)     Or   ondansetron (ZOFRAN) injection 4 mg (has no administration in time range)   prochlorperazine (COMPAZINE) injection 10 mg (has no administration in time range)     Or   prochlorperazine (COMPAZINE) tablet 10 mg (has no administration in time range)     Or   prochlorperazine (COMPAZINE) suppository 25 mg (has no administration in time range)   furosemide (LASIX) injection 100 mg (100 mg Intravenous Given 1/7/21 1803)             Assessments & Plan (with Medical Decision Making)   50-year-old male who presents for evaluation of dyspnea on exertion.  Differential included pneumonia, pneumothorax, volume overload, CHF, pulmonary embolus, bronchospasm, hemorrhage.  Exam revealed JVD, 1+ edema and displaced PMI.  EKG revealed new first-degree AV block as well as QTC prolongation.  Chest x-ray revealed cardiomegaly.  Laboratories revealed anemia worse than baseline with a hemoglobin of 7.  Comprehensive metabolic panel revealed worsening creatinine over baseline of 4.47 consistent with failing renal transplant.  N-terminal BNP was 15,053 consistent with volume overload.  Echocardiogram revealed pericardial effusion without tamponade.  Patient was treated with 100 mg IV Lasix.  The case was discussed at length with cardiology as well as internal medicine.  Patient was to be admitted but then stated he does not want to be admitted and would rather sign out AGAINST MEDICAL ADVICE.  Patient fully understands potential morbidity and/or mortality that could be associated with his decision.  He stated he would follow-up with his clinic  tomorrow.    I have reviewed the nursing notes.    I have reviewed the findings, diagnosis, plan and need for follow up with the patient.    Discharge Medication List as of 1/7/2021  6:17 PM          Final diagnoses:   Dyspnea on exertion   Other hypervolemia   ITay, am serving as a trained medical scribe to document services personally performed by Joao Montana MD, based on the provider's statements to me.     IJoao MD, was physically present and have reviewed and verified the accuracy of this note documented by Tay Cifuentes.      1/7/2021   [facility] EMERGENCY DEPARTMENT     Joao Montana MD  01/07/21 1678  Tristian Mckeon has made the decision to leave the current treatment against medical advice.  He has been told that his symptoms may possibly be caused by volume overload and pericardial effusion. He has been informed and understands the inherent risks, including death, permanent disability or other.  He has decided to accept the responsibility for his decision.  He has the capacity to make this informed decision.  He is alert and oriented x 3, understands these instructions, and is able to ambulate.  Tristian Mckeon and all necessary parties have been advised that they may return at any time for further evaluation or treatment.     Joao Montana MD  01/07/21 7993

## 2021-01-07 NOTE — ED NOTES
[facility]    ED Nurse to Floor Handoff     Tristian Mckeon is a 50 year old male who speaks English and lives with family members,  in a home  They arrived in the ED by car from home    ED Chief Complaint: Shortness of Breath and Cough    ED Dx;   Final diagnoses:   Dyspnea on exertion         Needed?: No    Allergies:   Allergies   Allergen Reactions     Cephalosporins Unknown     Cyclosporine      Duricef [Cefadroxil]    .  Past Medical Hx:   Past Medical History:   Diagnosis Date     Anemia      AVN (avascular necrosis of bone) (H)     Left femoral head     Depression      DJD (degenerative joint disease)      Erectile dysfunction      Genital condyloma, male     S/p resection     Gout      History of blood transfusion      Hyperlipidemia      Hypertension      Hypogonadism male      Kidney replaced by transplant 1994    LDKT.  Early ACR, ureteral strictures.  Failed 1998 d/t chronic rejection.  S/p graft nephrectomy.     Kidney replaced by transplant 2000    LDKT.  ACR, non-compliance, CAN.  Failed in 2007.  S/p graft nephrectomy.     Kidney replaced by transplant 3/12/2015    DDKT.  Induction w/ thymo 600mg.     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  3/25/15 DSA:  A1 mfi 1604, A30 mfi 4387, B13 mfi 1013.     Medical non-compliance      Obesity     S/p gastric bypass 2005     Organ transplant candidate 2009     Osteoporosis      Thyroid disease      Vitamin D deficiency       Baseline Mental status: WDL  Current Mental Status changes: at basesline    Infection present or suspected this encounter: no  Sepsis suspected: No  Isolation type: No active isolations  Patient tested for COVID 19 prior to admission: YES     Activity level - Baseline/Home:  Independent  Activity Level - Current:   Independent    Bariatric equipment needed?: No    In the ED these meds were given: Medications - No data to display    Drips  running?  No    Home pump  No    Current LDAs  Peripheral IV 01/07/21 Left Lower forearm (Active)   Site Assessment WDL 01/07/21 1158   Line Status Saline locked 01/07/21 1158   Dressing Intervention New dressing  01/07/21 1158   Phlebitis Scale 0-->no symptoms 01/07/21 1158   Infiltration Scale 0 01/07/21 1158   Infiltration Site Treatment Method  None 01/07/21 1158   Number of days: 0       Hemodialysis Vascular Access Arteriovenous fistula Right Arm (Active)   Number of days:        Incision/Surgical Site 03/12/15 Abdomen (Active)   Number of days: 2128       Labs results:   Labs Ordered and Resulted from Time of ED Arrival Up to the Time of Departure from the ED   CBC WITH PLATELETS DIFFERENTIAL - Abnormal; Notable for the following components:       Result Value    RBC Count 2.57 (*)     Hemoglobin 7.0 (*)     Hematocrit 23.2 (*)     MCHC 30.2 (*)     RDW 15.8 (*)     Absolute Neutrophil 8.5 (*)     Absolute Lymphocytes 0.2 (*)     All other components within normal limits   COMPREHENSIVE METABOLIC PANEL - Abnormal; Notable for the following components:    Chloride 113 (*)     Carbon Dioxide 14 (*)     Glucose 152 (*)     Urea Nitrogen 69 (*)     Creatinine 4.47 (*)     GFR Estimate 14 (*)     GFR Estimate If Black 17 (*)     Calcium 7.5 (*)     Albumin 3.2 (*)     Protein Total 6.5 (*)     All other components within normal limits   NT PROBNP INPATIENT - Abnormal; Notable for the following components:    N-Terminal Pro BNP Inpatient 15,053 (*)     All other components within normal limits   UA MACROSCOPIC WITH REFLEX TO MICRO AND CULTURE - Abnormal; Notable for the following components:    Protein Albumin Urine 30 (*)     Leukocyte Esterase Urine Trace (*)     All other components within normal limits   INFLUENZA A/B & SARS-COV2 PCR MULTIPLEX   TROPONIN I   MEASURE WEIGHT       Imaging Studies:   Recent Results (from the past 24 hour(s))   XR Chest Port 1 View    Narrative    Profound cardiomegaly, not seen on  prior exams, is concerning for a  pericardial effusion.   Echocardiogram Complete    Narrative    [id number]  [id number]  [id number]  [id number]^ESHA^CATRACHITO^E           [facility],[place]  Echocardiography Laboratory  [address] [place] [id number]     Name: AARON CHINO  MRN: [id number]  : 1970  Study Date: 2021 02:33 PM  Age: 50 yrs  Gender: Male  Patient Location: [facility]  Reason For Study: Cardiomegaly  Ordering Physician: CATRACHITO MONTANA  Performed By: DURGA Park     BSA: 2.1 m2  Height: 71 in  Weight: 203 lb  HR: 63  BP: 184/60 mmHg  _____________________________________________________________________________  __        Procedure  Complete Portable Echo Adult. The final echo results were communicated to Dr. Montana in ED.  _____________________________________________________________________________  __        Interpretation Summary  Global and regional left ventricular function is normal with an EF of 60-65%.  Grade II or moderate diastolic dysfunction.  Global right ventricular function is normal.  Moderate pulmonary hypertension. Estimated pulmonary artery systolic pressure  is 37 mmHg plus right atrial pressure.  IVC diameter >2.1 cm collapsing <50% with sniff suggests a high RA pressure  estimated at 15 mmHg or greater.  Moderate, circumferential pericardial effusion. Largest diameter of 2.2cm  posteriorly. No chamber compression or echocardiographic evidence for  tamponade.     This study was compared with the study from 14. Pericardial effusion was  trivial at that time. Estimated PA pressure has increased as well.  _____________________________________________________________________________  __        Left Ventricle  Global and regional left ventricular function is normal with an EF of 60-65%.  Left ventricular size is normal. Mild concentric wall thickening consistent  with left ventricular hypertrophy is present. Grade II or moderate  diastolic  dysfunction. No regional wall motion abnormalities are seen.     Right Ventricle  The right ventricle is normal size. Global right ventricular function is  normal.     Atria  Severe biatrial enlargement is present.     Mitral Valve  The mitral valve is normal. Trace mitral insufficiency is present.        Aortic Valve  Aortic valve is normal in structure and function. The aortic valve is  tricuspid.     Tricuspid Valve  The tricuspid valve is normal. Mild tricuspid insufficiency is present.  Estimated pulmonary artery systolic pressure is 37 mmHg plus right atrial  pressure. Moderate pulmonary hypertension is present.     Pulmonic Valve  The pulmonic valve is normal. Trace pulmonic insufficiency is present.     Vessels  Normal diameter aortic root and proximal ascending aorta. IVC diameter >2.1 cm  collapsing <50% with sniff suggests a high RA pressure estimated at 15 mmHg or  greater.     Pericardium  Moderate, circumferential pericardiac effusion. Largest diameter of 2.2cm  posteriorly. Chamber compression is not present; there is no evidence for  tamponade.        Compared to Previous Study  This study was compared with the study from 14 .  _____________________________________________________________________________  __  MMode/2D Measurements & Calculations     RVDd: 4.2 cm  IVSd: 1.2 cm  LVIDd: 5.4 cm  LVIDs: 3.4 cm  LVPWd: 1.2 cm  FS: 37.4 %  LV mass(C)d: 279.8 grams  LV mass(C)dI: 131.8 grams/m2  asc Aorta Diam: 3.4 cm  LVOT diam: 2.5 cm  LVOT area: 4.8 cm2     EF(MOD-bp): 59.2 %  LA Volume Index (BP): 90.0 ml/m2  RWT: 0.46  TAPSE: 3.5 cm           Doppler Measurements & Calculations  MV E max peri: 117.1 cm/sec  MV A max peri: 123.1 cm/sec  MV E/A: 0.95  MV dec slope: 390.7 cm/sec2  MV dec time: 0.30 sec  PA acc time: 0.06 sec  TR max peri: 296.4 cm/sec  TR max P.2 mmHg  E/E' avg: 15.7  Lateral E/e': 12.8  Medial E/e': 18.6      _____________________________________________________________________________  __           Report approved by: Dontrell Jacob 01/07/2021 03:36 PM          Recent vital signs:   BP (!) 178/62   Pulse 66   Temp 97.4  F (36.3  C) (Oral)   Resp 18   Wt 92.1 kg (203 lb)   SpO2 100%   BMI 28.31 kg/m      [place] Coma Scale Score: 15 (01/07/21 1205)       Cardiac Rhythm: Normal Sinus  Pt needs tele? No  Skin/wound Issues: None    Code Status: Full Code    Pain control: good    Nausea control: pt had none    Abnormal labs/tests/findings requiring intervention: BNP 15,053    Family present during ED course? No   Family Comments/Social Situation comments: Pt lives with family members    Tasks needing completion: None    Ruth Brantley    [phone] [facility]

## 2021-01-08 ENCOUNTER — HOSPITAL ENCOUNTER (INPATIENT)
Facility: CLINIC | Age: 51
LOS: 3 days | Discharge: HOME | DRG: 698 | End: 2021-01-11
Attending: EMERGENCY MEDICINE | Admitting: INTERNAL MEDICINE
Payer: COMMERCIAL

## 2021-01-08 ENCOUNTER — TELEPHONE (OUTPATIENT)
Dept: TRANSPLANT | Facility: CLINIC | Age: 51
End: 2021-01-08

## 2021-01-08 DIAGNOSIS — E87.70 HYPERVOLEMIA, UNSPECIFIED HYPERVOLEMIA TYPE: ICD-10-CM

## 2021-01-08 DIAGNOSIS — T86.11 KIDNEY TRANSPLANT REJECTION: ICD-10-CM

## 2021-01-08 DIAGNOSIS — D84.9 IMMUNOSUPPRESSION (H): Primary | ICD-10-CM

## 2021-01-08 DIAGNOSIS — I31.39 PERICARDIAL EFFUSION: ICD-10-CM

## 2021-01-08 DIAGNOSIS — I15.1 HTN, KIDNEY TRANSPLANT RELATED: ICD-10-CM

## 2021-01-08 DIAGNOSIS — Z11.52 ENCOUNTER FOR SCREENING LABORATORY TESTING FOR SEVERE ACUTE RESPIRATORY SYNDROME CORONAVIRUS 2 (SARS-COV-2): ICD-10-CM

## 2021-01-08 DIAGNOSIS — R06.02 SHORTNESS OF BREATH: ICD-10-CM

## 2021-01-08 DIAGNOSIS — J18.9 COMMUNITY ACQUIRED PNEUMONIA, UNSPECIFIED LATERALITY: ICD-10-CM

## 2021-01-08 DIAGNOSIS — N18.4 CKD (CHRONIC KIDNEY DISEASE) STAGE 4, GFR 15-29 ML/MIN (H): ICD-10-CM

## 2021-01-08 DIAGNOSIS — Z94.0 HTN, KIDNEY TRANSPLANT RELATED: ICD-10-CM

## 2021-01-08 LAB
ALBUMIN SERPL-MCNC: 3.1 G/DL (ref 3.4–5)
ALP SERPL-CCNC: 55 U/L (ref 40–150)
ALT SERPL W P-5'-P-CCNC: 13 U/L (ref 0–70)
ANION GAP SERPL CALCULATED.3IONS-SCNC: 10 MMOL/L (ref 3–14)
ANION GAP SERPL CALCULATED.3IONS-SCNC: 12 MMOL/L (ref 3–14)
AST SERPL W P-5'-P-CCNC: 12 U/L (ref 0–45)
BASOPHILS # BLD AUTO: 0 10E9/L (ref 0–0.2)
BASOPHILS NFR BLD AUTO: 0.1 %
BILIRUB SERPL-MCNC: 0.2 MG/DL (ref 0.2–1.3)
BUN SERPL-MCNC: 71 MG/DL (ref 7–30)
BUN SERPL-MCNC: 72 MG/DL (ref 7–30)
CALCIUM SERPL-MCNC: 7.8 MG/DL (ref 8.5–10.1)
CALCIUM SERPL-MCNC: 7.9 MG/DL (ref 8.5–10.1)
CHLORIDE SERPL-SCNC: 110 MMOL/L (ref 94–109)
CHLORIDE SERPL-SCNC: 112 MMOL/L (ref 94–109)
CO2 SERPL-SCNC: 15 MMOL/L (ref 20–32)
CO2 SERPL-SCNC: 16 MMOL/L (ref 20–32)
CREAT SERPL-MCNC: 4.73 MG/DL (ref 0.66–1.25)
CREAT SERPL-MCNC: 4.88 MG/DL (ref 0.66–1.25)
CRP SERPL-MCNC: 80 MG/L (ref 0–8)
DIFFERENTIAL METHOD BLD: ABNORMAL
EOSINOPHIL # BLD AUTO: 0.1 10E9/L (ref 0–0.7)
EOSINOPHIL NFR BLD AUTO: 1.2 %
ERYTHROCYTE [DISTWIDTH] IN BLOOD BY AUTOMATED COUNT: 15.8 % (ref 10–15)
ERYTHROCYTE [SEDIMENTATION RATE] IN BLOOD BY WESTERGREN METHOD: 103 MM/H (ref 0–15)
GFR SERPL CREATININE-BSD FRML MDRD: 13 ML/MIN/{1.73_M2}
GFR SERPL CREATININE-BSD FRML MDRD: 13 ML/MIN/{1.73_M2}
GLUCOSE SERPL-MCNC: 168 MG/DL (ref 70–99)
GLUCOSE SERPL-MCNC: 279 MG/DL (ref 70–99)
HCT VFR BLD AUTO: 23.2 % (ref 40–53)
HGB BLD-MCNC: 7 G/DL (ref 13.3–17.7)
IMM GRANULOCYTES # BLD: 0 10E9/L (ref 0–0.4)
IMM GRANULOCYTES NFR BLD: 0.6 %
INTERPRETATION ECG - MUSE: NORMAL
LABORATORY COMMENT REPORT: NORMAL
LYMPHOCYTES # BLD AUTO: 0.2 10E9/L (ref 0.8–5.3)
LYMPHOCYTES NFR BLD AUTO: 2.2 %
MAGNESIUM SERPL-MCNC: 2.7 MG/DL (ref 1.6–2.3)
MCH RBC QN AUTO: 27.1 PG (ref 26.5–33)
MCHC RBC AUTO-ENTMCNC: 30.2 G/DL (ref 31.5–36.5)
MCV RBC AUTO: 90 FL (ref 78–100)
MONOCYTES # BLD AUTO: 0.3 10E9/L (ref 0–1.3)
MONOCYTES NFR BLD AUTO: 4.6 %
NEUTROPHILS # BLD AUTO: 6.3 10E9/L (ref 1.6–8.3)
NEUTROPHILS NFR BLD AUTO: 91.3 %
NRBC # BLD AUTO: 0 10*3/UL
NRBC BLD AUTO-RTO: 0 /100
NT-PROBNP SERPL-MCNC: ABNORMAL PG/ML (ref 0–450)
PLATELET # BLD AUTO: 173 10E9/L (ref 150–450)
POTASSIUM SERPL-SCNC: 4.3 MMOL/L (ref 3.4–5.3)
POTASSIUM SERPL-SCNC: 4.7 MMOL/L (ref 3.4–5.3)
PROCALCITONIN SERPL-MCNC: 4.07 NG/ML
PROT SERPL-MCNC: 6.4 G/DL (ref 6.8–8.8)
RBC # BLD AUTO: 2.58 10E12/L (ref 4.4–5.9)
SARS-COV-2 RNA RESP QL NAA+PROBE: NEGATIVE
SODIUM SERPL-SCNC: 137 MMOL/L (ref 133–144)
SODIUM SERPL-SCNC: 138 MMOL/L (ref 133–144)
SPECIMEN SOURCE: NORMAL
TROPONIN I SERPL-MCNC: <0.015 UG/L (ref 0–0.04)
WBC # BLD AUTO: 6.9 10E9/L (ref 4–11)

## 2021-01-08 PROCEDURE — 84145 PROCALCITONIN (PCT): CPT | Performed by: EMERGENCY MEDICINE

## 2021-01-08 PROCEDURE — 80048 BASIC METABOLIC PNL TOTAL CA: CPT | Performed by: STUDENT IN AN ORGANIZED HEALTH CARE EDUCATION/TRAINING PROGRAM

## 2021-01-08 PROCEDURE — 120N000011 HC R&B TRANSPLANT UMMC

## 2021-01-08 PROCEDURE — 250N000011 HC RX IP 250 OP 636: Performed by: STUDENT IN AN ORGANIZED HEALTH CARE EDUCATION/TRAINING PROGRAM

## 2021-01-08 PROCEDURE — U0003 INFECTIOUS AGENT DETECTION BY NUCLEIC ACID (DNA OR RNA); SEVERE ACUTE RESPIRATORY SYNDROME CORONAVIRUS 2 (SARS-COV-2) (CORONAVIRUS DISEASE [COVID-19]), AMPLIFIED PROBE TECHNIQUE, MAKING USE OF HIGH THROUGHPUT TECHNOLOGIES AS DESCRIBED BY CMS-2020-01-R: HCPCS | Performed by: STUDENT IN AN ORGANIZED HEALTH CARE EDUCATION/TRAINING PROGRAM

## 2021-01-08 PROCEDURE — 83735 ASSAY OF MAGNESIUM: CPT | Performed by: STUDENT IN AN ORGANIZED HEALTH CARE EDUCATION/TRAINING PROGRAM

## 2021-01-08 PROCEDURE — 250N000009 HC RX 250: Performed by: STUDENT IN AN ORGANIZED HEALTH CARE EDUCATION/TRAINING PROGRAM

## 2021-01-08 PROCEDURE — 250N000013 HC RX MED GY IP 250 OP 250 PS 637: Performed by: STUDENT IN AN ORGANIZED HEALTH CARE EDUCATION/TRAINING PROGRAM

## 2021-01-08 PROCEDURE — 84484 ASSAY OF TROPONIN QUANT: CPT | Performed by: EMERGENCY MEDICINE

## 2021-01-08 PROCEDURE — 99285 EMERGENCY DEPT VISIT HI MDM: CPT | Mod: 25 | Performed by: EMERGENCY MEDICINE

## 2021-01-08 PROCEDURE — U0005 INFEC AGEN DETEC AMPLI PROBE: HCPCS | Performed by: STUDENT IN AN ORGANIZED HEALTH CARE EDUCATION/TRAINING PROGRAM

## 2021-01-08 PROCEDURE — 36415 COLL VENOUS BLD VENIPUNCTURE: CPT | Performed by: STUDENT IN AN ORGANIZED HEALTH CARE EDUCATION/TRAINING PROGRAM

## 2021-01-08 PROCEDURE — 86140 C-REACTIVE PROTEIN: CPT | Performed by: EMERGENCY MEDICINE

## 2021-01-08 PROCEDURE — 99223 1ST HOSP IP/OBS HIGH 75: CPT | Mod: GC | Performed by: STUDENT IN AN ORGANIZED HEALTH CARE EDUCATION/TRAINING PROGRAM

## 2021-01-08 PROCEDURE — 93010 ELECTROCARDIOGRAM REPORT: CPT | Performed by: EMERGENCY MEDICINE

## 2021-01-08 PROCEDURE — 250N000013 HC RX MED GY IP 250 OP 250 PS 637: Performed by: EMERGENCY MEDICINE

## 2021-01-08 PROCEDURE — 85652 RBC SED RATE AUTOMATED: CPT | Performed by: EMERGENCY MEDICINE

## 2021-01-08 PROCEDURE — 93005 ELECTROCARDIOGRAM TRACING: CPT | Performed by: EMERGENCY MEDICINE

## 2021-01-08 PROCEDURE — 85025 COMPLETE CBC W/AUTO DIFF WBC: CPT | Performed by: EMERGENCY MEDICINE

## 2021-01-08 PROCEDURE — 250N000012 HC RX MED GY IP 250 OP 636 PS 637: Performed by: INTERNAL MEDICINE

## 2021-01-08 PROCEDURE — 83880 ASSAY OF NATRIURETIC PEPTIDE: CPT | Performed by: EMERGENCY MEDICINE

## 2021-01-08 PROCEDURE — 80053 COMPREHEN METABOLIC PANEL: CPT | Performed by: EMERGENCY MEDICINE

## 2021-01-08 RX ORDER — CHLORTHALIDONE 25 MG/1
25 TABLET ORAL DAILY
Status: DISCONTINUED | OUTPATIENT
Start: 2021-01-08 | End: 2021-01-11 | Stop reason: HOSPADM

## 2021-01-08 RX ORDER — FERROUS SULFATE 325(65) MG
325 TABLET ORAL EVERY OTHER DAY
Status: DISCONTINUED | OUTPATIENT
Start: 2021-01-09 | End: 2021-01-11 | Stop reason: HOSPADM

## 2021-01-08 RX ORDER — MYCOPHENOLIC ACID 360 MG/1
1080 TABLET, DELAYED RELEASE ORAL 2 TIMES DAILY
Status: DISCONTINUED | OUTPATIENT
Start: 2021-01-08 | End: 2021-01-08

## 2021-01-08 RX ORDER — HYDRALAZINE HYDROCHLORIDE 20 MG/ML
10 INJECTION INTRAMUSCULAR; INTRAVENOUS EVERY 4 HOURS PRN
Status: DISCONTINUED | OUTPATIENT
Start: 2021-01-08 | End: 2021-01-11 | Stop reason: HOSPADM

## 2021-01-08 RX ORDER — AMOXICILLIN 250 MG
1 CAPSULE ORAL 2 TIMES DAILY PRN
Status: DISCONTINUED | OUTPATIENT
Start: 2021-01-08 | End: 2021-01-11 | Stop reason: HOSPADM

## 2021-01-08 RX ORDER — MULTIPLE VITAMINS W/ MINERALS TAB 9MG-400MCG
1 TAB ORAL DAILY
Status: DISCONTINUED | OUTPATIENT
Start: 2021-01-08 | End: 2021-01-11 | Stop reason: HOSPADM

## 2021-01-08 RX ORDER — HEPARIN SODIUM 5000 [USP'U]/.5ML
5000 INJECTION, SOLUTION INTRAVENOUS; SUBCUTANEOUS EVERY 12 HOURS
Status: DISCONTINUED | OUTPATIENT
Start: 2021-01-08 | End: 2021-01-11 | Stop reason: HOSPADM

## 2021-01-08 RX ORDER — FUROSEMIDE 10 MG/ML
100 INJECTION INTRAMUSCULAR; INTRAVENOUS 2 TIMES DAILY
Status: DISCONTINUED | OUTPATIENT
Start: 2021-01-08 | End: 2021-01-08

## 2021-01-08 RX ORDER — MYCOPHENOLIC ACID 360 MG/1
1080 TABLET, DELAYED RELEASE ORAL 2 TIMES DAILY
Status: DISCONTINUED | OUTPATIENT
Start: 2021-01-08 | End: 2021-01-11 | Stop reason: HOSPADM

## 2021-01-08 RX ORDER — CALCITRIOL 0.25 UG/1
0.25 CAPSULE, LIQUID FILLED ORAL DAILY
Status: DISCONTINUED | OUTPATIENT
Start: 2021-01-08 | End: 2021-01-11 | Stop reason: HOSPADM

## 2021-01-08 RX ORDER — LOSARTAN POTASSIUM 25 MG/1
25 TABLET ORAL DAILY
Status: DISCONTINUED | OUTPATIENT
Start: 2021-01-08 | End: 2021-01-08

## 2021-01-08 RX ORDER — FERROUS SULFATE 325(65) MG
325 TABLET ORAL DAILY
Status: DISCONTINUED | OUTPATIENT
Start: 2021-01-08 | End: 2021-01-08

## 2021-01-08 RX ORDER — MYCOPHENOLIC ACID 360 MG/1
1080 TABLET, DELAYED RELEASE ORAL 2 TIMES DAILY
Status: ON HOLD | COMMUNITY
End: 2021-02-26

## 2021-01-08 RX ORDER — CLONIDINE HYDROCHLORIDE 0.1 MG/1
0.2 TABLET ORAL 2 TIMES DAILY
Status: DISCONTINUED | OUTPATIENT
Start: 2021-01-08 | End: 2021-01-11 | Stop reason: HOSPADM

## 2021-01-08 RX ORDER — CYCLOBENZAPRINE HCL 10 MG
10 TABLET ORAL 3 TIMES DAILY PRN
Status: DISCONTINUED | OUTPATIENT
Start: 2021-01-08 | End: 2021-01-11 | Stop reason: HOSPADM

## 2021-01-08 RX ORDER — AMOXICILLIN 250 MG
2 CAPSULE ORAL 2 TIMES DAILY PRN
Status: DISCONTINUED | OUTPATIENT
Start: 2021-01-08 | End: 2021-01-11 | Stop reason: HOSPADM

## 2021-01-08 RX ORDER — BUMETANIDE 0.25 MG/ML
4 INJECTION INTRAMUSCULAR; INTRAVENOUS ONCE
Status: COMPLETED | OUTPATIENT
Start: 2021-01-08 | End: 2021-01-08

## 2021-01-08 RX ORDER — LIDOCAINE 40 MG/G
CREAM TOPICAL
Status: DISCONTINUED | OUTPATIENT
Start: 2021-01-08 | End: 2021-01-11 | Stop reason: HOSPADM

## 2021-01-08 RX ORDER — PREDNISONE 5 MG/1
5 TABLET ORAL DAILY
Status: DISCONTINUED | OUTPATIENT
Start: 2021-01-08 | End: 2021-01-11 | Stop reason: HOSPADM

## 2021-01-08 RX ORDER — POLYETHYLENE GLYCOL 3350 17 G/17G
17 POWDER, FOR SOLUTION ORAL DAILY PRN
Status: DISCONTINUED | OUTPATIENT
Start: 2021-01-08 | End: 2021-01-11 | Stop reason: HOSPADM

## 2021-01-08 RX ORDER — DILTIAZEM HYDROCHLORIDE 120 MG/1
240 CAPSULE, COATED, EXTENDED RELEASE ORAL DAILY
Status: DISCONTINUED | OUTPATIENT
Start: 2021-01-08 | End: 2021-01-11 | Stop reason: HOSPADM

## 2021-01-08 RX ADMIN — CLONIDINE HYDROCHLORIDE 0.2 MG: 0.1 TABLET ORAL at 20:19

## 2021-01-08 RX ADMIN — LOSARTAN POTASSIUM 25 MG: 25 TABLET, FILM COATED ORAL at 11:54

## 2021-01-08 RX ADMIN — MYCOPHENOLIC ACID 1080 MG: 360 TABLET, DELAYED RELEASE ORAL at 20:19

## 2021-01-08 RX ADMIN — CALCITRIOL CAPSULES 0.25 MCG 0.25 MCG: 0.25 CAPSULE ORAL at 18:39

## 2021-01-08 RX ADMIN — HYDRALAZINE HYDROCHLORIDE 10 MG: 20 INJECTION, SOLUTION INTRAMUSCULAR; INTRAVENOUS at 20:30

## 2021-01-08 RX ADMIN — BUMETANIDE 0.5 MG/HR: 0.25 INJECTION INTRAMUSCULAR; INTRAVENOUS at 19:24

## 2021-01-08 RX ADMIN — BUMETANIDE 4 MG: 0.25 INJECTION INTRAMUSCULAR; INTRAVENOUS at 18:52

## 2021-01-08 RX ADMIN — HEPARIN SODIUM 5000 UNITS: 5000 INJECTION, SOLUTION INTRAVENOUS; SUBCUTANEOUS at 22:24

## 2021-01-08 RX ADMIN — CHLORTHALIDONE 25 MG: 25 TABLET ORAL at 18:39

## 2021-01-08 ASSESSMENT — MIFFLIN-ST. JEOR
SCORE: 1807.93
SCORE: 1779.81

## 2021-01-08 ASSESSMENT — ENCOUNTER SYMPTOMS
SHORTNESS OF BREATH: 1
ABDOMINAL PAIN: 0
CHILLS: 1
ARTHRALGIAS: 0
COUGH: 1
DIFFICULTY URINATING: 0
FEVER: 0
COLOR CHANGE: 0
NECK STIFFNESS: 0
EYE REDNESS: 0
CONFUSION: 0
HEADACHES: 0

## 2021-01-08 ASSESSMENT — ACTIVITIES OF DAILY LIVING (ADL): ADLS_ACUITY_SCORE: 11

## 2021-01-08 NOTE — ED NOTES
[facility]    ED Nurse to Floor Handoff     Tristian Mckeon is a 50 year old male who speaks English and lives with minor children only,  in a home  They arrived in the ED by car from home    ED Chief Complaint: Shortness of Breath    ED Dx;   Final diagnoses:   None         Needed?: No    Allergies:   Allergies   Allergen Reactions     Cephalosporins Unknown     Cyclosporine      Duricef [Cefadroxil]    .  Past Medical Hx:   Past Medical History:   Diagnosis Date     Anemia      AVN (avascular necrosis of bone) (H)     Left femoral head     Depression      DJD (degenerative joint disease)      Erectile dysfunction      Genital condyloma, male     S/p resection     Gout      History of blood transfusion      Hyperlipidemia      Hypertension      Hypogonadism male      Kidney replaced by transplant 1994    LDKT.  Early ACR, ureteral strictures.  Failed 1998 d/t chronic rejection.  S/p graft nephrectomy.     Kidney replaced by transplant 2000    LDKT.  ACR, non-compliance, CAN.  Failed in 2007.  S/p graft nephrectomy.     Kidney replaced by transplant 3/12/2015    DDKT.  Induction w/ thymo 600mg.     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  3/25/15 DSA:  A1 mfi 1604, A30 mfi 4387, B13 mfi 1013.     Medical non-compliance      Obesity     S/p gastric bypass 2005     Organ transplant candidate 2009     Osteoporosis      Thyroid disease      Vitamin D deficiency       Baseline Mental status: WDL  Current Mental Status changes: at basesline    Infection present or suspected this encounter: no  Sepsis suspected: No  Isolation type: No active isolations  Patient tested for COVID 19 prior to admission: YES     Activity level - Baseline/Home:  Independent  Activity Level - Current:   Independent    Bariatric equipment needed?: No    In the ED these meds were given:   Medications   losartan (COZAAR) tablet 25 mg (25 mg Oral Given  1/8/21 1154)   lidocaine 1 % 0.1-1 mL (has no administration in time range)   lidocaine (LMX4) cream (has no administration in time range)   sodium chloride (PF) 0.9% PF flush 3 mL (has no administration in time range)   sodium chloride (PF) 0.9% PF flush 3 mL (has no administration in time range)   melatonin tablet 1 mg (has no administration in time range)   senna-docusate (SENOKOT-S/PERICOLACE) 8.6-50 MG per tablet 1 tablet (has no administration in time range)     Or   senna-docusate (SENOKOT-S/PERICOLACE) 8.6-50 MG per tablet 2 tablet (has no administration in time range)   polyethylene glycol (MIRALAX) Packet 17 g (has no administration in time range)   cloNIDine (CATAPRES) tablet 0.2 mg (has no administration in time range)   cyclobenzaprine (FLEXERIL) tablet 10 mg (has no administration in time range)   diltiazem ER COATED BEADS (CARDIZEM CD/CARTIA XT) 24 hr capsule 240 mg (has no administration in time range)   Daily Multivitamin CAPS (has no administration in time range)   mycophenolic acid (MYFORTIC BRAND) EC tablet 1,080 mg (has no administration in time range)   predniSONE (DELTASONE) tablet 5 mg (has no administration in time range)   tacrolimus (ASTAGRAF XL) 24 hr capsule 3 mg (has no administration in time range)   tacrolimus (ASTAGRAF XL) 24 hr capsule 25 mg (has no administration in time range)       Drips running?  No    Home pump  No    Current LDAs  Peripheral IV 01/08/21 Left Lower forearm (Active)   Site Assessment WDL 01/08/21 1156   Line Status Saline locked 01/08/21 1156   Phlebitis Scale 0-->no symptoms 01/08/21 1156   Infiltration Site Treatment Method  None 01/08/21 1156   Number of days: 0       Hemodialysis Vascular Access Arteriovenous fistula Right Arm (Active)   Number of days:        Incision/Surgical Site 03/12/15 Abdomen (Active)   Number of days: 2129       Labs results:   Labs Ordered and Resulted from Time of ED Arrival Up to the Time of Departure from the ED   CBC WITH  PLATELETS DIFFERENTIAL - Abnormal; Notable for the following components:       Result Value    RBC Count 2.58 (*)     Hemoglobin 7.0 (*)     Hematocrit 23.2 (*)     MCHC 30.2 (*)     RDW 15.8 (*)     Absolute Lymphocytes 0.2 (*)     All other components within normal limits   COMPREHENSIVE METABOLIC PANEL - Abnormal; Notable for the following components:    Chloride 112 (*)     Carbon Dioxide 16 (*)     Glucose 168 (*)     Urea Nitrogen 72 (*)     Creatinine 4.88 (*)     GFR Estimate 13 (*)     GFR Estimate If Black 15 (*)     Calcium 7.9 (*)     All other components within normal limits   TROPONIN I   NT PROBNP INPATIENT   IP ASSIGN PROVIDER TEAM TO TREATMENT TEAM   PERIPHERAL IV CATHETER   VITAL SIGNS   PULSE OXIMETRY NURSING   INTAKE AND OUTPUT   APPLY PNEUMATIC COMPRESSION DEVICE (PCD)       Imaging Studies:   Recent Results (from the past 24 hour(s))   Echocardiogram Complete    Narrative    [id number]  [id number]  [id number]  [id number]^ESHA^CATRACHITO^E           [facility],[place]  Echocardiography Laboratory  [address] [place] [id number]     Name: AARON CHINO  MRN: [id number]  : 1970  Study Date: 2021 02:33 PM  Age: 50 yrs  Gender: Male  Patient Location: [facility]  Reason For Study: Cardiomegaly  Ordering Physician: CATRACHITO MONTANA  Performed By: DURGA Park     BSA: 2.1 m2  Height: 71 in  Weight: 203 lb  HR: 63  BP: 184/60 mmHg  _____________________________________________________________________________  __        Procedure  Complete Portable Echo Adult. The final echo results were communicated to Dr. Montana in ED.  _____________________________________________________________________________  __        Interpretation Summary  Global and regional left ventricular function is normal with an EF of 60-65%.  Grade II or moderate diastolic dysfunction.  Global right ventricular function is normal.  Moderate pulmonary hypertension. Estimated pulmonary artery systolic  pressure  is 37 mmHg plus right atrial pressure.  IVC diameter >2.1 cm collapsing <50% with sniff suggests a high RA pressure  estimated at 15 mmHg or greater.  Moderate, circumferential pericardial effusion. Largest diameter of 2.2cm  posteriorly. No chamber compression or echocardiographic evidence for  tamponade.     This study was compared with the study from 6/6/14. Pericardial effusion was  trivial at that time. Estimated PA pressure has increased as well.  _____________________________________________________________________________  __        Left Ventricle  Global and regional left ventricular function is normal with an EF of 60-65%.  Left ventricular size is normal. Mild concentric wall thickening consistent  with left ventricular hypertrophy is present. Grade II or moderate diastolic  dysfunction. No regional wall motion abnormalities are seen.     Right Ventricle  The right ventricle is normal size. Global right ventricular function is  normal.     Atria  Severe biatrial enlargement is present.     Mitral Valve  The mitral valve is normal. Trace mitral insufficiency is present.        Aortic Valve  Aortic valve is normal in structure and function. The aortic valve is  tricuspid.     Tricuspid Valve  The tricuspid valve is normal. Mild tricuspid insufficiency is present.  Estimated pulmonary artery systolic pressure is 37 mmHg plus right atrial  pressure. Moderate pulmonary hypertension is present.     Pulmonic Valve  The pulmonic valve is normal. Trace pulmonic insufficiency is present.     Vessels  Normal diameter aortic root and proximal ascending aorta. IVC diameter >2.1 cm  collapsing <50% with sniff suggests a high RA pressure estimated at 15 mmHg or  greater.     Pericardium  Moderate, circumferential pericardiac effusion. Largest diameter of 2.2cm  posteriorly. Chamber compression is not present; there is no evidence for  tamponade.        Compared to Previous Study  This study was compared with  "the study from 14 .  _____________________________________________________________________________  __  MMode/2D Measurements & Calculations     RVDd: 4.2 cm  IVSd: 1.2 cm  LVIDd: 5.4 cm  LVIDs: 3.4 cm  LVPWd: 1.2 cm  FS: 37.4 %  LV mass(C)d: 279.8 grams  LV mass(C)dI: 131.8 grams/m2  asc Aorta Diam: 3.4 cm  LVOT diam: 2.5 cm  LVOT area: 4.8 cm2     EF(MOD-bp): 59.2 %  LA Volume Index (BP): 90.0 ml/m2  RWT: 0.46  TAPSE: 3.5 cm           Doppler Measurements & Calculations  MV E max peri: 117.1 cm/sec  MV A max peri: 123.1 cm/sec  MV E/A: 0.95  MV dec slope: 390.7 cm/sec2  MV dec time: 0.30 sec  PA acc time: 0.06 sec  TR max peri: 296.4 cm/sec  TR max P.2 mmHg  E/E' avg: 15.7  Lateral E/e': 12.8  Medial E/e': 18.6     _____________________________________________________________________________  __           Report approved by: Dontrell Jacob 2021 03:36 PM          Recent vital signs:   BP (!) 171/82   Pulse 71   Temp 97  F (36.1  C) (Oral)   Resp 16   Ht 1.803 m (5' 11\")   Wt 92.1 kg (203 lb)   SpO2 96%   BMI 28.31 kg/m      [place] Coma Scale Score: 15 (21 1041)       Cardiac Rhythm: Other  Pt needs tele? Yes  Skin/wound Issues: None    Code Status: Full Code    Pain control: pt had none    Nausea control: pt had none    Abnormal labs/tests/findings requiring intervention: Patient had elevated BNP yesterday that required Lasix; patient has elevated BP today and was given Cozaar.     Family present during ED course? No   Family Comments/Social Situation comments: patient lives at home with two minor children; works out side of the home    Tasks needing completion: None    CHI ADAMS RN  [phone] [facility]    "

## 2021-01-08 NOTE — PHARMACY-ADMISSION MEDICATION HISTORY
Admission Medication History Completed by Pharmacy    See [facility] Admission Navigator for allergy information, preferred outpatient pharmacy, prior to admission medications and immunization status.     Medication History Sources:   The patient, [facility], [facility], and discharge summary notes on 11/18/20 ([facility])    Changes made to PTA medication list (reason):    Added:   o Cyclobenzaprine 10 mg (per patient and [facility])  o Calcium 500 mg - Vitamin B-12 unknown strength (per patient)    Deleted:   o Tacrolimus 1 mg (duplicate medication)  o Tacrolimus 5 mg (duplicate medication)  o Vitamin B-12 1000 mcg tablet (per patient)    Changed:   o Clonidine 0.1 mg tablets - take 1 tablet by mouth 2 times daily -->> Clonidine 1 mg tablets - take 2 tablets by mouth 2 times daily (per patient and discharge summary notes)  o Ferrous sulfate 325 mg tab - take 1 tablet by mouth daily -->> Ferrous sulfate 325 mg tablet - take one tablet by mouth every other day (per patient)    Additional Information:    The patient knew medication names and the time of his last dose, but was unsure about strength of medications.    The patient's mycophenolic acid 360 mg is generic formulation per [facility].    The patient's tacrolimus 1 mg and 5 mg are brand formulation per [facility].    The patient stated that he is taking a calcium 500 mg and vitamin B-12 combination medication, but does not know the strength of vitamin B-12.    The patient stated that he discontinued Procrit 42459 unit/mL injection. He stated that he completed what he picked up from the pharmacy, and never picked up a refill of the medication. He is unsure whether he completed the medication regimen or prematurely discontinued the medication since he did not speak to his doctor about it.    The patient stated that he does not take any other Rx or OTC medications.    The patient's  preferred pharmacy is [facility].    Prior to Admission medications    Medication Sig Last Dose Taking? Auth Provider   bumetanide (BUMEX) 1 MG tablet Take 2 tablets (2 mg) by mouth 2 times daily 1/6/2021 Yes Cayetano Pizano MD   calcitRIOL (ROCALTROL) 0.25 MCG capsule TAKE ONE CAPSULE BY MOUTH ONCE DAILY 1/7/2021 Yes Cayetano Pizano MD   chlorthalidone (HYGROTON) 25 MG tablet Take 1 tablet (25 mg) by mouth daily 1/7/2021 Yes Kole Collins MD   cloNIDine (CATAPRES) 0.1 MG tablet Take 0.2 mg by mouth 2 times daily 1/7/2021 Yes Unknown, Entered By History   diltiazem ER COATED BEADS (CARTIA XT) 240 MG 24 hr capsule TAKE ONE CAPSULE BY MOUTH ONCE DAILY 1/7/2021 Yes Kole Collins MD   ferrous sulfate (IRON) 325 (65 Fe) MG tablet Take 1 tablet (325 mg) by mouth daily  Patient taking differently: Take 1 tablet by mouth every other day  1/6/2021 Yes Kole Collins MD   losartan (COZAAR) 50 MG tablet Take 1 tablet (50 mg) by mouth 2 times daily 1/7/2021 Yes Cayetano Pizano MD   Multiple Vitamin (DAILY MULTIVITAMIN PO) Take 1 tablet by mouth daily  1/7/2021 Yes Reported, Patient   MYFORTIC (BRAND) 360 MG EC tablet Take 3 tablets (1,080 mg) by mouth 2 times daily 1/7/2021 Yes Kole Collins MD   predniSONE (DELTASONE) 5 MG tablet Take 1 tablet (5 mg) by mouth daily 1/7/2021 Yes Kole Collins MD   tacrolimus (ASTAGRAF XL) 1 MG 24 hr capsule Take 3 capsules (3 mg) by mouth every morning (before breakfast) Total dose 28 mg daily 1/7/2021 Yes Kole Collins MD   tacrolimus (ASTAGRAF XL) 5 MG 24 hr capsule Take 5 capsules (25 mg) by mouth every morning (before breakfast) Total dose 28 mg daily 1/7/2021 Yes Kole Collins MD   UNABLE TO FIND Take 1 tablet by mouth daily MEDICATION NAME: calcium 500 mg - Vitamin B12  1/7/2021 Yes Unknown, Entered By History   Blood Pressure Monitor KIT Automatic Blood Pressure Monitor   Prashant Marr MD   cyclobenzaprine (FLEXERIL) 10 MG tablet Take 10  mg by mouth 3 times daily as needed for muscle spasms More than a month  Unknown, Entered By History   epoetin sharon (EPOGEN/PROCRIT) 31644 UNIT/ML injection Inject 1 mL (10,000 Units) Subcutaneous every 7 days Administer for Hgb <10. HOLD dose if systolic BP >180.  Patient not taking: Reported on 11/2/2020 More than a month  Kole Collins MD       Date completed: 01/07/21    Medication history completed by: Sheridan Huddleston

## 2021-01-08 NOTE — CONSULTS
50yM w/ DDKT #3 in 3/2015, baseline Scr 3.5-3.8, on astagraf with goal tac 4-6, MPA 1080mg bid, prednisone 5mg daily, presenting with SOB. He was supposed to be admitted yesterday but he declined. Scr now up to 4.88. No acute lab indications for HD. It appears that he has volume overload and cardiomegaly. I recommend cardiology consult, echo, diuresis with lasix 100mg IV BID for now. Continue home immunosuppression, obtain tac level tmrw AM. Can start hydralazine for HTN control. Hold losartan for now.     Full consult to follow tomorrow.     Cayetano Pizano MD, [facility]  Transplant Nephrology  Pager: [phone]

## 2021-01-08 NOTE — ED TRIAGE NOTES
"Patient was here yesterday with a complete work up for \"fluid around\" his heart he left while waiting for a bed because he needed to make sure that his kids were situated with .  Returns today after making arrangements.  "

## 2021-01-08 NOTE — ED PROVIDER NOTES
[facility] ([facility])  1/08/21  History     Chief Complaint   Patient presents with     Shortness of Breath     The history is provided by the patient and medical records.     Tristian Mckeon is a 50 year old male with a complex past medical history significant for ESRD from hypertension (s/p multiple kidney transplants c/b rejection in 1994, and subsequent transplantation on 11/2000, and 3/2015 complicated by DGF), immunosuppressed status (on prednisone 5 mg daily, and tacrolimus 20 mg daily), secondary renal hyperparathyroidism, and thrombocytopenia who is s/p transplant biopsy on 3/25/2015 (found to have T-cell mediated and antibody mediated rejection) who presents to the Emergency Department for evaluation of shortness of breath.    Per review of the patient's medical record, he was recently seen here yesterday at the [place] ED with shortness of breath, and cough.  During this visit, patient endorsed an approximately 7-day history of shortness of breath with shaking chills.  This progressed into worsening dyspnea upon exertion.  Patient states that his productive cough had been improving with Robitussin.  Exam was noted to reveal JVD, 1+ edema nondisplaced PMI.  EKG revealed new first-degree AV block as well as QTC prolongation.  Patient underwent chest XR which revealed cardiomegaly.  Full impression noted below.  Subsequent labs revealed anemia worse than baseline with hemoglobin of 7.  CMP showed worsening creatinine over baseline of 4.47 consistent with failing renal transplant.  N-terminal BNP was 15,053 consistent with volume overload.  Patient underwent echocardiogram that revealed pericardial effusion without tamponade.  Full echo impression seen below.  Patient was subsequent treated with 100 mg IV Lasix, and plan was to be admitted but the patient was noted to leave AGAINST MEDICAL ADVICE.    Patient presents back to the ED with after having taken care of the issues  that prevented him from being admitted yesterday.  He reports that he was given Lasix 100 mg last night with good urine output and mild improvement of his symptoms.  He reports continued shortness of breath with activity.  He has no fever.  He has a mild cough.  He denies chest pain.  He had an episode of epigastric pain last evening.  He denies urine or bowel complaints.      Exam: XR CHEST PORT 1 VW, 1/7/2021  Impression:   Profound cardiac silhouette enlargement, not seen on prior exams, is concerning for a pericardial effusion. Left basilar atelectasis/consolidation.    Exam: Echocardiogram complete-1/7/2021  Interpretation Summary:  Global and regional left ventricular function is normal with an EF of 60-65%.  Grade II or moderate diastolic dysfunction.  Global right ventricular function is normal.  Moderate pulmonary hypertension. Estimated pulmonary artery systolic pressure  is 37 mmHg plus right atrial pressure.  IVC diameter >2.1 cm collapsing <50% with sniff suggests a high RA pressure  estimated at 15 mmHg or greater.  Moderate, circumferential pericardial effusion. Largest diameter of 2.2cm  posteriorly. No chamber compression or echocardiographic evidence for  tamponade.     This study was compared with the study from 6/6/14. Pericardial effusion was  trivial at that time. Estimated PA pressure has increased as well.      I have reviewed the Medications, Allergies, Past Medical and Surgical History, and Social History in the [employer] system.  PAST MEDICAL HISTORY:   Past Medical History:   Diagnosis Date     Anemia      AVN (avascular necrosis of bone) (H)     Left femoral head     Depression      DJD (degenerative joint disease)      Erectile dysfunction      Genital condyloma, male     S/p resection     Gout      History of blood transfusion      Hyperlipidemia      Hypertension      Hypogonadism male      Kidney replaced by transplant 1994    LDKT.  Early ACR, ureteral strictures.  Failed 1998 d/t  chronic rejection.  S/p graft nephrectomy.     Kidney replaced by transplant     LDKT.  ACR, non-compliance, CAN.  Failed in .  S/p graft nephrectomy.     Kidney replaced by transplant 3/12/2015    DDKT.  Induction w/ thymo 600mg.     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  3/25/15 DSA:  A1 mfi 1604, A30 mfi 4387, B13 mfi 1013.     Medical non-compliance      Obesity     S/p gastric bypass      Organ transplant candidate 2009     Osteoporosis      Thyroid disease      Vitamin D deficiency        PAST SURGICAL HISTORY:   Past Surgical History:   Procedure Laterality Date     BIOPSY      Kidney     C HIP CORE DECOMPRESSION Left      C TOTAL HIP ARTHROPLASTY Right      CYSTOSCOPY, REMOVE STENT(S), COMBINED Right 2015    Procedure: COMBINED CYSTOSCOPY, REMOVE STENT(S);  Surgeon: Ren Romeo MD;  Location: [facility] OR     GASTRIC BYPASS  2005     HERNIA REPAIR Right     inguinal     HYDROCELECTOMY INGUINAL Right      NEPHRECTOMY Right 1998    allograft nephrectomy     NEPHRECTOMY Left 2007    allograft nephrectomy     PARATHYROIDECTOMY       TRANSPLANT KIDNEY RECIPIENT  DONOR N/A 3/12/2015    Procedure: TRANSPLANT KIDNEY RECIPIENT  DONOR;  Surgeon: Ren Romeo MD;  Location: [facility] OR     TRANSPLANT KIDNEY RECIPIENT LIVING RELATED      s/p right allograft nephrectomy     TRANSPLANT KIDNEY RECIPIENT LIVING RELATED      s/p left allograft nephrectomy     VASCULAR SURGERY         Past medical history, past surgical history, medications, and allergies were reviewed with the patient. Additional pertinent items: None    FAMILY HISTORY:   Family History   Problem Relation Age of Onset     Diabetes Brother      Blood Disease Sister         sickle cell     Hypertension Father      Heart Disease Father      Cerebrovascular Disease Brother      Arthritis Mother      Heart Disease Mother        SOCIAL HISTORY:   Social History      Tobacco Use     Smoking status: Light Tobacco Smoker     Types: Cigars     Smokeless tobacco: Never Used   Substance Use Topics     Alcohol use: Yes     Alcohol/week: 0.0 standard drinks     Comment: Minimal     Social history was reviewed with the patient. Additional pertinent items: None      Current Discharge Medication List      CONTINUE these medications which have NOT CHANGED    Details   mycophenolic acid (GENERIC EQUIVALENT) 360 MG EC tablet Take 1,080 mg by mouth 2 times daily      Blood Pressure Monitor KIT Automatic Blood Pressure Monitor  Qty: 1 kit, Refills: 0    Associated Diagnoses: Hypertension      bumetanide (BUMEX) 1 MG tablet Take 2 tablets (2 mg) by mouth 2 times daily  Qty: 120 tablet, Refills: 1      calcitRIOL (ROCALTROL) 0.25 MCG capsule TAKE ONE CAPSULE BY MOUTH ONCE DAILY  Qty: 90 capsule, Refills: 3    Associated Diagnoses: Aftercare following organ transplant; Fatigue, unspecified type      chlorthalidone (HYGROTON) 25 MG tablet Take 1 tablet (25 mg) by mouth daily  Qty: 90 tablet, Refills: 3    Associated Diagnoses: HTN, kidney transplant related      cloNIDine (CATAPRES) 0.1 MG tablet Take 0.2 mg by mouth 2 times daily      cyclobenzaprine (FLEXERIL) 10 MG tablet Take 10 mg by mouth 3 times daily as needed for muscle spasms      diltiazem ER COATED BEADS (CARTIA XT) 240 MG 24 hr capsule TAKE ONE CAPSULE BY MOUTH ONCE DAILY  Qty: 90 capsule, Refills: 3    Associated Diagnoses: HTN, kidney transplant related      epoetin sharon (EPOGEN/PROCRIT) 43764 UNIT/ML injection Inject 1 mL (10,000 Units) Subcutaneous every 7 days Administer for Hgb <10. HOLD dose if systolic BP >180.  Qty: 4 mL, Refills: 11    Associated Diagnoses: CKD (chronic kidney disease) stage 4, GFR 15-29 ml/min (H); Anemia of chronic renal failure, stage 4 (severe) (H)      ferrous sulfate (IRON) 325 (65 Fe) MG tablet Take 1 tablet (325 mg) by mouth daily  Qty: 90 tablet, Refills: 3    Associated Diagnoses: Anemia;  Status post kidney transplant      losartan (COZAAR) 50 MG tablet Take 1 tablet (50 mg) by mouth 2 times daily  Qty: 180 tablet, Refills: 3    Comments: Per Dr. Pizano 11/2/2020  Associated Diagnoses: Kidney transplanted; HTN, kidney transplant related      Multiple Vitamin (DAILY MULTIVITAMIN PO) Take 1 tablet by mouth daily     Associated Diagnoses: Organ transplant candidate; ESRD (end stage renal disease) on dialysis (H)      predniSONE (DELTASONE) 5 MG tablet Take 1 tablet (5 mg) by mouth daily  Qty: 30 tablet, Refills: 11    Associated Diagnoses: Kidney transplant rejection; Kidney replaced by transplant; S/P kidney transplant; Immunosuppressed status (H); Essential hypertension      !! tacrolimus (ASTAGRAF XL) 1 MG 24 hr capsule Take 3 capsules (3 mg) by mouth every morning (before breakfast) Total dose 28 mg daily  Qty: 90 capsule, Refills: 11    Associated Diagnoses: Kidney replaced by transplant; S/P kidney transplant; Immunosuppressed status (H); Essential hypertension; Kidney transplant rejection      !! tacrolimus (ASTAGRAF XL) 5 MG 24 hr capsule Take 5 capsules (25 mg) by mouth every morning (before breakfast) Total dose 28 mg daily  Qty: 150 capsule, Refills: 11    Associated Diagnoses: Kidney replaced by transplant; S/P kidney transplant; Immunosuppressed status (H); Essential hypertension; Kidney transplant rejection      UNABLE TO FIND Take 1 tablet by mouth daily MEDICATION NAME: calcium 500 mg - Vitamin B12        !! - Potential duplicate medications found. Please discuss with provider.             Allergies   Allergen Reactions     Cephalosporins Unknown     Cyclosporine      Duricef [Cefadroxil]         Review of Systems   Constitutional: Positive for chills. Negative for fever.   HENT: Negative for congestion.    Eyes: Negative for redness.   Respiratory: Positive for cough and shortness of breath.    Cardiovascular: Negative for chest pain.   Gastrointestinal: Negative for abdominal pain.  "  Genitourinary: Negative for difficulty urinating.   Musculoskeletal: Negative for arthralgias and neck stiffness.   Skin: Negative for color change.   Neurological: Negative for headaches.   Psychiatric/Behavioral: Negative for confusion.   All other systems reviewed and are negative.        Physical Exam   BP: (!) 184/56  Pulse: 76  Temp: 97  F (36.1  C)  Resp: 16  Height: 180.3 cm (5' 11\")  Weight: 92.1 kg (203 lb)  SpO2: 99 %      Physical Exam  Vitals signs and nursing note reviewed.   Constitutional:       General: He is not in acute distress.     Appearance: He is well-developed. He is not diaphoretic.   HENT:      Head: Normocephalic and atraumatic.   Eyes:      General: No scleral icterus.     Pupils: Pupils are equal, round, and reactive to light.   Neck:      Musculoskeletal: Normal range of motion and neck supple.   Cardiovascular:      Rate and Rhythm: Normal rate and regular rhythm.      Heart sounds: Normal heart sounds. No murmur.   Pulmonary:      Effort: No respiratory distress.      Breath sounds: No stridor. Examination of the right-lower field reveals rhonchi. Examination of the left-lower field reveals rhonchi. Rhonchi present. No wheezing or rales.   Abdominal:      General: Bowel sounds are normal.      Palpations: Abdomen is soft.      Tenderness: There is no abdominal tenderness.   Musculoskeletal:         General: No tenderness.   Skin:     General: Skin is warm and dry.      Coloration: Skin is not pale.      Findings: No erythema or rash.   Neurological:      Mental Status: He is alert and oriented to person, place, and time.      Sensory: No sensory deficit.      Coordination: Coordination normal.         ED Course   10:48 AM  The patient was seen and examined by Neville Charles MD in Room ED14.        Procedures        An EKG was performed.  It was read by me at 11:23 AM.  It shows a sinus rhythm with a first-degree AV block and prolonged QT interval.  There is no change when " compared to an EKG from yesterday.         Results for orders placed or performed during the hospital encounter of 01/08/21 (from the past 24 hour(s))   EKG 12-lead, tracing only   Result Value Ref Range    Interpretation ECG Click View Image link to view waveform and result    CBC with platelets differential   Result Value Ref Range    WBC 6.9 4.0 - 11.0 10e9/L    RBC Count 2.58 (L) 4.4 - 5.9 10e12/L    Hemoglobin 7.0 (L) 13.3 - 17.7 g/dL    Hematocrit 23.2 (L) 40.0 - 53.0 %    MCV 90 78 - 100 fl    MCH 27.1 26.5 - 33.0 pg    MCHC 30.2 (L) 31.5 - 36.5 g/dL    RDW 15.8 (H) 10.0 - 15.0 %    Platelet Count 173 150 - 450 10e9/L    Diff Method Automated Method     % Neutrophils 91.3 %    % Lymphocytes 2.2 %    % Monocytes 4.6 %    % Eosinophils 1.2 %    % Basophils 0.1 %    % Immature Granulocytes 0.6 %    Nucleated RBCs 0 0 /100    Absolute Neutrophil 6.3 1.6 - 8.3 10e9/L    Absolute Lymphocytes 0.2 (L) 0.8 - 5.3 10e9/L    Absolute Monocytes 0.3 0.0 - 1.3 10e9/L    Absolute Eosinophils 0.1 0.0 - 0.7 10e9/L    Absolute Basophils 0.0 0.0 - 0.2 10e9/L    Abs Immature Granulocytes 0.0 0 - 0.4 10e9/L    Absolute Nucleated RBC 0.0    Comprehensive metabolic panel   Result Value Ref Range    Sodium 138 133 - 144 mmol/L    Potassium 4.3 3.4 - 5.3 mmol/L    Chloride 112 (H) 94 - 109 mmol/L    Carbon Dioxide 16 (L) 20 - 32 mmol/L    Anion Gap 10 3 - 14 mmol/L    Glucose 168 (H) 70 - 99 mg/dL    Urea Nitrogen 72 (H) 7 - 30 mg/dL    Creatinine 4.88 (H) 0.66 - 1.25 mg/dL    GFR Estimate 13 (L) >60 mL/min/[1.73_m2]    GFR Estimate If Black 15 (L) >60 mL/min/[1.73_m2]    Calcium 7.9 (L) 8.5 - 10.1 mg/dL    Bilirubin Total 0.2 0.2 - 1.3 mg/dL    Albumin 3.1 (L) 3.4 - 5.0 g/dL    Protein Total 6.4 (L) 6.8 - 8.8 g/dL    Alkaline Phosphatase 55 40 - 150 U/L    ALT 13 0 - 70 U/L    AST 12 0 - 45 U/L   Troponin I   Result Value Ref Range    Troponin I ES <0.015 0.000 - 0.045 ug/L   Nt probnp inpatient (BNP)   Result Value Ref Range     N-Terminal Pro BNP Inpatient 12,231 (H) 0 - 450 pg/mL   CRP inflammation   Result Value Ref Range    CRP Inflammation 80.0 (H) 0.0 - 8.0 mg/L   Erythrocyte sedimentation rate auto   Result Value Ref Range    Sed Rate 103 (H) 0 - 15 mm/h   Procalcitonin   Result Value Ref Range    Procalcitonin 4.07 ng/ml   Cardiology General Adult IP Consult: Patient to be seen: Routine within 24 hrs; Call back #: [phone] [id number]; 50 yom with history multiple kidney transplants, now with pericardial effusion, moderate pHTN, and diastolic dysfunction seen on TTE; Consultant...    Yehuda Liz     1/8/2021  5:53 PM  [facility] Cardiology Consult    Tristian Mckeon MRN# [id number]   Age: 50 year old YOB: 1970     Date of Admission:  1/8/2021    Reason for consult: Pericardial effusion       Requesting physician: Isaak Neumann MD       Level of consult: One-time consult to assist in determining a   diagnosis and to recommend an appropriate treatment plan           Assessment and Plan:   1. Pericardial effusion, moderate  2. Acute exacerbation of diastolic heart failure (EF 60-65%)  3. Pulmonary hypertension, moderate  (PASP 37mmHg)  4. ESRD 2/2 HTN S/p DDKT x3 C/b DGF  5. Chronic Immunosuppresion  6. Accelerated Hypertension  7. Anemia of chronic disease    Patient is a 50yr old male with PMHx as above who presents with   accelerated hypertension, exertional dyspnea, orthopnea and was   found to have moderate sized pericardial effusion necessitating   cardiology consult.   Patient endorses slowly worsening exertional dyspnea over the   past week. Low concern for ACS given no chest pain, non-ischemic   EKG, negative troponin's and echo without WMA. PE also less   likely without tachycardia or RV strain on echo. He endorses   non-compliance with his diuretics and increased fluid intake. On   exam, his JVD is at least ~15cm, BNP is ~91600 and his echo is   suggestive of volume overload.  At this time, suspect his dyspnea   is primarily driven by volume due to diuretic non-compliance in   the setting of worsening CKD and underlying diastolic   dysfunction. Regarding his pericardial effusion, suspect this is   secondary to hypervolemia as above. Do not think there is an   indication for pericardiocentesis at this time. Reassuring   there's no evidence of tamponade on echo and he is   hemodynamically stable.   His accelerated hypertension is probably driven by his volume   status. Overall, he will benefit from aggressive diuresis and   blood pressure control. Recommend nephrology involvement with   managing his volume status and hypertension.    Plan:  - No indication for pericardiocentesis at this time  - Suspect pericardial effusion is secondary to volume  - Recommend Bumex 4mg IV load  - Start Bumex gtt at 0.5mg/hr   - Recommend Nephrology consult      Patient staffed with Dr Stearns. Cardiology will sign of, please   page [phone] with questions.       Chief Complaint:   Pericardial effusion     History is obtained from the patient         History of Present Illness:      Tristian Mckeon is a 50 year old male with a PMH of kidney   transplant S/P rejection (2016), immunosuppression, HTN,   secondary renal hyperparathyroidism, CKD stage IV, anemia,   thrombocytopenia, tobacco abuse, osteoporosis, AVN, and h/o blood   transfusion who presents the ED today complaining of shortness of   breath.    Patient reports that 7 days ago he woke up from sleep feeling   short of breath, shivering, and experiencing chills.  Since then   he has had slow onset GRULLON. Also endorses orthopnea with increased   cough when laying flat. He endorses increased abdominal bloating.   He endorses non-compliance with his Bumex and increased fluid   intake over the past couple of weeks. He has a chronic cough that   is unchanged.  He denies pedal edema (at his baseline), chest pain.   He continues to smoke about 5-10 cigarettes a day.  Denies alcohol   or illicit drug use         Past Medical History:   I have reviewed this patient's past medical history          Past Surgical History:   I have reviewed this patient's past surgical history          Social History:   I have reviewed this patient's social history          Family History:   I have reviewed this patient's family history          Immunizations:   Immunization status is unknown          Allergies:   All allergies reviewed and addressed          Medications:     Current Facility-Administered Medications   Medication     bumetanide (BUMEX) 0.25 mg/mL infusion     bumetanide (BUMEX) injection 4 mg     calcitRIOL (ROCALTROL) capsule 0.25 mcg     chlorthalidone (HYGROTON) tablet 25 mg     cloNIDine (CATAPRES) tablet 0.2 mg     [Held by provider] cyclobenzaprine (FLEXERIL) tablet 10 mg     diltiazem ER COATED BEADS (CARDIZEM CD/CARTIA XT) 24 hr capsule   240 mg     [START ON 1/9/2021] ferrous sulfate (FEROSUL) tablet 325 mg     hydrALAZINE (APRESOLINE) injection 10 mg     lidocaine (LMX4) cream     lidocaine 1 % 0.1-1 mL     melatonin tablet 1 mg     multivitamin w/minerals (THERA-VIT-M) tablet 1 tablet     mycophenolic acid (GENERIC EQUIVALENT) EC tablet 1,080 mg     polyethylene glycol (MIRALAX) Packet 17 g     predniSONE (DELTASONE) tablet 5 mg     senna-docusate (SENOKOT-S/PERICOLACE) 8.6-50 MG per tablet 1   tablet    Or     senna-docusate (SENOKOT-S/PERICOLACE) 8.6-50 MG per tablet 2   tablet     sodium chloride (PF) 0.9% PF flush 3 mL     sodium chloride (PF) 0.9% PF flush 3 mL     [START ON 1/9/2021] tacrolimus (ASTAGRAF XL) 24 hr capsule 25   mg     [START ON 1/9/2021] tacrolimus (ASTAGRAF XL) 24 hr capsule 3 mg               Review of Systems:   The Review of Systems is negative other than noted in the HPI          Physical Exam:   Vitals were reviewed  Constitutional:   awake, alert, cooperative, no apparent distress, and appears   stated age     Neck:   Supple, symmetrical,  trachea midline. JVD ~15cm     Lungs:   No increased work of breathing, good air exchange, clear to   auscultation bilaterally, no crackles or wheezing     Cardiovascular:   Normal apical impulse, regular rate and rhythm.     Abdomen:   Soft, non-distended, non-tender.     Musculoskeletal:   no lower extremity pitting edema present     Skin:   normal skin color, texture, turgor             Data:   All laboratory data reviewed     EKG results:   I have reviewed this patient's EKG with the following   interpretation:        Normal sinus rhythm, normal axis, no acute ischemic ST segment   or T wave changes      Echocardiology:   Performed at [place] recently within the past week1/7/2021        Results:    Interpretation Summary  Global and regional left ventricular function is normal with an   EF of 60-65%.  Grade II or moderate diastolic dysfunction.  Global right ventricular function is normal.  Moderate pulmonary hypertension. Estimated pulmonary artery   systolic pressure  is 37 mmHg plus right atrial pressure.  IVC diameter >2.1 cm collapsing <50% with sniff suggests a high   RA pressure  estimated at 15 mmHg or greater.  Moderate, circumferential pericardial effusion. Largest diameter   of 2.2cm  posteriorly. No chamber compression or echocardiographic evidence   for  tamponade.     This study was compared with the study from 6/6/14. Pericardial   effusion was  trivial at that time. Estimated PA pressure has increased as   well.     All imaging studies reviewed by me.    Yehuda Unger MD      Asymptomatic SARS-CoV-2 COVID-19 Virus (Coronavirus) by PCR    Specimen: Nasopharyngeal   Result Value Ref Range    SARS-CoV-2 Virus Specimen Source Nasopharyngeal     SARS-CoV-2 PCR Result NEGATIVE     SARS-CoV-2 PCR Comment       Testing was performed using the Xpert Xpress SARS-CoV-2 Assay on the Cepheid Gene-Xpert   Instrument Systems. Additional information about this Emergency Use Authorization (EUA)   assay can be  found via the Lab Guide.     Basic metabolic panel   Result Value Ref Range    Sodium 137 133 - 144 mmol/L    Potassium 4.7 3.4 - 5.3 mmol/L    Chloride 110 (H) 94 - 109 mmol/L    Carbon Dioxide 15 (L) 20 - 32 mmol/L    Anion Gap 12 3 - 14 mmol/L    Glucose 279 (H) 70 - 99 mg/dL    Urea Nitrogen 71 (H) 7 - 30 mg/dL    Creatinine 4.73 (H) 0.66 - 1.25 mg/dL    GFR Estimate 13 (L) >60 mL/min/[1.73_m2]    GFR Estimate If Black 16 (L) >60 mL/min/[1.73_m2]    Calcium 7.8 (L) 8.5 - 10.1 mg/dL   Magnesium   Result Value Ref Range    Magnesium 2.7 (H) 1.6 - 2.3 mg/dL       Medications   lidocaine 1 % 0.1-1 mL (has no administration in time range)   lidocaine (LMX4) cream (has no administration in time range)   sodium chloride (PF) 0.9% PF flush 3 mL (3 mLs Intracatheter Not Given 1/8/21 2013)   sodium chloride (PF) 0.9% PF flush 3 mL (has no administration in time range)   melatonin tablet 1 mg (has no administration in time range)   senna-docusate (SENOKOT-S/PERICOLACE) 8.6-50 MG per tablet 1 tablet (has no administration in time range)     Or   senna-docusate (SENOKOT-S/PERICOLACE) 8.6-50 MG per tablet 2 tablet (has no administration in time range)   polyethylene glycol (MIRALAX) Packet 17 g (has no administration in time range)   cloNIDine (CATAPRES) tablet 0.2 mg (0.2 mg Oral Given 1/8/21 2019)   cyclobenzaprine (FLEXERIL) tablet 10 mg ( Oral Held by provider 1/8/21 1238)   diltiazem ER COATED BEADS (CARDIZEM CD/CARTIA XT) 24 hr capsule 240 mg (240 mg Oral Not Given 1/8/21 1342)   multivitamin w/minerals (THERA-VIT-M) tablet 1 tablet (1 tablet Oral Not Given 1/8/21 1342)   predniSONE (DELTASONE) tablet 5 mg (5 mg Oral Not Given 1/8/21 1356)   tacrolimus (ASTAGRAF XL) 24 hr capsule 3 mg (has no administration in time range)   tacrolimus (ASTAGRAF XL) 24 hr capsule 25 mg (has no administration in time range)   mycophenolic acid (GENERIC EQUIVALENT) EC tablet 1,080 mg (1,080 mg Oral Given 1/8/21 2019)   calcitRIOL  (ROCALTROL) capsule 0.25 mcg (0.25 mcg Oral Given 1/8/21 1839)   chlorthalidone (HYGROTON) tablet 25 mg (25 mg Oral Given 1/8/21 1839)   hydrALAZINE (APRESOLINE) injection 10 mg (10 mg Intravenous Given 1/8/21 2030)   ferrous sulfate (FEROSUL) tablet 325 mg (has no administration in time range)   bumetanide (BUMEX) 0.25 mg/mL infusion (0.5 mg/hr Intravenous New Bag 1/8/21 1924)   heparin ANTICOAGULANT injection 5,000 Units (has no administration in time range)   bumetanide (BUMEX) injection 4 mg (4 mg Intravenous Given 1/8/21 1852)             Assessments & Plan (with Medical Decision Making)   Patient is a 50-year-old male with a complex past medical history including end-stage renal disease and hypertension who presented with increasing dyspnea on exertion.  He was found to have volume overload as well as an increased pericardial effusion.  There is no evidence of tamponade, however there was evidence of increased PA pressures.  Chest x-ray also showed some left basilar atelectasis/consolidation.  Patient had recommendations to be admitted, however he had responsibilities at home that he needed to take care of before he could be admitted.  He was given Lasix 100 mg prior to discharge yesterday.  He reports that he had good urine output.  He feels slightly better than he did yesterday.  He had no new symptoms.  EKG shows no acute ischemia, and his troponin is negative.  His creatinine continues to be elevated, however fairly close to his baseline.    Patient will be admitted for diuresis as well as further investigation into his pericardial effusion and whether any intervention needs to occur.  His blood pressure is moderately elevated, and he reports that he has been compliant on taking his medications.  He was given an additional dose of Cozaar today with mild improvement.  I discussed his case with Dr. Baig who accepted the patient for admission.    Patient's BNP today is slightly more elevated than it was  yesterday.  He has minimal crackles in his lung bases.    I have reviewed the nursing notes.    I have reviewed the findings, diagnosis, plan and need for follow up with the patient.    Current Discharge Medication List          Final diagnoses:   Hypervolemia, unspecified hypervolemia type   Pericardial effusion   I, Christophe Dallas, am serving as a trained medical scribe to document services personally performed by Neville Charles MD, based on the provider's statements to me.      I, Neville Charles MD, was physically present and have reviewed and verified the accuracy of this note documented by Christophe Dallas.       1/8/2021   [facility] EMERGENCY DEPARTMENT     Neville Charles MD  01/09/21 1125

## 2021-01-08 NOTE — CONSULTS
[facility] Cardiology Consult    Tristian Mckeon MRN# [id number]   Age: 50 year old YOB: 1970     Date of Admission:  1/8/2021    Reason for consult: Pericardial effusion       Requesting physician: Isaak Neumann MD       Level of consult: One-time consult to assist in determining a diagnosis and to recommend an appropriate treatment plan           Assessment and Plan:   1. Pericardial effusion, moderate  2. Acute exacerbation of diastolic heart failure (EF 60-65%)  3. Pulmonary hypertension, moderate  (PASP 37mmHg)  4. ESRD 2/2 HTN S/p DDKT x3 C/b DGF  5. Chronic Immunosuppresion  6. Accelerated Hypertension  7. Anemia of chronic disease    Patient is a 50yr old male with PMHx as above who presents with accelerated hypertension, exertional dyspnea, orthopnea and was found to have moderate sized pericardial effusion necessitating cardiology consult.   Patient endorses slowly worsening exertional dyspnea over the past week. Low concern for ACS given no chest pain, non-ischemic EKG, negative troponin's and echo without WMA. PE also less likely without tachycardia or RV strain on echo. He endorses non-compliance with his diuretics and increased fluid intake. On exam, his JVD is at least ~15cm, BNP is ~38403 and his echo is suggestive of volume overload. At this time, suspect his dyspnea is primarily driven by volume due to diuretic non-compliance in the setting of worsening CKD and underlying diastolic dysfunction. Regarding his pericardial effusion, suspect this is secondary to hypervolemia as above. Do not think there is an indication for pericardiocentesis at this time. Reassuring there's no evidence of tamponade on echo and he is hemodynamically stable.   His accelerated hypertension is probably driven by his volume status. Overall, he will benefit from aggressive diuresis and blood pressure control. Recommend nephrology involvement with managing his volume status and  hypertension.    Plan:  - No indication for pericardiocentesis at this time  - Suspect pericardial effusion is secondary to volume  - Recommend Bumex 4mg IV load  - Start Bumex gtt at 0.5mg/hr   - Recommend Nephrology consult      Patient staffed with Dr Stearns. Cardiology will sign of, please page [phone] with questions.       Chief Complaint:   Pericardial effusion     History is obtained from the patient         History of Present Illness:      Tristian Mckeon is a 50 year old male with a PMH of kidney transplant S/P rejection (2016), immunosuppression, HTN, secondary renal hyperparathyroidism, CKD stage IV, anemia, thrombocytopenia, tobacco abuse, osteoporosis, AVN, and h/o blood transfusion who presents the ED today complaining of shortness of breath.    Patient reports that 7 days ago he woke up from sleep feeling short of breath, shivering, and experiencing chills.  Since then he has had slow onset GRULLON. Also endorses orthopnea with increased cough when laying flat. He endorses increased abdominal bloating. He endorses non-compliance with his Bumex and increased fluid intake over the past couple of weeks. He has a chronic cough that is unchanged.  He denies pedal edema (at his baseline), chest pain.   He continues to smoke about 5-10 cigarettes a day. Denies alcohol or illicit drug use         Past Medical History:   I have reviewed this patient's past medical history          Past Surgical History:   I have reviewed this patient's past surgical history          Social History:   I have reviewed this patient's social history          Family History:   I have reviewed this patient's family history          Immunizations:   Immunization status is unknown          Allergies:   All allergies reviewed and addressed          Medications:     Current Facility-Administered Medications   Medication     bumetanide (BUMEX) 0.25 mg/mL infusion     bumetanide (BUMEX) injection 4 mg     calcitRIOL (ROCALTROL) capsule 0.25 mcg      chlorthalidone (HYGROTON) tablet 25 mg     cloNIDine (CATAPRES) tablet 0.2 mg     [Held by provider] cyclobenzaprine (FLEXERIL) tablet 10 mg     diltiazem ER COATED BEADS (CARDIZEM CD/CARTIA XT) 24 hr capsule 240 mg     [START ON 1/9/2021] ferrous sulfate (FEROSUL) tablet 325 mg     hydrALAZINE (APRESOLINE) injection 10 mg     lidocaine (LMX4) cream     lidocaine 1 % 0.1-1 mL     melatonin tablet 1 mg     multivitamin w/minerals (THERA-VIT-M) tablet 1 tablet     mycophenolic acid (GENERIC EQUIVALENT) EC tablet 1,080 mg     polyethylene glycol (MIRALAX) Packet 17 g     predniSONE (DELTASONE) tablet 5 mg     senna-docusate (SENOKOT-S/PERICOLACE) 8.6-50 MG per tablet 1 tablet    Or     senna-docusate (SENOKOT-S/PERICOLACE) 8.6-50 MG per tablet 2 tablet     sodium chloride (PF) 0.9% PF flush 3 mL     sodium chloride (PF) 0.9% PF flush 3 mL     [START ON 1/9/2021] tacrolimus (ASTAGRAF XL) 24 hr capsule 25 mg     [START ON 1/9/2021] tacrolimus (ASTAGRAF XL) 24 hr capsule 3 mg             Review of Systems:   The Review of Systems is negative other than noted in the HPI          Physical Exam:   Vitals were reviewed  Constitutional:   awake, alert, cooperative, no apparent distress, and appears stated age     Neck:   Supple, symmetrical, trachea midline. JVD ~15cm     Lungs:   No increased work of breathing, good air exchange, clear to auscultation bilaterally, no crackles or wheezing     Cardiovascular:   Normal apical impulse, regular rate and rhythm.     Abdomen:   Soft, non-distended, non-tender.     Musculoskeletal:   no lower extremity pitting edema present     Skin:   normal skin color, texture, turgor             Data:   All laboratory data reviewed     EKG results:   I have reviewed this patient's EKG with the following interpretation:        Normal sinus rhythm, normal axis, no acute ischemic ST segment or T wave changes      Echocardiology:   Performed at [facility] recently within the past week1/7/2021         Results:    Interpretation Summary  Global and regional left ventricular function is normal with an EF of 60-65%.  Grade II or moderate diastolic dysfunction.  Global right ventricular function is normal.  Moderate pulmonary hypertension. Estimated pulmonary artery systolic pressure  is 37 mmHg plus right atrial pressure.  IVC diameter >2.1 cm collapsing <50% with sniff suggests a high RA pressure  estimated at 15 mmHg or greater.  Moderate, circumferential pericardial effusion. Largest diameter of 2.2cm  posteriorly. No chamber compression or echocardiographic evidence for  tamponade.     This study was compared with the study from 6/6/14. Pericardial effusion was  trivial at that time. Estimated PA pressure has increased as well.     All imaging studies reviewed by me.    Yehuda Unger MD

## 2021-01-08 NOTE — TELEPHONE ENCOUNTER
7:04 AM  January 8, 2021  Returned Mr. Mckeon's call.  He requested that I arrange for him to be admitted this AM (seen in ER last night with admission recommended. Left AMA).  I explained that I could not arrange a direct admission and recommended that he return to the ER for admission.  Kiya Sethi, RN, BA  On Call Organ Coordinator

## 2021-01-09 LAB
ANION GAP SERPL CALCULATED.3IONS-SCNC: 12 MMOL/L (ref 3–14)
BUN SERPL-MCNC: 73 MG/DL (ref 7–30)
CALCIUM SERPL-MCNC: 8.2 MG/DL (ref 8.5–10.1)
CHLORIDE SERPL-SCNC: 111 MMOL/L (ref 94–109)
CO2 SERPL-SCNC: 15 MMOL/L (ref 20–32)
CREAT SERPL-MCNC: 4.64 MG/DL (ref 0.66–1.25)
ERYTHROCYTE [DISTWIDTH] IN BLOOD BY AUTOMATED COUNT: 15.5 % (ref 10–15)
GFR SERPL CREATININE-BSD FRML MDRD: 14 ML/MIN/{1.73_M2}
GLUCOSE SERPL-MCNC: 89 MG/DL (ref 70–99)
GRAM STN SPEC: NORMAL
HCT VFR BLD AUTO: 24.8 % (ref 40–53)
HGB BLD-MCNC: 7.7 G/DL (ref 13.3–17.7)
Lab: NORMAL
MAGNESIUM SERPL-MCNC: 2.6 MG/DL (ref 1.6–2.3)
MCH RBC QN AUTO: 27 PG (ref 26.5–33)
MCHC RBC AUTO-ENTMCNC: 31 G/DL (ref 31.5–36.5)
MCV RBC AUTO: 87 FL (ref 78–100)
MRSA DNA SPEC QL NAA+PROBE: NEGATIVE
PLATELET # BLD AUTO: 215 10E9/L (ref 150–450)
POTASSIUM SERPL-SCNC: 4.2 MMOL/L (ref 3.4–5.3)
POTASSIUM SERPL-SCNC: 4.5 MMOL/L (ref 3.4–5.3)
RBC # BLD AUTO: 2.85 10E12/L (ref 4.4–5.9)
SODIUM SERPL-SCNC: 139 MMOL/L (ref 133–144)
SPECIMEN SOURCE: NORMAL
SPECIMEN SOURCE: NORMAL
TACROLIMUS BLD-MCNC: 3.6 UG/L (ref 5–15)
TME LAST DOSE: ABNORMAL H
WBC # BLD AUTO: 5.9 10E9/L (ref 4–11)

## 2021-01-09 PROCEDURE — 250N000013 HC RX MED GY IP 250 OP 250 PS 637: Performed by: STUDENT IN AN ORGANIZED HEALTH CARE EDUCATION/TRAINING PROGRAM

## 2021-01-09 PROCEDURE — 87205 SMEAR GRAM STAIN: CPT | Performed by: STUDENT IN AN ORGANIZED HEALTH CARE EDUCATION/TRAINING PROGRAM

## 2021-01-09 PROCEDURE — 82668 ASSAY OF ERYTHROPOIETIN: CPT | Performed by: STUDENT IN AN ORGANIZED HEALTH CARE EDUCATION/TRAINING PROGRAM

## 2021-01-09 PROCEDURE — 93005 ELECTROCARDIOGRAM TRACING: CPT

## 2021-01-09 PROCEDURE — 36415 COLL VENOUS BLD VENIPUNCTURE: CPT | Performed by: STUDENT IN AN ORGANIZED HEALTH CARE EDUCATION/TRAINING PROGRAM

## 2021-01-09 PROCEDURE — 99233 SBSQ HOSP IP/OBS HIGH 50: CPT | Mod: GC | Performed by: INTERNAL MEDICINE

## 2021-01-09 PROCEDURE — 80048 BASIC METABOLIC PNL TOTAL CA: CPT | Performed by: STUDENT IN AN ORGANIZED HEALTH CARE EDUCATION/TRAINING PROGRAM

## 2021-01-09 PROCEDURE — 99223 1ST HOSP IP/OBS HIGH 75: CPT | Performed by: INTERNAL MEDICINE

## 2021-01-09 PROCEDURE — 250N000013 HC RX MED GY IP 250 OP 250 PS 637: Performed by: NURSE PRACTITIONER

## 2021-01-09 PROCEDURE — 87640 STAPH A DNA AMP PROBE: CPT | Performed by: STUDENT IN AN ORGANIZED HEALTH CARE EDUCATION/TRAINING PROGRAM

## 2021-01-09 PROCEDURE — 87641 MR-STAPH DNA AMP PROBE: CPT | Performed by: STUDENT IN AN ORGANIZED HEALTH CARE EDUCATION/TRAINING PROGRAM

## 2021-01-09 PROCEDURE — 120N000011 HC R&B TRANSPLANT UMMC

## 2021-01-09 PROCEDURE — 250N000012 HC RX MED GY IP 250 OP 636 PS 637: Performed by: STUDENT IN AN ORGANIZED HEALTH CARE EDUCATION/TRAINING PROGRAM

## 2021-01-09 PROCEDURE — 87070 CULTURE OTHR SPECIMN AEROBIC: CPT | Performed by: STUDENT IN AN ORGANIZED HEALTH CARE EDUCATION/TRAINING PROGRAM

## 2021-01-09 PROCEDURE — 250N000011 HC RX IP 250 OP 636: Performed by: STUDENT IN AN ORGANIZED HEALTH CARE EDUCATION/TRAINING PROGRAM

## 2021-01-09 PROCEDURE — 250N000012 HC RX MED GY IP 250 OP 636 PS 637: Performed by: INTERNAL MEDICINE

## 2021-01-09 PROCEDURE — 87077 CULTURE AEROBIC IDENTIFY: CPT | Performed by: STUDENT IN AN ORGANIZED HEALTH CARE EDUCATION/TRAINING PROGRAM

## 2021-01-09 PROCEDURE — 83735 ASSAY OF MAGNESIUM: CPT | Performed by: STUDENT IN AN ORGANIZED HEALTH CARE EDUCATION/TRAINING PROGRAM

## 2021-01-09 PROCEDURE — 85027 COMPLETE CBC AUTOMATED: CPT | Performed by: STUDENT IN AN ORGANIZED HEALTH CARE EDUCATION/TRAINING PROGRAM

## 2021-01-09 PROCEDURE — 80197 ASSAY OF TACROLIMUS: CPT | Performed by: STUDENT IN AN ORGANIZED HEALTH CARE EDUCATION/TRAINING PROGRAM

## 2021-01-09 PROCEDURE — 84132 ASSAY OF SERUM POTASSIUM: CPT | Performed by: STUDENT IN AN ORGANIZED HEALTH CARE EDUCATION/TRAINING PROGRAM

## 2021-01-09 PROCEDURE — 93010 ELECTROCARDIOGRAM REPORT: CPT | Performed by: INTERNAL MEDICINE

## 2021-01-09 RX ORDER — SODIUM CHLORIDE 9 MG/ML
INJECTION, SOLUTION INTRAVENOUS
Status: DISPENSED
Start: 2021-01-09 | End: 2021-01-10

## 2021-01-09 RX ORDER — SODIUM BICARBONATE 650 MG/1
650 TABLET ORAL 2 TIMES DAILY
Status: DISCONTINUED | OUTPATIENT
Start: 2021-01-09 | End: 2021-01-11 | Stop reason: HOSPADM

## 2021-01-09 RX ORDER — ACETAMINOPHEN 325 MG/1
650 TABLET ORAL EVERY 6 HOURS PRN
Status: DISCONTINUED | OUTPATIENT
Start: 2021-01-09 | End: 2021-01-11 | Stop reason: HOSPADM

## 2021-01-09 RX ORDER — LABETALOL HYDROCHLORIDE 5 MG/ML
10 INJECTION, SOLUTION INTRAVENOUS EVERY 10 MIN PRN
Status: DISCONTINUED | OUTPATIENT
Start: 2021-01-09 | End: 2021-01-09

## 2021-01-09 RX ORDER — LABETALOL HYDROCHLORIDE 5 MG/ML
10 INJECTION, SOLUTION INTRAVENOUS EVERY 10 MIN PRN
Status: COMPLETED | OUTPATIENT
Start: 2021-01-09 | End: 2021-01-10

## 2021-01-09 RX ORDER — LEVOFLOXACIN 5 MG/ML
750 INJECTION, SOLUTION INTRAVENOUS
Status: DISCONTINUED | OUTPATIENT
Start: 2021-01-09 | End: 2021-01-11 | Stop reason: HOSPADM

## 2021-01-09 RX ORDER — LABETALOL HYDROCHLORIDE 5 MG/ML
10 INJECTION, SOLUTION INTRAVENOUS ONCE
Status: COMPLETED | OUTPATIENT
Start: 2021-01-09 | End: 2021-01-09

## 2021-01-09 RX ADMIN — ACETAMINOPHEN 650 MG: 325 TABLET, FILM COATED ORAL at 12:32

## 2021-01-09 RX ADMIN — MULTIPLE VITAMINS W/ MINERALS TAB 1 TABLET: TAB at 08:13

## 2021-01-09 RX ADMIN — HYDRALAZINE HYDROCHLORIDE 10 MG: 20 INJECTION, SOLUTION INTRAMUSCULAR; INTRAVENOUS at 17:22

## 2021-01-09 RX ADMIN — MYCOPHENOLIC ACID 1080 MG: 360 TABLET, DELAYED RELEASE ORAL at 21:16

## 2021-01-09 RX ADMIN — FERROUS SULFATE TAB 325 MG (65 MG ELEMENTAL FE) 325 MG: 325 (65 FE) TAB at 08:13

## 2021-01-09 RX ADMIN — HYDRALAZINE HYDROCHLORIDE 10 MG: 20 INJECTION, SOLUTION INTRAMUSCULAR; INTRAVENOUS at 00:20

## 2021-01-09 RX ADMIN — DILTIAZEM HYDROCHLORIDE 240 MG: 120 CAPSULE, COATED, EXTENDED RELEASE ORAL at 08:19

## 2021-01-09 RX ADMIN — CLONIDINE HYDROCHLORIDE 0.2 MG: 0.1 TABLET ORAL at 08:12

## 2021-01-09 RX ADMIN — CALCITRIOL CAPSULES 0.25 MCG 0.25 MCG: 0.25 CAPSULE ORAL at 08:12

## 2021-01-09 RX ADMIN — MYCOPHENOLIC ACID 1080 MG: 360 TABLET, DELAYED RELEASE ORAL at 08:15

## 2021-01-09 RX ADMIN — HYDRALAZINE HYDROCHLORIDE 10 MG: 20 INJECTION, SOLUTION INTRAMUSCULAR; INTRAVENOUS at 04:09

## 2021-01-09 RX ADMIN — LABETALOL HYDROCHLORIDE 10 MG: 5 INJECTION, SOLUTION INTRAVENOUS at 12:07

## 2021-01-09 RX ADMIN — TACROLIMUS EXTENDED-RELEASE CAPSULES 3 MG: 1 CAPSULE, COATED, EXTENDED RELEASE ORAL at 08:55

## 2021-01-09 RX ADMIN — LABETALOL HYDROCHLORIDE 10 MG: 5 INJECTION, SOLUTION INTRAVENOUS at 06:21

## 2021-01-09 RX ADMIN — PREDNISONE 5 MG: 5 TABLET ORAL at 08:57

## 2021-01-09 RX ADMIN — LABETALOL HYDROCHLORIDE 10 MG: 5 INJECTION, SOLUTION INTRAVENOUS at 09:26

## 2021-01-09 RX ADMIN — SODIUM BICARBONATE 650 MG TABLET 650 MG: at 21:16

## 2021-01-09 RX ADMIN — HEPARIN SODIUM 5000 UNITS: 5000 INJECTION, SOLUTION INTRAVENOUS; SUBCUTANEOUS at 21:16

## 2021-01-09 RX ADMIN — CHLORTHALIDONE 25 MG: 25 TABLET ORAL at 08:12

## 2021-01-09 RX ADMIN — TACROLIMUS EXTENDED-RELEASE CAPSULES 25 MG: 5 CAPSULE, COATED, EXTENDED RELEASE ORAL at 08:54

## 2021-01-09 RX ADMIN — HYDRALAZINE HYDROCHLORIDE 10 MG: 20 INJECTION, SOLUTION INTRAMUSCULAR; INTRAVENOUS at 08:09

## 2021-01-09 RX ADMIN — LEVOFLOXACIN 750 MG: 5 INJECTION, SOLUTION INTRAVENOUS at 13:50

## 2021-01-09 RX ADMIN — HEPARIN SODIUM 5000 UNITS: 5000 INJECTION, SOLUTION INTRAVENOUS; SUBCUTANEOUS at 09:32

## 2021-01-09 RX ADMIN — CLONIDINE HYDROCHLORIDE 0.2 MG: 0.1 TABLET ORAL at 21:16

## 2021-01-09 ASSESSMENT — ACTIVITIES OF DAILY LIVING (ADL)
ADLS_ACUITY_SCORE: 11

## 2021-01-09 ASSESSMENT — MIFFLIN-ST. JEOR: SCORE: 1758.04

## 2021-01-09 NOTE — PHARMACY-ADMISSION MEDICATION HISTORY
Admission medication history interview status for the 1/8/2021 admission is complete. See Epic admission navigator for allergy information, pharmacy, prior to admission medications and immunization status.     Medication history interview sources:  Please refer to medication history completed on 1/7/20    Changes made to PTA medication list (reason)  Added: None    Deleted: None    Changed: None    Additional medication history information (including reliability of information, actions taken by pharmacist):None      Prior to Admission medications    Medication Sig Last Dose Taking? Auth Provider   mycophenolic acid (GENERIC EQUIVALENT) 360 MG EC tablet Take 1,080 mg by mouth 2 times daily  Yes Unknown, Entered By History   Blood Pressure Monitor KIT Automatic Blood Pressure Monitor   Prashant Marr MD   bumetanide (BUMEX) 1 MG tablet Take 2 tablets (2 mg) by mouth 2 times daily   Cayetano Pizano MD   calcitRIOL (ROCALTROL) 0.25 MCG capsule TAKE ONE CAPSULE BY MOUTH ONCE DAILY   Cayetano Pizano MD   chlorthalidone (HYGROTON) 25 MG tablet Take 1 tablet (25 mg) by mouth daily   Kole Collins MD   cloNIDine (CATAPRES) 0.1 MG tablet Take 0.2 mg by mouth 2 times daily   Unknown, Entered By History   cyclobenzaprine (FLEXERIL) 10 MG tablet Take 10 mg by mouth 3 times daily as needed for muscle spasms   Unknown, Entered By History   diltiazem ER COATED BEADS (CARTIA XT) 240 MG 24 hr capsule TAKE ONE CAPSULE BY MOUTH ONCE DAILY   Kole Collins MD   epoetin sharon (EPOGEN/PROCRIT) 79866 UNIT/ML injection Inject 1 mL (10,000 Units) Subcutaneous every 7 days Administer for Hgb <10. HOLD dose if systolic BP >180.  Patient not taking: Reported on 11/2/2020   Kole Collins MD   ferrous sulfate (IRON) 325 (65 Fe) MG tablet Take 1 tablet (325 mg) by mouth daily  Patient taking differently: Take 1 tablet by mouth every other day    Kole Collins MD   losartan (COZAAR) 50 MG tablet Take 1 tablet (50 mg) by mouth 2 times  daily   Cayetano Pizano MD   Multiple Vitamin (DAILY MULTIVITAMIN PO) Take 1 tablet by mouth daily    Reported, Patient   predniSONE (DELTASONE) 5 MG tablet Take 1 tablet (5 mg) by mouth daily   Kole Collins MD   tacrolimus (ASTAGRAF XL) 1 MG 24 hr capsule Take 3 capsules (3 mg) by mouth every morning (before breakfast) Total dose 28 mg daily   Kole Collins MD   tacrolimus (ASTAGRAF XL) 5 MG 24 hr capsule Take 5 capsules (25 mg) by mouth every morning (before breakfast) Total dose 28 mg daily   Kole Collins MD   UNABLE TO FIND Take 1 tablet by mouth daily MEDICATION NAME: calcium 500 mg - Vitamin B12    Unknown, Entered By History         Medication history completed by:   Nicholas Maradiaga, Pharm.D, BCPS

## 2021-01-09 NOTE — CONSULTS
[facility]   Transplant Nephrology Consult  Date of Admission:  1/8/2021  Today's Date: 01/09/2021  Requesting physician: Silvnio Abreu MD    Recommendations:  - will order repeat tac level for tomorrow    - will start Na Bicarb 650 mg BID   - Agree with bumex gtt, diltiazem 240mg daily and prn hydralazine/labetalol     Assessment & Plan   # DDKT: Trend up, GLEN likely secondary to poorly controlled HTN and heart failure exacerbation. Do not recommend HD at this time.    - Baseline Creatinine: ~ 3.5-3.8   - Proteinuria: Minimal (0.2-0.5 grams)   - Date DSA Last Checked: Feb/2020      Latest DSA: No   - BK Viremia: Not checked recently   - Kidney Tx Biopsy: Kidney Tx Biopsy: Dec 23, 2015; Result: Mild glomerulitis and capillary C4d staining consistent with mild, persistent antibody-mediated rejection. 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     # Infection Prophylaxis:   - PJP: None    # Hypertension: Borderline control;  Goal BP: < 130/80   - Volume status: Mildly hypervolemic     - Changes: No, continue Bumex gtt, diltiazem 240mg daily and prn hydralazine/labetalol     # Anemia in Chronic Renal Disease: Hgb: Decreased      RAYMOND: No   - Iron studies: Replete    # Mineral Bone Disorder:   - Secondary renal hyperparathyroidism; PTH level: Minimally elevated ( pg/ml)        On treatment: None  - Vitamin D; level: Not checked recently        On supplement: No  - Calcium; level: Low , but corrected for low albumin       On supplement: No   - Phosphorus; level: Not checked recently        On supplement: No    # Electrolytes:   - Potassium; level: Normal         On supplement: No  - Magnesium; level: Normal        On supplement: No  - Bicarbonate; level: Low        On supplement: No, will start Na Bicarb 650 mg BID     # Acute exacerbation of diastolic heart failure (EF 60-65%), pulmonary hypertension (PASP 37 mmHg): cards following.  Likely from fluid over load and accelerated HTN. Treating with aggressive diuresis and blood pressure control.    #Moderate pericardial effusion: likely secondary to hypervolemia. No evidence of tamponade. Pericardiocentesis not recommended at this time by cardiology.      # Transplant History:  Etiology of Kidney Failure: Hypertension  Tx: DDKT  Transplant: 3/12/2015 (Kidney), 4/1/1994 (Kidney), 10/1/2000 (Kidney)  Donor Type: Donation after Brain Death Donor Class: Standard Criteria Donor  Significant changes in immunosuppression: None  Significant transplant-related complications: None    Recommendations were communicated to the primary team via this note.    Seen and discussed with Dr. Partha Gore, SHANE CNP  Pager: [phone]     Physician Attestation   I, Jonathan Chavis, saw and evaluated Tristian Mckeon as part of a shared visit.  I have reviewed and discussed with the advanced practice provider their history, physical and plan.    I personally reviewed the vital signs, medications and labs.    My key history or physical exam findings: Elevated, but stable creatinine.  Improved urine output.  Patient hadn't been as compliant with diuretics as outpatient.  Low bicarbonate level.    Key management decisions made by me: Would continue with diuresis.  No changes in immunosuppression.  Check tacrolimus level tomorrow.  Start sodium bicarbonate supplement.    Jonathan Chavis  Date of Service (when I saw the patient): 1/9/21    REASON FOR CONSULT   Follow DDKT.     History of Present Illness   Tristian Mckeon is a 50 year old male with history of ESKD 2/2 HTN S/p DDKT x3 who presents with acute exacerbation of  diastolic heart failure (EF 60-65%), pulmonary hypertension (PASP 37 mmHg) and accelerated hypertension.  He endorses non-compliance with his diuretics and increased fluid intake.    Review of Systems    The 10 point Review of Systems is negative other than noted in the HPI or here.     Past Medical History    I have reviewed this patient's medical history and updated it with pertinent information if needed.   Past Medical History:   Diagnosis Date     Anemia      AVN (avascular necrosis of bone) (H)     Left femoral head     Depression      DJD (degenerative joint disease)      Erectile dysfunction      Genital condyloma, male     S/p resection     Gout      History of blood transfusion      Hyperlipidemia      Hypertension      Hypogonadism male      Kidney replaced by transplant 1994    LDKT.  Early ACR, ureteral strictures.  Failed 1998 d/t chronic rejection.  S/p graft nephrectomy.     Kidney replaced by transplant 2000    LDKT.  ACR, non-compliance, CAN.  Failed in 2007.  S/p graft nephrectomy.     Kidney replaced by transplant 3/12/2015    DDKT.  Induction w/ thymo 600mg.     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  3/25/15 DSA:  A1 mfi 1604, A30 mfi 4387, B13 mfi 1013.     Medical non-compliance      Obesity     S/p gastric bypass 2005     Organ transplant candidate 2009     Osteoporosis      Thyroid disease      Vitamin D deficiency        Past Surgical History   I have reviewed this patient's surgical history and updated it with pertinent information if needed.  Past Surgical History:   Procedure Laterality Date     BIOPSY      Kidney     C HIP CORE DECOMPRESSION Left 2000     C TOTAL HIP ARTHROPLASTY Right      CYSTOSCOPY, REMOVE STENT(S), COMBINED Right 4/22/2015    Procedure: COMBINED CYSTOSCOPY, REMOVE STENT(S);  Surgeon: Ren Romeo MD;  Location: [facility] OR     GASTRIC BYPASS  7/12/2005     HERNIA REPAIR Right     inguinal     HYDROCELECTOMY INGUINAL Right       NEPHRECTOMY Right     allograft nephrectomy     NEPHRECTOMY Left 2007    allograft nephrectomy     PARATHYROIDECTOMY       TRANSPLANT KIDNEY RECIPIENT  DONOR N/A 3/12/2015    Procedure: TRANSPLANT KIDNEY RECIPIENT  DONOR;  Surgeon: Ren Romeo MD;  Location: [facility] OR     TRANSPLANT KIDNEY RECIPIENT LIVING RELATED      s/p right allograft nephrectomy     TRANSPLANT KIDNEY RECIPIENT LIVING RELATED      s/p left allograft nephrectomy     VASCULAR SURGERY         Family History   I have reviewed this patient's family history and updated it with pertinent information if needed.   Family History   Problem Relation Age of Onset     Diabetes Brother      Blood Disease Sister         sickle cell     Hypertension Father      Heart Disease Father      Cerebrovascular Disease Brother      Arthritis Mother      Heart Disease Mother        Social History   I have reviewed this patient's social history and updated it with pertinent information if needed. Tristian Mckeon  reports that he has been smoking cigars. He has never used smokeless tobacco. He reports current alcohol use. He reports that he does not use drugs.    Allergies   Allergies   Allergen Reactions     Cephalosporins Unknown     Cyclosporine      Duricef [Cefadroxil]      Prior to Admission Medications     calcitRIOL  0.25 mcg Oral Daily     chlorthalidone  25 mg Oral Daily     cloNIDine  0.2 mg Oral BID     diltiazem ER COATED BEADS  240 mg Oral Daily     ferrous sulfate  325 mg Oral Every Other Day     heparin ANTICOAGULANT  5,000 Units Subcutaneous Q12H     multivitamin w/minerals  1 tablet Oral Daily     mycophenolic acid  1,080 mg Oral BID     predniSONE  5 mg Oral Daily     sodium chloride (PF)  3 mL Intracatheter Q8H     [START ON 1/10/2021] tacrolimus  28 mg Oral QAM AC       bumetanide 0.5 mg/hr (21 0909)       Physical Exam   Temp  Av.6  F (36.4  C)  Min: 97  F (36.1  C)  Max: 98.6  F (37  C)      Pulse  Avg:  "70.2  Min: 60  Max: 87 Resp  Av  Min: 16  Max: 18  SpO2  Av.3 %  Min: 96 %  Max: 100 %     /73 (BP Location: Left arm)   Pulse 74   Temp 97.7  F (36.5  C) (Oral)   Resp 18   Ht 1.803 m (5' 11\")   Wt 89.3 kg (196 lb 12.8 oz)   SpO2 100%   BMI 27.45 kg/m     Date 21 07 - 01/10/21 0659   Shift 8549-3428 4545-7061 9677-4731 24 Hour Total   INTAKE   Shift Total(mL/kg)       OUTPUT   Urine 200   200   Shift Total(mL/kg) 200(2.24)   200(2.24)   Weight (kg) 89.27 89.27 89.27 89.27      Admit Weight: 92.1 kg (203 lb)     GENERAL APPEARANCE: alert and no distress  HENT: mouth without ulcers or lesions  LYMPHATICS: no cervical or supraclavicular nodes  RESP: lungs clear to auscultation - no rales, rhonchi or wheezes  CV: regular rhythm, normal rate, no rub, no murmur  EDEMA: mild LE edema bilaterally  ABDOMEN: soft, nondistended, nontender, bowel sounds normal  MS: extremities normal - no gross deformities noted, no evidence of inflammation in joints, no muscle tenderness  SKIN: no rash    Data   CMP  Recent Labs   Lab 21  0552 21  1815 21  1149 21  1157    137 138 137   POTASSIUM 4.2 4.7 4.3 4.5   CHLORIDE 111* 110* 112* 113*   CO2 15* 15* 16* 14*   ANIONGAP 12 12 10 10   GLC 89 279* 168* 152*   BUN 73* 71* 72* 69*   CR 4.64* 4.73* 4.88* 4.47*   GFRESTIMATED 14* 13* 13* 14*   GFRESTBLACK 16* 16* 15* 17*   MEERA 8.2* 7.8* 7.9* 7.5*   MAG  --  2.7*  --   --    PROTTOTAL  --   --  6.4* 6.5*   ALBUMIN  --   --  3.1* 3.2*   BILITOTAL  --   --  0.2 0.3   ALKPHOS  --   --  55 59   AST  --   --  12 7   ALT  --   --  13 14     CBC  Recent Labs   Lab 21  0552 21  1149 21  1157   HGB 7.7* 7.0* 7.0*   WBC 5.9 6.9 9.5   RBC 2.85* 2.58* 2.57*   HCT 24.8* 23.2* 23.2*   MCV 87 90 90   MCH 27.0 27.1 27.2   MCHC 31.0* 30.2* 30.2*   RDW 15.5* 15.8* 15.8*    173 161     INRNo lab results found in last 7 days.  ABGNo lab results found in last 7 days.   Urine " Studies  Recent Labs   Lab Test 01/07/21  1241 01/24/16  1435 09/10/15  1600 08/29/15  1425 06/25/15  0910   COLOR Yellow Yellow Light Yellow Light Yellow Light Yellow   APPEARANCE Clear Clear Clear Clear Clear   URINEGLC Negative Negative Negative Negative Negative   URINEBILI Negative Negative Negative Negative Negative   URINEKETONE Negative Negative Negative Negative Negative   SG 1.013 1.010 1.007 1.007 1.009   UBLD Negative Negative Negative Negative Negative   URINEPH 5.5 6.0 5.0 5.0 5.0   PROTEIN 30* Negative Negative Negative Negative   NITRITE Negative Negative Negative Positive* Positive*   LEUKEST Trace* Negative Negative Moderate* Moderate*   RBCU 1 1  --  <1 0   WBCU 5 4*  --  14* 11*     Recent Labs   Lab Test 02/21/20  0859 08/22/19  1033 01/23/19  0848 05/08/18  0812 08/03/17  0827 06/15/16  0905 05/19/16  1330 09/21/15  0958 09/10/15  1600 07/24/15  1136 05/07/15  0824   UTPG 0.37* 0.54* 0.76* 0.15 0.16 0.11 0.08 0.15 0.23* 0.23* 0.92*     PTH  Recent Labs   Lab Test 12/10/19  1432 05/24/18  1005 05/08/18  0812 01/12/17  1121 03/20/15  0730   PTHI 146* 120* 110* 134* 210*     Iron Studies  Recent Labs   Lab Test 10/30/20  1252 05/08/18  0812 01/12/17  1121 03/20/15  0730   IRON 43 46 74 51   * 204* 194* 156*   IRONSAT 21 23 38 33   CARLOS A 445* 414* 371 1,172*       IMAGING:  All imaging studies reviewed by me.

## 2021-01-09 NOTE — PROVIDER NOTIFICATION
Paged team at 0214 regarding high BP after PRN hydralazine given. Provider requested RN give more hydralazine. Writer received no order to give more hydralazine. Writer paged provider again at 0240 requesting order, waiting response.

## 2021-01-09 NOTE — PROGRESS NOTES
Admitted/transferred from: ED  2 RN full   skin assessment completed by Octavio Rankin, ELIU and Anneliese Perdue RN .  Skin assessment finding: skin intact, no problems   Interventions/actions: skin interventions skin intact, no interventions at this time      Will continue to monitor.

## 2021-01-09 NOTE — PLAN OF CARE
"BP (!) 186/81 (BP Location: Left arm)   Pulse 66   Temp 97.6  F (36.4  C) (Oral)   Resp 16   Ht 1.803 m (5' 11\")   Wt 89.3 kg (196 lb 12.8 oz)   SpO2 99%   BMI 27.45 kg/m    [id number]  Patient hypertensive. PRN hydralazine being given and additional on time dose of labetetolol, on RA, afebrile. No BG checks. Denied nausea. Pt  Complaint of pain in chest (not heart pain) provider notified. Tolerating 2g NA diet.with good appetite. 2l FR.PIV with bumex drip at 0.5mg/hour. Last BM 1/8. Voiding in urinal-strict I&O. Pt is UAL.   Will continue with POC and notify MD with changes or concerns.    "

## 2021-01-09 NOTE — PROVIDER NOTIFICATION
Thien Mulligan MD is notified of Pt's high B/P, B/P 201/82 HR 79. Pt received PRN hydralazine 10 mg IV; B/P came down to 188/69 HR 79.  Md also notified of pt's C/O bilateral chest, abdomen, bilateral leg tenderness and severe headache. Md came at bed side and assessed pt. Pt is now on telemetry.

## 2021-01-09 NOTE — PROGRESS NOTES
[facility]     Progress Note - Maroon 3 Service        Date of Admission:  1/8/2021    Assessment & Plan       Tristian Mckeon is a 50 year old male with ESRD 2/2 HTN s/p DDKT x3 c/b antibody-mediated rejection admitted on 1/8/2021. He presents with shortness of breath, found to have a pericardial effusion.     Today:   - BP improved with labetalol PRN  - Nephrology consult to adjust HTN regimen  - Remains on bumex gtt, goal net -2 to 3 L  - Start levofloxacin 750 mg every day for CAP  - MRSA nares     Shortness of Breath  Pericardial Effusion   Diastolic Dysfunction  Moderate Pulmonary Hypertension  CKD IV-V  Patient presents with one week history of shortness of breath. No CP, non-ischemic EKG, neg troponin. TTE with IVC >2.1 cm, moderate pericardial effusion without tamponade, also new moderate pulmonary hypertension and diastolic dysfunction. Patient has no prior cardiac history. NT BNP 46967. Received IV lasix 100 mg on 1/7 with improvement. Endorses diuretic non-compliance. Likely due to hypervolemia secondary to CKD and diastolic dysfunction. Uremic pericarditis also considered.  - No indication for pericardiocentesis at this time  - Start Bumex gtt at 0.5 mg/h, goal net neg 2-3L  - BMP, Mg BID. Maintain K>4, Mg>2  - Strict I&O, daily weight  - Fluid <2L, Na<2g  - Cardiology signed off  - Nephrology following   - Hold PTA Bumex 2 mg BID while on Bumex gtt      Cough  C/f Community Acquired Infection   Patient has one week history of cough and one episode of chills. CRP 80, , Procal 4. Possibly in the setting of volume overload above. Covid negative. Patient has prior cephalosporin allergy at [facility] due to lab abnormalities. Also reports history of MRSA infection.   - Start levofloxacin 750 mg every day for CAP  - MRSA nares     CKD IV-V  ESRD 2/2 HTN  S/p DDKT x3  C/b Antibody Mediated Rejection  Chronic Immunosuppresion  Transplant: 3/12/2015 (Kidney),  4/1/1994 (Kidney), 10/1/2000 (Kidney)   Most recently seen in nephrology clinic 11/2/20. DSA+ 8/2019. Biopsy 12/2015 with mild persistent antibody-mediated rejection. Cr on admission 4.88. Baseline Cr 3.5-3.8, has been trending up c/f worsening kidney function due to progression of disease. Referred for ESRD planning and repeat transplant evaluation.  - PTA Tacrolimus (goal 4-6)   - PTA Mycophenolic acid (goal 1-3.5)  - PTA Prednisone 5 mg every day  - No acute indication for dialysis  - Tacrolimus level in AM     Hypertension  Poor control. Goal <130/80.   - Hydralazine 10 mg PRN for SBP>160  - Labetalol 10 mg PRN x3 for SBP>160   - PTA clonidine 0.2 mg BID  - PTA diltiazem 240 mg qd  - PTA chlorthalidone 25 mg every day  - Hold PTA losartan 50 mg BID  - Hold PTA Bumex 2 mg BID while on Bumex gtt as above     Anemia of Chronic Renal Disease  On admission 7. Baseline 8-9. No evidence of bleeding.  Iron studies 10/30 with normal iron, elevated ferritin, consistent with anemia of chronic disease. Previously receiving EPO injections for Hgb <10 every 7 days, but discontinued recently as he did not  a refill of the medication.  - check EPO level  - ferrous sulfate 325 mg q other day     Diet: Fluid restriction 2000 ML FLUID  2 Gram Sodium Diet    Fluids: None  Lines: PIV  DVT Prophylaxis: Heparin SQ  Julio Catheter: not present  Code Status: Full Code           Disposition Plan   Expected discharge: 2 - 3 days, recommended to prior living arrangement once volume optimized.  Entered: Thien Mulligan MD 01/09/2021, 1:06 PM       The patient's care was discussed with the Dr. Abreu.    Thien Mulligan MD  [address]   Please see sign in/sign out for up to date coverage information      ______________________________________________________________________    Interval History   Overnight BP remained elevated 170-200/50-90s. Received hydralazine 40 mg and  labetalol 10 mg with home antihypertensives with limited improvement.     This morning, reports abdominal pain, chest pain, and HA. Improved with additional labetalol PRN.     Reports chest discomfort with deep breathing. Feels congested. Coughs up occasional sputum.     Good appetite. Plans to walk around the unit today.     Reports has prior allergy to cephalosporin at [facility]. No rash, edema, anaphylaxis but was told he had lab abnormalities due to the medications.     Data reviewed today: I reviewed all medications, new labs and imaging results over the last 24 hours.     Physical Exam   Vital Signs: Temp: 97.1  F (36.2  C) Temp src: Oral BP: (!) 156/74 Pulse: 72   Resp: 18 SpO2: 96 % O2 Device: None (Room air)    Weight: 192 lbs 0 oz  Gen: well-appearing, lying in bed with HOB elevated, in NAD  CV: RRR, normal S1 and S2, JVD to jaw  Pulm: CTAB, no increased WOB on RA  GI: soft, NT, ND.    : no suprapubic tenderness  Neuro: alert, conversant, responds to questions appropriately  Ext: no LE edema.  Psych: normal affect    Data   Recent Labs   Lab 01/09/21  0552 01/08/21  1815 01/08/21  1149 01/07/21  1157   WBC 5.9  --  6.9 9.5   HGB 7.7*  --  7.0* 7.0*   MCV 87  --  90 90     --  173 161    137 138 137   POTASSIUM 4.2 4.7 4.3 4.5   CHLORIDE 111* 110* 112* 113*   CO2 15* 15* 16* 14*   BUN 73* 71* 72* 69*   CR 4.64* 4.73* 4.88* 4.47*   ANIONGAP 12 12 10 10   MEERA 8.2* 7.8* 7.9* 7.5*   GLC 89 279* 168* 152*   ALBUMIN  --   --  3.1* 3.2*   PROTTOTAL  --   --  6.4* 6.5*   BILITOTAL  --   --  0.2 0.3   ALKPHOS  --   --  55 59   ALT  --   --  13 14   AST  --   --  12 7   TROPI  --   --  <0.015 0.015     No results found for this or any previous visit (from the past 24 hour(s)).

## 2021-01-10 LAB
ANION GAP SERPL CALCULATED.3IONS-SCNC: 13 MMOL/L (ref 3–14)
BUN SERPL-MCNC: 78 MG/DL (ref 7–30)
CALCIUM SERPL-MCNC: 7.7 MG/DL (ref 8.5–10.1)
CHLORIDE SERPL-SCNC: 111 MMOL/L (ref 94–109)
CO2 SERPL-SCNC: 17 MMOL/L (ref 20–32)
CREAT SERPL-MCNC: 4.77 MG/DL (ref 0.66–1.25)
EPO SERPL-ACNC: 14 MU/ML (ref 4–27)
ERYTHROCYTE [DISTWIDTH] IN BLOOD BY AUTOMATED COUNT: 15.7 % (ref 10–15)
GFR SERPL CREATININE-BSD FRML MDRD: 13 ML/MIN/{1.73_M2}
GLUCOSE SERPL-MCNC: 75 MG/DL (ref 70–99)
HCT VFR BLD AUTO: 23.7 % (ref 40–53)
HGB BLD-MCNC: 7.5 G/DL (ref 13.3–17.7)
MAGNESIUM SERPL-MCNC: 2.7 MG/DL (ref 1.6–2.3)
MCH RBC QN AUTO: 28.1 PG (ref 26.5–33)
MCHC RBC AUTO-ENTMCNC: 31.6 G/DL (ref 31.5–36.5)
MCV RBC AUTO: 89 FL (ref 78–100)
PLATELET # BLD AUTO: 206 10E9/L (ref 150–450)
POTASSIUM SERPL-SCNC: 4.3 MMOL/L (ref 3.4–5.3)
POTASSIUM SERPL-SCNC: 5.2 MMOL/L (ref 3.4–5.3)
RBC # BLD AUTO: 2.67 10E12/L (ref 4.4–5.9)
SODIUM SERPL-SCNC: 140 MMOL/L (ref 133–144)
TACROLIMUS BLD-MCNC: 3.1 UG/L (ref 5–15)
TME LAST DOSE: ABNORMAL H
WBC # BLD AUTO: 4.3 10E9/L (ref 4–11)

## 2021-01-10 PROCEDURE — 85027 COMPLETE CBC AUTOMATED: CPT | Performed by: STUDENT IN AN ORGANIZED HEALTH CARE EDUCATION/TRAINING PROGRAM

## 2021-01-10 PROCEDURE — 120N000011 HC R&B TRANSPLANT UMMC

## 2021-01-10 PROCEDURE — 250N000012 HC RX MED GY IP 250 OP 636 PS 637: Performed by: INTERNAL MEDICINE

## 2021-01-10 PROCEDURE — 250N000011 HC RX IP 250 OP 636: Performed by: STUDENT IN AN ORGANIZED HEALTH CARE EDUCATION/TRAINING PROGRAM

## 2021-01-10 PROCEDURE — 250N000013 HC RX MED GY IP 250 OP 250 PS 637: Performed by: NURSE PRACTITIONER

## 2021-01-10 PROCEDURE — 83735 ASSAY OF MAGNESIUM: CPT | Performed by: STUDENT IN AN ORGANIZED HEALTH CARE EDUCATION/TRAINING PROGRAM

## 2021-01-10 PROCEDURE — 250N000013 HC RX MED GY IP 250 OP 250 PS 637: Performed by: STUDENT IN AN ORGANIZED HEALTH CARE EDUCATION/TRAINING PROGRAM

## 2021-01-10 PROCEDURE — 80197 ASSAY OF TACROLIMUS: CPT | Performed by: NURSE PRACTITIONER

## 2021-01-10 PROCEDURE — 36415 COLL VENOUS BLD VENIPUNCTURE: CPT | Performed by: STUDENT IN AN ORGANIZED HEALTH CARE EDUCATION/TRAINING PROGRAM

## 2021-01-10 PROCEDURE — 84132 ASSAY OF SERUM POTASSIUM: CPT | Performed by: STUDENT IN AN ORGANIZED HEALTH CARE EDUCATION/TRAINING PROGRAM

## 2021-01-10 PROCEDURE — 99233 SBSQ HOSP IP/OBS HIGH 50: CPT | Mod: GC | Performed by: INTERNAL MEDICINE

## 2021-01-10 PROCEDURE — 250N000012 HC RX MED GY IP 250 OP 636 PS 637: Performed by: STUDENT IN AN ORGANIZED HEALTH CARE EDUCATION/TRAINING PROGRAM

## 2021-01-10 PROCEDURE — 80048 BASIC METABOLIC PNL TOTAL CA: CPT | Performed by: STUDENT IN AN ORGANIZED HEALTH CARE EDUCATION/TRAINING PROGRAM

## 2021-01-10 RX ORDER — LABETALOL HYDROCHLORIDE 5 MG/ML
10 INJECTION, SOLUTION INTRAVENOUS EVERY 6 HOURS PRN
Status: DISCONTINUED | OUTPATIENT
Start: 2021-01-10 | End: 2021-01-11 | Stop reason: HOSPADM

## 2021-01-10 RX ORDER — BUMETANIDE 1 MG/1
2 TABLET ORAL
Status: DISCONTINUED | OUTPATIENT
Start: 2021-01-10 | End: 2021-01-10

## 2021-01-10 RX ORDER — BUMETANIDE 0.25 MG/ML
3 INJECTION INTRAMUSCULAR; INTRAVENOUS
Status: DISCONTINUED | OUTPATIENT
Start: 2021-01-10 | End: 2021-01-11

## 2021-01-10 RX ADMIN — PREDNISONE 5 MG: 5 TABLET ORAL at 08:32

## 2021-01-10 RX ADMIN — BUMETANIDE 3 MG: 0.25 INJECTION INTRAMUSCULAR; INTRAVENOUS at 19:57

## 2021-01-10 RX ADMIN — MULTIPLE VITAMINS W/ MINERALS TAB 1 TABLET: TAB at 08:33

## 2021-01-10 RX ADMIN — CALCITRIOL CAPSULES 0.25 MCG 0.25 MCG: 0.25 CAPSULE ORAL at 08:31

## 2021-01-10 RX ADMIN — CHLORTHALIDONE 25 MG: 25 TABLET ORAL at 08:31

## 2021-01-10 RX ADMIN — LABETALOL HYDROCHLORIDE 10 MG: 5 INJECTION, SOLUTION INTRAVENOUS at 03:14

## 2021-01-10 RX ADMIN — MYCOPHENOLIC ACID 1080 MG: 360 TABLET, DELAYED RELEASE ORAL at 19:57

## 2021-01-10 RX ADMIN — DILTIAZEM HYDROCHLORIDE 240 MG: 120 CAPSULE, COATED, EXTENDED RELEASE ORAL at 08:31

## 2021-01-10 RX ADMIN — CLONIDINE HYDROCHLORIDE 0.2 MG: 0.1 TABLET ORAL at 08:30

## 2021-01-10 RX ADMIN — HYDRALAZINE HYDROCHLORIDE 10 MG: 20 INJECTION, SOLUTION INTRAMUSCULAR; INTRAVENOUS at 20:13

## 2021-01-10 RX ADMIN — BUMETANIDE 3 MG: 0.25 INJECTION INTRAMUSCULAR; INTRAVENOUS at 16:10

## 2021-01-10 RX ADMIN — HEPARIN SODIUM 5000 UNITS: 5000 INJECTION, SOLUTION INTRAVENOUS; SUBCUTANEOUS at 21:39

## 2021-01-10 RX ADMIN — HYDRALAZINE HYDROCHLORIDE 10 MG: 20 INJECTION, SOLUTION INTRAMUSCULAR; INTRAVENOUS at 08:33

## 2021-01-10 RX ADMIN — HEPARIN SODIUM 5000 UNITS: 5000 INJECTION, SOLUTION INTRAVENOUS; SUBCUTANEOUS at 11:53

## 2021-01-10 RX ADMIN — SODIUM BICARBONATE 650 MG TABLET 650 MG: at 19:57

## 2021-01-10 RX ADMIN — MYCOPHENOLIC ACID 1080 MG: 360 TABLET, DELAYED RELEASE ORAL at 08:31

## 2021-01-10 RX ADMIN — TACROLIMUS EXTENDED-RELEASE CAPSULES 28 MG: 5 CAPSULE, COATED, EXTENDED RELEASE ORAL at 08:32

## 2021-01-10 RX ADMIN — SODIUM BICARBONATE 650 MG TABLET 650 MG: at 08:33

## 2021-01-10 RX ADMIN — CLONIDINE HYDROCHLORIDE 0.2 MG: 0.1 TABLET ORAL at 19:57

## 2021-01-10 ASSESSMENT — ACTIVITIES OF DAILY LIVING (ADL)
ADLS_ACUITY_SCORE: 11

## 2021-01-10 ASSESSMENT — MIFFLIN-ST. JEOR: SCORE: 1744.43

## 2021-01-10 NOTE — PLAN OF CARE
A&Ox4.B/P runs high; pt had PRN hydralazine 10 mg IV x2; labetalol 10 mg IV x2 and his scheduled b/p meds; B/P came down this afternoon. Pt reported severe headache this morning when B/P was high; improved when B/P came down and Pt also had tylenol 650 mg tab po x1. Pt also reports bilateral chest, abdomen and bilateral le tenderness; Md is aware.Pt is on bumex drip at 0.5 mg/hr( 2 ml/hr). Pt is on 2 gm Na diet and 2 L fluid restriction; pt is very reserved of his fluid intake Good urine output. Pt is on telemetry for pericardiac  effusion; NSR.Frequent productive cough( grey, pale yellow thick sputum). Sputum sample got sent. Pt started IV levaquin for CAP.MRSA nasal swab sent.Up independent to bathroom.Continue with POC.

## 2021-01-10 NOTE — PROGRESS NOTES
[facility]    Transplant Nephrology Progress Note  Date of Admission:  1/8/2021  Today's Date: 01/10/2021    Recommendations:  - will start home dose bumex 2mg BID    - will increase Astagraf XL dose to 30 mg daily and recommend rechecking level on Wednesday.     Assessment & Plan   # DDKT: Trend up   - Baseline Creatinine ~ 3.5-3.8   - Proteinuria: Minimal (0.2-0.5 grams)   - Date DSA Last Checked: Feb/2020      Latest DSA: No   - BK Viremia: Not checked recently   - Kidney Tx Biopsy: Dec 23, 2015; Result: Mild glomerulitis and capillary C4d staining consistent with mild, persistent antibody-mediated rejection. 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     # Infection Prophylaxis:   - PJP: None    # Hypertension: Inadequate control;  Goal BP: < 130/80   - Volume status: Mildly hypervolemic  EDW ~ 75 kg    - Changes: Yes - will start home dose bumex 2 mg BID , continue diltiazem 240 mg daily, chlorthalidone 25 mg daily, clonidine 0.2 mg BID and prn hydralazine and labetalol.       # Anemia in Chronic Renal Disease: Hgb: Decreased      RAYMOND: No   - Iron studies: Replete    # Mineral Bone Disorder:   - Secondary renal hyperparathyroidism; PTH level: Minimally elevated ( pg/ml)        On treatment: None  - Vitamin D; level: Not checked recently        On supplement: No  - Calcium; level: Low , but corrected with hypoalbuminemia        On supplement: No  - Phosphorus; level: Not checked recently        On supplement: No    # Electrolytes:   - Potassium; level: Normal        On supplement: No  - Magnesium; level: High        On supplement: No  - Bicarbonate; level: Low        On  supplement: Yes, Sodium Bicarb 650 mg TID     # Acute exacerbation of diastolic heart failure (EF 60-65%), pulmonary hypertension (PASP 37 mmHg): cards following.  Likely from fluid over load and accelerated HTN. 2L off since admit and bumex gtt stopped.     #Moderate pericardial effusion: likely secondary to hypervolemia. No evidence of tamponade. Pericardiocentesis not recommended at this time by cardiology.     # Transplant History:  Etiology of Kidney Failure: Hypertension  Tx: DDKT  Transplant: 3/12/2015 (Kidney), 1994 (Kidney), 10/1/2000 (Kidney)  Donor Type: Donation after Brain Death Donor Class: Standard Criteria Donor  Significant changes in immunosuppression: None  Significant transplant-related complications: None    Recommendations were communicated to the primary team via this note.    SHANE Phillip CNP   Pager: [phone]    Interval History   Generalized chest and abdominal pain improved. Bumex stopped due to cramping. No fevers, SOB , n/V, diarrhea. Up at kapil.      Review of Systems   4 point ROS was obtained and negative except as noted in the Interval History.    MEDICATIONS:    calcitRIOL  0.25 mcg Oral Daily     chlorthalidone  25 mg Oral Daily     cloNIDine  0.2 mg Oral BID     diltiazem ER COATED BEADS  240 mg Oral Daily     ferrous sulfate  325 mg Oral Every Other Day     heparin ANTICOAGULANT  5,000 Units Subcutaneous Q12H     levofloxacin  750 mg Intravenous Q48H     multivitamin w/minerals  1 tablet Oral Daily     mycophenolic acid  1,080 mg Oral BID     predniSONE  5 mg Oral Daily     sodium bicarbonate  650 mg Oral BID     sodium chloride (PF)  3 mL Intracatheter Q8H     tacrolimus  28 mg Oral QAM AC       [Held by provider] bumetanide Stopped (01/10/21 0337)       Physical Exam   Temp  Av.8  F (36.6  C)  Min: 97  F (36.1  C)  Max: 98.6  F (37  C)      Pulse  Av.9  Min: 60  Max: 87 Resp  Av.2  Min: 16  Max: 18  SpO2  Av %  Min: 96 %  Max: 100 %     BP (!)  "188/69   Pulse 72   Temp 98.2  F (36.8  C) (Oral)   Resp 16   Ht 1.803 m (5' 11\")   Wt 87.1 kg (192 lb)   SpO2 97%   BMI 26.78 kg/m     Date 01/10/21 0700 - 01/11/21 0659   Shift 6667-5886 7227-1632 7446-5219 24 Hour Total   INTAKE   Shift Total(mL/kg)       OUTPUT   Urine 575   575   Shift Total(mL/kg) 575(6.6)   575(6.6)   Weight (kg) 87.09 87.09 87.09 87.09      Admit Weight: 92.1 kg (203 lb)     GENERAL APPEARANCE: alert and no distress  HENT: mouth without ulcers or lesions  LYMPHATICS: no cervical or supraclavicular nodes  RESP: lungs clear to auscultation - no rales, rhonchi or wheezes  CV: regular rhythm, normal rate, no rub, no murmur  EDEMA: no LE edema bilaterally  EDEMA: pitting LE edema bilaterally  ABDOMEN: soft, nondistended, nontender, bowel sounds normal  MS: extremities normal - no gross deformities noted, no evidence of inflammation in joints, no muscle tenderness  SKIN: no rash    Data   All labs reviewed by me.  CMP  Recent Labs   Lab 01/10/21  0632 01/09/21  1715 01/09/21  0552 01/08/21  1815 01/08/21  1149 01/07/21  1157     --  139 137 138 137   POTASSIUM 4.3 4.5 4.2 4.7 4.3 4.5   CHLORIDE 111*  --  111* 110* 112* 113*   CO2 17*  --  15* 15* 16* 14*   ANIONGAP 13  --  12 12 10 10   GLC 75  --  89 279* 168* 152*   BUN 78*  --  73* 71* 72* 69*   CR 4.77*  --  4.64* 4.73* 4.88* 4.47*   GFRESTIMATED 13*  --  14* 13* 13* 14*   GFRESTBLACK 15*  --  16* 16* 15* 17*   MEERA 7.7*  --  8.2* 7.8* 7.9* 7.5*   MAG  --  2.6*  --  2.7*  --   --    PROTTOTAL  --   --   --   --  6.4* 6.5*   ALBUMIN  --   --   --   --  3.1* 3.2*   BILITOTAL  --   --   --   --  0.2 0.3   ALKPHOS  --   --   --   --  55 59   AST  --   --   --   --  12 7   ALT  --   --   --   --  13 14     CBC  Recent Labs   Lab 01/10/21  0632 01/09/21  0552 01/08/21  1149 01/07/21  1157   HGB 7.5* 7.7* 7.0* 7.0*   WBC 4.3 5.9 6.9 9.5   RBC 2.67* 2.85* 2.58* 2.57*   HCT 23.7* 24.8* 23.2* 23.2*   MCV 89 87 90 90   MCH 28.1 27.0 27.1 27.2 "   MCHC 31.6 31.0* 30.2* 30.2*   RDW 15.7* 15.5* 15.8* 15.8*    215 173 161     INRNo lab results found in last 7 days.  ABGNo lab results found in last 7 days.   Urine Studies  Recent Labs   Lab Test 01/07/21  1241 01/24/16  1435 09/10/15  1600 08/29/15  1425 06/25/15  0910   COLOR Yellow Yellow Light Yellow Light Yellow Light Yellow   APPEARANCE Clear Clear Clear Clear Clear   URINEGLC Negative Negative Negative Negative Negative   URINEBILI Negative Negative Negative Negative Negative   URINEKETONE Negative Negative Negative Negative Negative   SG 1.013 1.010 1.007 1.007 1.009   UBLD Negative Negative Negative Negative Negative   URINEPH 5.5 6.0 5.0 5.0 5.0   PROTEIN 30* Negative Negative Negative Negative   NITRITE Negative Negative Negative Positive* Positive*   LEUKEST Trace* Negative Negative Moderate* Moderate*   RBCU 1 1  --  <1 0   WBCU 5 4*  --  14* 11*     Recent Labs   Lab Test 02/21/20  0859 08/22/19  1033 01/23/19  0848 05/08/18  0812 08/03/17  0827 06/15/16  0905 05/19/16  1330 09/21/15  0958 09/10/15  1600 07/24/15  1136 05/07/15  0824   UTPG 0.37* 0.54* 0.76* 0.15 0.16 0.11 0.08 0.15 0.23* 0.23* 0.92*     PTH  Recent Labs   Lab Test 12/10/19  1432 05/24/18  1005 05/08/18  0812 01/12/17  1121 03/20/15  0730   PTHI 146* 120* 110* 134* 210*     Iron Studies  Recent Labs   Lab Test 10/30/20  1252 05/08/18  0812 01/12/17  1121 03/20/15  0730   IRON 43 46 74 51   * 204* 194* 156*   IRONSAT 21 23 38 33   CARLOS A 445* 414* 371 1,172*       IMAGING:  All imaging studies reviewed by me.

## 2021-01-10 NOTE — PLAN OF CARE
"BP (!) 156/56 (BP Location: Left arm)   Pulse 72   Temp 97.7  F (36.5  C) (Oral)   Resp 16   Ht 1.803 m (5' 11\")   Wt 85.7 kg (189 lb)   SpO2 98%   BMI 26.36 kg/m       Neuro: A/Ox4  Cardiac: Hypertensive, PRN IV hydralazine given x1 for SBP >160. SR with first degree AV block on tele. Afebrile  Respiratory: RA. GRULLON. Productive cough  GI/: BM x1. Voiding fair amounts. Started on 3mg IV bumex TID.   Diet/Appetite: 2g Na with 2L FR. Fair appetite. Denies nausea  Skin: No new skin deficits noted  LDA: PIVx1 saline locked  Activity: Up ad kapil  Pain: Acceptable with current regimen  Plan: Continue to monitor and follow POC. Notify MD with any changes or concerns     "

## 2021-01-10 NOTE — PROVIDER NOTIFICATION
"Cross cover paged at 339 \"FYI: Pt asked for bumex to be shut off as he is experiencing cramping of his lower extremities. I offered alternatives such as hot packs but he asked it be shut off for now stating that we were pulling too much fluid \"too fast\".\"  "

## 2021-01-10 NOTE — PLAN OF CARE
"Vitals: BP (!) 166/55 (BP Location: Left arm)   Pulse 76   Temp 98.1  F (36.7  C) (Oral)   Resp 18   Ht 1.803 m (5' 11\")   Wt 87.1 kg (192 lb)   SpO2 97%   BMI 26.78 kg/m      Shift: 9844-8244  VS: hypertensive, OVSS on RA.   Neuro: A&Ox4  Labs: creat 4.64, tac 3.6, covid neg, MRSA nasal swab negative  Respiratory: Diminished at bases, frequent productive cough.   Cardiac: distant, tele showed NSR with 1st degree heart block   Pain/Nausea/PRN: endorses cramping in lower extremities, asked for bumex to be shut off at 330. On call said he met his net and wanted to hold anyway  Diet: 2 gram sodium diet with 2L FR   LDA: R fistula WDL L PIV   Gtt/IVF: bumex shut off at 330.   GI/: 550 this shift, LBM 1/8  Skin: WDL   Mobility: up ad kapil   Plan: diurese, treat pneumonia      Will continue with plan of care and update team with any changes.     "

## 2021-01-11 VITALS
HEIGHT: 71 IN | RESPIRATION RATE: 18 BRPM | TEMPERATURE: 98 F | OXYGEN SATURATION: 98 % | DIASTOLIC BLOOD PRESSURE: 71 MMHG | HEART RATE: 66 BPM | SYSTOLIC BLOOD PRESSURE: 164 MMHG | WEIGHT: 191 LBS | BODY MASS INDEX: 26.74 KG/M2

## 2021-01-11 LAB
ANION GAP SERPL CALCULATED.3IONS-SCNC: 14 MMOL/L (ref 3–14)
BACTERIA SPEC CULT: ABNORMAL
BACTERIA SPEC CULT: ABNORMAL
BUN SERPL-MCNC: 84 MG/DL (ref 7–30)
CALCIUM SERPL-MCNC: 7.8 MG/DL (ref 8.5–10.1)
CHLORIDE SERPL-SCNC: 108 MMOL/L (ref 94–109)
CO2 SERPL-SCNC: 15 MMOL/L (ref 20–32)
CREAT SERPL-MCNC: 5.19 MG/DL (ref 0.66–1.25)
GFR SERPL CREATININE-BSD FRML MDRD: 12 ML/MIN/{1.73_M2}
GLUCOSE SERPL-MCNC: 96 MG/DL (ref 70–99)
INTERPRETATION ECG - MUSE: NORMAL
MAGNESIUM SERPL-MCNC: 2.7 MG/DL (ref 1.6–2.3)
POTASSIUM SERPL-SCNC: 4.6 MMOL/L (ref 3.4–5.3)
SODIUM SERPL-SCNC: 138 MMOL/L (ref 133–144)
SPECIMEN SOURCE: ABNORMAL
TACROLIMUS BLD-MCNC: 3.5 UG/L (ref 5–15)
TME LAST DOSE: ABNORMAL H

## 2021-01-11 PROCEDURE — 83735 ASSAY OF MAGNESIUM: CPT | Performed by: STUDENT IN AN ORGANIZED HEALTH CARE EDUCATION/TRAINING PROGRAM

## 2021-01-11 PROCEDURE — 36415 COLL VENOUS BLD VENIPUNCTURE: CPT | Performed by: INTERNAL MEDICINE

## 2021-01-11 PROCEDURE — 99239 HOSP IP/OBS DSCHRG MGMT >30: CPT | Mod: GC | Performed by: INTERNAL MEDICINE

## 2021-01-11 PROCEDURE — 250N000012 HC RX MED GY IP 250 OP 636 PS 637: Performed by: INTERNAL MEDICINE

## 2021-01-11 PROCEDURE — 250N000012 HC RX MED GY IP 250 OP 636 PS 637: Performed by: NURSE PRACTITIONER

## 2021-01-11 PROCEDURE — 250N000012 HC RX MED GY IP 250 OP 636 PS 637: Performed by: STUDENT IN AN ORGANIZED HEALTH CARE EDUCATION/TRAINING PROGRAM

## 2021-01-11 PROCEDURE — 250N000013 HC RX MED GY IP 250 OP 250 PS 637: Performed by: NURSE PRACTITIONER

## 2021-01-11 PROCEDURE — 80197 ASSAY OF TACROLIMUS: CPT | Performed by: INTERNAL MEDICINE

## 2021-01-11 PROCEDURE — 250N000011 HC RX IP 250 OP 636: Performed by: STUDENT IN AN ORGANIZED HEALTH CARE EDUCATION/TRAINING PROGRAM

## 2021-01-11 PROCEDURE — 80048 BASIC METABOLIC PNL TOTAL CA: CPT | Performed by: STUDENT IN AN ORGANIZED HEALTH CARE EDUCATION/TRAINING PROGRAM

## 2021-01-11 PROCEDURE — 250N000013 HC RX MED GY IP 250 OP 250 PS 637: Performed by: STUDENT IN AN ORGANIZED HEALTH CARE EDUCATION/TRAINING PROGRAM

## 2021-01-11 RX ORDER — HYDRALAZINE HYDROCHLORIDE 50 MG/1
50 TABLET, FILM COATED ORAL 3 TIMES DAILY
Qty: 90 TABLET | Refills: 1 | Status: SHIPPED | OUTPATIENT
Start: 2021-01-11 | End: 2021-03-16

## 2021-01-11 RX ORDER — BUMETANIDE 1 MG/1
2 TABLET ORAL
Status: DISCONTINUED | OUTPATIENT
Start: 2021-01-11 | End: 2021-01-11 | Stop reason: HOSPADM

## 2021-01-11 RX ORDER — LEVOFLOXACIN 750 MG/1
750 TABLET, FILM COATED ORAL EVERY OTHER DAY
Qty: 3 TABLET | Refills: 0 | Status: SHIPPED | OUTPATIENT
Start: 2021-01-11 | End: 2021-01-16

## 2021-01-11 RX ADMIN — BUMETANIDE 2 MG: 1 TABLET ORAL at 08:02

## 2021-01-11 RX ADMIN — CHLORTHALIDONE 25 MG: 25 TABLET ORAL at 07:57

## 2021-01-11 RX ADMIN — SODIUM BICARBONATE 650 MG TABLET 650 MG: at 07:57

## 2021-01-11 RX ADMIN — HEPARIN SODIUM 5000 UNITS: 5000 INJECTION, SOLUTION INTRAVENOUS; SUBCUTANEOUS at 10:10

## 2021-01-11 RX ADMIN — TACROLIMUS EXTENDED-RELEASE CAPSULES 30 MG: 5 CAPSULE, COATED, EXTENDED RELEASE ORAL at 08:02

## 2021-01-11 RX ADMIN — CLONIDINE HYDROCHLORIDE 0.2 MG: 0.1 TABLET ORAL at 07:56

## 2021-01-11 RX ADMIN — MULTIPLE VITAMINS W/ MINERALS TAB 1 TABLET: TAB at 07:56

## 2021-01-11 RX ADMIN — CALCITRIOL CAPSULES 0.25 MCG 0.25 MCG: 0.25 CAPSULE ORAL at 07:57

## 2021-01-11 RX ADMIN — FERROUS SULFATE TAB 325 MG (65 MG ELEMENTAL FE) 325 MG: 325 (65 FE) TAB at 08:05

## 2021-01-11 RX ADMIN — BUMETANIDE 2 MG: 1 TABLET ORAL at 16:22

## 2021-01-11 RX ADMIN — MYCOPHENOLIC ACID 1080 MG: 360 TABLET, DELAYED RELEASE ORAL at 07:57

## 2021-01-11 RX ADMIN — LEVOFLOXACIN 750 MG: 5 INJECTION, SOLUTION INTRAVENOUS at 13:15

## 2021-01-11 RX ADMIN — DILTIAZEM HYDROCHLORIDE 240 MG: 120 CAPSULE, COATED, EXTENDED RELEASE ORAL at 08:02

## 2021-01-11 RX ADMIN — PREDNISONE 5 MG: 5 TABLET ORAL at 07:57

## 2021-01-11 ASSESSMENT — ACTIVITIES OF DAILY LIVING (ADL)
ADLS_ACUITY_SCORE: 11
DEPENDENT_IADLS:: INDEPENDENT
ADLS_ACUITY_SCORE: 11

## 2021-01-11 ASSESSMENT — MIFFLIN-ST. JEOR: SCORE: 1753.5

## 2021-01-11 NOTE — CONSULTS
Care Management Initial Consult    General Information  Assessment completed with: Patient,    Type of CM/SW Visit: Initial Assessment    Primary Care Provider verified and updated as needed: Yes   Readmission within the last 30 days:     Reason for Consult: Red readmission risk  Advance Care Planning:    Legal NOK is mom or dad    Communication Assessment  Patient's communication style: spoken language (English or Bilingual)    Hearing Difficulty or Deaf: no   Wear Glasses or Blind: yes    Cognitive  Cognitive/Neuro/Behavioral: WDL                      Living Environment:   People in home: child(jewel), dependent  2 children, 13 and 16  Current living Arrangements: house      Able to return to prior arrangements: yes    Family/Social Support:  Care provided by: self  Provides care for: children (16 and 12 yo)  Marital Status: Single  Support: Mom, Dad, and girlfriend provide support to patient       Description of Support System: Supportive    Support Assessment: Adequate family and caregiver support    Current Resources:   Skilled Home Care Services:  None  Community Resources: None  Equipment currently used at home: None  Supplies currently used at home: None    Employment/Financial:  Employment Status: employed part-time     Employment/ Comments: Property Clean Up  Financial Concerns: No concerns identified    Finance Comments: Thinking about applying for SSDI again. Last time on SSDI was 2 years    Lifestyle & Psychosocial Needs:  Socioeconomic History     Marital status: Single     Spouse name: Not on file     Number of children: 2     Years of education: Not on file     Highest education level: Not on file   Occupational History     Employer: UNEMPLOYED     Tobacco Use     Smoking status: Light Tobacco Smoker     Types: Cigars     Smokeless tobacco: Never Used   Substance and Sexual Activity     Alcohol use: Yes     Alcohol/week: 0.0 standard drinks     Comment: Minimal     Drug use: No     Sexual  activity: Yes     Partners: Female     Functional Status:  Prior to admission patient needed assistance:   Dependent ADLs: Independent  Dependent IADLs: Independent  Assesssment of Functional Status: At functional baseline    Mental Health Status:  Mental Health Status: No Current Concerns       Chemical Dependency Status:  Chemical Dependency Status: No Current Concerns        Values/Beliefs:  Spiritual, Cultural Beliefs, Mandaeism Practices, Values that affect care: Did not discuss today    Additional Information:  Patient is a 50 year old male with ESRD 2/2 HTN s/p DDKT x3 c/b antibody-mediated rejection admitted on 1/8/2021. He presents with shortness of breath, found to have a pericardial effusion. SW/CM did a self consult due to red readmission risk. SW spoke with patient via phone to complete this assessment. Patient has good support his parents and girlfriend. 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 They are staying with patient's parents while he is hospitalized. Patient does work cleaning in and out of homes. He is concerned about returning to work with his condition. SW paged out to the team to discuss when patient should return to work. Patient had no further SW/CM needs.     TREE Carrillo, GENET  7A   Ph: [phone]  Pager: [phone]

## 2021-01-12 ENCOUNTER — PATIENT OUTREACH (OUTPATIENT)
Dept: CARE COORDINATION | Facility: CLINIC | Age: 51
End: 2021-01-12

## 2021-01-12 NOTE — DISCHARGE SUMMARY
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 (with [facility] or [facility]   provider or service). Call [phone] if you haven't heard regarding   these appointments within 7 days of discharge.              BMP on Wednesday, 1/13/2020.  Results to be followed up by CORE clinic.  Follow-up with nephrology clinic within 1 week    Discharge Disposition   Discharged to home  Condition at discharge: Stable    Hospital Course   Tristian Mckeon was admitted on 1/8/2021 for cough, SOB, and increased weight.  The following problems were addressed during his hospitalization:    Shortness of Breath  Cough  Haemophilus influenzae pneumonia  Pericardial Effusion   Diastolic Dysfunction  Moderate Pulmonary Hypertension  Patient presents with one week history of shortness of breath, most likely secondary to Haemophilus influenza pneumonia and possible volume overload contributing. No CP, non-ischemic EKG, neg troponin. TTE with IVC >2.1 cm, moderate pericardial effusion without tamponade, also new moderate pulmonary hypertension and diastolic dysfunction. Patient has no prior cardiac history. NT BNP 04212. Received IV lasix and bumex. Endorses diuretic non-compliance, agreeable to restarting PTA  bumex.   - No indication for pericardiocentesis at this time  - Fluid <2L, Na<2g  - PTA Bumex 2 mg BID  -10-day course of levofloxacin for Haemophilus influenza pneumonia, end 1/17/2021     CKD IV-V  ESRD 2/2 HTN  S/p DDKT x3  C/b Antibody Mediated Rejection  Chronic Immunosuppresion  Transplant: 3/12/2015 (Kidney), 4/1/1994 (Kidney), 10/1/2000 (Kidney)   Most recently seen in nephrology clinic 11/2/20. DSA+ 8/2019. Biopsy 12/2015 with mild persistent antibody-mediated rejection. Cr on admission 4.88. Baseline Cr 3.5-3.8, has been trending up c/f worsening kidney function due to progression of disease. Referred for ESRD planning and repeat transplant evaluation. Creat 5.19 on day of discharge.  - PTA Tacrolimus (goal 4-6) - increased this admission to 30 mg daily  - PTA Mycophenolic acid (goal 1-3.5)  - PTA Prednisone 5 mg every day  - No acute indication for dialysis  - Follow up in nephrology clinic within 1 week of discharge     Hypertension  Poor control. Goal <130/80. Has home blood pressure cuff.   Continue PTA regimen: clonidine 0.2 mg BID, diltiazem 240 mg every day,  bumex 2 mg BID  -Per nephrology recommendations - HOLD PTA chlorthalidone 25 mg daily and losartan 50 mg BID, re-evaluate in nephrology clinic in 1 week  -Per nephrology recommendations, added p.o. hydralazine 50 mg 3 times daily, patient to hold for systolic blood pressure less than 120     Anemia of Chronic Renal Disease  On admission 7, stable throughout admission. Baseline 8-9. No evidence of bleeding.  Iron studies 10/30 with normal iron, elevated ferritin, consistent with anemia of chronic disease. Previously receiving EPO injections for Hgb <10 every 7 days, but discontinued recently as he did not  a refill of the medication.  - ferrous sulfate 325 mg q other day      Consultations This Hospital Stay   CARDIOLOGY GENERAL ADULT IP CONSULT  NEPHROLOGY KIDNEY/PANCREAS TRANSPLANT ADULT IP CONSULT  CARE MANAGEMENT / SOCIAL WORK IP  CONSULT    Code Status   Full code       The patient was discussed with MD Jamel Spivey 3 Service  [facility] [address] [address]  Phone: [phone]  ______________________________________________________________________    Physical Exam   Vital Signs: Temp: 98  F (36.7  C) Temp src: Oral BP: (!) 164/71 Pulse: 66   Resp: 18 SpO2: 98 % O2 Device: None (Room air)    Weight: 191 lbs 0 oz  General Appearance: Well appearing, laying back in bed, comfortable and in NAD  Respiratory: Comfortable on room air with no increased WOB, no cough, CTAB  Cardiovascular: RRR, no murmurs, JVD elevated  GI: Soft, NT, ND  Skin: No rash      Primary Care Physician   Physician No Ref-Primary    Discharge Orders      Basic metabolic panel     CARDIOVASCULAR CORE CLINIC REFERRAL      Activity    Your activity upon discharge: activity as tolerated     When to contact your care team    Call your nephrology clinic if you have any of the following:  increased shortness of breath or increased swelling. Increased blood pressure persistently systolic blood pressure >160. Increasing weight.     Monitor and record    Measure and record your blood pressure daily  Measure and record your weight when you get home. (This will be your dry weight.)  Then measure and record your weight daily     Reason for your hospital stay    Dear Mr. Mckeon,    You were admitted for shortness of breath. This was likely due to the following reasons: You were found to have a fluid collection around your heart and significant volume overload due to your decreased kidney function. You were also found to have a bacterial pneumonia with Haemophilus inluenzae.     You were started on a Bumex drip to help remove the additional fluid. At discharge, you were placed back on your home diuretics. It will be important to continue taking your diuretics at home. Please check your weight at home  every day.    You were also started on an antibiotic, Levofloxacin, which you will continue at home. You will take this medication every other day for a total of three doses on 1/13, 1/15, and 1/17.     You were evaluated by the kidney doctors while you were here. Due to concern that your kidney function is worsening, your Astrograf was increased to 30 mg every morning. You will need to have a repeat lab check (BMP) in CORE clinic in 2 days to make sure your kidney function is improving.    Your blood pressure control will also be very important to help you continue to protect your kidney. Please keep a log of your blood pressures at home. You will follow up in transplant nephrology clinic to continue to work on this.    Medication changes:   Start: Levofloxacin 750 mg every other day (1/13, 1/15, 1/17) , Hydralazine 50 mg three times a day (hold if Systolic BP <120)   Increase: Astrograf to 30 mg every day   Stop: Chlorthalidone, Losartan (due to current kidney injury)    On behalf of the inpatient team at [facility], it has been a privilege to care for you.    Sincerely,  Thien Mulligan MD  [address]      Adult [facility] Follow-up and recommended labs and tests    [place] up in CORE clinic in 2 days with BMP  Follow up in Transplant Nephrology clinic in 1 week or next available    Appointments on [place] and/or [facility] (with [facility] or [facility] provider or service). Call [phone] if you haven't heard regarding these appointments within 7 days of discharge.     Diet    Follow this diet upon discharge: Orders Placed This Encounter      Fluid restriction 2000 ML FLUID      2 Gram Sodium Diet       Significant Results and Procedures   Most Recent 3 BMP's:  Recent Labs   Lab Test 01/11/21  0449 01/10/21  1754 01/10/21  0632 01/09/21  0552 01/09/21  0552     --  140  --  139   POTASSIUM 4.6 5.2 4.3   < > 4.2   CHLORIDE 108  --  111*  --   111*   CO2 15*  --  17*  --  15*   BUN 84*  --  78*  --  73*   CR 5.19*  --  4.77*  --  4.64*   ANIONGAP 14  --  13  --  12   MEERA 7.8*  --  7.7*  --  8.2*   GLC 96  --  75  --  89    < > = values in this interval not displayed.       Discharge Medications   Discharge Medication List as of 1/11/2021  4:36 PM      START taking these medications    Details   hydrALAZINE (APRESOLINE) 50 MG tablet Take 1 tablet (50 mg) by mouth 3 times daily Do not take if systolic blood pressure is less than 120., Disp-90 tablet, R-1, E-Prescribe      levofloxacin (LEVAQUIN) 750 MG tablet Take 1 tablet (750 mg) by mouth every other day for 3 doses Please take on 1/13, 1/15, 1/17, Disp-3 tablet, R-0, E-PrescribeDose adjusted for renal function to q48         CONTINUE these medications which have CHANGED    Details   tacrolimus (ASTAGRAF XL) 5 MG 24 hr capsule Take 6 capsules (30 mg) by mouth every morning (before breakfast), Disp-180 capsule, R-0, E-Prescribe         CONTINUE these medications which have NOT CHANGED    Details   mycophenolic acid (GENERIC EQUIVALENT) 360 MG EC tablet Take 1,080 mg by mouth 2 times daily, Historical      Blood Pressure Monitor KIT Automatic Blood Pressure Monitor, Disp-1 kit, R-0, E-Prescribe      bumetanide (BUMEX) 1 MG tablet Take 2 tablets (2 mg) by mouth 2 times daily, Disp-120 tablet, R-1, E-Prescribe      calcitRIOL (ROCALTROL) 0.25 MCG capsule TAKE ONE CAPSULE BY MOUTH ONCE DAILY, Disp-90 capsule, R-3, E-Prescribe      cloNIDine (CATAPRES) 0.1 MG tablet Take 0.2 mg by mouth 2 times daily, Historical      cyclobenzaprine (FLEXERIL) 10 MG tablet Take 10 mg by mouth 3 times daily as needed for muscle spasms, Historical      diltiazem ER COATED BEADS (CARTIA XT) 240 MG 24 hr capsule TAKE ONE CAPSULE BY MOUTH ONCE DAILY, Disp-90 capsule, R-3, E-Prescribe      epoetin sharon (EPOGEN/PROCRIT) 57372 UNIT/ML injection Inject 1 mL (10,000 Units) Subcutaneous every 7 days Administer for Hgb <10. HOLD dose if  systolic BP >180., Disp-4 mL, R-11, E-Prescribe      ferrous sulfate (IRON) 325 (65 Fe) MG tablet Take 1 tablet (325 mg) by mouth daily, Disp-90 tablet, R-3, E-Prescribe      Multiple Vitamin (DAILY MULTIVITAMIN PO) Take 1 tablet by mouth daily , Historical      predniSONE (DELTASONE) 5 MG tablet Take 1 tablet (5 mg) by mouth daily, Disp-30 tablet, R-11, E-Prescribe      UNABLE TO FIND Take 1 tablet by mouth daily MEDICATION NAME: calcium 500 mg - Vitamin B12 , Historical         STOP taking these medications       chlorthalidone (HYGROTON) 25 MG tablet Comments:   Reason for Stopping:         losartan (COZAAR) 50 MG tablet Comments:   Reason for Stopping:             Allergies   Allergies   Allergen Reactions     Cephalosporins Unknown     Cyclosporine      Duricef [Cefadroxil]

## 2021-01-12 NOTE — PLAN OF CARE
Patient's MD wrote for patient to discharge. PIV removed. Went over discharge orders and medications. Patient with no further questions, discharged home with family.

## 2021-01-12 NOTE — PROGRESS NOTES
[facility]: Post-Discharge Note  SITUATION                                                      Admission:    Admission Date: 01/08/21   Reason for Admission: Haemophilus influenza pneumonia  Discharge:   Discharge Date: 01/11/21  Discharge Diagnosis: Haemophilus influenza pneumonia  Discharge Service: Medicine and Pediatrics    BACKGROUND                                                      Tristian Mckeon is a 50 year old male with ESRD 2/2 HTN s/p DDKT x3 c/b antibody-mediated rejection admitted on 1/8/2021. He presents with shortness of breath, found to have a pericardial effusion.     ASSESSMENT      Discharge Assessment  Patient reports symptoms are: Improved  Does the patient have all of their medications?: Yes  Does patient know what their new medications are for?: Yes  Does patient have a follow-up appointment scheduled?: Yes  Does patient have any other questions or concerns?: No    Post-op  Did the patient have surgery or a procedure: No  Fever: No  Chills: No  Eating & Drinking: eating and drinking without complaints/concerns  PO Intake: other(Fluid restriction, 2 gram sodium diet)  Bowel Function: normal  Urinary Status: voiding without complaint/concerns    PLAN                                                      Outpatient Plan:      Follow up in CORE clinic in 2 days with [facility]    Follow up in Transplant Nephrology clinic in 1 week or next available    Future Appointments   Date Time Provider Department Center   3/16/2021  3:35 PM 1,  Kidney/Pancreas Recipient [facility]           Yokasta Dyer, [place]

## 2021-01-14 ENCOUNTER — VIRTUAL VISIT (OUTPATIENT)
Dept: NEPHROLOGY | Facility: CLINIC | Age: 51
End: 2021-01-14
Attending: INTERNAL MEDICINE
Payer: COMMERCIAL

## 2021-01-14 DIAGNOSIS — Z48.298 AFTERCARE FOLLOWING ORGAN TRANSPLANT: ICD-10-CM

## 2021-01-14 DIAGNOSIS — Z94.0 KIDNEY REPLACED BY TRANSPLANT: Primary | ICD-10-CM

## 2021-01-14 DIAGNOSIS — N18.4 CKD (CHRONIC KIDNEY DISEASE) STAGE 4, GFR 15-29 ML/MIN (H): ICD-10-CM

## 2021-01-14 DIAGNOSIS — D84.9 IMMUNOSUPPRESSION (H): ICD-10-CM

## 2021-01-14 PROCEDURE — 99215 OFFICE O/P EST HI 40 MIN: CPT | Mod: GT

## 2021-01-14 ASSESSMENT — PAIN SCALES - GENERAL: PAINLEVEL: NO PAIN (0)

## 2021-01-14 NOTE — LETTER
1/14/2021      RE: Tristian Mckeon  [address]  [place] [address]       Tristian is a 50 year old who is being evaluated via a billable video visit.      How would you like to obtain your AVS? MyChart  If the video visit is dropped, the invitation should be resent by: Text to cell phone: [phone]  Will anyone else be joining your video visit? No    Video Start Time: 1112  Video-Visit Details    Type of service:  Video Visit    Video End Time:1123    Originating Location (pt. Location): Home    Distant Location (provider location):  [facility] NEPHROLOGY [facility]IC [employer]     Platform used for Video Visit: [employer]    CHRONIC TRANSPLANT NEPHROLOGY VISIT    Assessment & Plan   # DDKT: Increased creatinine and nearing need for renal replacement therapy.  Patient has some uremic symptoms, but feels he isn't quite ready to start dialysis.  Will continue to follow very closely with weekly labs and return visit in a few weeks.  Will have Nephrology nurse also check in with him.   - Baseline Creatinine:  ~ 3-4s   - Proteinuria: Minimal (0.2-0.5 grams)   - Date DSA Last Checked: Feb/2020      Latest DSA: Some DSA   - BK Viremia: Not checked recently due to time from transplant   - Kidney Tx Biopsy: Dec 23, 2015; Results:  Mild glomerulitis and capillary C4d staining consistent with mild, persistent antibody-mediated rejection. 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    # Immunosuppression: Tacrolimus immediate release (goal 4-6), Mycophenolic acid (dose 1080 mg every 12 hours) and Prednisone (dose 5 mg daily)          - Continue with intensive monitoring of immunosuppression for efficacy and toxicity.          - Changes: No    # Infection Prophylaxis:   - PJP: None    # Hypertension: Borderline control;  Goal BP: < 130/80   - Changes: Not at this time, but recommend patient weigh himself and can look at diuretic adjustment as needed.    # Anemia in Chronic  Renal Disease: Hgb: Stable, low      RAYMOND: No   - Iron studies: Replete    # Mineral Bone Disorder:   - Secondary renal hyperparathyroidism; PTH level: Minimally elevated ( pg/ml)        On treatment: None  - Vitamin D; level: Not checked recently        On supplement: No  - Calcium; level: Normal        On supplement: No    # Electrolytes:   - Potassium; level: Normal        On supplement: No  - Bicarbonate; level: Low        On supplement: Yes    # HFpEF with Pulmonary Hypertension: Patient with recent admission for acute exacerbation, which improved with diuresis.  Will continue on diuretics and following patient's weights.   - Recommend patient start weighing himself daily.  Will adjust diuretics as needed to maintain stable weight.     # Moderate Pericardial Effusion: Likely secondary to hypervolemia. No evidence of tamponade. Pericardiocentesis not recommended at this time by cardiology.     # Skin Cancer Risk:    - Discussed sun protection and recommend regular follow up with Dermatology.    # Medical Compliance: No.  Evidence of labs less than recommended.  - Discussed importance of checking labs regularly as recommended, taking medications as prescribed and attending scheduled medical appointments.    # COVID-19 Virus Review: Discussed COVID-19 virus and the potential medical risks.  Reviewed preventative health recommendations, which includes washing hands for 20 seconds, avoid touching your face, and social distancing.  Asked patient to inform the transplant center if they are exposed or diagnosed with this virus.    # Transplant History:  Etiology of Kidney Failure: Hypertension  Tx: DDKT  Transplant: 3/12/2015 (Kidney), 4/1/1994 (Kidney), 10/1/2000 (Kidney)  Donor Type: Donation after Brain Death Donor Class: Standard Criteria Donor  Significant changes in immunosuppression: None  Significant transplant-related complications: Acute cellular-mediated rejection and Acute antibody-mediated  rejection    Transplant Office Phone Number: [phone]    Assessment and plan was discussed with the patient and he voiced his understanding and agreement.    Return visit: Return in about 4 weeks (around 2/11/2021).    Jonathan Chavis MD    Chief Complaint   Mr. Mckeon is a 50 year old here for hospital follow up of kidney transplant and immunosuppression management.    History of Present Illness    Mr. Mckeon reports feeling okay overall with some medical complaints.  Please see recent discharge summary from admission a few days ago for acute on chronic heart failure and volume overload.  Patient has worsening kidney function and is nearing need for renal replacement therapy.    His energy level has decreased, although maybe a bit better since the hospitalization.  He is still active, but really gets minimal exercise.  Denies any chest pain, but some shortness of breath with exertion along with just tiring easy.  His leg swelling has resolved after diuresis during hospitalization, although he feels his urine output is down a bit the last couple of days.  He hasn't weighed himself yet as he is trying to get a scale, which he will hopefully get today or tomorrow.    Appetite is also a bit lower.  No nausea, vomiting or diarrhea.  No metallic taste in his mouth.  No fever, sweats or chills.    Home BP: 130/70s    Problem List   Patient Active Problem List   Diagnosis     HTN, kidney transplant related     Tobacco abuse     Depression     Kidney replaced by transplant     Immunosuppression (H)     Anemia in chronic renal disease     Aftercare following organ transplant     Secondary renal hyperparathyroidism (H)     Vitamin D deficiency     Kidney transplant rejection     Steroid-induced hyperglycemia     Metabolic acidosis     Antibody mediated rejection of kidney transplant     Hyperglycemia     Hemorrhage following kidney biopsy     Thrombocytopenia (H)     CKD (chronic kidney disease) stage 4, GFR 15-29  ml/min (H)     Anemia of chronic renal failure, stage 4 (severe) (H)     Dyspnea on exertion     Shortness of breath     Hypervolemia, unspecified hypervolemia type       Allergies   Allergies   Allergen Reactions     Cephalosporins Unknown     Cyclosporine      Duricef [Cefadroxil]        Medications   Current Outpatient Medications   Medication Sig     Blood Pressure Monitor KIT Automatic Blood Pressure Monitor     bumetanide (BUMEX) 1 MG tablet Take 2 tablets (2 mg) by mouth 2 times daily     calcitRIOL (ROCALTROL) 0.25 MCG capsule TAKE ONE CAPSULE BY MOUTH ONCE DAILY     cloNIDine (CATAPRES) 0.1 MG tablet Take 0.2 mg by mouth 2 times daily     cyclobenzaprine (FLEXERIL) 10 MG tablet Take 10 mg by mouth 3 times daily as needed for muscle spasms     diltiazem ER COATED BEADS (CARTIA XT) 240 MG 24 hr capsule TAKE ONE CAPSULE BY MOUTH ONCE DAILY     ferrous sulfate (IRON) 325 (65 Fe) MG tablet Take 1 tablet (325 mg) by mouth daily (Patient taking differently: Take 1 tablet by mouth every other day )     hydrALAZINE (APRESOLINE) 50 MG tablet Take 1 tablet (50 mg) by mouth 3 times daily Do not take if systolic blood pressure is less than 120.     Multiple Vitamin (DAILY MULTIVITAMIN PO) Take 1 tablet by mouth daily      mycophenolic acid (GENERIC EQUIVALENT) 360 MG EC tablet Take 1,080 mg by mouth 2 times daily     predniSONE (DELTASONE) 5 MG tablet Take 1 tablet (5 mg) by mouth daily     UNABLE TO FIND Take 1 tablet by mouth daily MEDICATION NAME: calcium 500 mg - Vitamin B12      Calcium Acetate 667 MG TABS Take 2 tablets by mouth 3 times daily (with meals)     tacrolimus (ASTAGRAF XL) 5 MG 24 hr capsule Take 5 capsules (25 mg) by mouth every morning (before breakfast)     No current facility-administered medications for this visit.      There are no discontinued medications.    Physical Exam   Vital signs were deferred for this telemedicine visit.    GENERAL APPEARANCE: alert and no distress  HENT: no obvious  abnormalities on appearance  RESP: breathing appears unremarkable with normal rate, no audible wheezing or cough and no apparent shortness of breath with conversation  MS: extremities normal - no gross deformities noted  SKIN: no apparent rash and normal skin tone  NEURO: speech is clear with no obvious neurological deficits  PSYCH: mentation appears normal and affect normal    Data     Renal Latest Ref Rng & Units 1/15/2021 1/15/2021 1/11/2021   Na 133 - 144 mmol/L 132(L) 134 138   K 3.4 - 5.3 mmol/L 5.2 4.7 4.6   Cl 94 - 109 mmol/L 103 105 108   CO2 20 - 32 mmol/L 18(L) 16(L) 15(L)   BUN 7 - 30 mg/dL 110(H) 111(H) 84(H)   Cr 0.66 - 1.25 mg/dL 6.60(H) 6.51(H) 5.19(H)   Glucose 70 - 99 mg/dL 139(H) 117(H) 96   Ca  8.5 - 10.1 mg/dL 7.6(L) 7.6(L) 7.8(L)   Mg 1.6 - 2.3 mg/dL - - 2.7(H)     Bone Health Latest Ref Rng & Units 1/15/2021 1/15/2021 12/10/2019   Phos 2.5 - 4.5 mg/dL 9.9(H) 10.1(H) 5.2(H)   PTHi 18 - 80 pg/mL - 150(H) 146(H)   Vit D Def 20 - 75 ug/L - - -     Heme Latest Ref Rng & Units 1/15/2021 1/10/2021 1/9/2021   WBC 4.0 - 11.0 10e9/L - 4.3 5.9   Hgb 13.3 - 17.7 g/dL 8.3(L) 7.5(L) 7.7(L)   Plt 150 - 450 10e9/L - 206 215   ABSOLUTE NEUTROPHIL 1.6 - 8.3 10e9/L - - -   ABSOLUTE LYMPHOCYTES 0.8 - 5.3 10e9/L - - -   ABSOLUTE MONOCYTES 0.0 - 1.3 10e9/L - - -   ABSOLUTE EOSINOPHILS 0.0 - 0.7 10e9/L - - -   ABSOLUTE BASOPHILS 0.0 - 0.2 10e9/L - - -   ABS IMMATURE GRANULOCYTES 0 - 0.4 10e9/L - - -   ABSOLUTE NUCLEATED RBC - - - -     Liver Latest Ref Rng & Units 1/15/2021 1/8/2021 1/7/2021   AP 40 - 150 U/L - 55 59   TBili 0.2 - 1.3 mg/dL - 0.2 0.3   DBili 0.0 - 0.2 mg/dL - - -   ALT 0 - 70 U/L - 13 14   AST 0 - 45 U/L - 12 7   Tot Protein 6.8 - 8.8 g/dL - 6.4(L) 6.5(L)   Albumin 3.4 - 5.0 g/dL 3.6 3.1(L) 3.2(L)     Pancreas Latest Ref Rng & Units 5/8/2015 3/11/2015 1/23/2009   A1C 4.3 - 6.0 % - 5.2 5.3   Lipase 73 - 393 U/L 127 - -     Iron studies Latest Ref Rng & Units 10/30/2020 5/8/2018 1/12/2017   Iron 35 -  180 ug/dL 43 46 74   Iron sat 15 - 46 % 21 23 38   Ferritin 26 - 388 ng/mL 445(H) 414(H) 371     [facility] Txp Virology Latest Ref Rng & Units 1/15/2021 1/23/2019 8/8/2018   CMV IgG EU/mL - - -   CVM DNA Quant - - - -   CMV QUANT IU/ML CMVND [IU]/mL - - -   LOG IU/ML OF CMVQNT <2.1 [Log:IU]/mL - - -   BK Spec - - Plasma, EDTA anticoagulant Plasma   BK Res BKNEG:BK Virus DNA Not Detected copies/mL - BK Virus DNA Not Detected BK Virus DNA Not Detected   BK Log <2.7 Log copies/mL - Not Calculated Not Calculated   EBV IgG - - - -   EBV CAPSID ANTIBODY IGG 0.0 - 0.8 AI - - 7.3(H)   EBV DNA COPIES/ML EBVNEG [Copies]/mL - - -   EBV DNA LOG OF COPIES <2.7 [Log:copies]/mL - - -   Hep B Core NR:Nonreactive Nonreactive - Nonreactive   Hep B Surf - - - -   HIV 1&2 NEG - - -        Recent Labs   Lab Test 01/10/21  0632 01/11/21  0449 01/15/21  0906   DOSTAC Not Provided Not Provided 01/14/21  09:00 AM   TACROL 3.1* 3.5* 7.0     Recent Labs   Lab Test 01/19/16  1349 01/12/17  1121 05/08/18  0813   DOSMPA 2,130 01/11/17  2130 0900 05/07/2018   MPACID 11.03* 4.51* 1.63   MPAG 126.7* 100.9* 128.9*     Jnoathan Chavis MD

## 2021-01-14 NOTE — PROGRESS NOTES
Tristian is a 50 year old who is being evaluated via a billable video visit.      How would you like to obtain your AVS? MyChart  If the video visit is dropped, the invitation should be resent by: Text to cell phone: [phone]  Will anyone else be joining your video visit? No    Video Start Time: 1112  Video-Visit Details    Type of service:  Video Visit    Video End Time:1123    Originating Location (pt. Location): Home    Distant Location (provider location):  [facility] [place]     Platform used for Video Visit: [employer]    CHRONIC TRANSPLANT NEPHROLOGY VISIT    Assessment & Plan   # DDKT: Increased creatinine and nearing need for renal replacement therapy.  Patient has some uremic symptoms, but feels he isn't quite ready to start dialysis.  Will continue to follow very closely with weekly labs and return visit in a few weeks.  Will have Nephrology nurse also check in with him.   - Baseline Creatinine:  ~ 3-4s   - Proteinuria: Minimal (0.2-0.5 grams)   - Date DSA Last Checked: Feb/2020      Latest DSA: Some DSA   - BK Viremia: Not checked recently due to time from transplant   - Kidney Tx Biopsy: Dec 23, 2015; Results:  Mild glomerulitis and capillary C4d staining consistent with mild, persistent antibody-mediated rejection. 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    # Immunosuppression: Tacrolimus immediate release (goal 4-6), Mycophenolic acid (dose 1080 mg every 12 hours) and Prednisone (dose 5 mg daily)          - Continue with intensive monitoring of immunosuppression for efficacy and toxicity.          - Changes: No    # Infection Prophylaxis:   - PJP: None    # Hypertension: Borderline control;  Goal BP: < 130/80   - Changes: Not at this time, but recommend patient weigh himself and can look at diuretic adjustment as needed.    # Anemia in Chronic Renal Disease: Hgb: Stable, low      RAYMOND: No   - Iron studies: Replete    # Mineral Bone  Disorder:   - Secondary renal hyperparathyroidism; PTH level: Minimally elevated ( pg/ml)        On treatment: None  - Vitamin D; level: Not checked recently        On supplement: No  - Calcium; level: Normal        On supplement: No    # Electrolytes:   - Potassium; level: Normal        On supplement: No  - Bicarbonate; level: Low        On supplement: Yes    # HFpEF with Pulmonary Hypertension: Patient with recent admission for acute exacerbation, which improved with diuresis.  Will continue on diuretics and following patient's weights.   - Recommend patient start weighing himself daily.  Will adjust diuretics as needed to maintain stable weight.     # Moderate Pericardial Effusion: Likely secondary to hypervolemia. No evidence of tamponade. Pericardiocentesis not recommended at this time by cardiology.     # Skin Cancer Risk:    - Discussed sun protection and recommend regular follow up with Dermatology.    # Medical Compliance: No.  Evidence of labs less than recommended.  - Discussed importance of checking labs regularly as recommended, taking medications as prescribed and attending scheduled medical appointments.    # COVID-19 Virus Review: Discussed COVID-19 virus and the potential medical risks.  Reviewed preventative health recommendations, which includes washing hands for 20 seconds, avoid touching your face, and social distancing.  Asked patient to inform the transplant center if they are exposed or diagnosed with this virus.    # Transplant History:  Etiology of Kidney Failure: Hypertension  Tx: DDKT  Transplant: 3/12/2015 (Kidney), 4/1/1994 (Kidney), 10/1/2000 (Kidney)  Donor Type: Donation after Brain Death Donor Class: Standard Criteria Donor  Significant changes in immunosuppression: None  Significant transplant-related complications: Acute cellular-mediated rejection and Acute antibody-mediated rejection    Transplant Office Phone Number: [phone]    Assessment and plan was discussed with  the patient and he voiced his understanding and agreement.    Return visit: Return in about 4 weeks (around 2/11/2021).    Jonathan Chavis MD    Chief Complaint   Mr. Mckeon is a 50 year old here for hospital follow up of kidney transplant and immunosuppression management.    History of Present Illness    Mr. Mckeon reports feeling okay overall with some medical complaints.  Please see recent discharge summary from admission a few days ago for acute on chronic heart failure and volume overload.  Patient has worsening kidney function and is nearing need for renal replacement therapy.    His energy level has decreased, although maybe a bit better since the hospitalization.  He is still active, but really gets minimal exercise.  Denies any chest pain, but some shortness of breath with exertion along with just tiring easy.  His leg swelling has resolved after diuresis during hospitalization, although he feels his urine output is down a bit the last couple of days.  He hasn't weighed himself yet as he is trying to get a scale, which he will hopefully get today or tomorrow.    Appetite is also a bit lower.  No nausea, vomiting or diarrhea.  No metallic taste in his mouth.  No fever, sweats or chills.    Home BP: 130/70s    Problem List   Patient Active Problem List   Diagnosis     HTN, kidney transplant related     Tobacco abuse     Depression     Kidney replaced by transplant     Immunosuppression (H)     Anemia in chronic renal disease     Aftercare following organ transplant     Secondary renal hyperparathyroidism (H)     Vitamin D deficiency     Kidney transplant rejection     Steroid-induced hyperglycemia     Metabolic acidosis     Antibody mediated rejection of kidney transplant     Hyperglycemia     Hemorrhage following kidney biopsy     Thrombocytopenia (H)     CKD (chronic kidney disease) stage 4, GFR 15-29 ml/min (H)     Anemia of chronic renal failure, stage 4 (severe) (H)     Dyspnea on exertion      Shortness of breath     Hypervolemia, unspecified hypervolemia type       Allergies   Allergies   Allergen Reactions     Cephalosporins Unknown     Cyclosporine      Duricef [Cefadroxil]        Medications   Current Outpatient Medications   Medication Sig     Blood Pressure Monitor KIT Automatic Blood Pressure Monitor     bumetanide (BUMEX) 1 MG tablet Take 2 tablets (2 mg) by mouth 2 times daily     calcitRIOL (ROCALTROL) 0.25 MCG capsule TAKE ONE CAPSULE BY MOUTH ONCE DAILY     cloNIDine (CATAPRES) 0.1 MG tablet Take 0.2 mg by mouth 2 times daily     cyclobenzaprine (FLEXERIL) 10 MG tablet Take 10 mg by mouth 3 times daily as needed for muscle spasms     diltiazem ER COATED BEADS (CARTIA XT) 240 MG 24 hr capsule TAKE ONE CAPSULE BY MOUTH ONCE DAILY     ferrous sulfate (IRON) 325 (65 Fe) MG tablet Take 1 tablet (325 mg) by mouth daily (Patient taking differently: Take 1 tablet by mouth every other day )     hydrALAZINE (APRESOLINE) 50 MG tablet Take 1 tablet (50 mg) by mouth 3 times daily Do not take if systolic blood pressure is less than 120.     Multiple Vitamin (DAILY MULTIVITAMIN PO) Take 1 tablet by mouth daily      mycophenolic acid (GENERIC EQUIVALENT) 360 MG EC tablet Take 1,080 mg by mouth 2 times daily     predniSONE (DELTASONE) 5 MG tablet Take 1 tablet (5 mg) by mouth daily     UNABLE TO FIND Take 1 tablet by mouth daily MEDICATION NAME: calcium 500 mg - Vitamin B12      Calcium Acetate 667 MG TABS Take 2 tablets by mouth 3 times daily (with meals)     tacrolimus (ASTAGRAF XL) 5 MG 24 hr capsule Take 5 capsules (25 mg) by mouth every morning (before breakfast)     No current facility-administered medications for this visit.      There are no discontinued medications.    Physical Exam   Vital signs were deferred for this telemedicine visit.    GENERAL APPEARANCE: alert and no distress  HENT: no obvious abnormalities on appearance  RESP: breathing appears unremarkable with normal rate, no audible  wheezing or cough and no apparent shortness of breath with conversation  MS: extremities normal - no gross deformities noted  SKIN: no apparent rash and normal skin tone  NEURO: speech is clear with no obvious neurological deficits  PSYCH: mentation appears normal and affect normal    Data     Renal Latest Ref Rng & Units 1/15/2021 1/15/2021 1/11/2021   Na 133 - 144 mmol/L 132(L) 134 138   K 3.4 - 5.3 mmol/L 5.2 4.7 4.6   Cl 94 - 109 mmol/L 103 105 108   CO2 20 - 32 mmol/L 18(L) 16(L) 15(L)   BUN 7 - 30 mg/dL 110(H) 111(H) 84(H)   Cr 0.66 - 1.25 mg/dL 6.60(H) 6.51(H) 5.19(H)   Glucose 70 - 99 mg/dL 139(H) 117(H) 96   Ca  8.5 - 10.1 mg/dL 7.6(L) 7.6(L) 7.8(L)   Mg 1.6 - 2.3 mg/dL - - 2.7(H)     Bone Health Latest Ref Rng & Units 1/15/2021 1/15/2021 12/10/2019   Phos 2.5 - 4.5 mg/dL 9.9(H) 10.1(H) 5.2(H)   PTHi 18 - 80 pg/mL - 150(H) 146(H)   Vit D Def 20 - 75 ug/L - - -     Heme Latest Ref Rng & Units 1/15/2021 1/10/2021 1/9/2021   WBC 4.0 - 11.0 10e9/L - 4.3 5.9   Hgb 13.3 - 17.7 g/dL 8.3(L) 7.5(L) 7.7(L)   Plt 150 - 450 10e9/L - 206 215   ABSOLUTE NEUTROPHIL 1.6 - 8.3 10e9/L - - -   ABSOLUTE LYMPHOCYTES 0.8 - 5.3 10e9/L - - -   ABSOLUTE MONOCYTES 0.0 - 1.3 10e9/L - - -   ABSOLUTE EOSINOPHILS 0.0 - 0.7 10e9/L - - -   ABSOLUTE BASOPHILS 0.0 - 0.2 10e9/L - - -   ABS IMMATURE GRANULOCYTES 0 - 0.4 10e9/L - - -   ABSOLUTE NUCLEATED RBC - - - -     Liver Latest Ref Rng & Units 1/15/2021 1/8/2021 1/7/2021   AP 40 - 150 U/L - 55 59   TBili 0.2 - 1.3 mg/dL - 0.2 0.3   DBili 0.0 - 0.2 mg/dL - - -   ALT 0 - 70 U/L - 13 14   AST 0 - 45 U/L - 12 7   Tot Protein 6.8 - 8.8 g/dL - 6.4(L) 6.5(L)   Albumin 3.4 - 5.0 g/dL 3.6 3.1(L) 3.2(L)     Pancreas Latest Ref Rng & Units 5/8/2015 3/11/2015 1/23/2009   A1C 4.3 - 6.0 % - 5.2 5.3   Lipase 73 - 393 U/L 127 - -     Iron studies Latest Ref Rng & Units 10/30/2020 5/8/2018 1/12/2017   Iron 35 - 180 ug/dL 43 46 74   Iron sat 15 - 46 % 21 23 38   Ferritin 26 - 388 ng/mL 445(H) 414(H) 371      [facility] Txp Virology Latest Ref Rng & Units 1/15/2021 1/23/2019 8/8/2018   CMV IgG EU/mL - - -   CVM DNA Quant - - - -   CMV QUANT IU/ML CMVND [IU]/mL - - -   LOG IU/ML OF CMVQNT <2.1 [Log:IU]/mL - - -   BK Spec - - Plasma, EDTA anticoagulant Plasma   BK Res BKNEG:BK Virus DNA Not Detected copies/mL - BK Virus DNA Not Detected BK Virus DNA Not Detected   BK Log <2.7 Log copies/mL - Not Calculated Not Calculated   EBV IgG - - - -   EBV CAPSID ANTIBODY IGG 0.0 - 0.8 AI - - 7.3(H)   EBV DNA COPIES/ML EBVNEG [Copies]/mL - - -   EBV DNA LOG OF COPIES <2.7 [Log:copies]/mL - - -   Hep B Core NR:Nonreactive Nonreactive - Nonreactive   Hep B Surf - - - -   HIV 1&2 NEG - - -        Recent Labs   Lab Test 01/10/21  0632 01/11/21  0449 01/15/21  0906   DOSTAC Not Provided Not Provided 01/14/21  09:00 AM   TACROL 3.1* 3.5* 7.0     Recent Labs   Lab Test 01/19/16  1349 01/12/17  1121 05/08/18  0813   DOSMPA 2,130 01/11/17  2130 0900 05/07/2018   MPACID 11.03* 4.51* 1.63   MPAG 126.7* 100.9* 128.9*

## 2021-01-14 NOTE — LETTER
1/14/2021       RE: Tristian Mckeon  [address]  [facility] [address]     Dear Colleague,    Thank you for referring your patient, Tristian Mckeon, to the [facility] [place] at [facility]. Please see a copy of my visit note below.    Tristian is a 50 year old who is being evaluated via a billable video visit.      How would you like to obtain your AVS? MyChart  If the video visit is dropped, the invitation should be resent by: Text to cell phone: [phone]  Will anyone else be joining your video visit? No    Video Start Time: 1112  Video-Visit Details    Type of service:  Video Visit    Video End Time:1123    Originating Location (pt. Location): Home    Distant Location (provider location):  [facility] [place]     Platform used for Video Visit: [employer]    CHRONIC TRANSPLANT NEPHROLOGY VISIT    Assessment & Plan   # DDKT: Increased creatinine and nearing need for renal replacement therapy.  Patient has some uremic symptoms, but feels he isn't quite ready to start dialysis.  Will continue to follow very closely with weekly labs and return visit in a few weeks.  Will have Nephrology nurse also check in with him.   - Baseline Creatinine:  ~ 3-4s   - Proteinuria: Minimal (0.2-0.5 grams)   - Date DSA Last Checked: Feb/2020      Latest DSA: Some DSA   - BK Viremia: Not checked recently due to time from transplant   - Kidney Tx Biopsy: Dec 23, 2015; Results:  Mild glomerulitis and capillary C4d staining consistent with mild, persistent antibody-mediated rejection. 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    # Immunosuppression: Tacrolimus immediate release (goal 4-6), Mycophenolic acid (dose 1080 mg every 12 hours) and Prednisone (dose 5 mg daily)          - Continue with intensive monitoring of immunosuppression for efficacy and toxicity.          - Changes: No    #  Infection Prophylaxis:   - PJP: None    # Hypertension: Borderline control;  Goal BP: < 130/80   - Changes: Not at this time, but recommend patient weigh himself and can look at diuretic adjustment as needed.    # Anemia in Chronic Renal Disease: Hgb: Stable, low      RAYMOND: No   - Iron studies: Replete    # Mineral Bone Disorder:   - Secondary renal hyperparathyroidism; PTH level: Minimally elevated ( pg/ml)        On treatment: None  - Vitamin D; level: Not checked recently        On supplement: No  - Calcium; level: Normal        On supplement: No    # Electrolytes:   - Potassium; level: Normal        On supplement: No  - Bicarbonate; level: Low        On supplement: Yes    # HFpEF with Pulmonary Hypertension: Patient with recent admission for acute exacerbation, which improved with diuresis.  Will continue on diuretics and following patient's weights.   - Recommend patient start weighing himself daily.  Will adjust diuretics as needed to maintain stable weight.     # Moderate Pericardial Effusion: Likely secondary to hypervolemia. No evidence of tamponade. Pericardiocentesis not recommended at this time by cardiology.     # Skin Cancer Risk:    - Discussed sun protection and recommend regular follow up with Dermatology.    # Medical Compliance: No.  Evidence of labs less than recommended.  - Discussed importance of checking labs regularly as recommended, taking medications as prescribed and attending scheduled medical appointments.    # COVID-19 Virus Review: Discussed COVID-19 virus and the potential medical risks.  Reviewed preventative health recommendations, which includes washing hands for 20 seconds, avoid touching your face, and social distancing.  Asked patient to inform the transplant center if they are exposed or diagnosed with this virus.    # Transplant History:  Etiology of Kidney Failure: Hypertension  Tx: DDKT  Transplant: 3/12/2015 (Kidney), 4/1/1994 (Kidney), 10/1/2000 (Kidney)  Donor Type:  Donation after Brain Death Donor Class: Standard Criteria Donor  Significant changes in immunosuppression: None  Significant transplant-related complications: Acute cellular-mediated rejection and Acute antibody-mediated rejection    Transplant Office Phone Number: [phone]    Assessment and plan was discussed with the patient and he voiced his understanding and agreement.    Return visit: Return in about 4 weeks (around 2/11/2021).    Jonathan Chavis MD    Chief Complaint   Mr. Mckeon is a 50 year old here for hospital follow up of kidney transplant and immunosuppression management.    History of Present Illness    Mr. Mckeon reports feeling okay overall with some medical complaints.  Please see recent discharge summary from admission a few days ago for acute on chronic heart failure and volume overload.  Patient has worsening kidney function and is nearing need for renal replacement therapy.    His energy level has decreased, although maybe a bit better since the hospitalization.  He is still active, but really gets minimal exercise.  Denies any chest pain, but some shortness of breath with exertion along with just tiring easy.  His leg swelling has resolved after diuresis during hospitalization, although he feels his urine output is down a bit the last couple of days.  He hasn't weighed himself yet as he is trying to get a scale, which he will hopefully get today or tomorrow.    Appetite is also a bit lower.  No nausea, vomiting or diarrhea.  No metallic taste in his mouth.  No fever, sweats or chills.    Home BP: 130/70s    Problem List   Patient Active Problem List   Diagnosis     HTN, kidney transplant related     Tobacco abuse     Depression     Kidney replaced by transplant     Immunosuppression (H)     Anemia in chronic renal disease     Aftercare following organ transplant     Secondary renal hyperparathyroidism (H)     Vitamin D deficiency     Kidney transplant rejection     Steroid-induced  hyperglycemia     Metabolic acidosis     Antibody mediated rejection of kidney transplant     Hyperglycemia     Hemorrhage following kidney biopsy     Thrombocytopenia (H)     CKD (chronic kidney disease) stage 4, GFR 15-29 ml/min (H)     Anemia of chronic renal failure, stage 4 (severe) (H)     Dyspnea on exertion     Shortness of breath     Hypervolemia, unspecified hypervolemia type       Allergies   Allergies   Allergen Reactions     Cephalosporins Unknown     Cyclosporine      Duricef [Cefadroxil]        Medications   Current Outpatient Medications   Medication Sig     Blood Pressure Monitor KIT Automatic Blood Pressure Monitor     bumetanide (BUMEX) 1 MG tablet Take 2 tablets (2 mg) by mouth 2 times daily     calcitRIOL (ROCALTROL) 0.25 MCG capsule TAKE ONE CAPSULE BY MOUTH ONCE DAILY     cloNIDine (CATAPRES) 0.1 MG tablet Take 0.2 mg by mouth 2 times daily     cyclobenzaprine (FLEXERIL) 10 MG tablet Take 10 mg by mouth 3 times daily as needed for muscle spasms     diltiazem ER COATED BEADS (CARTIA XT) 240 MG 24 hr capsule TAKE ONE CAPSULE BY MOUTH ONCE DAILY     ferrous sulfate (IRON) 325 (65 Fe) MG tablet Take 1 tablet (325 mg) by mouth daily (Patient taking differently: Take 1 tablet by mouth every other day )     hydrALAZINE (APRESOLINE) 50 MG tablet Take 1 tablet (50 mg) by mouth 3 times daily Do not take if systolic blood pressure is less than 120.     Multiple Vitamin (DAILY MULTIVITAMIN PO) Take 1 tablet by mouth daily      mycophenolic acid (GENERIC EQUIVALENT) 360 MG EC tablet Take 1,080 mg by mouth 2 times daily     predniSONE (DELTASONE) 5 MG tablet Take 1 tablet (5 mg) by mouth daily     UNABLE TO FIND Take 1 tablet by mouth daily MEDICATION NAME: calcium 500 mg - Vitamin B12      Calcium Acetate 667 MG TABS Take 2 tablets by mouth 3 times daily (with meals)     tacrolimus (ASTAGRAF XL) 5 MG 24 hr capsule Take 5 capsules (25 mg) by mouth every morning (before breakfast)     No current  facility-administered medications for this visit.      There are no discontinued medications.    Physical Exam   Vital signs were deferred for this telemedicine visit.    GENERAL APPEARANCE: alert and no distress  HENT: no obvious abnormalities on appearance  RESP: breathing appears unremarkable with normal rate, no audible wheezing or cough and no apparent shortness of breath with conversation  MS: extremities normal - no gross deformities noted  SKIN: no apparent rash and normal skin tone  NEURO: speech is clear with no obvious neurological deficits  PSYCH: mentation appears normal and affect normal    Data     Renal Latest Ref Rng & Units 1/15/2021 1/15/2021 1/11/2021   Na 133 - 144 mmol/L 132(L) 134 138   K 3.4 - 5.3 mmol/L 5.2 4.7 4.6   Cl 94 - 109 mmol/L 103 105 108   CO2 20 - 32 mmol/L 18(L) 16(L) 15(L)   BUN 7 - 30 mg/dL 110(H) 111(H) 84(H)   Cr 0.66 - 1.25 mg/dL 6.60(H) 6.51(H) 5.19(H)   Glucose 70 - 99 mg/dL 139(H) 117(H) 96   Ca  8.5 - 10.1 mg/dL 7.6(L) 7.6(L) 7.8(L)   Mg 1.6 - 2.3 mg/dL - - 2.7(H)     Bone Health Latest Ref Rng & Units 1/15/2021 1/15/2021 12/10/2019   Phos 2.5 - 4.5 mg/dL 9.9(H) 10.1(H) 5.2(H)   PTHi 18 - 80 pg/mL - 150(H) 146(H)   Vit D Def 20 - 75 ug/L - - -     Heme Latest Ref Rng & Units 1/15/2021 1/10/2021 1/9/2021   WBC 4.0 - 11.0 10e9/L - 4.3 5.9   Hgb 13.3 - 17.7 g/dL 8.3(L) 7.5(L) 7.7(L)   Plt 150 - 450 10e9/L - 206 215   ABSOLUTE NEUTROPHIL 1.6 - 8.3 10e9/L - - -   ABSOLUTE LYMPHOCYTES 0.8 - 5.3 10e9/L - - -   ABSOLUTE MONOCYTES 0.0 - 1.3 10e9/L - - -   ABSOLUTE EOSINOPHILS 0.0 - 0.7 10e9/L - - -   ABSOLUTE BASOPHILS 0.0 - 0.2 10e9/L - - -   ABS IMMATURE GRANULOCYTES 0 - 0.4 10e9/L - - -   ABSOLUTE NUCLEATED RBC - - - -     Liver Latest Ref Rng & Units 1/15/2021 1/8/2021 1/7/2021   AP 40 - 150 U/L - 55 59   TBili 0.2 - 1.3 mg/dL - 0.2 0.3   DBili 0.0 - 0.2 mg/dL - - -   ALT 0 - 70 U/L - 13 14   AST 0 - 45 U/L - 12 7   Tot Protein 6.8 - 8.8 g/dL - 6.4(L) 6.5(L)   Albumin 3.4 -  5.0 g/dL 3.6 3.1(L) 3.2(L)     Pancreas Latest Ref Rng & Units 5/8/2015 3/11/2015 1/23/2009   A1C 4.3 - 6.0 % - 5.2 5.3   Lipase 73 - 393 U/L 127 - -     Iron studies Latest Ref Rng & Units 10/30/2020 5/8/2018 1/12/2017   Iron 35 - 180 ug/dL 43 46 74   Iron sat 15 - 46 % 21 23 38   Ferritin 26 - 388 ng/mL 445(H) 414(H) 371     [facility] Txp Virology Latest Ref Rng & Units 1/15/2021 1/23/2019 8/8/2018   CMV IgG EU/mL - - -   CVM DNA Quant - - - -   CMV QUANT IU/ML CMVND [IU]/mL - - -   LOG IU/ML OF CMVQNT <2.1 [Log:IU]/mL - - -   BK Spec - - Plasma, EDTA anticoagulant Plasma   BK Res BKNEG:BK Virus DNA Not Detected copies/mL - BK Virus DNA Not Detected BK Virus DNA Not Detected   BK Log <2.7 Log copies/mL - Not Calculated Not Calculated   EBV IgG - - - -   EBV CAPSID ANTIBODY IGG 0.0 - 0.8 AI - - 7.3(H)   EBV DNA COPIES/ML EBVNEG [Copies]/mL - - -   EBV DNA LOG OF COPIES <2.7 [Log:copies]/mL - - -   Hep B Core NR:Nonreactive Nonreactive - Nonreactive   Hep B Surf - - - -   HIV 1&2 NEG - - -        Recent Labs   Lab Test 01/10/21  0632 01/11/21  0449 01/15/21  0906   DOSTAC Not Provided Not Provided 01/14/21  09:00 AM   TACROL 3.1* 3.5* 7.0     Recent Labs   Lab Test 01/19/16  1349 01/12/17  1121 05/08/18  0813   DOSMPA 2,130 01/11/17  2130 0900 05/07/2018   MPACID 11.03* 4.51* 1.63   MPAG 126.7* 100.9* 128.9*       Again, thank you for allowing me to participate in the care of your patient.      Sincerely,    [facility]

## 2021-01-14 NOTE — LETTER
Date:January 29, 2021      Patient was self referred, no letter generated. Do not send.        [facility] Health Information

## 2021-01-15 ENCOUNTER — TELEPHONE (OUTPATIENT)
Dept: TRANSPLANT | Facility: CLINIC | Age: 51
End: 2021-01-15

## 2021-01-15 ENCOUNTER — HEALTH MAINTENANCE LETTER (OUTPATIENT)
Age: 51
End: 2021-01-15

## 2021-01-15 ENCOUNTER — TELEPHONE (OUTPATIENT)
Dept: NEPHROLOGY | Facility: CLINIC | Age: 51
End: 2021-01-15

## 2021-01-15 DIAGNOSIS — N18.5 CHRONIC KIDNEY DISEASE, STAGE V (H): ICD-10-CM

## 2021-01-15 DIAGNOSIS — D84.9 IMMUNOSUPPRESSION (H): ICD-10-CM

## 2021-01-15 DIAGNOSIS — Z11.59 SCREENING FOR VIRAL DISEASE: ICD-10-CM

## 2021-01-15 DIAGNOSIS — N18.5 CHRONIC KIDNEY DISEASE, STAGE V (H): Primary | ICD-10-CM

## 2021-01-15 DIAGNOSIS — Z79.899 ENCOUNTER FOR LONG-TERM CURRENT USE OF MEDICATION: ICD-10-CM

## 2021-01-15 DIAGNOSIS — N18.4 ANEMIA OF CHRONIC RENAL FAILURE, STAGE 4 (SEVERE) (H): ICD-10-CM

## 2021-01-15 DIAGNOSIS — E83.39 HYPERPHOSPHATEMIA: Primary | ICD-10-CM

## 2021-01-15 DIAGNOSIS — T86.11 KIDNEY TRANSPLANT REJECTION: ICD-10-CM

## 2021-01-15 DIAGNOSIS — N18.4 CKD (CHRONIC KIDNEY DISEASE) STAGE 4, GFR 15-29 ML/MIN (H): ICD-10-CM

## 2021-01-15 DIAGNOSIS — Z48.298 AFTERCARE FOLLOWING ORGAN TRANSPLANT: ICD-10-CM

## 2021-01-15 DIAGNOSIS — D63.1 ANEMIA OF CHRONIC RENAL FAILURE, STAGE 4 (SEVERE) (H): ICD-10-CM

## 2021-01-15 DIAGNOSIS — Z94.0 KIDNEY TRANSPLANTED: ICD-10-CM

## 2021-01-15 DIAGNOSIS — Z94.0 KIDNEY REPLACED BY TRANSPLANT: ICD-10-CM

## 2021-01-15 DIAGNOSIS — Z94.0 KIDNEY TRANSPLANTED: Primary | ICD-10-CM

## 2021-01-15 DIAGNOSIS — D84.9 IMMUNOSUPPRESSED STATUS (H): ICD-10-CM

## 2021-01-15 LAB
ALBUMIN SERPL-MCNC: 3.6 G/DL (ref 3.4–5)
ANION GAP SERPL CALCULATED.3IONS-SCNC: 12 MMOL/L (ref 3–14)
ANION GAP SERPL CALCULATED.3IONS-SCNC: 13 MMOL/L (ref 3–14)
BUN SERPL-MCNC: 110 MG/DL (ref 7–30)
BUN SERPL-MCNC: 111 MG/DL (ref 7–30)
CALCIUM SERPL-MCNC: 7.6 MG/DL (ref 8.5–10.1)
CALCIUM SERPL-MCNC: 7.6 MG/DL (ref 8.5–10.1)
CHLORIDE SERPL-SCNC: 103 MMOL/L (ref 94–109)
CHLORIDE SERPL-SCNC: 105 MMOL/L (ref 94–109)
CO2 SERPL-SCNC: 16 MMOL/L (ref 20–32)
CO2 SERPL-SCNC: 18 MMOL/L (ref 20–32)
CREAT SERPL-MCNC: 6.51 MG/DL (ref 0.66–1.25)
CREAT SERPL-MCNC: 6.6 MG/DL (ref 0.66–1.25)
GFR SERPL CREATININE-BSD FRML MDRD: 9 ML/MIN/{1.73_M2}
GFR SERPL CREATININE-BSD FRML MDRD: 9 ML/MIN/{1.73_M2}
GLUCOSE SERPL-MCNC: 117 MG/DL (ref 70–99)
GLUCOSE SERPL-MCNC: 139 MG/DL (ref 70–99)
HCT VFR BLD AUTO: 26.2 % (ref 40–53)
HGB BLD-MCNC: 8.3 G/DL (ref 13.3–17.7)
PHOSPHATE SERPL-MCNC: 10.1 MG/DL (ref 2.5–4.5)
PHOSPHATE SERPL-MCNC: 9.9 MG/DL (ref 2.5–4.5)
POTASSIUM SERPL-SCNC: 4.7 MMOL/L (ref 3.4–5.3)
POTASSIUM SERPL-SCNC: 5.2 MMOL/L (ref 3.4–5.3)
PTH-INTACT SERPL-MCNC: 150 PG/ML (ref 18–80)
SODIUM SERPL-SCNC: 132 MMOL/L (ref 133–144)
SODIUM SERPL-SCNC: 134 MMOL/L (ref 133–144)
TACROLIMUS BLD-MCNC: 7 UG/L (ref 5–15)
TME LAST DOSE: NORMAL H

## 2021-01-15 PROCEDURE — 99000 SPECIMEN HANDLING OFFICE-LAB: CPT | Performed by: PATHOLOGY

## 2021-01-15 PROCEDURE — 85018 HEMOGLOBIN: CPT | Performed by: PATHOLOGY

## 2021-01-15 PROCEDURE — 86706 HEP B SURFACE ANTIBODY: CPT | Mod: 90 | Performed by: PATHOLOGY

## 2021-01-15 PROCEDURE — 86704 HEP B CORE ANTIBODY TOTAL: CPT | Mod: 90 | Performed by: PATHOLOGY

## 2021-01-15 PROCEDURE — 83970 ASSAY OF PARATHORMONE: CPT | Mod: 90 | Performed by: PATHOLOGY

## 2021-01-15 PROCEDURE — 84100 ASSAY OF PHOSPHORUS: CPT | Mod: 59 | Performed by: PATHOLOGY

## 2021-01-15 PROCEDURE — 87340 HEPATITIS B SURFACE AG IA: CPT | Mod: 90 | Performed by: PATHOLOGY

## 2021-01-15 PROCEDURE — 85014 HEMATOCRIT: CPT | Performed by: PATHOLOGY

## 2021-01-15 PROCEDURE — 80197 ASSAY OF TACROLIMUS: CPT | Mod: 90 | Performed by: PATHOLOGY

## 2021-01-15 PROCEDURE — 80069 RENAL FUNCTION PANEL: CPT | Performed by: PATHOLOGY

## 2021-01-15 PROCEDURE — 36415 COLL VENOUS BLD VENIPUNCTURE: CPT | Performed by: PATHOLOGY

## 2021-01-15 PROCEDURE — 80048 BASIC METABOLIC PNL TOTAL CA: CPT | Performed by: PATHOLOGY

## 2021-01-15 PROCEDURE — 86481 TB AG RESPONSE T-CELL SUSP: CPT | Mod: 90 | Performed by: PATHOLOGY

## 2021-01-15 RX ORDER — CALCIUM ACETATE 667 MG/1
2 TABLET ORAL
Qty: 180 TABLET | Refills: 11 | Status: ON HOLD | OUTPATIENT
Start: 2021-01-15 | End: 2021-02-26

## 2021-01-15 NOTE — TELEPHONE ENCOUNTER
Cayetano Pizano MD Stechmann, Kathleen, RN; Jonathan Chavis MD             Cristofer, did you get to see him yesterday in acute clinic? I would not increase diuretics at this point due to worsening GFR. Repeat labs tmrw, if feeling worse go to ED     Called patient to notify him of repeat lab work needed tomorrow and to go to ED if he gets worse.     Called patient and notified him of above. Patient stated he wants to do a dialysis run today because he doesn't feel good. Educated patient on how the outpatient set up for dialysis works. Educated patient on what would happen if he went to ER for dialysis initiation. Patient stated he doesn't understand why he can't just go get dialysis somewhere today. Told patient that's not how it works and if he is feeling that terrible he needs to go to the ER.     Told patient I would review with doctors but I would not be able to get him set up for outpatient dialysis for at least 4 days as we need specific labs that take a few days to result. Patient verbalized understanding and said he might go to the ER tomorrow.

## 2021-01-15 NOTE — TELEPHONE ENCOUNTER
Tristian called [facility] directly to report he was to get extra labwork to start dialysis tomorrow but no lab appointments [place] [facility] [place]. [facility] assisted in setting him up for labs at 5 pm today and notified nephrology nurse.     Discussed tacrolimus level of 7. His previous levels were low. Astragraf dose but may have not been 24 hour trough. Result sent to Dr. Collins to review. Left dose as is until provider recommendation received.

## 2021-01-15 NOTE — TELEPHONE ENCOUNTER
Cayetano Pizano MD Stechmann, Kathleen, RN; Jonathan Chavis MD             If he starts as an inpatient he will remain inpatient for 3 days. If he starts as an outpatient it might be a week. Let's get the repeat labs tmrw as well as the hepatitis studies, quantiferon, etc and try to set him up for HD to start next week as an outpatient. If he starts to feel poorly in the meanwhile he needs to go to the ED. Thanks.     Cayetano     Patient notified of above plan. Labs ordered and [facility] admission paperwork faxed.

## 2021-01-15 NOTE — TELEPHONE ENCOUNTER
Tacrolimus XR level 7 on 1/15/20, goal 4-6, dose 30 mg (6 capsules) daily. Reviewed level with Dr. Collins after confirming with patient good level. Patient confirmed dose change to 25 mg (5 capsules) daily and to repeat labs in 1-2 weeks. Orders sent to preferred pharmacy for dose change. Orders for standing weekly labs for repeat level available. Patient voiced understanding of plan.

## 2021-01-15 NOTE — TELEPHONE ENCOUNTER
Cayetano Pizano MD   1/15/2021  9:52 AM CST      Please start calcium acetate 2 tabs with meals for hyperphos. Remind me does he have dialysis access? I'm concerned he is going to need to start very soon. How is he feeling? What is his weight doing?     Patient verbalized understanding about new medication. Script sent.  Patient reports he is not feeling good. Patient reports feeling tired and fatigued. No Nausea, vomiting, itching, loss of appetite. Patient did state he is gaining weight when he left hospital he was at 185 pounds and is now 196.      Asked patient if we get to the point where its time to start dialysis what facility he prefers.  Patient stated he would prefer [facility] in [facility] in the mornings.    Will route to provider.

## 2021-01-17 LAB
GAMMA INTERFERON BACKGROUND BLD IA-ACNC: 0 IU/ML
M TB IFN-G CD4+ BCKGRND COR BLD-ACNC: 0.24 IU/ML
M TB TUBERC IFN-G BLD QL: ABNORMAL
MITOGEN IGNF BCKGRD COR BLD-ACNC: 0 IU/ML
MITOGEN IGNF BCKGRD COR BLD-ACNC: 0 IU/ML

## 2021-01-18 ENCOUNTER — TELEPHONE (OUTPATIENT)
Dept: NEPHROLOGY | Facility: CLINIC | Age: 51
End: 2021-01-18

## 2021-01-18 LAB
HBV CORE AB SERPL QL IA: NONREACTIVE
HBV SURFACE AB SERPL IA-ACNC: 19.13 M[IU]/ML
HBV SURFACE AG SERPL QL IA: NONREACTIVE

## 2021-01-18 NOTE — TELEPHONE ENCOUNTER
Updated labs and notes and orders faxed to [facility].  Varsha Jordan LPN  Nephrology  [phone]

## 2021-01-25 ENCOUNTER — TELEPHONE (OUTPATIENT)
Dept: NEPHROLOGY | Facility: CLINIC | Age: 51
End: 2021-01-25

## 2021-01-25 NOTE — TELEPHONE ENCOUNTER
Attempted to reach patient to discuss how his first few treatments of dialysis went. Unable to reach patient at either number listed.     After multiple attempts at trying to reach patient was unable to talk with him regarding dialysis. Will sign encounter.

## 2021-01-27 ENCOUNTER — TELEPHONE (OUTPATIENT)
Dept: PHARMACY | Facility: CLINIC | Age: 51
End: 2021-01-27

## 2021-01-27 NOTE — TELEPHONE ENCOUNTER
Referred by Dr. Kole Collins on 09/18/2020    Started dialysis.  Anemia will be followed by the Dialysis Clinic. Closing Anemia Services.     Anemia Latest Ref Rng & Units 11/14/2020 11/21/2020 1/7/2021 1/8/2021 1/9/2021 1/10/2021 1/15/2021   RAYMOND Dose - 10,000 units 10,000 units - - - - -   Hemoglobin 13.3 - 17.7 g/dL - - 7.0(L) 7.0(L) 7.7(L) 7.5(L) 8.3(L)   TSAT 15 - 46 % - - - - - - -   Ferritin 26 - 388 ng/mL - - - - - - -       Anemia labs discontinued, therapy plans cancelled, removed from active patient list, and referring provider notified.    Rachel Raman RN   Anemia Services  [address] [address]   [email or web address]   Office : [phone]  Fax: [phone]

## 2021-02-03 ENCOUNTER — TELEPHONE (OUTPATIENT)
Dept: TRANSPLANT | Facility: CLINIC | Age: 51
End: 2021-02-03

## 2021-02-03 NOTE — TELEPHONE ENCOUNTER
Patient Call: Medication Refill  Stated he ran out of Calcitriol 0.25 Mcg his dose was increased     Pharmacy Name: [facility] [address] [facility]     Name of Medication: Calcitriol 0.25Mcg    When will the patient be out of this medication?: Less than 3 days (Route high priority)

## 2021-02-04 NOTE — TELEPHONE ENCOUNTER
Patient on dialysis and all medications that nephrology managed should now be managed by his dialysis unit.     Patient notified of above. Patient also stated he has been doing ok on dialysis. It is more draining than he remembered from when he was on it previously but he is feeling ok. Patient stated he will call his dialysis unit to get the medication refilled.

## 2021-02-08 ENCOUNTER — PATIENT OUTREACH (OUTPATIENT)
Dept: NEPHROLOGY | Facility: CLINIC | Age: 51
End: 2021-02-08

## 2021-02-18 ENCOUNTER — TELEPHONE (OUTPATIENT)
Dept: TRANSPLANT | Facility: CLINIC | Age: 51
End: 2021-02-18

## 2021-02-18 NOTE — TELEPHONE ENCOUNTER
Patient Call: Transplant Illness  Having  A lot of  pain around the transplanted kidney   Duration of illness: 1 day   Transplanted organ? kidney  Illness: Pain: Location Around the transplanted kidney

## 2021-02-18 NOTE — TELEPHONE ENCOUNTER
Pt has been having a little pain off on over transplanted kidney area  Today is getting worse  More constant pain

## 2021-02-18 NOTE — TELEPHONE ENCOUNTER
Returned call to [facility]. He started dialysis couple weeks ago. Indirect abdominal pain started with tenderness about 2 weeks ago. Has had history of hernia repair and now has progressed to sharp pain.  A week ago states he developed swelling, no bruises, but warm/hot to touch on lower abdomen. No fever. Has had pain before with kidney transplants. Sometimes has blood when passing urine.     Advised he needed to be seen for sharp pain and hematuria for workup. Notified Dialysis coordinator via VM message and asked for call back to document date of dialysis initiation.

## 2021-02-19 ENCOUNTER — HOSPITAL ENCOUNTER (INPATIENT)
Facility: CLINIC | Age: 51
LOS: 7 days | Discharge: HOME | DRG: 659 | End: 2021-02-26
Attending: EMERGENCY MEDICINE | Admitting: TRANSPLANT SURGERY
Payer: COMMERCIAL

## 2021-02-19 ENCOUNTER — APPOINTMENT (OUTPATIENT)
Dept: ULTRASOUND IMAGING | Facility: CLINIC | Age: 51
DRG: 659 | End: 2021-02-19
Attending: EMERGENCY MEDICINE
Payer: COMMERCIAL

## 2021-02-19 ENCOUNTER — ANESTHESIA EVENT (OUTPATIENT)
Dept: SURGERY | Facility: CLINIC | Age: 51
DRG: 659 | End: 2021-02-19
Payer: COMMERCIAL

## 2021-02-19 ENCOUNTER — APPOINTMENT (OUTPATIENT)
Dept: CT IMAGING | Facility: CLINIC | Age: 51
DRG: 659 | End: 2021-02-19
Attending: EMERGENCY MEDICINE
Payer: COMMERCIAL

## 2021-02-19 ENCOUNTER — ANESTHESIA (OUTPATIENT)
Dept: SURGERY | Facility: CLINIC | Age: 51
DRG: 659 | End: 2021-02-19
Payer: COMMERCIAL

## 2021-02-19 DIAGNOSIS — Z48.298 AFTERCARE FOLLOWING ORGAN TRANSPLANT: ICD-10-CM

## 2021-02-19 DIAGNOSIS — E83.39 HYPERPHOSPHATEMIA: ICD-10-CM

## 2021-02-19 DIAGNOSIS — Z90.5 S/P NEPHRECTOMY: ICD-10-CM

## 2021-02-19 DIAGNOSIS — Z98.84 H/O GASTRIC BYPASS: ICD-10-CM

## 2021-02-19 DIAGNOSIS — Z94.0 KIDNEY REPLACED BY TRANSPLANT: ICD-10-CM

## 2021-02-19 DIAGNOSIS — N18.6 END STAGE RENAL DISEASE (H): ICD-10-CM

## 2021-02-19 DIAGNOSIS — I31.39 PERICARDIAL EFFUSION: ICD-10-CM

## 2021-02-19 DIAGNOSIS — I82.3 RENAL VEIN THROMBOSIS (H): Primary | ICD-10-CM

## 2021-02-19 DIAGNOSIS — Z20.822 CONTACT WITH AND (SUSPECTED) EXPOSURE TO COVID-19: ICD-10-CM

## 2021-02-19 LAB
ALBUMIN SERPL-MCNC: 2.3 G/DL (ref 3.4–5)
ALP SERPL-CCNC: 76 U/L (ref 40–150)
ALT SERPL W P-5'-P-CCNC: 16 U/L (ref 0–70)
ANION GAP SERPL CALCULATED.3IONS-SCNC: 5 MMOL/L (ref 3–14)
ANION GAP SERPL CALCULATED.3IONS-SCNC: 9 MMOL/L (ref 3–14)
APTT PPP: 39 SEC (ref 22–37)
APTT PPP: 41 SEC (ref 22–37)
AST SERPL W P-5'-P-CCNC: 34 U/L (ref 0–45)
BASOPHILS # BLD AUTO: 0 10E9/L (ref 0–0.2)
BASOPHILS # BLD AUTO: 0.1 10E9/L (ref 0–0.2)
BASOPHILS NFR BLD AUTO: 0.2 %
BASOPHILS NFR BLD AUTO: 0.4 %
BILIRUB SERPL-MCNC: 0.4 MG/DL (ref 0.2–1.3)
BLD PROD TYP BPU: NORMAL
BLD UNIT ID BPU: 0
BLOOD PRODUCT CODE: NORMAL
BPU ID: NORMAL
BUN SERPL-MCNC: 31 MG/DL (ref 7–30)
BUN SERPL-MCNC: 36 MG/DL (ref 7–30)
CALCIUM SERPL-MCNC: 8 MG/DL (ref 8.5–10.1)
CALCIUM SERPL-MCNC: 8.7 MG/DL (ref 8.5–10.1)
CHLORIDE SERPL-SCNC: 100 MMOL/L (ref 94–109)
CHLORIDE SERPL-SCNC: 98 MMOL/L (ref 94–109)
CO2 SERPL-SCNC: 24 MMOL/L (ref 20–32)
CO2 SERPL-SCNC: 30 MMOL/L (ref 20–32)
CREAT SERPL-MCNC: 8.17 MG/DL (ref 0.66–1.25)
CREAT SERPL-MCNC: 8.47 MG/DL (ref 0.66–1.25)
DIFFERENTIAL METHOD BLD: ABNORMAL
DIFFERENTIAL METHOD BLD: ABNORMAL
EOSINOPHIL # BLD AUTO: 0 10E9/L (ref 0–0.7)
EOSINOPHIL # BLD AUTO: 0 10E9/L (ref 0–0.7)
EOSINOPHIL NFR BLD AUTO: 0 %
EOSINOPHIL NFR BLD AUTO: 0.2 %
ERYTHROCYTE [DISTWIDTH] IN BLOOD BY AUTOMATED COUNT: 14.5 % (ref 10–15)
ERYTHROCYTE [DISTWIDTH] IN BLOOD BY AUTOMATED COUNT: 15 % (ref 10–15)
GFR SERPL CREATININE-BSD FRML MDRD: 7 ML/MIN/{1.73_M2}
GFR SERPL CREATININE-BSD FRML MDRD: 7 ML/MIN/{1.73_M2}
GLUCOSE BLDC GLUCOMTR-MCNC: 99 MG/DL (ref 70–99)
GLUCOSE SERPL-MCNC: 109 MG/DL (ref 70–99)
GLUCOSE SERPL-MCNC: 130 MG/DL (ref 70–99)
HCT VFR BLD AUTO: 22.2 % (ref 40–53)
HCT VFR BLD AUTO: 26.4 % (ref 40–53)
HGB BLD-MCNC: 6.5 G/DL (ref 13.3–17.7)
HGB BLD-MCNC: 8 G/DL (ref 13.3–17.7)
IMM GRANULOCYTES # BLD: 0 10E9/L (ref 0–0.4)
IMM GRANULOCYTES # BLD: 0.1 10E9/L (ref 0–0.4)
IMM GRANULOCYTES NFR BLD: 0.4 %
IMM GRANULOCYTES NFR BLD: 0.6 %
INR PPP: 1.8 (ref 0.86–1.14)
INR PPP: 1.85 (ref 0.86–1.14)
LABORATORY COMMENT REPORT: NORMAL
LIPASE SERPL-CCNC: 66 U/L (ref 73–393)
LYMPHOCYTES # BLD AUTO: 0.6 10E9/L (ref 0.8–5.3)
LYMPHOCYTES # BLD AUTO: 0.7 10E9/L (ref 0.8–5.3)
LYMPHOCYTES NFR BLD AUTO: 4.8 %
LYMPHOCYTES NFR BLD AUTO: 6.3 %
MAGNESIUM SERPL-MCNC: 2.3 MG/DL (ref 1.6–2.3)
MCH RBC QN AUTO: 26.3 PG (ref 26.5–33)
MCH RBC QN AUTO: 27.3 PG (ref 26.5–33)
MCHC RBC AUTO-ENTMCNC: 29.3 G/DL (ref 31.5–36.5)
MCHC RBC AUTO-ENTMCNC: 30.3 G/DL (ref 31.5–36.5)
MCV RBC AUTO: 90 FL (ref 78–100)
MCV RBC AUTO: 90 FL (ref 78–100)
MONOCYTES # BLD AUTO: 1 10E9/L (ref 0–1.3)
MONOCYTES # BLD AUTO: 1.1 10E9/L (ref 0–1.3)
MONOCYTES NFR BLD AUTO: 8.3 %
MONOCYTES NFR BLD AUTO: 9.3 %
NEUTROPHILS # BLD AUTO: 10.3 10E9/L (ref 1.6–8.3)
NEUTROPHILS # BLD AUTO: 9.7 10E9/L (ref 1.6–8.3)
NEUTROPHILS NFR BLD AUTO: 84.6 %
NEUTROPHILS NFR BLD AUTO: 84.9 %
NRBC # BLD AUTO: 0 10*3/UL
NRBC # BLD AUTO: 0 10*3/UL
NRBC BLD AUTO-RTO: 0 /100
NRBC BLD AUTO-RTO: 0 /100
PHOSPHATE SERPL-MCNC: 5.9 MG/DL (ref 2.5–4.5)
PLATELET # BLD AUTO: 125 10E9/L (ref 150–450)
PLATELET # BLD AUTO: 129 10E9/L (ref 150–450)
POTASSIUM SERPL-SCNC: 5.2 MMOL/L (ref 3.4–5.3)
POTASSIUM SERPL-SCNC: 5.9 MMOL/L (ref 3.4–5.3)
PROT SERPL-MCNC: 6.9 G/DL (ref 6.8–8.8)
RADIOLOGIST FLAGS: ABNORMAL
RBC # BLD AUTO: 2.47 10E12/L (ref 4.4–5.9)
RBC # BLD AUTO: 2.93 10E12/L (ref 4.4–5.9)
SARS-COV-2 RNA RESP QL NAA+PROBE: NEGATIVE
SODIUM SERPL-SCNC: 133 MMOL/L (ref 133–144)
SODIUM SERPL-SCNC: 133 MMOL/L (ref 133–144)
SPECIMEN SOURCE: NORMAL
TRANSFUSION STATUS PATIENT QL: NORMAL
WBC # BLD AUTO: 11.4 10E9/L (ref 4–11)
WBC # BLD AUTO: 12.1 10E9/L (ref 4–11)

## 2021-02-19 PROCEDURE — 0TN00ZZ RELEASE RIGHT KIDNEY, OPEN APPROACH: ICD-10-PCS | Performed by: TRANSPLANT SURGERY

## 2021-02-19 PROCEDURE — 120N000011 HC R&B TRANSPLANT UMMC

## 2021-02-19 PROCEDURE — 0TT00ZZ RESECTION OF RIGHT KIDNEY, OPEN APPROACH: ICD-10-PCS | Performed by: TRANSPLANT SURGERY

## 2021-02-19 PROCEDURE — 999N001017 HC STATISTIC GLUCOSE BY METER IP

## 2021-02-19 PROCEDURE — 250N000011 HC RX IP 250 OP 636: Performed by: EMERGENCY MEDICINE

## 2021-02-19 PROCEDURE — 258N000003 HC RX IP 258 OP 636: Performed by: STUDENT IN AN ORGANIZED HEALTH CARE EDUCATION/TRAINING PROGRAM

## 2021-02-19 PROCEDURE — 86923 COMPATIBILITY TEST ELECTRIC: CPT | Performed by: EMERGENCY MEDICINE

## 2021-02-19 PROCEDURE — 85025 COMPLETE CBC W/AUTO DIFF WBC: CPT | Performed by: EMERGENCY MEDICINE

## 2021-02-19 PROCEDURE — 85730 THROMBOPLASTIN TIME PARTIAL: CPT | Performed by: EMERGENCY MEDICINE

## 2021-02-19 PROCEDURE — C9803 HOPD COVID-19 SPEC COLLECT: HCPCS | Performed by: EMERGENCY MEDICINE

## 2021-02-19 PROCEDURE — 85610 PROTHROMBIN TIME: CPT | Performed by: EMERGENCY MEDICINE

## 2021-02-19 PROCEDURE — 250N000025 HC SEVOFLURANE, PER MIN: Performed by: TRANSPLANT SURGERY

## 2021-02-19 PROCEDURE — P9016 RBC LEUKOCYTES REDUCED: HCPCS | Performed by: EMERGENCY MEDICINE

## 2021-02-19 PROCEDURE — 74176 CT ABD & PELVIS W/O CONTRAST: CPT | Mod: 26 | Performed by: RADIOLOGY

## 2021-02-19 PROCEDURE — 83735 ASSAY OF MAGNESIUM: CPT | Performed by: SURGERY

## 2021-02-19 PROCEDURE — U0003 INFECTIOUS AGENT DETECTION BY NUCLEIC ACID (DNA OR RNA); SEVERE ACUTE RESPIRATORY SYNDROME CORONAVIRUS 2 (SARS-COV-2) (CORONAVIRUS DISEASE [COVID-19]), AMPLIFIED PROBE TECHNIQUE, MAKING USE OF HIGH THROUGHPUT TECHNOLOGIES AS DESCRIBED BY CMS-2020-01-R: HCPCS | Performed by: EMERGENCY MEDICINE

## 2021-02-19 PROCEDURE — 86850 RBC ANTIBODY SCREEN: CPT | Performed by: EMERGENCY MEDICINE

## 2021-02-19 PROCEDURE — 96375 TX/PRO/DX INJ NEW DRUG ADDON: CPT | Performed by: EMERGENCY MEDICINE

## 2021-02-19 PROCEDURE — 99285 EMERGENCY DEPT VISIT HI MDM: CPT | Performed by: EMERGENCY MEDICINE

## 2021-02-19 PROCEDURE — 250N000009 HC RX 250

## 2021-02-19 PROCEDURE — 96376 TX/PRO/DX INJ SAME DRUG ADON: CPT | Performed by: EMERGENCY MEDICINE

## 2021-02-19 PROCEDURE — 88307 TISSUE EXAM BY PATHOLOGIST: CPT | Mod: TC | Performed by: TRANSPLANT SURGERY

## 2021-02-19 PROCEDURE — 85025 COMPLETE CBC W/AUTO DIFF WBC: CPT | Performed by: SURGERY

## 2021-02-19 PROCEDURE — 85610 PROTHROMBIN TIME: CPT | Performed by: SURGERY

## 2021-02-19 PROCEDURE — 250N000011 HC RX IP 250 OP 636: Performed by: STUDENT IN AN ORGANIZED HEALTH CARE EDUCATION/TRAINING PROGRAM

## 2021-02-19 PROCEDURE — 84100 ASSAY OF PHOSPHORUS: CPT | Performed by: SURGERY

## 2021-02-19 PROCEDURE — 250N000009 HC RX 250: Performed by: SURGERY

## 2021-02-19 PROCEDURE — U0005 INFEC AGEN DETEC AMPLI PROBE: HCPCS | Performed by: EMERGENCY MEDICINE

## 2021-02-19 PROCEDURE — 74176 CT ABD & PELVIS W/O CONTRAST: CPT

## 2021-02-19 PROCEDURE — 370N000017 HC ANESTHESIA TECHNICAL FEE, PER MIN: Performed by: TRANSPLANT SURGERY

## 2021-02-19 PROCEDURE — 80048 BASIC METABOLIC PNL TOTAL CA: CPT | Performed by: SURGERY

## 2021-02-19 PROCEDURE — 88307 TISSUE EXAM BY PATHOLOGIST: CPT | Mod: 26 | Performed by: PATHOLOGY

## 2021-02-19 PROCEDURE — 83690 ASSAY OF LIPASE: CPT | Performed by: EMERGENCY MEDICINE

## 2021-02-19 PROCEDURE — 76776 US EXAM K TRANSPL W/DOPPLER: CPT

## 2021-02-19 PROCEDURE — 99207 PR SATISFY VISIT NUMBER: CPT | Performed by: TRANSPLANT SURGERY

## 2021-02-19 PROCEDURE — 272N000001 HC OR GENERAL SUPPLY STERILE: Performed by: TRANSPLANT SURGERY

## 2021-02-19 PROCEDURE — 96374 THER/PROPH/DIAG INJ IV PUSH: CPT | Performed by: EMERGENCY MEDICINE

## 2021-02-19 PROCEDURE — 99285 EMERGENCY DEPT VISIT HI MDM: CPT | Mod: 25 | Performed by: EMERGENCY MEDICINE

## 2021-02-19 PROCEDURE — 86901 BLOOD TYPING SEROLOGIC RH(D): CPT | Performed by: EMERGENCY MEDICINE

## 2021-02-19 PROCEDURE — 250N000013 HC RX MED GY IP 250 OP 250 PS 637: Performed by: SURGERY

## 2021-02-19 PROCEDURE — 360N000077 HC SURGERY LEVEL 4, PER MIN: Performed by: TRANSPLANT SURGERY

## 2021-02-19 PROCEDURE — 86900 BLOOD TYPING SEROLOGIC ABO: CPT | Performed by: EMERGENCY MEDICINE

## 2021-02-19 PROCEDURE — 36415 COLL VENOUS BLD VENIPUNCTURE: CPT | Performed by: SURGERY

## 2021-02-19 PROCEDURE — 999N000141 HC STATISTIC PRE-PROCEDURE NURSING ASSESSMENT: Performed by: TRANSPLANT SURGERY

## 2021-02-19 PROCEDURE — 76776 US EXAM K TRANSPL W/DOPPLER: CPT | Mod: 26 | Performed by: RADIOLOGY

## 2021-02-19 PROCEDURE — 710N000010 HC RECOVERY PHASE 1, LEVEL 2, PER MIN: Performed by: TRANSPLANT SURGERY

## 2021-02-19 PROCEDURE — 80053 COMPREHEN METABOLIC PANEL: CPT | Performed by: EMERGENCY MEDICINE

## 2021-02-19 PROCEDURE — 250N000009 HC RX 250: Performed by: STUDENT IN AN ORGANIZED HEALTH CARE EDUCATION/TRAINING PROGRAM

## 2021-02-19 PROCEDURE — 85730 THROMBOPLASTIN TIME PARTIAL: CPT | Performed by: SURGERY

## 2021-02-19 RX ORDER — PREDNISONE 5 MG/1
5 TABLET ORAL DAILY
Status: DISCONTINUED | OUTPATIENT
Start: 2021-02-20 | End: 2021-02-20

## 2021-02-19 RX ORDER — NALOXONE HYDROCHLORIDE 0.4 MG/ML
0.2 INJECTION, SOLUTION INTRAMUSCULAR; INTRAVENOUS; SUBCUTANEOUS
Status: DISCONTINUED | OUTPATIENT
Start: 2021-02-19 | End: 2021-02-20

## 2021-02-19 RX ORDER — LOSARTAN POTASSIUM 25 MG/1
25 TABLET ORAL 2 TIMES DAILY
COMMUNITY
Start: 2020-10-01 | End: 2021-03-16

## 2021-02-19 RX ORDER — HEPARIN SODIUM 10000 [USP'U]/100ML
0-5000 INJECTION, SOLUTION INTRAVENOUS CONTINUOUS
Status: DISCONTINUED | OUTPATIENT
Start: 2021-02-19 | End: 2021-02-19

## 2021-02-19 RX ORDER — VIT B COMP NO.3/FOLIC/C/BIOTIN 1 MG-60 MG
1 TABLET ORAL DAILY
COMMUNITY
End: 2021-07-28

## 2021-02-19 RX ORDER — AMOXICILLIN 250 MG
1 CAPSULE ORAL 2 TIMES DAILY
Status: DISCONTINUED | OUTPATIENT
Start: 2021-02-20 | End: 2021-02-27 | Stop reason: HOSPADM

## 2021-02-19 RX ORDER — CLINDAMYCIN PHOSPHATE 900 MG/50ML
900 INJECTION, SOLUTION INTRAVENOUS
Status: COMPLETED | OUTPATIENT
Start: 2021-02-19 | End: 2021-02-19

## 2021-02-19 RX ORDER — ONDANSETRON 2 MG/ML
4 INJECTION INTRAMUSCULAR; INTRAVENOUS EVERY 30 MIN PRN
Status: DISCONTINUED | OUTPATIENT
Start: 2021-02-19 | End: 2021-02-20

## 2021-02-19 RX ORDER — HYDRALAZINE HYDROCHLORIDE 20 MG/ML
2.5-5 INJECTION INTRAMUSCULAR; INTRAVENOUS EVERY 10 MIN PRN
Status: DISCONTINUED | OUTPATIENT
Start: 2021-02-19 | End: 2021-02-19 | Stop reason: HOSPADM

## 2021-02-19 RX ORDER — NALOXONE HYDROCHLORIDE 0.4 MG/ML
0.2 INJECTION, SOLUTION INTRAMUSCULAR; INTRAVENOUS; SUBCUTANEOUS
Status: ACTIVE | OUTPATIENT
Start: 2021-02-19 | End: 2021-02-20

## 2021-02-19 RX ORDER — ONDANSETRON 2 MG/ML
4 INJECTION INTRAMUSCULAR; INTRAVENOUS EVERY 6 HOURS PRN
Status: DISCONTINUED | OUTPATIENT
Start: 2021-02-19 | End: 2021-02-27 | Stop reason: HOSPADM

## 2021-02-19 RX ORDER — FENTANYL CITRATE 50 UG/ML
10-20 INJECTION, SOLUTION INTRAMUSCULAR; INTRAVENOUS
Status: DISCONTINUED | OUTPATIENT
Start: 2021-02-19 | End: 2021-02-20

## 2021-02-19 RX ORDER — METHOCARBAMOL 750 MG/1
750 TABLET, FILM COATED ORAL 4 TIMES DAILY PRN
Status: DISCONTINUED | OUTPATIENT
Start: 2021-02-19 | End: 2021-02-27 | Stop reason: HOSPADM

## 2021-02-19 RX ORDER — NALOXONE HYDROCHLORIDE 0.4 MG/ML
0.4 INJECTION, SOLUTION INTRAMUSCULAR; INTRAVENOUS; SUBCUTANEOUS
Status: ACTIVE | OUTPATIENT
Start: 2021-02-19 | End: 2021-02-20

## 2021-02-19 RX ORDER — LIDOCAINE HYDROCHLORIDE 10 MG/ML
INJECTION, SOLUTION EPIDURAL; INFILTRATION; INTRACAUDAL; PERINEURAL
Status: COMPLETED
Start: 2021-02-19 | End: 2021-02-19

## 2021-02-19 RX ORDER — CLINDAMYCIN PHOSPHATE 900 MG/50ML
900 INJECTION, SOLUTION INTRAVENOUS SEE ADMIN INSTRUCTIONS
Status: DISCONTINUED | OUTPATIENT
Start: 2021-02-19 | End: 2021-02-19 | Stop reason: HOSPADM

## 2021-02-19 RX ORDER — FENTANYL CITRATE 50 UG/ML
INJECTION, SOLUTION INTRAMUSCULAR; INTRAVENOUS PRN
Status: DISCONTINUED | OUTPATIENT
Start: 2021-02-19 | End: 2021-02-19

## 2021-02-19 RX ORDER — ONDANSETRON 2 MG/ML
4 INJECTION INTRAMUSCULAR; INTRAVENOUS EVERY 30 MIN PRN
Status: DISCONTINUED | OUTPATIENT
Start: 2021-02-19 | End: 2021-02-19 | Stop reason: HOSPADM

## 2021-02-19 RX ORDER — CIPROFLOXACIN 2 MG/ML
400 INJECTION, SOLUTION INTRAVENOUS EVERY 12 HOURS
Status: COMPLETED | OUTPATIENT
Start: 2021-02-20 | End: 2021-02-20

## 2021-02-19 RX ORDER — SODIUM CHLORIDE, SODIUM LACTATE, POTASSIUM CHLORIDE, CALCIUM CHLORIDE 600; 310; 30; 20 MG/100ML; MG/100ML; MG/100ML; MG/100ML
INJECTION, SOLUTION INTRAVENOUS CONTINUOUS PRN
Status: DISCONTINUED | OUTPATIENT
Start: 2021-02-19 | End: 2021-02-19

## 2021-02-19 RX ORDER — ONDANSETRON 4 MG/1
4 TABLET, ORALLY DISINTEGRATING ORAL EVERY 30 MIN PRN
Status: DISCONTINUED | OUTPATIENT
Start: 2021-02-19 | End: 2021-02-19 | Stop reason: HOSPADM

## 2021-02-19 RX ORDER — ACETAMINOPHEN 325 MG/1
975 TABLET ORAL EVERY 8 HOURS
Status: DISPENSED | OUTPATIENT
Start: 2021-02-20 | End: 2021-02-23

## 2021-02-19 RX ORDER — MYCOPHENOLIC ACID 360 MG/1
1080 TABLET, DELAYED RELEASE ORAL 2 TIMES DAILY
Status: DISCONTINUED | OUTPATIENT
Start: 2021-02-20 | End: 2021-02-20

## 2021-02-19 RX ORDER — ACETAMINOPHEN 325 MG/1
650 TABLET ORAL EVERY 4 HOURS PRN
Status: DISCONTINUED | OUTPATIENT
Start: 2021-02-22 | End: 2021-02-27 | Stop reason: HOSPADM

## 2021-02-19 RX ORDER — FENTANYL CITRATE 50 UG/ML
25-50 INJECTION, SOLUTION INTRAMUSCULAR; INTRAVENOUS
Status: DISCONTINUED | OUTPATIENT
Start: 2021-02-19 | End: 2021-02-19 | Stop reason: HOSPADM

## 2021-02-19 RX ORDER — SODIUM CHLORIDE, SODIUM LACTATE, POTASSIUM CHLORIDE, CALCIUM CHLORIDE 600; 310; 30; 20 MG/100ML; MG/100ML; MG/100ML; MG/100ML
INJECTION, SOLUTION INTRAVENOUS CONTINUOUS
Status: DISCONTINUED | OUTPATIENT
Start: 2021-02-19 | End: 2021-02-19 | Stop reason: HOSPADM

## 2021-02-19 RX ORDER — HYDROMORPHONE HYDROCHLORIDE 1 MG/ML
0.5 INJECTION, SOLUTION INTRAMUSCULAR; INTRAVENOUS; SUBCUTANEOUS
Status: COMPLETED | OUTPATIENT
Start: 2021-02-19 | End: 2021-02-19

## 2021-02-19 RX ORDER — PROPOFOL 10 MG/ML
INJECTION, EMULSION INTRAVENOUS PRN
Status: DISCONTINUED | OUTPATIENT
Start: 2021-02-19 | End: 2021-02-19

## 2021-02-19 RX ORDER — ONDANSETRON 4 MG/1
4 TABLET, ORALLY DISINTEGRATING ORAL EVERY 6 HOURS PRN
Status: DISCONTINUED | OUTPATIENT
Start: 2021-02-19 | End: 2021-02-27 | Stop reason: HOSPADM

## 2021-02-19 RX ORDER — LIDOCAINE HYDROCHLORIDE 20 MG/ML
INJECTION, SOLUTION INFILTRATION; PERINEURAL PRN
Status: DISCONTINUED | OUTPATIENT
Start: 2021-02-19 | End: 2021-02-19

## 2021-02-19 RX ORDER — NALOXONE HYDROCHLORIDE 0.4 MG/ML
0.4 INJECTION, SOLUTION INTRAMUSCULAR; INTRAVENOUS; SUBCUTANEOUS
Status: DISCONTINUED | OUTPATIENT
Start: 2021-02-19 | End: 2021-02-20

## 2021-02-19 RX ORDER — PROCHLORPERAZINE MALEATE 5 MG
10 TABLET ORAL EVERY 6 HOURS PRN
Status: DISCONTINUED | OUTPATIENT
Start: 2021-02-19 | End: 2021-02-27 | Stop reason: HOSPADM

## 2021-02-19 RX ORDER — PHENYLEPHRINE HCL IN 0.9% NACL 50MG/250ML
.5-1.25 PLASTIC BAG, INJECTION (ML) INTRAVENOUS CONTINUOUS
Status: DISCONTINUED | OUTPATIENT
Start: 2021-02-19 | End: 2021-02-20 | Stop reason: CLARIF

## 2021-02-19 RX ORDER — AMOXICILLIN 250 MG
2 CAPSULE ORAL 2 TIMES DAILY
Status: DISCONTINUED | OUTPATIENT
Start: 2021-02-20 | End: 2021-02-27 | Stop reason: HOSPADM

## 2021-02-19 RX ORDER — OXYCODONE HYDROCHLORIDE 5 MG/1
5-10 TABLET ORAL EVERY 4 HOURS PRN
Status: DISCONTINUED | OUTPATIENT
Start: 2021-02-19 | End: 2021-02-20

## 2021-02-19 RX ORDER — SODIUM CHLORIDE 9 MG/ML
INJECTION, SOLUTION INTRAVENOUS CONTINUOUS PRN
Status: DISCONTINUED | OUTPATIENT
Start: 2021-02-19 | End: 2021-02-19

## 2021-02-19 RX ADMIN — CLINDAMYCIN PHOSPHATE 900 MG: 900 INJECTION, SOLUTION INTRAVENOUS at 19:39

## 2021-02-19 RX ADMIN — HYDROMORPHONE HYDROCHLORIDE 0.5 MG: 1 INJECTION, SOLUTION INTRAMUSCULAR; INTRAVENOUS; SUBCUTANEOUS at 15:23

## 2021-02-19 RX ADMIN — HYDROMORPHONE HYDROCHLORIDE 0.5 MG: 1 INJECTION, SOLUTION INTRAMUSCULAR; INTRAVENOUS; SUBCUTANEOUS at 17:00

## 2021-02-19 RX ADMIN — FENTANYL CITRATE 50 MCG: 50 INJECTION, SOLUTION INTRAMUSCULAR; INTRAVENOUS at 22:45

## 2021-02-19 RX ADMIN — ROCURONIUM BROMIDE 20 MG: 10 INJECTION INTRAVENOUS at 20:26

## 2021-02-19 RX ADMIN — FENTANYL CITRATE 250 MCG: 50 INJECTION, SOLUTION INTRAMUSCULAR; INTRAVENOUS at 19:25

## 2021-02-19 RX ADMIN — LIDOCAINE HYDROCHLORIDE 5 MG: 10 INJECTION, SOLUTION EPIDURAL; INFILTRATION; INTRACAUDAL; PERINEURAL at 15:20

## 2021-02-19 RX ADMIN — ONDANSETRON 4 MG: 2 INJECTION INTRAMUSCULAR; INTRAVENOUS at 11:59

## 2021-02-19 RX ADMIN — SUGAMMADEX 200 MG: 100 INJECTION, SOLUTION INTRAVENOUS at 22:10

## 2021-02-19 RX ADMIN — HYDROMORPHONE HYDROCHLORIDE 0.5 MG: 1 INJECTION, SOLUTION INTRAMUSCULAR; INTRAVENOUS; SUBCUTANEOUS at 22:53

## 2021-02-19 RX ADMIN — SODIUM CHLORIDE, POTASSIUM CHLORIDE, SODIUM LACTATE AND CALCIUM CHLORIDE: 600; 310; 30; 20 INJECTION, SOLUTION INTRAVENOUS at 19:22

## 2021-02-19 RX ADMIN — FENTANYL CITRATE 50 MCG: 50 INJECTION, SOLUTION INTRAMUSCULAR; INTRAVENOUS at 22:09

## 2021-02-19 RX ADMIN — PROPOFOL 30 MG: 10 INJECTION, EMULSION INTRAVENOUS at 21:48

## 2021-02-19 RX ADMIN — FENTANYL CITRATE 50 MCG: 50 INJECTION, SOLUTION INTRAMUSCULAR; INTRAVENOUS at 22:39

## 2021-02-19 RX ADMIN — PROPOFOL 150 MG: 10 INJECTION, EMULSION INTRAVENOUS at 19:25

## 2021-02-19 RX ADMIN — Medication 100 MG: at 19:26

## 2021-02-19 RX ADMIN — SODIUM CHLORIDE, POTASSIUM CHLORIDE, SODIUM LACTATE AND CALCIUM CHLORIDE: 600; 310; 30; 20 INJECTION, SOLUTION INTRAVENOUS at 23:02

## 2021-02-19 RX ADMIN — SODIUM CHLORIDE: 9 INJECTION, SOLUTION INTRAVENOUS at 19:22

## 2021-02-19 RX ADMIN — ROCURONIUM BROMIDE 20 MG: 10 INJECTION INTRAVENOUS at 19:51

## 2021-02-19 RX ADMIN — LIDOCAINE HYDROCHLORIDE 100 MG: 20 INJECTION, SOLUTION INFILTRATION; PERINEURAL at 19:25

## 2021-02-19 RX ADMIN — ONDANSETRON 4 MG: 2 INJECTION INTRAMUSCULAR; INTRAVENOUS at 22:45

## 2021-02-19 RX ADMIN — HYDROMORPHONE HYDROCHLORIDE 0.5 MG: 1 INJECTION, SOLUTION INTRAMUSCULAR; INTRAVENOUS; SUBCUTANEOUS at 14:05

## 2021-02-19 RX ADMIN — HYDROMORPHONE HYDROCHLORIDE 0.5 MG: 1 INJECTION, SOLUTION INTRAMUSCULAR; INTRAVENOUS; SUBCUTANEOUS at 23:10

## 2021-02-19 RX ADMIN — ROCURONIUM BROMIDE 10 MG: 10 INJECTION INTRAVENOUS at 21:47

## 2021-02-19 RX ADMIN — FENTANYL CITRATE 50 MCG: 50 INJECTION, SOLUTION INTRAMUSCULAR; INTRAVENOUS at 22:19

## 2021-02-19 RX ADMIN — HYDROMORPHONE HYDROCHLORIDE 0.5 MG: 1 INJECTION, SOLUTION INTRAMUSCULAR; INTRAVENOUS; SUBCUTANEOUS at 12:02

## 2021-02-19 RX ADMIN — ONDANSETRON 4 MG: 2 INJECTION INTRAMUSCULAR; INTRAVENOUS at 21:48

## 2021-02-19 RX ADMIN — ROCURONIUM BROMIDE 30 MG: 10 INJECTION INTRAVENOUS at 19:29

## 2021-02-19 RX ADMIN — ROCURONIUM BROMIDE 20 MG: 10 INJECTION INTRAVENOUS at 21:09

## 2021-02-19 ASSESSMENT — ACTIVITIES OF DAILY LIVING (ADL)
WEAR_GLASSES_OR_BLIND: NO
DIFFICULTY_EATING/SWALLOWING: NO
WALKING_OR_CLIMBING_STAIRS_DIFFICULTY: NO
DOING_ERRANDS_INDEPENDENTLY_DIFFICULTY: NO
FALL_HISTORY_WITHIN_LAST_SIX_MONTHS: NO
TOILETING_ISSUES: NO
DIFFICULTY_COMMUNICATING: NO
DRESSING/BATHING_DIFFICULTY: NO
HEARING_DIFFICULTY_OR_DEAF: NO
CONCENTRATING,_REMEMBERING_OR_MAKING_DECISIONS_DIFFICULTY: NO
PATIENT_/_FAMILY_COMMUNICATION_STYLE: SPOKEN LANGUAGE (ENGLISH OR BILINGUAL)

## 2021-02-19 ASSESSMENT — LIFESTYLE VARIABLES: TOBACCO_USE: 1

## 2021-02-19 ASSESSMENT — MIFFLIN-ST. JEOR: SCORE: 1717.13

## 2021-02-19 NOTE — H&P
[facility]    History and Physical  Solid Organ Transplant     Date of Admission:  2/19/2021    Assessment & Plan   Tristian Mckeon is a 50 year old male who presents with abdominal pain, hematuria s/p kidney transplant with acute RV thrombosis  -Admit to Transplant surgery  -NPO  -plan for OR tonight for graft nephrectomy  -obtain US of bilateral Extremities to r/o DVT    ESRD on HD  -Consult Nephrology for dialysis    Anemia  -give 1 U PRBC        Code Status   Full Code       Primary Care Physician   Physician No Ref-Primary    Chief Complaint   Abdominal pain    History is obtained from the patient    History of Present Illness   Tristian Mckeon is a 50 year old male with PMH significant for HTN, ACD, Tobacco use s/p kidney transplant x3. First two transplants and graft nephrectomies were at [facility]. His last transplant was at [facility] in 3/12/2015 with subsequent graft failure now back on dialysis [id number].  He currently dialyzes MWF via a RUE fistula. He missed Wed and reports he last dialyzed on Thursday 2/18.  He reports pain ongoing with intermittent hematuria for about 1 month.  He reports pain worsened over the last 24 hrs and this warranted his presentation to the ED.     Past Medical History    I have reviewed this patient's medical history and updated it with pertinent information if needed.   Past Medical History:   Diagnosis Date     Anemia      AVN (avascular necrosis of bone) (H)     Left femoral head     Depression      DJD (degenerative joint disease)      Erectile dysfunction      Genital condyloma, male     S/p resection     Gout      History of blood transfusion      Hyperlipidemia      Hypertension      Hypogonadism male      Kidney replaced by transplant 1994    LDKT.  Early ACR, ureteral strictures.  Failed 1998 d/t chronic rejection.  S/p graft nephrectomy.     Kidney replaced by transplant 2000    LDKT.  ACR, non-compliance, CAN.  Failed in 2007.   S/p graft nephrectomy.     Kidney replaced by transplant 3/12/2015    DDKT.  Induction w/ thymo 600mg.     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  3/25/15 DSA:  A1 mfi 1604, A30 mfi 4387, B13 mfi 1013.     Medical non-compliance      Obesity     S/p gastric bypass      Organ transplant candidate      Osteoporosis      Thyroid disease      Vitamin D deficiency        Past Surgical History   I have reviewed this patient's surgical history and updated it with pertinent information if needed.  Past Surgical History:   Procedure Laterality Date     BIOPSY      Kidney     C HIP CORE DECOMPRESSION Left      C TOTAL HIP ARTHROPLASTY Right      CYSTOSCOPY, REMOVE STENT(S), COMBINED Right 2015    Procedure: COMBINED CYSTOSCOPY, REMOVE STENT(S);  Surgeon: Ren Romeo MD;  Location: [facility] OR     GASTRIC BYPASS  2005     HERNIA REPAIR Right     inguinal     HYDROCELECTOMY INGUINAL Right      NEPHRECTOMY Right 1998    allograft nephrectomy     NEPHRECTOMY Left 2007    allograft nephrectomy     PARATHYROIDECTOMY       TRANSPLANT KIDNEY RECIPIENT  DONOR N/A 3/12/2015    Procedure: TRANSPLANT KIDNEY RECIPIENT  DONOR;  Surgeon: Ren Romeo MD;  Location: [facility] OR     TRANSPLANT KIDNEY RECIPIENT LIVING RELATED      s/p right allograft nephrectomy     TRANSPLANT KIDNEY RECIPIENT LIVING RELATED      s/p left allograft nephrectomy     VASCULAR SURGERY         Prior to Admission Medications   Prior to Admission Medications   Prescriptions Last Dose Informant Patient Reported? Taking?   Blood Pressure Monitor KIT  Self No No   Sig: Automatic Blood Pressure Monitor   Calcium Acetate 667 MG TABS 2021 at Unknown time  No Yes   Sig: Take 2 tablets by mouth 3 times daily (with meals)   Multiple Vitamin (DAILY MULTIVITAMIN PO) 2021 at Unknown time Self Yes Yes   Sig: Take 1 tablet by mouth daily    UNABLE TO FIND   Yes No   Sig: Take 1  tablet by mouth daily MEDICATION NAME: calcium 500 mg - Vitamin B12    bumetanide (BUMEX) 1 MG tablet   No No   Sig: Take 2 tablets (2 mg) by mouth 2 times daily   calcitRIOL (ROCALTROL) 0.25 MCG capsule   No No   Sig: TAKE ONE CAPSULE BY MOUTH ONCE DAILY   cloNIDine (CATAPRES) 0.1 MG tablet 2021 at Unknown time  Yes Yes   Sig: Take 0.2 mg by mouth 2 times daily   cyclobenzaprine (FLEXERIL) 10 MG tablet   Yes No   Sig: Take 10 mg by mouth 3 times daily as needed for muscle spasms   diltiazem ER COATED BEADS (CARTIA XT) 240 MG 24 hr capsule   No No   Sig: TAKE ONE CAPSULE BY MOUTH ONCE DAILY   ferrous sulfate (IRON) 325 (65 Fe) MG tablet 2021 at Unknown time  No Yes   Sig: Take 1 tablet (325 mg) by mouth daily   Patient taking differently: Take 1 tablet by mouth every other day    hydrALAZINE (APRESOLINE) 50 MG tablet 2021 at Unknown time  No Yes   Sig: Take 1 tablet (50 mg) by mouth 3 times daily Do not take if systolic blood pressure is less than 120.   losartan (COZAAR) 25 MG tablet 2021 at Unknown time  Yes Yes   Si mg by Oral or Feeding Tube route 2 times daily   mycophenolic acid (GENERIC EQUIVALENT) 360 MG EC tablet   Yes No   Sig: Take 1,080 mg by mouth 2 times daily   predniSONE (DELTASONE) 5 MG tablet   No No   Sig: Take 1 tablet (5 mg) by mouth daily   tacrolimus (ASTAGRAF XL) 5 MG 24 hr capsule   No No   Sig: Take 5 capsules (25 mg) by mouth every morning (before breakfast)      Facility-Administered Medications: None     Allergies   Allergies   Allergen Reactions     Cephalosporins Unknown     Cyclosporine      Duricef [Cefadroxil]        Social History   I have reviewed this patient's social history and updated it with pertinent information if needed. Tristian Mckeon  reports that he has been smoking cigars. He has never used smokeless tobacco. He reports current alcohol use. He reports that he does not use drugs.    Family History   I have reviewed this patient's family  history and updated it with pertinent information if needed.   Family History   Problem Relation Age of Onset     Diabetes Brother      Blood Disease Sister         sickle cell     Hypertension Father      Heart Disease Father      Cerebrovascular Disease Brother      Arthritis Mother      Heart Disease Mother        Review of Systems   The 10 point Review of Systems is negative other than noted in the HPI or here.     Physical Exam   Temp: 97.7  F (36.5  C) Temp src: Oral BP: (!) 158/78 Pulse: 83   Resp: 16 SpO2: 99 % O2 Device: None (Room air)    Vital Signs with Ranges  Temp:  [97.7  F (36.5  C)] 97.7  F (36.5  C)  Pulse:  [80-88] 83  Resp:  [16] 16  BP: (114-159)/(42-78) 158/78  SpO2:  [98 %-100 %] 99 %  182 lbs 15.71 oz    Constitutional: Awake, alert, cooperative,  Mild distress due to pain, and appears stated age.  Respiratory: No increased work of breathing, good air exchange, clear to auscultation bilaterally, no crackles or wheezing.  Cardiovascular: Normal apical impulse, regular rate and rhythm, normal S1 and S2,   GI: Abdomen soft, Tender to RLQ with some guarding, surgical scar well approximated.   Lymph/Hematologic: No cervical lymphadenopathy and no supraclavicular lymphadenopathy.  Skin: No bruising or bleeding, normal skin color, texture, turgor, no redness, warmth, or swelling  Musculoskeletal: There is no redness, warmth, or swelling of the joints.  Full range of motion noted.  Motor strength is 5 out of 5 all extremities bilaterally.  Tone is normal.  Neurologic: Awake, alert, oriented to name, place and time.    Neuropsychiatric: Calm, normal eye contact, alert, normal affect, oriented to self, place, time and situation, memory for past and recent events intact and thought process normal.    Data   Results for orders placed or performed during the hospital encounter of 02/19/21 (from the past 24 hour(s))   CBC with platelets differential   Result Value Ref Range    WBC 12.1 (H) 4.0 - 11.0  10e9/L    RBC Count 2.47 (L) 4.4 - 5.9 10e12/L    Hemoglobin 6.5 (LL) 13.3 - 17.7 g/dL    Hematocrit 22.2 (L) 40.0 - 53.0 %    MCV 90 78 - 100 fl    MCH 26.3 (L) 26.5 - 33.0 pg    MCHC 29.3 (L) 31.5 - 36.5 g/dL    RDW 14.5 10.0 - 15.0 %    Platelet Count 125 (L) 150 - 450 10e9/L    Diff Method Automated Method     % Neutrophils 84.9 %    % Lymphocytes 4.8 %    % Monocytes 9.3 %    % Eosinophils 0.2 %    % Basophils 0.2 %    % Immature Granulocytes 0.6 %    Nucleated RBCs 0 0 /100    Absolute Neutrophil 10.3 (H) 1.6 - 8.3 10e9/L    Absolute Lymphocytes 0.6 (L) 0.8 - 5.3 10e9/L    Absolute Monocytes 1.1 0.0 - 1.3 10e9/L    Absolute Eosinophils 0.0 0.0 - 0.7 10e9/L    Absolute Basophils 0.0 0.0 - 0.2 10e9/L    Abs Immature Granulocytes 0.1 0 - 0.4 10e9/L    Absolute Nucleated RBC 0.0    Comprehensive metabolic panel   Result Value Ref Range    Sodium 133 133 - 144 mmol/L    Potassium 5.2 3.4 - 5.3 mmol/L    Chloride 98 94 - 109 mmol/L    Carbon Dioxide 30 20 - 32 mmol/L    Anion Gap 5 3 - 14 mmol/L    Glucose 109 (H) 70 - 99 mg/dL    Urea Nitrogen 31 (H) 7 - 30 mg/dL    Creatinine 8.17 (H) 0.66 - 1.25 mg/dL    GFR Estimate 7 (L) >60 mL/min/[1.73_m2]    GFR Estimate If Black 8 (L) >60 mL/min/[1.73_m2]    Calcium 8.7 8.5 - 10.1 mg/dL    Bilirubin Total 0.4 0.2 - 1.3 mg/dL    Albumin 2.3 (L) 3.4 - 5.0 g/dL    Protein Total 6.9 6.8 - 8.8 g/dL    Alkaline Phosphatase 76 40 - 150 U/L    ALT 16 0 - 70 U/L    AST 34 0 - 45 U/L   Lipase   Result Value Ref Range    Lipase 66 (L) 73 - 393 U/L   CT Abdomen Pelvis w/o Contrast    Narrative    EXAMINATION: CT ABDOMEN PELVIS W/O CONTRAST, 2/19/2021 12:07 PM    TECHNIQUE:  Helical CT images from the lung bases through the  symphysis pubis were obtained without IV contrast.     COMPARISON: Ultrasound 1/24/2016, CT abdomen 9/10/2015. Chest  radiograph 1/7/2021.    HISTORY: Abdominal pain, acute, nonlocalized; Abdominal pain. Hx of  kidney transplant.    FINDINGS:    Lung bases: Moderate  to large low-density pericardial effusion.  Bibasilar platelike atelectasis. Coronary artery calcification noted.  Patient appears anemic with decreased density of intraventricular  blood.    Abdomen and pelvis: Normal liver, spleen, and adrenal glands allowing  for noncontrast exam. Normal pancreas. Cholelithiasis without evidence  of cholecystitis. Right lower quadrant renal transplant measuring 13 x  7.8 cm in the largest axial dimension, previously 12.6 x 7.3 cm  (series 2 image 47). No clear evidence of hydronephrosis or  nephrolithiasis. Diffuse calcification of the abdominal vasculature.  Atrophic native kidneys. Normal caliber bowel without evidence of  bowel wall thickening. Normal caliber abdominal vasculature.  Mesenteric and omental edema. Small volume of ascites in the pelvis.  No free air.    Bones and soft tissues: Right total hip arthroplasty. Degenerative  changes of thoracolumbar spine. Diffuse anasarca. Otherwise, no  concerning osseous or soft tissue findings.      Impression    IMPRESSION:   1. No clear evidence for hydronephrosis or nephrolithiasis related to  the transplant kidney. The transplant kidney does appear increased in  size when compared to CT September 2015.  2. Moderate to large pericardial effusion. This was also described on  recent chest radiograph.  3. Diffuse anasarca with overall third spacing of fluid.    I have personally reviewed the examination and initial interpretation  and I agree with the findings.    FRANSISCO TAN MD   US Renal Transplant   Result Value Ref Range    Radiologist flags Acute renal vein thrombosis (AA)     Narrative    EXAMINATION: US RENAL TRANSPLANT,  2/19/2021 1:36 PM     COMPARISON: CT abdomen 2/19/2021.    HISTORY: Pain at site of transplanted kidney    TECHNIQUE:  Grey-scale, color Doppler and spectral flow analysis.    FINDINGS:  The transplant kidney located in the right lower quadrant, is enlarged  and measures 12.5 x 7.3 cm. Parenchymal  echogenicity is increased.  There is no perinephric fluid collection or hydronephrosis. 1.2 cm  cyst is noted in the upper pole. Echogenic material is noted within  the collecting system extending to the ureter, likely blood clot.    Renal artery flow: There is diastolic flow reversal in the main renal  artery and intrarenal arcuate arteries.  130 cm/sec peak systolic at hilum.  175 peak systolic at anastomosis.  Arcuate artery resistive indices (upper to lower): 1, 1, 1    Renal Vein Flow:  There is absence of flow within the renal vein at the hilum up to the  middle portion of the renal vein. A velocity of 250 cm/s noted at  anastomosis.    Iliac artery flow:  248 peak systolic above anastomosis.  186 peak systolic below anastomosis.    Iliac vein flow:  Patent above and below the anastomosis.      Impression    IMPRESSION:   1.  Enlarged right lower quadrant transplant kidney with acute renal  vein thrombosis and diastolic flow reversal in the renal artery.  2.  Echogenic nonvascular material within the collecting sytem, likely  representing clot.    [Critical Result: Acute renal vein thrombosis]    Finding was identified on 2/19/2021 1:40 PM.     Dr. Cochran was contacted by Dr. Smith at 2/19/2021 1:53 PM and  verbalized understanding of the critical finding.     KLEVER SMITH MD   Asymptomatic SARS-CoV-2 COVID-19 Virus (Coronavirus) by PCR    Specimen: Nasopharyngeal   Result Value Ref Range    SARS-CoV-2 Virus Specimen Source Nasopharyngeal     SARS-CoV-2 PCR Result NEGATIVE     SARS-CoV-2 PCR Comment       Testing was performed using the Xpert Xpress SARS-CoV-2 Assay on the Cepheid Gene-Xpert   Instrument Systems. Additional information about this Emergency Use Authorization (EUA)   assay can be found via the Lab Guide.     INR   Result Value Ref Range    INR 1.85 (H) 0.86 - 1.14   Partial thromboplastin time   Result Value Ref Range    PTT 41 (H) 22 - 37 sec   ABO/Rh type and screen   Result Value Ref Range     ABO PENDING     Antibody Screen PENDING     Test Valid Only At          [facility]    Specimen Expires 02/22/2021

## 2021-02-19 NOTE — ANESTHESIA PREPROCEDURE EVALUATION
Anesthesia Evaluation     . Pt has had prior anesthetic. Type: General    No history of anesthetic complications     ROS/MED HX    ENT/Pulmonary:  - neg pulmonary ROS   (+)tobacco use (3-5 cigars/day), , . .   (-) asthma   Neurologic:  - neg neurologic ROS    (-) seizures and CVA   Cardiovascular: Comment: HTN not on medications    (+) Dyslipidemia, hypertension . : . . . :. . Previous cardiac testing date:results:Stress Testdate:6/2014 results:Normal dobutamine echo without infarct or ischemia at adequate rate pressure product. LV size and function change appropriately with stress.ECG reviewed date:3/2015 results:NSR date: results:          METS/Exercise Tolerance:     Hematologic:  - neg hematologic  ROS       Musculoskeletal:   (+) other musculoskeletal- gout; osteoporosis; DJD      GI/Hepatic:  - neg GI/hepatic ROS       Renal/Genitourinary: Comment: Last dialysis was 3/11/2015. Initial Kidney failure was secondary to Reflux Uropathy. Kidney Transplant x2. Kidney transplants failed secondary to non-compliance    (+) chronic renal disease type: ESRD Pt does not require dialysis Pt has history of transplant date: LDKT 1994; LDKT 2000, now s/p transplant on 3/12/15       Endo: Comment: BMI=33 - neg endo ROS   (+) Obesity, .   (-) Type II DM and thyroid disease     Psychiatric:  - neg psychiatric ROS   (+) psychiatric history depression      Infectious Disease:  - neg infectious disease ROS       Malignancy:      - no malignancy   Other:               Physical Exam  Normal systems: dental    Airway   Mallampati: III  TM distance: >3 FB  Neck ROM: full    Dental     Cardiovascular   Rhythm and rate: regular and normal      Pulmonary    breath sounds clear to auscultation                    Anesthesia Plan    ASA Score: 3 .    Plan for MAC - Maintenance will be TIVA.   Routine analgesia and antiemetics to be used for post-operative care. Anesthetic plan, risks, benefits and alternatives discussed with: patient or  representative.     .            History & Physical Review  History and physical reviewed and following examination; no interval change.        ANESTHESIA PREOP EVALUATION    HPI: Tristian Mckeon is a 44 year old male who presents for Procedure(s):  LAPAROTOMY  NEPHRECTOMY, TOTAL    PMHx/PSHx/ROS:  Past Medical History:   Diagnosis Date     Anemia      AVN (avascular necrosis of bone) (H)     Left femoral head     Depression      DJD (degenerative joint disease)      Erectile dysfunction      Genital condyloma, male     S/p resection     Gout      History of blood transfusion      Hyperlipidemia      Hypertension      Hypogonadism male      Kidney replaced by transplant     LDKT.  Early ACR, ureteral strictures.  Failed  d/t chronic rejection.  S/p graft nephrectomy.     Kidney replaced by transplant     LDKT.  ACR, non-compliance, CAN.  Failed in .  S/p graft nephrectomy.     Kidney replaced by transplant 3/12/2015    DDKT.  Induction w/ thymo 600mg.     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  3/25/15 DSA:  A1 mfi 1604, A30 mfi 4387, B13 mfi 1013.     Medical non-compliance      Obesity     S/p gastric bypass      Organ transplant candidate      Osteoporosis      Thyroid disease      Vitamin D deficiency        Past Surgical History:   Procedure Laterality Date     BIOPSY      Kidney     C HIP CORE DECOMPRESSION Left      C TOTAL HIP ARTHROPLASTY Right      CYSTOSCOPY, REMOVE STENT(S), COMBINED Right 2015    Procedure: COMBINED CYSTOSCOPY, REMOVE STENT(S);  Surgeon: Ren Romeo MD;  Location: [facility] OR     GASTRIC BYPASS  2005     HERNIA REPAIR Right     inguinal     HYDROCELECTOMY INGUINAL Right      NEPHRECTOMY Right 1998    allograft nephrectomy     NEPHRECTOMY Left     allograft nephrectomy     PARATHYROIDECTOMY       TRANSPLANT KIDNEY RECIPIENT  DONOR N/A 3/12/2015    Procedure: TRANSPLANT KIDNEY RECIPIENT   "DONOR;  Surgeon: Ren Romeo MD;  Location: [facility] OR     TRANSPLANT KIDNEY RECIPIENT LIVING RELATED  1994    s/p right allograft nephrectomy     TRANSPLANT KIDNEY RECIPIENT LIVING RELATED  2000    s/p left allograft nephrectomy     VASCULAR SURGERY         Past Anes Hx: No personal or family h/o anesthesia problems    Soc Hx:   Social History     Tobacco Use     Smoking status: Light Tobacco Smoker     Types: Cigars     Smokeless tobacco: Never Used     Tobacco comment: \"one a day\"   Substance Use Topics     Alcohol use: Yes     Alcohol/week: 0.0 standard drinks     Comment: Minimal       Allergies:   Allergies   Allergen Reactions     Cephalosporins Unknown     Cyclosporine      Duricef [Cefadroxil]        Meds:   No current facility-administered medications for this visit.      No current outpatient medications on file.     Facility-Administered Medications Ordered in Other Visits   Medication     clindamycin (CLEOCIN) infusion 900 mg     fentaNYL (PF) (SUBLIMAZE) injection     [Auto Hold] HYDROmorphone (PF) (DILAUDID) injection 0.5 mg     lactated ringers infusion     lidocaine 2% injection (MDV)     [Auto Hold] ondansetron (ZOFRAN) injection 4 mg     phenylephrine (ZAYRA-SYNEPHRINE) 50 mg in NaCl 0.9 % 250 mL infusion PERIPHERAL     propofol (DIPRIVAN) injection 10 mg/mL vial     rocuronium injection     sodium chloride 0.9% infusion     succinylcholine (ANECTINE) injection       NPO Status: >12 hours     Labs:    BMP:  Recent Labs   Lab Test  04/20/15   0913   NA  137   POTASSIUM  4.5   CHLORIDE  112*   CO2  16*   BUN  19   CR  2.41*   GLC  110*   MEERA  8.3*     CBC:   Recent Labs   Lab Test  04/20/15   0913   WBC  4.6   RBC  2.84*   HGB  8.5*   HCT  26.8*   MCV  94   MCH  29.9   MCHC  31.7   RDW  19.9*   PLT  121*     Coags:  Recent Labs   Lab Test  04/04/15   0845   03/11/15   1549   INR  1.53*   < >  1.22*   PTT   --    --   42*   FIBR  110*   < >   --     < > = values in this interval not displayed. "       Christine Onofre MD  Staff Anesthesiologist  Pager [phone]  2015  8:30 AM        Anesthesia Pre-Procedure Evaluation    Patient: Tristian Mckeon   MRN: [id number] : 1970        Preoperative Diagnosis: Renal vein thrombosis (H) [I82.3]   Procedure : Procedure(s):  LAPAROTOMY  NEPHRECTOMY, TOTAL     Past Medical History:   Diagnosis Date     Anemia      AVN (avascular necrosis of bone) (H)     Left femoral head     Depression      DJD (degenerative joint disease)      Erectile dysfunction      Genital condyloma, male     S/p resection     Gout      History of blood transfusion      Hyperlipidemia      Hypertension      Hypogonadism male      Kidney replaced by transplant     LDKT.  Early ACR, ureteral strictures.  Failed  d/t chronic rejection.  S/p graft nephrectomy.     Kidney replaced by transplant     LDKT.  ACR, non-compliance, CAN.  Failed in .  S/p graft nephrectomy.     Kidney replaced by transplant 3/12/2015    DDKT.  Induction w/ thymo 600mg.     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  3/25/15 DSA:  A1 mfi 1604, A30 mfi 4387, B13 mfi 1013.     Medical non-compliance      Obesity     S/p gastric bypass 2005     Organ transplant candidate      Osteoporosis      Thyroid disease      Vitamin D deficiency       Past Surgical History:   Procedure Laterality Date     BIOPSY      Kidney     C HIP CORE DECOMPRESSION Left      C TOTAL HIP ARTHROPLASTY Right      CYSTOSCOPY, REMOVE STENT(S), COMBINED Right 2015    Procedure: COMBINED CYSTOSCOPY, REMOVE STENT(S);  Surgeon: Ren Romeo MD;  Location: [facility] OR     GASTRIC BYPASS  2005     HERNIA REPAIR Right     inguinal     HYDROCELECTOMY INGUINAL Right      NEPHRECTOMY Right 1998    allograft nephrectomy     NEPHRECTOMY Left     allograft nephrectomy     PARATHYROIDECTOMY       TRANSPLANT KIDNEY RECIPIENT  DONOR N/A 3/12/2015    Procedure: TRANSPLANT KIDNEY  "RECIPIENT  DONOR;  Surgeon: Ren Romeo MD;  Location: [facility] OR     TRANSPLANT KIDNEY RECIPIENT LIVING RELATED      s/p right allograft nephrectomy     TRANSPLANT KIDNEY RECIPIENT LIVING RELATED      s/p left allograft nephrectomy     VASCULAR SURGERY        Allergies   Allergen Reactions     Cephalosporins Unknown     Cyclosporine      Duricef [Cefadroxil]       Social History     Tobacco Use     Smoking status: Light Tobacco Smoker     Types: Cigars     Smokeless tobacco: Never Used     Tobacco comment: \"one a day\"   Substance Use Topics     Alcohol use: Yes     Alcohol/week: 0.0 standard drinks     Comment: Minimal      Wt Readings from Last 1 Encounters:   21 83 kg (182 lb 15.7 oz)        Anesthesia Evaluation   Pt has had prior anesthetic. Type: General.        ROS/MED HX  ENT/Pulmonary:     (+) tobacco use,     Neurologic:       Cardiovascular:     (+) hypertension----- (-) GRULLON   METS/Exercise Tolerance:     Hematologic: Comments: thrombocytopenia    Renal vein thrombus     (+) anemia, history of blood transfusion,     Musculoskeletal: Comment: osteoporosis      GI/Hepatic:       Renal/Genitourinary: Comment: S/p KT x3. Last in  with rejection in     (+) renal disease, Pt has history of transplant,     Endo: Comment: Hyperparathyroid 2/2 CKD. Now s/p parathyroidectomy    (-) obesity   Psychiatric/Substance Use:       Infectious Disease:       Malignancy:       Other:            Physical Exam    Airway  airway exam normal           Respiratory Devices and Support         Dental  no notable dental history         Cardiovascular   cardiovascular exam normal          Pulmonary   pulmonary exam normal                OUTSIDE LABS:  CBC:   Lab Results   Component Value Date    WBC 12.1 (H) 2021    WBC 4.3 01/10/2021    HGB 6.5 (LL) 2021    HGB 8.3 (L) 01/15/2021    HCT 22.2 (L) 2021    HCT 26.2 (L) 01/15/2021     (L) 2021     01/10/2021 "     BMP:   Lab Results   Component Value Date     02/19/2021     (L) 01/15/2021    POTASSIUM 5.2 02/19/2021    POTASSIUM 5.2 01/15/2021    CHLORIDE 98 02/19/2021    CHLORIDE 103 01/15/2021    CO2 30 02/19/2021    CO2 18 (L) 01/15/2021    BUN 31 (H) 02/19/2021     (H) 01/15/2021    CR 8.17 (H) 02/19/2021    CR 6.60 (H) 01/15/2021     (H) 02/19/2021     (H) 01/15/2021     COAGS:   Lab Results   Component Value Date    PTT 41 (H) 02/19/2021    INR 1.85 (H) 02/19/2021    FIBR 175 (L) 10/14/2015     POC:   Lab Results   Component Value Date     (H) 05/12/2015     HEPATIC:   Lab Results   Component Value Date    ALBUMIN 2.3 (L) 02/19/2021    PROTTOTAL 6.9 02/19/2021    ALT 16 02/19/2021    AST 34 02/19/2021    ALKPHOS 76 02/19/2021    BILITOTAL 0.4 02/19/2021     OTHER:   Lab Results   Component Value Date    PH  03/12/2015     WRONG SPECIMEN TYPE SEE [id number]  CORRECTED ON 03/12 AT 0855: PREVIOUSLY REPORTED AS 7.41      LACT 1.3 01/24/2016    A1C 5.2 03/11/2015    MEERA 8.7 02/19/2021    PHOS 9.9 (H) 01/15/2021    MAG 2.7 (H) 01/11/2021    LIPASE 66 (L) 02/19/2021    TSH 2.01 12/10/2019    CRP 80.0 (H) 01/08/2021     (H) 01/08/2021       Anesthesia Plan    ASA Status:  4      Anesthesia Type: General.     - Airway: ETT   Induction: Intravenous.   Maintenance: Balanced.   Techniques and Equipment:     - Lines/Monitors: 2nd IV     Consents         - Extended Intubation/Ventilatory Support Discussed: no Extended Intubation.      - Patient is DNR/DNI Status: No    Use of blood products discussed: Yes.     - Discussed with: Patient.     - Consented: consented to blood products     Postoperative Care    Pain management: IV analgesics.   PONV prophylaxis: Ondansetron (or other 5HT-3)     Comments:                Hazel Flood MD

## 2021-02-19 NOTE — PHARMACY-ADMISSION MEDICATION HISTORY
Admission Medication History Completed by Pharmacy    See [facility] Admission Navigator for allergy information, preferred outpatient pharmacy, prior to admission medications and immunization status.     Medication history sources: Patient interview via phone, [employer]    Changes made to PTA medication list (reason)  Added: Astagraf XL 3mg  Deleted: NA  Changed: NA    Additional medication history information:   - Patient reports he stopped taking Bumex, Calcitriol, Prednisone, Mycophenolate, Tacrolimus and Diltiazem roughly 2 weeks ago. Please address.  - Patient reports he is only taking 25mg of Losartan BID, however, patient filled 50mg tablets on 2/10/21, 1/6/21, 12/17/20, 11/25/20, 11/02/20. He reports taking a whole tablet and not cutting it in half.  - Patient is on generic mycophenolate 1,080mg BID and brand name Astagraf XL 28mg for anti-rejection medications.  - Patient denies taking any additional Rx/OTC medications other than the ones listed below.  Prior to Admission medications    Medication Sig Last Dose Taking? Auth Provider   bumetanide (BUMEX) 1 MG tablet Take 2 tablets (2 mg) by mouth 2 times daily Past Month at Unknown time Yes Cayetano Pizano MD   calcitRIOL (ROCALTROL) 0.25 MCG capsule TAKE ONE CAPSULE BY MOUTH ONCE DAILY Past Month at Unknown time Yes Cayetano Pizano MD   Calcium Acetate 667 MG TABS Take 2 tablets by mouth 3 times daily (with meals) 2/19/2021 at Unknown time Yes Cayetano Pizano MD   cloNIDine (CATAPRES) 0.1 MG tablet Take 0.2 mg by mouth 2 times daily 2/19/2021 at Unknown time Yes Unknown, Entered By History   Cyanocobalamin (VITAMIN B-12 SL) Place 1 tablet under the tongue daily 2/18/2021 at Unknown time Yes Unknown, Entered By History   cyclobenzaprine (FLEXERIL) 10 MG tablet Take 10 mg by mouth 3 times daily as needed for muscle spasms Past Month at Unknown time Yes Unknown, Entered By History   diltiazem ER COATED BEADS (CARTIA XT) 240 MG 24 hr capsule TAKE ONE CAPSULE BY MOUTH  ONCE DAILY Past Month at Unknown time Yes Kole Collins MD   ferrous sulfate (IRON) 325 (65 Fe) MG tablet Take 1 tablet (325 mg) by mouth daily  Patient taking differently: Take 1 tablet by mouth every other day  2/18/2021 at Unknown time Yes Kole Collins MD   hydrALAZINE (APRESOLINE) 50 MG tablet Take 1 tablet (50 mg) by mouth 3 times daily Do not take if systolic blood pressure is less than 120. 2/19/2021 at Unknown time Yes Jackie Edouard MD   losartan (COZAAR) 25 MG tablet Take 25 mg by mouth 2 times daily 2/19/2021 at Unknown time Yes Reported, Patient   Multiple Vitamin (DAILY MULTIVITAMIN PO) Take 1 tablet by mouth daily  2/19/2021 at Unknown time Yes Reported, Patient   multivitamin RENAL (RENAVITE RX/NEPHROVITE) 1 MG tablet Take 1 tablet by mouth daily 2/18/2021 at Unknown time Yes Unknown, Entered By History   mycophenolic acid (GENERIC EQUIVALENT) 360 MG EC tablet Take 1,080 mg by mouth 2 times daily Past Month at Unknown time Yes Unknown, Entered By History   predniSONE (DELTASONE) 5 MG tablet Take 1 tablet (5 mg) by mouth daily Past Month at Unknown time Yes Kole Collins MD   tacrolimus (ASTAGRAF XL) 1 MG 24 hr capsule Take 3 mg by mouth every morning (before breakfast) Take with 25mg for total dose of 28mg daily Past Month at Unknown time Yes Unknown, Entered By History   tacrolimus (ASTAGRAF XL) 5 MG 24 hr capsule Take 5 capsules (25 mg) by mouth every morning (before breakfast) Past Month at Unknown time Yes Kole Collins MD     Date completed: 02/19/21    Medication history completed by:   Ignacia Colin, LaverneD

## 2021-02-19 NOTE — ED PROVIDER NOTES
ED Provider Note  [facility]      History     Chief Complaint   Patient presents with     Abdominal Pain     The history is provided by the patient and medical records.     Tristian Mckeon is a 50 year old male with a PMH notable for kidney transplant x3 (last 2015) S/P rejection, immunosuppression, HTN, secondary renal hyperparathyroidism, CKD stage IV, anemia, thrombocytopenia, AVN, and h/o blood transfusion who presents the ED today with abdominal pain. Patient reports that he began having pain over his right kidney radiating to the back a few days ago. The pain became more diffuse and worsened today. He states that he has never had this pain before. He reports cold sweats since restarting dialysis. He dialyses MWF, but missed this Wednesday so he went yesterday. He produces minimal urine that he states is mostly blood. He denies known sick exposure. No other symptoms noted.       Past Medical History  Past Medical History:   Diagnosis Date     Anemia      AVN (avascular necrosis of bone) (H)     Left femoral head     Depression      DJD (degenerative joint disease)      Erectile dysfunction      Genital condyloma, male     S/p resection     Gout      History of blood transfusion      Hyperlipidemia      Hypertension      Hypogonadism male      Kidney replaced by transplant 1994    LDKT.  Early ACR, ureteral strictures.  Failed 1998 d/t chronic rejection.  S/p graft nephrectomy.     Kidney replaced by transplant 2000    LDKT.  ACR, non-compliance, CAN.  Failed in 2007.  S/p graft nephrectomy.     Kidney replaced by transplant 3/12/2015    DDKT.  Induction w/ thymo 600mg.     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  3/25/15 DSA:  A1 mfi 1604, A30 mfi 4387, B13 mfi 1013.     Medical non-compliance      Obesity     S/p gastric bypass 2005     Organ transplant candidate 2009     Osteoporosis      Thyroid disease      Vitamin D deficiency      Past  Surgical History:   Procedure Laterality Date     BIOPSY      Kidney     C HIP CORE DECOMPRESSION Left      C TOTAL HIP ARTHROPLASTY Right      CYSTOSCOPY, REMOVE STENT(S), COMBINED Right 2015    Procedure: COMBINED CYSTOSCOPY, REMOVE STENT(S);  Surgeon: Ren Romeo MD;  Location: [facility] OR     GASTRIC BYPASS  2005     HERNIA REPAIR Right     inguinal     HYDROCELECTOMY INGUINAL Right      NEPHRECTOMY Right 1998    allograft nephrectomy     NEPHRECTOMY Left 2007    allograft nephrectomy     PARATHYROIDECTOMY       TRANSPLANT KIDNEY RECIPIENT  DONOR N/A 3/12/2015    Procedure: TRANSPLANT KIDNEY RECIPIENT  DONOR;  Surgeon: Ren Romeo MD;  Location: [facility] OR     TRANSPLANT KIDNEY RECIPIENT LIVING RELATED      s/p right allograft nephrectomy     TRANSPLANT KIDNEY RECIPIENT LIVING RELATED      s/p left allograft nephrectomy     VASCULAR SURGERY       Calcium Acetate 667 MG TABS  cloNIDine (CATAPRES) 0.1 MG tablet  ferrous sulfate (IRON) 325 (65 Fe) MG tablet  hydrALAZINE (APRESOLINE) 50 MG tablet  losartan (COZAAR) 25 MG tablet  Multiple Vitamin (DAILY MULTIVITAMIN PO)  Blood Pressure Monitor KIT  bumetanide (BUMEX) 1 MG tablet  calcitRIOL (ROCALTROL) 0.25 MCG capsule  cyclobenzaprine (FLEXERIL) 10 MG tablet  diltiazem ER COATED BEADS (CARTIA XT) 240 MG 24 hr capsule  mycophenolic acid (GENERIC EQUIVALENT) 360 MG EC tablet  predniSONE (DELTASONE) 5 MG tablet  tacrolimus (ASTAGRAF XL) 5 MG 24 hr capsule  UNABLE TO FIND      Allergies   Allergen Reactions     Cephalosporins Unknown     Cyclosporine      Duricef [Cefadroxil]      Family History  Family History   Problem Relation Age of Onset     Diabetes Brother      Blood Disease Sister         sickle cell     Hypertension Father      Heart Disease Father      Cerebrovascular Disease Brother      Arthritis Mother      Heart Disease Mother      Social History   Social History     Tobacco Use     Smoking status: Light Tobacco  "Smoker     Types: Cigars     Smokeless tobacco: Never Used     Tobacco comment: \"one a day\"   Substance Use Topics     Alcohol use: Yes     Alcohol/week: 0.0 standard drinks     Comment: Minimal     Drug use: No      Past medical history, past surgical history, medications, allergies, family history, and social history were reviewed with the patient. No additional pertinent items.       Review of Systems  A complete review of systems was performed with pertinent positives and negatives noted in the HPI, and all other systems negative.    Physical Exam   BP: 114/42  Pulse: 88  Temp: 97.7  F (36.5  C)  Resp: 16  Height: 180.3 cm (5' 11\")  Weight: 83 kg (182 lb 15.7 oz)  SpO2: 99 %  Physical Exam  Constitutional:       General: He is in acute distress.      Appearance: He is well-developed. He is not diaphoretic.   HENT:      Head: Normocephalic and atraumatic.      Mouth/Throat:      Pharynx: No oropharyngeal exudate.   Eyes:      General: No scleral icterus.        Right eye: No discharge.         Left eye: No discharge.      Pupils: Pupils are equal, round, and reactive to light.   Neck:      Musculoskeletal: Normal range of motion and neck supple.   Cardiovascular:      Rate and Rhythm: Normal rate and regular rhythm.      Heart sounds: Normal heart sounds. No murmur. No friction rub. No gallop.    Pulmonary:      Effort: Pulmonary effort is normal. No respiratory distress.      Breath sounds: Normal breath sounds. No wheezing.   Chest:      Chest wall: No tenderness.   Abdominal:      General: Bowel sounds are normal. There is no distension.      Palpations: Abdomen is soft.      Tenderness: There is generalized abdominal tenderness (Worse in mid-right abdomen.).   Genitourinary:     Rectum: Guaiac result negative.   Musculoskeletal: Normal range of motion.         General: No tenderness or deformity.   Skin:     General: Skin is warm and dry.      Coloration: Skin is not pale.      Findings: No erythema or rash. "   Neurological:      Mental Status: He is alert and oriented to person, place, and time.      Cranial Nerves: No cranial nerve deficit.         ED Course      Procedures                         No results found for any visits on 02/19/21.  Medications - No data to display     Assessments & Plan (with Medical Decision Making)   This is a 50-year-old male with a history of ESRD on dialysis who presents with abdominal pain.  This is been ongoing for the past several days but became severe yesterday.  He notes right mid abdominal pain at the site of his transplanted kidney.  On exam patient appears uncomfortable.  He has abdominal tenderness which is worse in the right mid abdomen.  Lab work shows a hemoglobin of 6.5.  Hemoccult is negative.  Ultrasound of transplanted kidney shows acute renal vein thrombosis as well as likely clot in the working system.  CT scan shows enlargement of transplanted kidney.  Also shows previously known pericardial effusion.  There is diffuse anasarca.  I discussed the case with Transplant Surgery who recommends starting patient on heparin.  Patient consented to blood products and transfusion was also started.  Patient will be admitted to the Transplant Surgery service.    I have reviewed the nursing notes. I have reviewed the findings, diagnosis, plan and need for follow up with the patient.    New Prescriptions    No medications on file       Final diagnoses:   None   Rosey STEWART, am serving as a trained medical scribe to document services personally performed by Saul Cochran DO, based on the provider's statements to me.     Saul STEWART DO, was physically present and have reviewed and verified the accuracy of this note documented by Rosey Mcclellan.      --  Saul Cochran DO  [facility] EMERGENCY DEPARTMENT  2/19/2021     Saul Cochran DO  02/19/21 0455

## 2021-02-19 NOTE — ED NOTES
Transplant surgery at bedside. Heparin drip to be started after blood transfusion. Awaiting for type & screen to result.

## 2021-02-19 NOTE — ED TRIAGE NOTES
Pt comes in with generalized abdominal pain that started yesterday. Some nausea. Hx kidney transplant, currently dialyzes--last yesterday.

## 2021-02-20 LAB
ABO + RH BLD: NORMAL
ABO + RH BLD: NORMAL
BLD GP AB SCN SERPL QL: NORMAL
BLD PROD TYP BPU: NORMAL
BLOOD BANK CMNT PATIENT-IMP: NORMAL
GLUCOSE BLDC GLUCOMTR-MCNC: 104 MG/DL (ref 70–99)
GLUCOSE BLDC GLUCOMTR-MCNC: 122 MG/DL (ref 70–99)
HGB BLD-MCNC: 7.3 G/DL (ref 13.3–17.7)
HGB BLD-MCNC: 7.4 G/DL (ref 13.3–17.7)
HGB BLD-MCNC: 7.9 G/DL (ref 13.3–17.7)
HGB BLD-MCNC: 8.1 G/DL (ref 13.3–17.7)
HGB BLD-MCNC: 8.1 G/DL (ref 13.3–17.7)
NUM BPU REQUESTED: 2
POTASSIUM SERPL-SCNC: 4 MMOL/L (ref 3.4–5.3)
POTASSIUM SERPL-SCNC: 4.5 MMOL/L (ref 3.4–5.3)
POTASSIUM SERPL-SCNC: 4.5 MMOL/L (ref 3.4–5.3)
POTASSIUM SERPL-SCNC: 4.7 MMOL/L (ref 3.4–5.3)
POTASSIUM SERPL-SCNC: 6.2 MMOL/L (ref 3.4–5.3)
POTASSIUM SERPL-SCNC: NORMAL MMOL/L (ref 3.4–5.3)
SPECIMEN EXP DATE BLD: NORMAL

## 2021-02-20 PROCEDURE — 36415 COLL VENOUS BLD VENIPUNCTURE: CPT | Performed by: SURGERY

## 2021-02-20 PROCEDURE — 84132 ASSAY OF SERUM POTASSIUM: CPT | Performed by: SURGERY

## 2021-02-20 PROCEDURE — 5A1D70Z PERFORMANCE OF URINARY FILTRATION, INTERMITTENT, LESS THAN 6 HOURS PER DAY: ICD-10-PCS | Performed by: INTERNAL MEDICINE

## 2021-02-20 PROCEDURE — 85018 HEMOGLOBIN: CPT | Performed by: SURGERY

## 2021-02-20 PROCEDURE — 90937 HEMODIALYSIS REPEATED EVAL: CPT

## 2021-02-20 PROCEDURE — 250N000011 HC RX IP 250 OP 636

## 2021-02-20 PROCEDURE — 250N000011 HC RX IP 250 OP 636: Performed by: SURGERY

## 2021-02-20 PROCEDURE — 258N000003 HC RX IP 258 OP 636

## 2021-02-20 PROCEDURE — 93010 ELECTROCARDIOGRAM REPORT: CPT | Performed by: INTERNAL MEDICINE

## 2021-02-20 PROCEDURE — 120N000011 HC R&B TRANSPLANT UMMC

## 2021-02-20 PROCEDURE — 250N000013 HC RX MED GY IP 250 OP 250 PS 637: Performed by: SURGERY

## 2021-02-20 PROCEDURE — 250N000011 HC RX IP 250 OP 636: Performed by: NURSE PRACTITIONER

## 2021-02-20 PROCEDURE — 93005 ELECTROCARDIOGRAM TRACING: CPT

## 2021-02-20 PROCEDURE — 250N000013 HC RX MED GY IP 250 OP 250 PS 637

## 2021-02-20 PROCEDURE — 99223 1ST HOSP IP/OBS HIGH 75: CPT | Performed by: INTERNAL MEDICINE

## 2021-02-20 PROCEDURE — 258N000001 HC RX 258

## 2021-02-20 PROCEDURE — 999N001017 HC STATISTIC GLUCOSE BY METER IP

## 2021-02-20 PROCEDURE — 258N000003 HC RX IP 258 OP 636: Performed by: INTERNAL MEDICINE

## 2021-02-20 PROCEDURE — 250N000013 HC RX MED GY IP 250 OP 250 PS 637: Performed by: NURSE PRACTITIONER

## 2021-02-20 RX ORDER — DEXTROSE MONOHYDRATE 25 G/50ML
25-50 INJECTION, SOLUTION INTRAVENOUS
Status: DISCONTINUED | OUTPATIENT
Start: 2021-02-20 | End: 2021-02-27 | Stop reason: HOSPADM

## 2021-02-20 RX ORDER — NICOTINE POLACRILEX 4 MG
15-30 LOZENGE BUCCAL
Status: DISCONTINUED | OUTPATIENT
Start: 2021-02-20 | End: 2021-02-27 | Stop reason: HOSPADM

## 2021-02-20 RX ORDER — HYDROMORPHONE HYDROCHLORIDE 2 MG/1
2-4 TABLET ORAL EVERY 4 HOURS PRN
Status: DISCONTINUED | OUTPATIENT
Start: 2021-02-20 | End: 2021-02-22

## 2021-02-20 RX ORDER — DEXTROSE MONOHYDRATE 100 MG/ML
INJECTION, SOLUTION INTRAVENOUS CONTINUOUS
Status: DISCONTINUED | OUTPATIENT
Start: 2021-02-20 | End: 2021-02-20

## 2021-02-20 RX ORDER — DILTIAZEM HYDROCHLORIDE 240 MG/1
240 CAPSULE, COATED, EXTENDED RELEASE ORAL DAILY
Status: DISCONTINUED | OUTPATIENT
Start: 2021-02-20 | End: 2021-02-21

## 2021-02-20 RX ORDER — HYDROMORPHONE HYDROCHLORIDE 2 MG/1
2 TABLET ORAL EVERY 4 HOURS PRN
Status: DISCONTINUED | OUTPATIENT
Start: 2021-02-20 | End: 2021-02-20

## 2021-02-20 RX ORDER — DEXTROSE MONOHYDRATE 25 G/50ML
25 INJECTION, SOLUTION INTRAVENOUS ONCE
Status: COMPLETED | OUTPATIENT
Start: 2021-02-20 | End: 2021-02-20

## 2021-02-20 RX ORDER — DEXTROSE MONOHYDRATE 25 G/50ML
50 INJECTION, SOLUTION INTRAVENOUS ONCE
Status: DISCONTINUED | OUTPATIENT
Start: 2021-02-20 | End: 2021-02-20

## 2021-02-20 RX ADMIN — DOCUSATE SODIUM 50 MG AND SENNOSIDES 8.6 MG 2 TABLET: 8.6; 5 TABLET, FILM COATED ORAL at 00:56

## 2021-02-20 RX ADMIN — METRONIDAZOLE 500 MG: 500 INJECTION, SOLUTION INTRAVENOUS at 16:26

## 2021-02-20 RX ADMIN — METRONIDAZOLE 500 MG: 500 INJECTION, SOLUTION INTRAVENOUS at 08:10

## 2021-02-20 RX ADMIN — HYDROMORPHONE HYDROCHLORIDE 4 MG: 2 TABLET ORAL at 20:58

## 2021-02-20 RX ADMIN — SODIUM CHLORIDE 300 ML: 9 INJECTION, SOLUTION INTRAVENOUS at 04:06

## 2021-02-20 RX ADMIN — CIPROFLOXACIN 400 MG: 2 INJECTION, SOLUTION INTRAVENOUS at 12:26

## 2021-02-20 RX ADMIN — FENTANYL CITRATE 20 MCG: 50 INJECTION INTRAMUSCULAR; INTRAVENOUS at 05:06

## 2021-02-20 RX ADMIN — OXYCODONE HYDROCHLORIDE 10 MG: 5 TABLET ORAL at 08:05

## 2021-02-20 RX ADMIN — HYDROMORPHONE HYDROCHLORIDE 2 MG: 2 TABLET ORAL at 11:50

## 2021-02-20 RX ADMIN — ACETAMINOPHEN 975 MG: 325 TABLET, FILM COATED ORAL at 16:26

## 2021-02-20 RX ADMIN — SODIUM CHLORIDE 250 ML: 9 INJECTION, SOLUTION INTRAVENOUS at 04:06

## 2021-02-20 RX ADMIN — HYDROMORPHONE HYDROCHLORIDE 2 MG: 2 TABLET ORAL at 14:44

## 2021-02-20 RX ADMIN — Medication: at 04:07

## 2021-02-20 RX ADMIN — METHOCARBAMOL 750 MG: 750 TABLET, FILM COATED ORAL at 17:41

## 2021-02-20 RX ADMIN — ACETAMINOPHEN 975 MG: 325 TABLET, FILM COATED ORAL at 08:23

## 2021-02-20 RX ADMIN — CIPROFLOXACIN 400 MG: 2 INJECTION, SOLUTION INTRAVENOUS at 01:01

## 2021-02-20 RX ADMIN — FENTANYL CITRATE 20 MCG: 50 INJECTION INTRAMUSCULAR; INTRAVENOUS at 06:40

## 2021-02-20 RX ADMIN — OXYCODONE HYDROCHLORIDE 10 MG: 5 TABLET ORAL at 01:42

## 2021-02-20 RX ADMIN — METRONIDAZOLE 500 MG: 500 INJECTION, SOLUTION INTRAVENOUS at 00:58

## 2021-02-20 RX ADMIN — ACETAMINOPHEN 975 MG: 325 TABLET, FILM COATED ORAL at 23:50

## 2021-02-20 RX ADMIN — HYDROMORPHONE HYDROCHLORIDE 2 MG: 2 TABLET ORAL at 17:41

## 2021-02-20 RX ADMIN — METHOCARBAMOL 750 MG: 750 TABLET, FILM COATED ORAL at 21:43

## 2021-02-20 RX ADMIN — FENTANYL CITRATE 20 MCG: 50 INJECTION INTRAMUSCULAR; INTRAVENOUS at 08:11

## 2021-02-20 RX ADMIN — DOCUSATE SODIUM 50 MG AND SENNOSIDES 8.6 MG 2 TABLET: 8.6; 5 TABLET, FILM COATED ORAL at 08:06

## 2021-02-20 RX ADMIN — FENTANYL CITRATE 20 MCG: 50 INJECTION INTRAMUSCULAR; INTRAVENOUS at 00:54

## 2021-02-20 RX ADMIN — HUMAN INSULIN 8.3 UNITS: 100 INJECTION, SOLUTION SUBCUTANEOUS at 02:50

## 2021-02-20 RX ADMIN — METHOCARBAMOL 750 MG: 750 TABLET, FILM COATED ORAL at 12:57

## 2021-02-20 RX ADMIN — FENTANYL CITRATE 20 MCG: 50 INJECTION INTRAMUSCULAR; INTRAVENOUS at 01:56

## 2021-02-20 RX ADMIN — CALCIUM GLUCONATE 1 G: 98 INJECTION, SOLUTION INTRAVENOUS at 02:52

## 2021-02-20 RX ADMIN — ACETAMINOPHEN 975 MG: 325 TABLET, FILM COATED ORAL at 00:56

## 2021-02-20 RX ADMIN — DEXTROSE MONOHYDRATE 25 G: 500 INJECTION PARENTERAL at 02:40

## 2021-02-20 ASSESSMENT — ACTIVITIES OF DAILY LIVING (ADL)
ADLS_ACUITY_SCORE: 10
ADLS_ACUITY_SCORE: 11

## 2021-02-20 ASSESSMENT — MIFFLIN-ST. JEOR: SCORE: 1719.48

## 2021-02-20 NOTE — BRIEF OP NOTE
[facility]    Brief Operative Note    Pre-operative diagnosis: Renal vein thrombosis (H) [I82.3]  Post-operative diagnosis Same as pre-operative diagnosis    Procedure: Procedure(s):  LAPAROTOMY  NEPHRECTOMY, TOTAL  Surgeon: Surgeon(s) and Role:     * Ren Romeo MD - Primary     * Heraclio Nagel MD - Assisting  Anesthesia: General   Estimated blood loss: 500 ml  Drains: None  Specimens:   ID Type Source Tests Collected by Time Destination   A : Transplanted Kidney Tissue Kidney, Transplanted SURGICAL PATHOLOGY EXAM Ren Romeo MD 2/19/2021  9:12 PM      Findings:   see op note.  Complications: None.  Implants: * No implants in log *

## 2021-02-20 NOTE — PROGRESS NOTES
HEMODIALYSIS TREATMENT NOTE    Date: 2/20/2021  Time: 9:41 AM    Data:  Pre Wt: 83 kg (182 lb 15.7 oz)   Desired Wt: 83 kg   Post Wt:    Weight change:   kg  Ultrafiltration - Post Run Net Total Removed (mL): 0 mL  Vascular Access Status: Fistula  patent  Dialyzer Rinse: Streaked, Light  Total Blood Volume Processed: 82.28 L   Total Dialysis (Treatment) Time: 3.25   Dialysate Bath: K 3, Ca 3  Heparin: None    Lab:   No    Interventions:  4 hour dialysis for ESRD patient admitted with Renal Vein Thrombosis. Patient here for dialysis following Nephrectomy. He received blood during surgery causing rise in potassium. Patient alert and oriented, he is in a lot of pain stating pain at 9 on scale. Fentanyl given q hour. Patient requested to come off dialysis early he says his pain is worse during TX. He was taken off dialysis  45 minutes early. He tolerated dialysis well. Report called to PCN and he transferred back to his unit.      Plan:    Per Renal

## 2021-02-20 NOTE — PROGRESS NOTES
[facility]   Transplant Nephrology Progress Note  Date of Admission:  2/19/2021  Today's Date: 02/20/2021    Recommendations:  - Stop immunosuppression medications    Assessment & Plan   # DDKT: s/p graft nephrectomy 2/19/21   - Date DSA Last Checked: Feb/2021      Latest DSA: Yes, A30, B13, CW6   - BK Viremia: No Nov 2019    - Kidney Tx Biopsy: Dec 23, 2015; Result:  Results:  Mild glomerulitis and capillary C4d staining consistent with mild, persistent antibody-mediated rejection. 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    Currently doing HD on CojoinF at [facility], runs for 4 hours   Patient signed consent for renal replacement therapy today - placed in chart    # Immunosuppression: Tacrolimus extended release (goal 4-6), Mycophenolic acid (dose 1080 mg every 12 hours) and Prednisone (dose 5 mg daily)   - Changes: Yes - Stop Immunosuppression as patient has stopped PTA >3weeks prior    # Infection Prophylaxis:   - PJP: None    # Hypertension: Borderline control;  Goal BP: < 130/80   - Volume status: Euvolemic  EDW ~ 83Kg  Clonidine 0.2 mg BID  Diltiazem  ER  240 MG daily  Hydralazine 50 mg TID  Losartan  25 mg BID              - Changes: No. Continue to hold Hydralazine snd Clonidine. If BP starts rising reintroduce Losartan, Clonidine and hydralazine sequentially      # Anemia in Chronic Renal Disease: Hgb: Increased 8.1 after transfusion     RAYMOND: No   - Iron studies: Not checked recently    # Mineral Bone Disorder:   - Secondary renal hyperparathyroidism; PTH level: Minimally elevated ( pg/ml)        On treatment: None  - Vitamin D; level:  Not checked recently     On supplement: No  - Calcium; level: Low        On supplement: No  - Phosphorus; level: High        On supplement: No , undergoing HD      # Electrolytes:   - Potassium; level: Normal following HD   On supplement: No  - Magnesium; level: Normal        On  "supplement: No  - Bicarbonate; level: Low        On supplement: Yes  - Sodium; level: Normal       # HFpEF with Pulmonary Hypertension:               - Recommend patient start weighing himself daily.               - On HD now     # Moderate Pericardial Effusion,per Echo 2021: Likely secondary to hypervolemia. No evidence of tamponade. Pericardiocentesis was not recommended by cardiology earlier      # Transplant History:  Etiology of Kidney Failure: Hypertension  Tx: DDKT  Transplant: 3/12/2015 (Kidney), 1994 (Kidney), 10/1/2000 (Kidney)   Donor Type: Donation after Brain Death        Donor Class: Standard Criteria Donor  Significant changes in immunosuppression: None  Significant transplant-related complications: Acute cellular-mediated rejection and Acute antibody-mediated rejection    Recommendations were communicated to the primary team verbally.    Seen and discussed with Dr. Parker Foster NP   Pager: [phone]    Interval History   OR yesterday for transplant graft nephrectomy for pain , RV thrombosis.     Review of Systems   4 point ROS was obtained and negative except as noted in the Interval History.    MEDICATIONS:    acetaminophen  975 mg Oral Q8H     diltiazem ER COATED BEADS  240 mg Oral Daily     metroNIDAZOLE  500 mg Intravenous Q8H     senna-docusate  1 tablet Oral BID    Or     senna-docusate  2 tablet Oral BID     sodium chloride (PF)  3 mL Intravenous Q8H       dextrose 10%         Physical Exam   Temp  Av.5  F (36.9  C)  Min: 97.5  F (36.4  C)  Max: 100.3  F (37.9  C)      Pulse  Av.1  Min: 78  Max: 92 Resp  Av.1  Min: 12  Max: 21  SpO2  Av.3 %  Min: 94 %  Max: 100 %     /78 (BP Location: Left arm)   Pulse 89   Temp 97.9  F (36.6  C) (Oral)   Resp 16   Ht 1.803 m (5' 11\")   Wt 83.2 kg (183 lb 8 oz)   SpO2 99%   BMI 25.59 kg/m     Date 21 0700 - 21 0659   Shift [id number] [id number] 24 Hour Total   INTAKE   P.O. 110   110 "   Other 0   0   Shift Total(mL/kg) 110(1.32)   110(1.32)   OUTPUT   Other 0   0   Shift Total(mL/kg) 0(0)   0(0)   Weight (kg) 83.23 83.23 83.23 83.23      Admit Weight: 83 kg (182 lb 15.7 oz)     GENERAL APPEARANCE: alert and no distress  HENT: mouth without ulcers or lesions  LYMPHATICS: no cervical or supraclavicular nodes  RESP: lungs clear to auscultation - no rales, rhonchi or wheezes  CV: regular rhythm, normal rate, no rub, no murmur  EDEMA: no LE edema bilaterally  ABDOMEN: soft, nondistended, appropriately tender to RLQ, bowel sounds hypoactive, surgical dressing in place.   MS: extremities normal - no gross deformities noted, no evidence of inflammation in joints, no muscle tenderness  RUE fistula +bruit/thrill  SKIN: no rash    Data   All labs reviewed by me.  CMP  Recent Labs   Lab 02/20/21  1334 02/20/21  0802 02/20/21  0315 02/20/21  0122 02/19/21  2250 02/19/21  1130   NA  --   --   --   --  133 133   POTASSIUM 4.5 4.0 Canceled, Test credited 6.2* 5.9* 5.2   CHLORIDE  --   --   --   --  100 98   CO2  --   --   --   --  24 30   ANIONGAP  --   --   --   --  9 5   GLC  --   --   --   --  130* 109*   BUN  --   --   --   --  36* 31*   CR  --   --   --   --  8.47* 8.17*   GFRESTIMATED  --   --   --   --  7* 7*   GFRESTBLACK  --   --   --   --  8* 8*   MEERA  --   --   --   --  8.0* 8.7   MAG  --   --   --   --  2.3  --    PHOS  --   --   --   --  5.9*  --    PROTTOTAL  --   --   --   --   --  6.9   ALBUMIN  --   --   --   --   --  2.3*   BILITOTAL  --   --   --   --   --  0.4   ALKPHOS  --   --   --   --   --  76   AST  --   --   --   --   --  34   ALT  --   --   --   --   --  16     CBC  Recent Labs   Lab 02/20/21  1334 02/20/21  0802 02/20/21  0122 02/19/21  2250 02/19/21  1130   HGB 8.1* 7.9* 7.4* 8.0* 6.5*   WBC  --   --   --  11.4* 12.1*   RBC  --   --   --  2.93* 2.47*   HCT  --   --   --  26.4* 22.2*   MCV  --   --   --  90 90   MCH  --   --   --  27.3 26.3*   MCHC  --   --   --  30.3* 29.3*   RDW  --    --   --  15.0 14.5   PLT  --   --   --  129* 125*     INR  Recent Labs   Lab 02/19/21  2250 02/19/21  1528   INR 1.80* 1.85*   PTT 39* 41*     ABGNo lab results found in last 7 days.   Urine Studies  Recent Labs   Lab Test 01/07/21  1241 01/24/16  1435 09/10/15  1600 08/29/15  1425 06/25/15  0910   COLOR Yellow Yellow Light Yellow Light Yellow Light Yellow   APPEARANCE Clear Clear Clear Clear Clear   URINEGLC Negative Negative Negative Negative Negative   URINEBILI Negative Negative Negative Negative Negative   URINEKETONE Negative Negative Negative Negative Negative   SG 1.013 1.010 1.007 1.007 1.009   UBLD Negative Negative Negative Negative Negative   URINEPH 5.5 6.0 5.0 5.0 5.0   PROTEIN 30* Negative Negative Negative Negative   NITRITE Negative Negative Negative Positive* Positive*   LEUKEST Trace* Negative Negative Moderate* Moderate*   RBCU 1 1  --  <1 0   WBCU 5 4*  --  14* 11*     Recent Labs   Lab Test 02/21/20  0859 08/22/19  1033 01/23/19  0848 05/08/18  0812 08/03/17  0827 06/15/16  0905 05/19/16  1330 09/21/15  0958 09/10/15  1600 07/24/15  1136 05/07/15  0824   UTPG 0.37* 0.54* 0.76* 0.15 0.16 0.11 0.08 0.15 0.23* 0.23* 0.92*     PTH  Recent Labs   Lab Test 01/15/21  0906 12/10/19  1432 05/24/18  1005 05/08/18  0812 01/12/17  1121 03/20/15  0730   PTHI 150* 146* 120* 110* 134* 210*     Iron Studies  Recent Labs   Lab Test 10/30/20  1252 05/08/18  0812 01/12/17  1121 03/20/15  0730   IRON 43 46 74 51   * 204* 194* 156*   IRONSAT 21 23 38 33   CARLOS A 445* 414* 371 1,172*       IMAGING:  All imaging studies reviewed by me.    Physician Attestation   I, Annie Adams, saw and evaluated Tristian Mckeon as part of a shared visit.  I have reviewed and discussed with the advanced practice provider their history, physical and plan.    I personally reviewed the vital signs, medications, labs and imaging.    My key history or physical exam findings: c/o pain post nephrectomy , underwent HD overnight for  "hyperK    Key management decisions made by me:   . Failed kidney transplant: on HD MWF  . IS: patient self stopped triple IS 3 weeks ago which led to symptomatic rejection necessitating graft nephrectomy, this is his 3rd kidney transplant, highly sensitized PRA-100% expect uptrend in DSA post nephrectomy. Risks of restarting IS \"infectious & malignant\" outweigh potential benefit \"reduced sensitization\" as he is anuric & less likely of re-transplant within a year in the absence of potential living donors. Consider stim test as previously on prednisone 5 if concern  . HTN: well controlled on current regimen    Annie Adams  Date of Service (when I saw the patient): 02/20/21    "

## 2021-02-20 NOTE — PROGRESS NOTES
"Post Op Check    02/20/2021    Tristian Mckeon is a 50 year old male with h/o Renal Vein Thrombosis now POD#0 s/p Laparotomy, Total Nephrectomy.    Pt reports abdominal pain but managable. Denies SOB, chest pain, or dizziness. No BM. Voiding.    /76   Pulse 78   Temp 97.9  F (36.6  C) (Oral)   Resp 16   Ht 1.803 m (5' 11\")   Wt 83 kg (182 lb 15.7 oz)   SpO2 95%   BMI 25.52 kg/m      Gen: A&O x3, NAD, cooperative  Chest: breathing non-labored 1L NC  Abdomen: soft, mildly distended, appropriately tender, no guarding, no rebound, no peritoneal signs   Incision: clean, dry, intact  Extremities: warm and well perfused    A/P: Patient post-operatively had an up-trending potassium level. Which maxed at 6.2. I was paged regarding this. I spoke with the fellow on call who recommended, Given 10 units of insulin, an amp of D50, Calcium Gluconate, and paging nephrology for urgent hemodialysis. Patient during this time was in NAD. EKG was ordered, which did not show changes at that time. Nephrology called me back regarding the page and recommend potassium shifting, giving calcium gluconate, and that he would inform the dialysis team to perform urgent dialysis. Patient was then taken for urgent dialysis later on.     Saul Rush MD, PGY1  General Surgery      "

## 2021-02-20 NOTE — PROGRESS NOTES
Transplant Surgery  Inpatient Daily Progress Note  02/20/2021    Assessment & Plan: Tristian Mckeon is a 50 year old male with PMH significant for HTN, ACD, Tobacco use s/p kidney transplant x3. First two transplants and graft nephrectom[place] [facility] [place] last transplant was at [facility] in 3/12/2015 with subsequent graft failure now back on dialysis [id number]. S/p Graft nephrectomy this admission for acute renal vein thrombosis     Graft function:none, patient is s/p transplant nephrectomy, underwent HD overnight for hyperkalemia  Immunosuppression management: Changed discussed with Transplant nephrology, will discontinue immunosuppression  .  Complexity of management:Medium. Contributing factors: organ dysfunction  Neurology: Acute post surgical pain control- changed to dilaudid from fentanyl, cont PRN oxy and tylenol, and scheduled Robaxin.   Hematology: Acute Blood loss anemia- received 2 units PRBC yesterday intraoperatively, HGb stable since surgery at 7.9  Cardiorespiratory: Stable, no issues   GI/Nutrition: CLD will advance as tolerated to Renal/HD diet  Endocrine: no issues  Fluid/Electrolytes: MIVF:none,  Hyperkalemia- required HD overnight, will cont to monitor  : Patient is anuric, no ramesh necessary  Infectious disease: no issues, will stop antibiotics at 24 hours postop  Prophylaxis: fall  Disposition: 7A    Medical Decision Making: Medium  Admit [id number] (straight-forward/low level decision making)    ANTOLIN/Fellow/Resident Provider: Heraclio Nagel MD    Faculty: Ren Romeo M.D., Ph.D.  _________________________________________________________________  Transplant History: Admitted 2/19/2021 for Acute renal vein thrombosis in transplant kidney.  3/12/2015 (Kidney), 4/1/1994 (Kidney), 10/1/2000 (Kidney), Postoperative day: 2172     Interval History: History is obtained from the patient  Overnight events: pain improved, no nausea, vomiting, no BMs, No flatus.    ROS:   A 10-point review of systems was  "negative except as noted above.    Meds:    acetaminophen  975 mg Oral Q8H     ciprofloxacin  400 mg Intravenous Q12H    And     metroNIDAZOLE  500 mg Intravenous Q8H     diltiazem ER COATED BEADS  240 mg Oral Daily     mycophenolic acid  1,080 mg Oral BID     predniSONE  5 mg Oral Daily     senna-docusate  1 tablet Oral BID    Or     senna-docusate  2 tablet Oral BID     sodium chloride (PF)  3 mL Intravenous Q8H       Physical Exam:     Admit Weight: 83 kg (182 lb 15.7 oz)    Current vitals:   /78 (BP Location: Left arm)   Pulse 89   Temp 97.9  F (36.6  C) (Oral)   Resp 16   Ht 1.803 m (5' 11\")   Wt 83 kg (182 lb 15.7 oz)   SpO2 99%   BMI 25.52 kg/m           Vital sign ranges:    Temp:  [97.5  F (36.4  C)-100.3  F (37.9  C)] 97.9  F (36.6  C)  Pulse:  [78-92] 89  Resp:  [12-21] 16  BP: (117-168)/(45-90) 139/78  SpO2:  [94 %-100 %] 99 %  Patient Vitals for the past 24 hrs:   BP Temp Temp src Pulse Resp SpO2   02/20/21 1128 139/78 97.9  F (36.6  C) Oral 89 16 99 %   02/20/21 0752 135/78 98  F (36.7  C) Oral 92 15 96 %   02/20/21 0715 (!) 150/80 97.5  F (36.4  C) Oral 84 -- 97 %   02/20/21 0705 (!) 153/81 -- -- 84 -- 96 %   02/20/21 0700 (!) 153/81 -- -- 82 -- 96 %   02/20/21 0645 (!) 140/79 -- -- 86 -- 96 %   02/20/21 0630 (!) 143/78 -- -- 82 -- 96 %   02/20/21 0615 133/76 -- -- 85 -- 96 %   02/20/21 0600 132/76 -- -- 87 -- 99 %   02/20/21 0545 (!) 130/90 -- -- 81 -- 100 %   02/20/21 0530 139/82 -- -- 82 -- 97 %   02/20/21 0515 126/88 -- -- 79 14 96 %   02/20/21 0500 129/76 -- -- 78 16 95 %   02/20/21 0445 133/81 -- -- 83 -- 96 %   02/20/21 0430 120/80 -- -- 82 -- 95 %   02/20/21 0415 138/74 -- -- 83 -- 98 %   02/20/21 0400 128/81 -- -- 80 16 97 %   02/20/21 0352 119/71 -- -- 83 21 95 %   02/20/21 0335 119/70 97.9  F (36.6  C) Oral 83 19 94 %   02/20/21 0130 139/75 -- -- 86 -- 95 %   02/20/21 0100 130/75 -- -- 83 -- 96 %   02/20/21 0045 135/75 -- -- 87 -- 97 %   02/20/21 0030 138/78 -- -- 87 -- 96 % "   02/20/21 0015 132/78 -- -- 84 -- 97 %   02/20/21 0000 123/74 -- -- 84 -- 97 %   02/19/21 2352 136/77 98  F (36.7  C) Oral 90 18 99 %   02/19/21 2335 -- -- -- -- -- 96 %   02/19/21 2315 126/70 98.7  F (37.1  C) Oral 89 14 --   02/19/21 2300 (!) 143/80 -- -- 90 16 --   02/19/21 2245 131/79 99.8  F (37.7  C) Oral 87 16 --   02/19/21 2230 (!) 158/87 -- -- 89 18 --   02/19/21 2225 (!) 167/80 100.3  F (37.9  C) Oral 85 18 --   02/19/21 1829 (!) 168/61 99  F (37.2  C) Oral 88 18 --   02/19/21 1756 -- -- -- 89 16 --   02/19/21 1743 -- -- -- 87 14 97 %   02/19/21 1730 117/58 -- -- 87 -- --   02/19/21 1727 -- -- -- 87 12 99 %   02/19/21 1716 128/45 98.8  F (37.1  C) Oral 88 12 96 %   02/19/21 1704 131/51 98.7  F (37.1  C) Oral 87 16 98 %   02/19/21 1534 -- -- -- -- -- 99 %   02/19/21 1430 (!) 158/78 -- -- 83 -- 99 %   02/19/21 1409 (!) 159/78 -- -- 80 -- 100 %   02/19/21 1303 -- -- -- -- -- 100 %   02/19/21 1302 -- -- -- -- -- 100 %   02/19/21 1220 -- -- -- -- -- 99 %   02/19/21 1219 -- -- -- -- -- 98 %   02/19/21 1218 -- -- -- -- -- 100 %     General Appearance: in no apparent distress.   Skin: normal  Heart: regular rate and rhythm  Lungs: clear to auscultation  Abdomen: The abdomen is flat, incision dressing c/d/i. Abdomen appropriately ttp  : ramesh is not present.  The genitalia is not edematous.     Extremities: edema: absent.    Neurologic: awake, alert and oriented. Tremor absent..     Data:   CMP  Recent Labs   Lab 02/20/21  0802 02/20/21  0315 02/19/21  2250 02/19/21  2250 02/19/21  1130   NA  --   --   --  133 133   POTASSIUM 4.0 Canceled, Test credited   < > 5.9* 5.2   CHLORIDE  --   --   --  100 98   CO2  --   --   --  24 30   GLC  --   --   --  130* 109*   BUN  --   --   --  36* 31*   CR  --   --   --  8.47* 8.17*   GFRESTIMATED  --   --   --  7* 7*   GFRESTBLACK  --   --   --  8* 8*   MEERA  --   --   --  8.0* 8.7   MAG  --   --   --  2.3  --    PHOS  --   --   --  5.9*  --    LIPASE  --   --   --   --  66*    ALBUMIN  --   --   --   --  2.3*   BILITOTAL  --   --   --   --  0.4   ALKPHOS  --   --   --   --  76   AST  --   --   --   --  34   ALT  --   --   --   --  16    < > = values in this interval not displayed.     CBC  Recent Labs   Lab 02/20/21  0802 02/20/21  0122 02/19/21  2250 02/19/21  1130   HGB 7.9* 7.4* 8.0* 6.5*   WBC  --   --  11.4* 12.1*   PLT  --   --  129* 125*     COAGS  Recent Labs   Lab 02/19/21  2250 02/19/21  1528   INR 1.80* 1.85*   PTT 39* 41*      Urinalysis  Recent Labs   Lab Test 01/07/21  1241 02/21/20  0859 08/22/19  1033 01/24/16  1435 01/24/16  1435   COLOR Yellow  --   --   --  Yellow   APPEARANCE Clear  --   --   --  Clear   URINEGLC Negative  --   --   --  Negative   URINEBILI Negative  --   --   --  Negative   URINEKETONE Negative  --   --   --  Negative   SG 1.013  --   --   --  1.010   UBLD Negative  --   --   --  Negative   URINEPH 5.5  --   --   --  6.0   PROTEIN 30*  --   --   --  Negative   NITRITE Negative  --   --   --  Negative   LEUKEST Trace*  --   --   --  Negative   RBCU 1  --   --   --  1   WBCU 5  --   --   --  4*   UTPG  --  0.37* 0.54*   < >  --     < > = values in this interval not displayed.     Virology:  CMV DNA Quantitation Specimen   Date Value Ref Range Status   11/09/2015 EDTA PLASMA  Final     CMV IgG Antibody   Date Value Ref Range Status   01/23/2009 >160.0 EU/mL Final     Comment:     Positive for anti-CMV IgG     Hepatitis C Antibody   Date Value Ref Range Status   08/08/2018 Nonreactive NR^Nonreactive Final     Comment:     Assay performance characteristics have not been established for newborns,   infants, and children       Hep B Surface Farrah   Date Value Ref Range Status   01/23/2009 0.3  Final     Comment:     Negative, No antibody detected when the value is less than 5.0 mlU/mL.     BK Virus Result   Date Value Ref Range Status   01/23/2019 BK Virus DNA Not Detected BKNEG^BK Virus DNA Not Detected copies/mL Final   08/08/2018 BK Virus DNA Not Detected  BKNEG^BK Virus DNA Not Detected copies/mL Final   05/08/2018 BK Virus DNA Not Detected BKNEG^BK Virus DNA Not Detected copies/mL Final   08/03/2017 BK Virus DNA Not Detected BKNEG copies/mL Final   12/07/2015 BK Virus DNA Not Detected BKNEG copies/mL Final   11/23/2015 BK Virus DNA Not Detected BKNEG copies/mL Final   10/22/2015 BK Virus DNA Not Detected BKNEG copies/mL Final   07/08/2015 BK Virus DNA Not Detected BKNEG copies/mL Final   05/14/2015 BK Virus DNA Not Detected BKNEG copies/mL Final   05/07/2015 BK Virus DNA Not Detected BKNEG copies/mL Final   04/10/2015 BK Virus DNA Not Detected BKNEG copies/mL Final

## 2021-02-20 NOTE — ANESTHESIA CARE TRANSFER NOTE
Patient: Tristian Mckeon    Procedure(s):  LAPAROTOMY  NEPHRECTOMY, TOTAL    Diagnosis: Renal vein thrombosis (H) [I82.3]  Diagnosis Additional Information: No value filed.    Anesthesia Type:   General     Note:    Oropharynx: spontaneously breathing  Level of Consciousness: awake  Oxygen Supplementation: face mask  Level of Supplemental Oxygen (L/min / FiO2): 6  Independent Airway: airway patency satisfactory and stable  Dentition: dentition unchanged  Vital Signs Stable: post-procedure vital signs reviewed and stable  Report to RN Given: handoff report given  Patient transferred to: PACU    Handoff Report: Identifed the Patient, Identified the Reponsible Provider, Reviewed the pertinent medical history, Discussed the surgical course, Reviewed Intra-OP anesthesia mangement and issues during anesthesia, Set expectations for post-procedure period and Allowed opportunity for questions and acknowledgement of understanding      Vitals: (Last set prior to Anesthesia Care Transfer)  CRNA VITALS  2/19/2021 2150 - 2/19/2021 2232      2/19/2021             NIBP:  158/87    Ht Rate:  87        Electronically Signed By: Hazel Flood MD  February 19, 2021  10:32 PM

## 2021-02-20 NOTE — PROGRESS NOTES
Patient left unit for dialysis after critical potassium of 6.2. Patient received 25 of D50. 8.3 units of insulin and calcium gluconate before leaving.

## 2021-02-20 NOTE — ANESTHESIA POSTPROCEDURE EVALUATION
Patient: Tristian Mckeon    Procedure(s):  LAPAROTOMY  NEPHRECTOMY, TOTAL    Diagnosis:Renal vein thrombosis (H) [I82.3]  Diagnosis Additional Information: No value filed.    Anesthesia Type:  General    Note:  Disposition: Inpatient   Postop Pain Control: Uneventful            Sign Out: Well controlled pain   PONV: No   Neuro/Psych: Uneventful            Sign Out: Acceptable/Baseline neuro status   Airway/Respiratory: Uneventful            Sign Out: Acceptable/Baseline resp. status   CV/Hemodynamics: Uneventful            Sign Out: Acceptable CV status   Other NRE: NONE   DID A NON-ROUTINE EVENT OCCUR? No         Last vitals:  Vitals:    02/20/21 0030 02/20/21 0045 02/20/21 0100   BP: 138/78 135/75 130/75   Pulse: 87 87 83   Resp:      Temp:      SpO2: 96% 97% 96%       Last vitals prior to Anesthesia Care Transfer:  CRNA VITALS  2/19/2021 2150 - 2/19/2021 2250      2/19/2021             NIBP:  158/87    Ht Rate:  87          Electronically Signed By: Saul Villalobos MD  February 20, 2021  1:18 AM

## 2021-02-20 NOTE — ANESTHESIA PROCEDURE NOTES
Airway   Date/Time: 2/19/2021 7:27 PM   Patient location during procedure: OR  Staff -   Anesthesiologist:  Saul Villalobos MD  Resident/Fellow: Hazel Flood MD  Performed By: resident    Consent for Airway   Urgency: elective    Indications and Patient Condition  Indications for airway management: vasiliy-procedural  Induction type:intravenousMask difficulty assessment: 1 - vent by mask    Final Airway Details  Final airway type: endotracheal airway  Successful airway:ETT - single  Endotracheal Airway Details   ETT size (mm): 8.0  Cuffed: yes  Successful intubation technique: direct laryngoscopy  Grade View of Cords: 1  Adjucts: stylet  Measured from: gums/teeth  Secured at (cm): 24  Secured with: pink tape  Bite block used: None    Post intubation assessment   Placement verified by: capnometry and chest rise   Number of attempts at approach: 1  Secured with:pink tape  Ease of procedure: easy  Dentition: Intact and Unchanged

## 2021-02-20 NOTE — CONSULTS
[facility]  Transplant Nephrology Consult  Date of Admission:  2/19/2021  Today's Date: 02/20/2021  Requesting physician: Ren Romeo MD    Recommendations:  Will do urgent Hemodialysis  Due to hyperkalemia   Will do 4 hrs run , which is his usual run time. No UF due to low normal BP    Assessment & Plan     # DDKT:   S/p  Graft Nephrectomy today . Urgent HD run post off 2/2 hyperkalemia .               - Date DSA Last Checked: Feb/2021      Latest DSA: Yes A30, B13,CW6              - BK Viremia: No, Nov 2019              - Kidney Tx Biopsy: Dec 23, 2015; Results:  Mild glomerulitis and capillary C4d staining consistent with mild, persistent antibody-mediated rejection. 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    Currently doing HD on MWF at [facility], runs for 4 hours   Needs to get rounding report in AM from his dialysis center  Patient signed consent for renal replacement therapy today - placed in chart     # Immunosuppression: Tacrolimus immediate release (goal 4-6), Mycophenolic acid (dose 1080 mg every 12 hours) and Prednisone (dose 5 mg daily)          - Continue with intensive monitoring of immunosuppression for efficacy and toxicity.          - Changes: No     # Infection Prophylaxis:   - PJP: None     # Hypertension: Borderline control;   Goal BP: < 130/80 . EDW 83 kg  On Bumex  2 mg BID   Clonidine 0.2 mg BID  Diltiazem  ER  240 MG daily  Hydralazine 50 mg TID  Losartan  25 mg BID              - Changes: Yes. Would stop Bumex . Due to low normal BP, would advise to hold Hydralazine , Diltiazem ER  And Clonidine . Restart Losartan . If BP starts rising reintroduce Diltiazem, Clonidine and hydralazine sequentially       # Anemia in Chronic Renal Disease: Hgb:  low  6.5, pre procedure, needed PRBC transfusion     # Mineral Bone Disorder:   - Secondary renal hyperparathyroidism; PTH level: Minimally elevated  ( pg/ml)        On treatment: None  - Vitamin D; level: Not checked recently        On supplement: No  - Calcium; level: low        On supplement: No  - Phos - 5.9 2/2 Renal failure. Undergoing HD      # Electrolytes:   - Potassium; level: HIGH 6.2 - Undergoing HD     On supplement: No  - Bicarbonate; level: Low        On supplement: Yes     # HFpEF with Pulmonary Hypertension:               - Recommend patient start weighing himself daily.               - On HD now     # Moderate Pericardial Effusion,per Echo Jan 2021: Likely secondary to hypervolemia. No evidence of tamponade. Pericardiocentesis was not recommended by cardiology earlier    # Transplant History:  Etiology of Kidney Failure: Hypertension  Tx: DDKT  Transplant: 3/12/2015 (Kidney), 4/1/1994 (Kidney), 10/1/2000 (Kidney)  Donor Type: Donation after Brain Death        Donor Class: Standard Criteria Donor  Significant changes in immunosuppression: None  Significant transplant-related complications: Acute cellular-mediated rejection and Acute antibody-mediated rejection     Patient staffed with Dr Parker Schulz MD  Pager: [phone]    REASON FOR CONSULT   ESRD, S/P Graft nephrectomy , hyperkalemia     History of Present Illness   Admitting provider and nursing notes reviewed  Tristian Mckeon is a 50 year old male with PMH significant for HTN, ACD, Tobacco use s/p kidney transplant x3. First two transplants and graft nephrectomies were at [facility] [place]. His last transplant was at [facility] in 3/12/2015 with subsequent graft failure now back on dialysis [id number].  He currently dialyzes MWF via a RUE fistula. He missed Wed and reports he last dialyzed on Thursday 2/18.  He reports pain ongoing with intermittent hematuria for about 1 month.  He reports pain worsened over the last 24 hrs and this warranted his presentation to the ED.  Allograft US showed enlarged right lower quadrant transplant kidney with acute renal  vein thrombosis and diastolic flow  reversal in the renal artery. Echogenic nonvascular material within the collecting sytem, likely representing clot.  Patient underwent graft nephrectomy tonight     Nephrology consult for ESRD Management and post op hyperkalemia     Patient c/o pain at operative site   Feels tired  No dizziness /lightheadedness  No CP/SOB/Cough/fever/chills/leg swelling  No Nausea/vomiting. No diarrhea or constipation.  Did endorse hematuria prior to surgery   No skin rash   No focal weakness/acute change in vision        Review of Systems    The 10 point Review of Systems is negative other than noted in the HPI     Past Medical History    I have reviewed this patient's medical history and updated it with pertinent information if needed.   Past Medical History:   Diagnosis Date     Anemia      AVN (avascular necrosis of bone) (H)     Left femoral head     Depression      DJD (degenerative joint disease)      Erectile dysfunction      Genital condyloma, male     S/p resection     Gout      History of blood transfusion      Hyperlipidemia      Hypertension      Hypogonadism male      Kidney replaced by transplant 1994    LDKT.  Early ACR, ureteral strictures.  Failed 1998 d/t chronic rejection.  S/p graft nephrectomy.     Kidney replaced by transplant 2000    LDKT.  ACR, non-compliance, CAN.  Failed in 2007.  S/p graft nephrectomy.     Kidney replaced by transplant 3/12/2015    DDKT.  Induction w/ thymo 600mg.     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  3/25/15 DSA:  A1 mfi 1604, A30 mfi 4387, B13 mfi 1013.     Medical non-compliance      Obesity     S/p gastric bypass 2005     Organ transplant candidate 2009     Osteoporosis      Thyroid disease      Vitamin D deficiency        Past Surgical History   I have reviewed this patient's surgical history and updated it with pertinent information if needed.  Past Surgical History:   Procedure Laterality Date     BIOPSY      Kidney     C HIP CORE  DECOMPRESSION Left      C TOTAL HIP ARTHROPLASTY Right      CYSTOSCOPY, REMOVE STENT(S), COMBINED Right 2015    Procedure: COMBINED CYSTOSCOPY, REMOVE STENT(S);  Surgeon: Ren Romeo MD;  Location: [facility] OR     GASTRIC BYPASS  2005     HERNIA REPAIR Right     inguinal     HYDROCELECTOMY INGUINAL Right      NEPHRECTOMY Right 1998    allograft nephrectomy     NEPHRECTOMY Left 2007    allograft nephrectomy     PARATHYROIDECTOMY       TRANSPLANT KIDNEY RECIPIENT  DONOR N/A 3/12/2015    Procedure: TRANSPLANT KIDNEY RECIPIENT  DONOR;  Surgeon: Ren Romeo MD;  Location: [facility] OR     TRANSPLANT KIDNEY RECIPIENT LIVING RELATED      s/p right allograft nephrectomy     TRANSPLANT KIDNEY RECIPIENT LIVING RELATED      s/p left allograft nephrectomy     VASCULAR SURGERY         Family History   I have reviewed this patient's family history and updated it with pertinent information if needed.   Family History   Problem Relation Age of Onset     Diabetes Brother      Blood Disease Sister         sickle cell     Hypertension Father      Heart Disease Father      Cerebrovascular Disease Brother      Arthritis Mother      Heart Disease Mother        Social History   I have reviewed this patient's social history and updated it with pertinent information if needed. Tristian ALANNA Mckeon  reports that he has been smoking cigars. He has never used smokeless tobacco. He reports current alcohol use. He reports that he does not use drugs.    Allergies   Allergies   Allergen Reactions     Cephalosporins Unknown     Cyclosporine      Duricef [Cefadroxil]      Prior to Admission Medications     sodium chloride 0.9%  250 mL Intravenous Once in dialysis     sodium chloride 0.9%  300 mL Hemodialysis Machine Once     acetaminophen  975 mg Oral Q8H     ciprofloxacin  400 mg Intravenous Q12H    And     metroNIDAZOLE  500 mg Intravenous Q8H     gelatin absorbable  1 each Topical During Hemodialysis (from  "stock)     mycophenolic acid  1,080 mg Oral BID     - MEDICATION INSTRUCTIONS -   Does not apply Once     predniSONE  5 mg Oral Daily     senna-docusate  1 tablet Oral BID    Or     senna-docusate  2 tablet Oral BID     sodium chloride (PF)  3 mL Intravenous Q8H       dextrose 10%       phenylephrine         Physical Exam   Temp  Av.8  F (37.1  C)  Min: 97.7  F (36.5  C)  Max: 100.3  F (37.9  C)      Pulse  Av.3  Min: 80  Max: 90 Resp  Av.3  Min: 12  Max: 21  SpO2  Av.7 %  Min: 94 %  Max: 100 %     /71   Pulse 83   Temp 97.9  F (36.6  C) (Oral)   Resp 21   Ht 1.803 m (5' 11\")   Wt 83 kg (182 lb 15.7 oz)   SpO2 95%   BMI 25.52 kg/m      Admit Weight: 83 kg (182 lb 15.7 oz)  GENERAL APPEARANCE: no distress, he is  awake  EYES: no scleral icterus, pupils equal  Endo: no goiter, no moon facies  Lymphatics: no cervical or supraclavicular LAD  Pulmonary: lungs clear to auscultation with equal breath sounds bilaterally, no clubbing  CV: regular rhythm, normal rate, no rub   - JVD none   - Edema no edema   GI:  Abdominal surgical site dressing CDI. Soft. TTP adjacent to surgical site ,no guarding or rigidity, normal bowel sounds  MS: no evidence of inflammation in joints, no muscle tenderness  : no ramesh  SKIN: no rash, warm, dry, no cyanosis  NEURO: face symmetric, no asterixis   ACCESS - RUE AVF - good bruit       Data   CMP  Recent Labs   Lab 21  0122 21  2250 21  1130   NA  --  133 133   POTASSIUM 6.2* 5.9* 5.2   CHLORIDE  --  100 98   CO2  --  24 30   ANIONGAP  --  9 5   GLC  --  130* 109*   BUN  --  36* 31*   CR  --  8.47* 8.17*   GFRESTIMATED  --  7* 7*   GFRESTBLACK  --  8* 8*   MEERA  --  8.0* 8.7   MAG  --  2.3  --    PHOS  --  5.9*  --    PROTTOTAL  --   --  6.9   ALBUMIN  --   --  2.3*   BILITOTAL  --   --  0.4   ALKPHOS  --   --  76   AST  --   --  34   ALT  --   --  16     CBC  Recent Labs   Lab 21  0122 21  2250 21  1130   HGB 7.4* 8.0* 6.5* "   WBC  --  11.4* 12.1*   RBC  --  2.93* 2.47*   HCT  --  26.4* 22.2*   MCV  --  90 90   MCH  --  27.3 26.3*   MCHC  --  30.3* 29.3*   RDW  --  15.0 14.5   PLT  --  129* 125*     INR  Recent Labs   Lab 02/19/21  2250 02/19/21  1528   INR 1.80* 1.85*   PTT 39* 41*     ABGNo lab results found in last 7 days.   Urine Studies  Recent Labs   Lab Test 01/07/21  1241 01/24/16  1435 09/10/15  1600 08/29/15  1425 06/25/15  0910   COLOR Yellow Yellow Light Yellow Light Yellow Light Yellow   APPEARANCE Clear Clear Clear Clear Clear   URINEGLC Negative Negative Negative Negative Negative   URINEBILI Negative Negative Negative Negative Negative   URINEKETONE Negative Negative Negative Negative Negative   SG 1.013 1.010 1.007 1.007 1.009   UBLD Negative Negative Negative Negative Negative   URINEPH 5.5 6.0 5.0 5.0 5.0   PROTEIN 30* Negative Negative Negative Negative   NITRITE Negative Negative Negative Positive* Positive*   LEUKEST Trace* Negative Negative Moderate* Moderate*   RBCU 1 1  --  <1 0   WBCU 5 4*  --  14* 11*     Recent Labs   Lab Test 02/21/20  0859 08/22/19  1033 01/23/19  0848 05/08/18  0812 08/03/17  0827 06/15/16  0905 05/19/16  1330 09/21/15  0958 09/10/15  1600 07/24/15  1136 05/07/15  0824   UTPG 0.37* 0.54* 0.76* 0.15 0.16 0.11 0.08 0.15 0.23* 0.23* 0.92*     PTH  Recent Labs   Lab Test 01/15/21  0906 12/10/19  1432 05/24/18  1005 05/08/18  0812 01/12/17  1121 03/20/15  0730   PTHI 150* 146* 120* 110* 134* 210*     Iron Studies  Recent Labs   Lab Test 10/30/20  1252 05/08/18  0812 01/12/17  1121 03/20/15  0730   IRON 43 46 74 51   * 204* 194* 156*   IRONSAT 21 23 38 33   CARLOS A 445* 414* 371 1,172*       IMAGING:  All imaging studies reviewed by me.    Physician Attestation   I, Annie Adams MD, saw this patient with the resident and agree with the resident/fellow's findings and plan of care as documented in the note.      I personally reviewed vital signs, medications, labs and imaging.    Key  findings: 49 yo male with failed 3rd kidney transplant on HD MWF previously on triple IS presents with symptomatic graft rejection with graft pain, gross hematuria after abrupt discontinuation of all IS found to have renal v thrombosis necessitating graft nephrectomy. S/p HD overnight for hyperK    Annie Adams MD  Date of Service (when I saw the patient): 02/20/21

## 2021-02-20 NOTE — PLAN OF CARE
"/70   Pulse 83   Temp 98  F (36.7  C) (Oral)   Resp 19   Ht 1.803 m (5' 11\")   Wt 83 kg (182 lb 15.7 oz)   SpO2 94%   BMI 25.52 kg/m       [id number]  Patient A&O. VSS on 1L, capno in place, patient arrived from PACU. Not OOB. Patient refused to turn and have lift sheet removed. Patient refused 2 RN skin check due to pain. Clear liquid diet. No blood sugar checks. Prn fentanyl and oxy for pain. No complaints of nausea. Critical potassium of 6.2, team paged. Nephrology posted and decided patient needed to be dialyzed overnight, D50, insulin and calcium gluconate given before patient left unit at 0310, MD stated that fluid did not need to be started unless the patient showed symptoms of hypoglycemia. Stat EKG ordered before leaving, MD did not feel that the patient needed hard tele, but we were able to obtain tele hardware from .     Will continue to monitor and update the team with any changes.    "

## 2021-02-21 LAB
ANION GAP SERPL CALCULATED.3IONS-SCNC: 6 MMOL/L (ref 3–14)
BUN SERPL-MCNC: 26 MG/DL (ref 7–30)
CALCIUM SERPL-MCNC: 8.3 MG/DL (ref 8.5–10.1)
CHLORIDE SERPL-SCNC: 99 MMOL/L (ref 94–109)
CO2 SERPL-SCNC: 27 MMOL/L (ref 20–32)
CREAT SERPL-MCNC: 6.76 MG/DL (ref 0.66–1.25)
ERYTHROCYTE [DISTWIDTH] IN BLOOD BY AUTOMATED COUNT: 15.2 % (ref 10–15)
GFR SERPL CREATININE-BSD FRML MDRD: 9 ML/MIN/{1.73_M2}
GLUCOSE SERPL-MCNC: 113 MG/DL (ref 70–99)
HCT VFR BLD AUTO: 26.9 % (ref 40–53)
HGB BLD-MCNC: 7.9 G/DL (ref 13.3–17.7)
INTERPRETATION ECG - MUSE: NORMAL
MCH RBC QN AUTO: 27.1 PG (ref 26.5–33)
MCHC RBC AUTO-ENTMCNC: 29.4 G/DL (ref 31.5–36.5)
MCV RBC AUTO: 92 FL (ref 78–100)
PLATELET # BLD AUTO: ABNORMAL 10E9/L (ref 150–450)
POTASSIUM SERPL-SCNC: 4.8 MMOL/L (ref 3.4–5.3)
POTASSIUM SERPL-SCNC: 4.8 MMOL/L (ref 3.4–5.3)
RBC # BLD AUTO: 2.92 10E12/L (ref 4.4–5.9)
SODIUM SERPL-SCNC: 132 MMOL/L (ref 133–144)
WBC # BLD AUTO: 8.8 10E9/L (ref 4–11)

## 2021-02-21 PROCEDURE — 85027 COMPLETE CBC AUTOMATED: CPT

## 2021-02-21 PROCEDURE — 36415 COLL VENOUS BLD VENIPUNCTURE: CPT

## 2021-02-21 PROCEDURE — 120N000011 HC R&B TRANSPLANT UMMC

## 2021-02-21 PROCEDURE — 250N000013 HC RX MED GY IP 250 OP 250 PS 637: Performed by: NURSE PRACTITIONER

## 2021-02-21 PROCEDURE — 85027 COMPLETE CBC AUTOMATED: CPT | Performed by: NURSE PRACTITIONER

## 2021-02-21 PROCEDURE — 99233 SBSQ HOSP IP/OBS HIGH 50: CPT | Performed by: INTERNAL MEDICINE

## 2021-02-21 PROCEDURE — 250N000011 HC RX IP 250 OP 636

## 2021-02-21 PROCEDURE — 250N000013 HC RX MED GY IP 250 OP 250 PS 637: Performed by: SURGERY

## 2021-02-21 PROCEDURE — 80048 BASIC METABOLIC PNL TOTAL CA: CPT | Performed by: NURSE PRACTITIONER

## 2021-02-21 PROCEDURE — 250N000013 HC RX MED GY IP 250 OP 250 PS 637: Performed by: STUDENT IN AN ORGANIZED HEALTH CARE EDUCATION/TRAINING PROGRAM

## 2021-02-21 RX ORDER — LOSARTAN POTASSIUM 25 MG/1
25 TABLET ORAL 2 TIMES DAILY
Status: DISCONTINUED | OUTPATIENT
Start: 2021-02-21 | End: 2021-02-27 | Stop reason: HOSPADM

## 2021-02-21 RX ORDER — DILTIAZEM HYDROCHLORIDE 240 MG/1
240 CAPSULE, COATED, EXTENDED RELEASE ORAL DAILY
Status: DISCONTINUED | OUTPATIENT
Start: 2021-02-21 | End: 2021-02-22

## 2021-02-21 RX ORDER — LIDOCAINE 4 G/G
2 PATCH TOPICAL
Status: DISCONTINUED | OUTPATIENT
Start: 2021-02-22 | End: 2021-02-27 | Stop reason: HOSPADM

## 2021-02-21 RX ORDER — LABETALOL HYDROCHLORIDE 5 MG/ML
10 INJECTION, SOLUTION INTRAVENOUS ONCE
Status: COMPLETED | OUTPATIENT
Start: 2021-02-21 | End: 2021-02-21

## 2021-02-21 RX ADMIN — DOCUSATE SODIUM 50 MG AND SENNOSIDES 8.6 MG 2 TABLET: 8.6; 5 TABLET, FILM COATED ORAL at 08:11

## 2021-02-21 RX ADMIN — ACETAMINOPHEN 975 MG: 325 TABLET, FILM COATED ORAL at 08:11

## 2021-02-21 RX ADMIN — DILTIAZEM HYDROCHLORIDE 240 MG: 240 CAPSULE, COATED, EXTENDED RELEASE ORAL at 18:41

## 2021-02-21 RX ADMIN — HYDROMORPHONE HYDROCHLORIDE 4 MG: 2 TABLET ORAL at 05:10

## 2021-02-21 RX ADMIN — DOCUSATE SODIUM 50 MG AND SENNOSIDES 8.6 MG 2 TABLET: 8.6; 5 TABLET, FILM COATED ORAL at 20:54

## 2021-02-21 RX ADMIN — HYDROMORPHONE HYDROCHLORIDE 4 MG: 2 TABLET ORAL at 01:09

## 2021-02-21 RX ADMIN — LABETALOL HYDROCHLORIDE 10 MG: 5 INJECTION, SOLUTION INTRAVENOUS at 23:32

## 2021-02-21 RX ADMIN — LOSARTAN POTASSIUM 25 MG: 25 TABLET, FILM COATED ORAL at 20:54

## 2021-02-21 RX ADMIN — METHOCARBAMOL 750 MG: 750 TABLET, FILM COATED ORAL at 03:36

## 2021-02-21 RX ADMIN — HYDROMORPHONE HYDROCHLORIDE 4 MG: 2 TABLET ORAL at 09:21

## 2021-02-21 RX ADMIN — METHOCARBAMOL 750 MG: 750 TABLET, FILM COATED ORAL at 09:35

## 2021-02-21 ASSESSMENT — ACTIVITIES OF DAILY LIVING (ADL)
ADLS_ACUITY_SCORE: 12
ADLS_ACUITY_SCORE: 12
ADLS_ACUITY_SCORE: 10
ADLS_ACUITY_SCORE: 12

## 2021-02-21 ASSESSMENT — MIFFLIN-ST. JEOR: SCORE: 1712.22

## 2021-02-21 NOTE — PROGRESS NOTES
Transplant Surgery  Inpatient Daily Progress Note  02/21/2021    Assessment & Plan: Tristian Mckeon is a 50 year old male with PMH significant for HTN, ACD, Tobacco use, s/p kidney transplant x3. First two [place] and [place] [facility] last transplant was at [facility] in 3/12/2015 with subsequent graft failure, now back on dialysis x1 month. Presented with acute renal vein thrombosis. S/p graft nephrectomy on 2/19/21.     Graft nephrectomy: POD #2.   Acute post-op pain: On APAP, oral dilaudid, Robaxin. Add lidoderm.  Immunosuppression management: Patient had not taken immunosuppression for 3 weeks prior to admission. Discussed with Nephrology. Will not restart.  Hematology:   Acute blood loss anemia:  Hgb 7.9. Received 2 units PRBC  Intraoperatively.  Thrombocytopenia: Present on admission. Monitor.  Cardiorespiratory:   HTN: Diltiazem restarted post-op, but pt refused all doses. BP controlled.  GI/Nutrition: Regular diet. Bowel regimen with senna.  Endocrine: No issues  Fluid/Electrolytes: MIVF: none  Hyperkalemia: Required HD post-op for K 6.2. Now controlled.   ESKD s/p graft nephrectomy: Nephrology consulted for dialysis.  : Patient is anuric, no ramesh necessary  Infectious disease: Tmax 99.6F, now afebrile.   Prophylaxis: fall  Disposition: 7A    Medical Decision Making: Medium    ANTOLIN/Fellow/Resident Provider: Alley Lewis NP [id number]    Faculty: Ren Romeo M.D., Ph.D.  _________________________________________________________________  Transplant History:   3/12/2015 (Kidney), 4/1/1994 (Kidney), 10/1/2000 (Kidney), Postoperative day: 2173     Interval History: History is obtained from the patient  Overnight events: Pain tolerable, no flatus, eating a little    ROS:   A 10-point review of systems was negative except as noted above.    Meds:    acetaminophen  975 mg Oral Q8H     diltiazem ER COATED BEADS  240 mg Oral Daily     senna-docusate  1 tablet Oral BID    Or     senna-docusate  2  "tablet Oral BID     sodium chloride (PF)  3 mL Intravenous Q8H       Physical Exam:     Admit Weight: 83 kg (182 lb 15.7 oz)    Current vitals:   BP (!) 139/90   Pulse 97   Temp 98.3  F (36.8  C) (Oral)   Resp 16   Ht 1.803 m (5' 11\")   Wt 82.5 kg (181 lb 14.4 oz)   SpO2 98%   BMI 25.37 kg/m        Vital sign ranges:    Temp:  [97.8  F (36.6  C)-99.6  F (37.6  C)] 98.3  F (36.8  C)  Pulse:  [] 97  Resp:  [15-20] 16  BP: (128-154)/(80-91) 139/90  SpO2:  [93 %-98 %] 98 %  Patient Vitals for the past 24 hrs:   BP Temp Temp src Pulse Resp SpO2 Weight   02/21/21 1144 (!) 139/90 98.3  F (36.8  C) Oral 97 16 98 % --   02/21/21 0800 -- -- -- -- -- -- 82.5 kg (181 lb 14.4 oz)   02/21/21 0743 (!) 132/90 98.8  F (37.1  C) Oral 107 16 93 % --   02/21/21 0331 128/80 98.4  F (36.9  C) Oral 101 18 96 % --   02/20/21 2353 (!) 147/81 99.4  F (37.4  C) Oral 98 16 94 % --   02/20/21 1946 (!) 145/81 97.8  F (36.6  C) Oral 94 16 95 % --   02/20/21 1900 (!) 147/91 98.3  F (36.8  C) Oral 96 15 97 % --   02/20/21 1609 (!) 154/85 99.6  F (37.6  C) Oral 87 20 94 % --     General Appearance: in no apparent distress.   Skin: normal  Heart: Perfused  Lungs: Unlabored, RA  Abdomen: The abdomen is round and very soft, incision c/d/i. Abdomen appropriately ttp  : ramesh is not present.  The genitalia is not edematous.     Extremities: edema: absent.    Neurologic: awake, alert and oriented x4. Tremor absent..     Data:   CMP  Recent Labs   Lab 02/21/21  0611 02/21/21  0610 02/19/21  2250 02/19/21  2250 02/19/21  1130   *  --   --  133 133   POTASSIUM 4.8 4.8   < > 5.9* 5.2   CHLORIDE 99  --   --  100 98   CO2 27  --   --  24 30   *  --   --  130* 109*   BUN 26  --   --  36* 31*   CR 6.76*  --   --  8.47* 8.17*   GFRESTIMATED 9*  --   --  7* 7*   GFRESTBLACK 10*  --   --  8* 8*   MEERA 8.3*  --   --  8.0* 8.7   MAG  --   --   --  2.3  --    PHOS  --   --   --  5.9*  --    LIPASE  --   --   --   --  66*   ALBUMIN  --   --   " --   --  2.3*   BILITOTAL  --   --   --   --  0.4   ALKPHOS  --   --   --   --  76   AST  --   --   --   --  34   ALT  --   --   --   --  16    < > = values in this interval not displayed.     CBC  Recent Labs   Lab 02/21/21  0610 02/20/21  2147 02/19/21  2250 02/19/21  2250   HGB 7.9* 8.1*   < > 8.0*   WBC 8.8  --   --  11.4*   PLT Canceled, Test credited  --   --  129*    < > = values in this interval not displayed.     COAGS  Recent Labs   Lab 02/19/21  2250 02/19/21  1528   INR 1.80* 1.85*   PTT 39* 41*      Urinalysis  Recent Labs   Lab Test 01/07/21  1241 02/21/20  0859 08/22/19  1033 01/24/16  1435 01/24/16  1435   COLOR Yellow  --   --   --  Yellow   APPEARANCE Clear  --   --   --  Clear   URINEGLC Negative  --   --   --  Negative   URINEBILI Negative  --   --   --  Negative   URINEKETONE Negative  --   --   --  Negative   SG 1.013  --   --   --  1.010   UBLD Negative  --   --   --  Negative   URINEPH 5.5  --   --   --  6.0   PROTEIN 30*  --   --   --  Negative   NITRITE Negative  --   --   --  Negative   LEUKEST Trace*  --   --   --  Negative   RBCU 1  --   --   --  1   WBCU 5  --   --   --  4*   UTPG  --  0.37* 0.54*   < >  --     < > = values in this interval not displayed.     Virology:  CMV DNA Quantitation Specimen   Date Value Ref Range Status   11/09/2015 EDTA PLASMA  Final     CMV IgG Antibody   Date Value Ref Range Status   01/23/2009 >160.0 EU/mL Final     Comment:     Positive for anti-CMV IgG     Hepatitis C Antibody   Date Value Ref Range Status   08/08/2018 Nonreactive NR^Nonreactive Final     Comment:     Assay performance characteristics have not been established for newborns,   infants, and children       Hep B Surface Farrah   Date Value Ref Range Status   01/23/2009 0.3  Final     Comment:     Negative, No antibody detected when the value is less than 5.0 mlU/mL.     BK Virus Result   Date Value Ref Range Status   01/23/2019 BK Virus DNA Not Detected BKNEG^BK Virus DNA Not Detected copies/mL  Final   08/08/2018 BK Virus DNA Not Detected BKNEG^BK Virus DNA Not Detected copies/mL Final   05/08/2018 BK Virus DNA Not Detected BKNEG^BK Virus DNA Not Detected copies/mL Final   08/03/2017 BK Virus DNA Not Detected BKNEG copies/mL Final   12/07/2015 BK Virus DNA Not Detected BKNEG copies/mL Final   11/23/2015 BK Virus DNA Not Detected BKNEG copies/mL Final   10/22/2015 BK Virus DNA Not Detected BKNEG copies/mL Final   07/08/2015 BK Virus DNA Not Detected BKNEG copies/mL Final   05/14/2015 BK Virus DNA Not Detected BKNEG copies/mL Final   05/07/2015 BK Virus DNA Not Detected BKNEG copies/mL Final   04/10/2015 BK Virus DNA Not Detected BKNEG copies/mL Final

## 2021-02-21 NOTE — PROGRESS NOTES
Shift: 4000-2365  VS: hypertensive within parameters  Neuro: alert and oriented x4  Cardiac: Tele, NSR  Respiratory: Lung fields clear on room air. SpO2>95%  Pain/Nausea/PRN: Scheduled Tylenol, PRN Dilaudid PO 2-4 mg Q4, Robaxin QID  Diet: Regular diet, pt reports decreased appetite. Stated he has been working on dinner all evening long  LDA:  L PIV X2 SL, R shunt  GI/: anuric, on HD dialysis. LBM 2/18, reports some gas discomfort  Skin: midline abdominal incision covered with primapore with some shadow  Mobility: Up with assist of 1/independent  Plan: IV abx, pain management

## 2021-02-21 NOTE — PLAN OF CARE
"BP (!) 147/91 (BP Location: Left arm)   Pulse 96   Temp 98.3  F (36.8  C) (Oral)   Resp 15   Ht 1.803 m (5' 11\")   Wt 83.2 kg (183 lb 8 oz)   SpO2 97%   BMI 25.59 kg/m       Shift: 5723-5468  VS: BP slightly elevated on RA, afebrile  Neuro: AOx4  BG: 101 X1  Labs: K+ 4.5, Ca+ 8.0   Cardiac: Tele, SR  Respiratory: WNL  Pain/Nausea/PRN: Scheduled Tylenol, PRN Dilaudid PO 2-4 mg Q4, Robaxin QID  Diet: Regular diet  LDA:  L PIV X2 SL, R shunt  GI/: Oliguria, dialysis pt. No BM No bowel sound  Skin: Dry flaky, incision site covered with some shadow  Mobility: Up with assist of 1/independent  Plan: IV abx, pain management    Will continue with POC and notify MD with changes or concerns.    "

## 2021-02-21 NOTE — PROGRESS NOTES
"[facility]   Transplant Nephrology Progress Note  Date of Admission:  2/19/2021  Today's Date: 02/21/2021    Recommendations:  -ok to resume diltiazem if BP remains >130/80  -plan for dialysis Monday per home regimen    Assessment & Plan   # DDKT: s/p graft nephrectomy 2/19/21   - Date DSA Last Checked: Feb/2021      Latest DSA: Yes, A30, B13, CW6   - BK Viremia: No Nov 2019    - Kidney Tx Biopsy: Dec 23, 2015; Result:  Results:  Mild glomerulitis and capillary C4d staining consistent with mild, persistent antibody-mediated rejection. 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    Currently doing HD on MWF at [facility], runs for 4 hours       # Immunosuppression: Tacrolimus extended release (goal 4-6), Mycophenolic acid (dose 1080 mg every 12 hours) and Prednisone (dose 5 mg daily)     -patient self stopped triple IS 3 weeks ago which led to symptomatic rejection necessitating graft nephrectomy, this is his 3rd kidney transplant, highly sensitized PRA-100% expect uptrend in DSA post nephrectomy. Risks of restarting IS \"infectious & malignant\" outweigh potential benefit \"reduced sensitization\" as he is anuric & less likely of re-transplant within a year in the absence of potential living donors.    # Infection Prophylaxis:   - PJP: None    # Hypertension: Controlled;  Goal BP: < 130/80   - Volume status: Euvolemic  EDW ~ 83Kg  PTA on Clonidine 0.2 mg BID, Hydralazine 50 mg TID, Losartan  25 mg BID  Diltiazem  ER  240 MG daily-taking with Astagraf-stopped >3 weeks prior to admission.  Bumex -stopped prior to admission                 - Changes: Yes. Continue to hold Hydralazine, Losartan, Clonidine, stop Diltiazem.        Anemia in Chronic Renal Disease: Hgb: Stable, low 8.1 after transfusion     RAYMOND: No   - Iron studies: Not checked recently    # Mineral Bone Disorder:   - Secondary renal hyperparathyroidism; PTH level: " Minimally elevated ( pg/ml)        On treatment: None  - Vitamin D; level:  Not checked recently     On supplement: No  - Calcium; level: Low        On supplement: No  - Phosphorus; level: High        On supplement: No , undergoing HD      # Electrolytes:   - Potassium; level: High normal   On supplement: No  - Magnesium; level: Normal        On supplement: No  - Bicarbonate; level: Low        On supplement: Yes  - Sodium; level: Low       # HFpEF with Pulmonary Hypertension:               - Recommend patient start weighing himself daily.               - On HD now     # Moderate Pericardial Effusion,per Echo 2021: Likely secondary to hypervolemia. No evidence of tamponade. Pericardiocentesis was not recommended by cardiology earlier      # Transplant History:  Etiology of Kidney Failure: Hypertension  Tx: DDKT  Transplant: 3/12/2015 (Kidney), 1994 (Kidney), 10/1/2000 (Kidney)   Donor Type: Donation after Brain Death        Donor Class: Standard Criteria Donor  Significant changes in immunosuppression: None  Significant transplant-related complications: Acute cellular-mediated rejection and Acute antibody-mediated rejection    Recommendations were communicated to the primary team verbally.    Seen and discussed with Dr. Parker Foster NP   Pager: [phone]    Interval History   Declining antihypertensives. Pain present but controlled. Anuric.       Review of Systems   4 point ROS was obtained and negative except as noted in the Interval History.    MEDICATIONS:    acetaminophen  975 mg Oral Q8H     diltiazem ER COATED BEADS  240 mg Oral Daily     senna-docusate  1 tablet Oral BID    Or     senna-docusate  2 tablet Oral BID     sodium chloride (PF)  3 mL Intravenous Q8H         Physical Exam   Temp  Av.5  F (36.9  C)  Min: 97.5  F (36.4  C)  Max: 100.3  F (37.9  C)      Pulse  Av.1  Min: 78  Max: 92 Resp  Av.1  Min: 12  Max: 21  SpO2  Av.3 %  Min: 94 %  Max: 100 %     BP  "(!) 132/90 (BP Location: Left arm)   Pulse 107   Temp 98.8  F (37.1  C) (Oral)   Resp 16   Ht 1.803 m (5' 11\")   Wt 83.2 kg (183 lb 8 oz)   SpO2 93%   BMI 25.59 kg/m     Date 02/20/21 0700 - 02/21/21 0659   Shift 8810-5099 7869-0503 6490-9739 24 Hour Total   INTAKE   P.O. 110   110   Other 0   0   Shift Total(mL/kg) 110(1.32)   110(1.32)   OUTPUT   Other 0   0   Shift Total(mL/kg) 0(0)   0(0)   Weight (kg) 83.23 83.23 83.23 83.23      Admit Weight: 83 kg (182 lb 15.7 oz)     GENERAL APPEARANCE: alert and no distress  HENT: mouth without ulcers or lesions  LYMPHATICS: no cervical or supraclavicular nodes  RESP: lungs clear to auscultation - no rales, rhonchi or wheezes  CV: regular rhythm, normal rate, no rub, no murmur  EDEMA: no LE edema bilaterally  ABDOMEN: soft, nondistended, appropriately tender to RLQ, bowel sounds hypoactive, incision with staples CDI  MS: extremities normal - no gross deformities noted, no evidence of inflammation in joints, no muscle tenderness  RUE fistula +bruit/thrill  SKIN: no rash    Data   All labs reviewed by me.  CMP  Recent Labs   Lab 02/21/21  0611 02/21/21  0610 02/20/21  2147 02/20/21  1733 02/19/21  2250 02/19/21  2250 02/19/21  1130   *  --   --   --   --  133 133   POTASSIUM 4.8 4.8 4.7 4.5   < > 5.9* 5.2   CHLORIDE 99  --   --   --   --  100 98   CO2 27  --   --   --   --  24 30   ANIONGAP 6  --   --   --   --  9 5   *  --   --   --   --  130* 109*   BUN 26  --   --   --   --  36* 31*   CR 6.76*  --   --   --   --  8.47* 8.17*   GFRESTIMATED 9*  --   --   --   --  7* 7*   GFRESTBLACK 10*  --   --   --   --  8* 8*   MEERA 8.3*  --   --   --   --  8.0* 8.7   MAG  --   --   --   --   --  2.3  --    PHOS  --   --   --   --   --  5.9*  --    PROTTOTAL  --   --   --   --   --   --  6.9   ALBUMIN  --   --   --   --   --   --  2.3*   BILITOTAL  --   --   --   --   --   --  0.4   ALKPHOS  --   --   --   --   --   --  76   AST  --   --   --   --   --   --  34   ALT  " --   --   --   --   --   --  16    < > = values in this interval not displayed.     CBC  Recent Labs   Lab 02/21/21  0610 02/20/21  2147 02/20/21  1733 02/20/21  1334 02/19/21  2250 02/19/21  2250 02/19/21  1130   HGB 7.9* 8.1* 7.3* 8.1*   < > 8.0* 6.5*   WBC 8.8  --   --   --   --  11.4* 12.1*   RBC 2.92*  --   --   --   --  2.93* 2.47*   HCT 26.9*  --   --   --   --  26.4* 22.2*   MCV 92  --   --   --   --  90 90   MCH 27.1  --   --   --   --  27.3 26.3*   MCHC 29.4*  --   --   --   --  30.3* 29.3*   RDW 15.2*  --   --   --   --  15.0 14.5   PLT Canceled, Test credited  --   --   --   --  129* 125*    < > = values in this interval not displayed.     INR  Recent Labs   Lab 02/19/21  2250 02/19/21  1528   INR 1.80* 1.85*   PTT 39* 41*     ABGNo lab results found in last 7 days.   Urine Studies  Recent Labs   Lab Test 01/07/21  1241 01/24/16  1435 09/10/15  1600 08/29/15  1425 06/25/15  0910   COLOR Yellow Yellow Light Yellow Light Yellow Light Yellow   APPEARANCE Clear Clear Clear Clear Clear   URINEGLC Negative Negative Negative Negative Negative   URINEBILI Negative Negative Negative Negative Negative   URINEKETONE Negative Negative Negative Negative Negative   SG 1.013 1.010 1.007 1.007 1.009   UBLD Negative Negative Negative Negative Negative   URINEPH 5.5 6.0 5.0 5.0 5.0   PROTEIN 30* Negative Negative Negative Negative   NITRITE Negative Negative Negative Positive* Positive*   LEUKEST Trace* Negative Negative Moderate* Moderate*   RBCU 1 1  --  <1 0   WBCU 5 4*  --  14* 11*     Recent Labs   Lab Test 02/21/20  0859 08/22/19  1033 01/23/19  0848 05/08/18  0812 08/03/17  0827 06/15/16  0905 05/19/16  1330 09/21/15  0958 09/10/15  1600 07/24/15  1136 05/07/15  0824   UTPG 0.37* 0.54* 0.76* 0.15 0.16 0.11 0.08 0.15 0.23* 0.23* 0.92*     PTH  Recent Labs   Lab Test 01/15/21  0906 12/10/19  1432 05/24/18  1005 05/08/18  0812 01/12/17  1121 03/20/15  0730   PTHI 150* 146* 120* 110* 134* 210*     Iron Studies  Recent  "Labs   Lab Test 10/30/20  1252 05/08/18  0812 01/12/17  1121 03/20/15  0730   IRON 43 46 74 51   * 204* 194* 156*   IRONSAT 21 23 38 33   CARLOS A 445* 414* 371 1,172*       IMAGING:  All imaging studies reviewed by me.    My key history or physical exam findings: c/o pain post nephrectomy , underwent HD overnight for hyperK     Key management decisions made by me:   . Failed kidney transplant: on HD MWF  . IS: patient self stopped triple IS 3 weeks ago which led to symptomatic rejection necessitating graft nephrectomy, this is his 3rd kidney transplant, highly sensitized PRA-100% expect uptrend in DSA post nephrectomy. Risks of restarting IS \"infectious & malignant\" outweigh potential benefit \"reduced sensitization\" as he is anuric & less likely of re-transplant within a year in the absence of potential living donors. Consider stim test as previously on prednisone 5 if concern  . HTN: if uptrend in BP>130/80, ok to resume diltiazem  . Anemia: check iron studies    Date of Service (when I saw the patient): 02/20/21    "

## 2021-02-21 NOTE — PROGRESS NOTES
Admitted/transferred from: PACU   Time of arrival on unit 2/19 at 2300  2 RN full  skin assessment completed by Anneliese DICKERSON RN and Radha SANCHEZ RN   Skin assessment finding: issues found Midline incision covered with operative dressing, drainage marked and unchanged. Fistula in right arm. Scattered dry skin.    Interventions/actions: other No interventions at this time, reminded patient to shift weight while in bed and call before walking.      Will continue to monitor.

## 2021-02-21 NOTE — PLAN OF CARE
"BP (!) 169/98 (BP Location: Right arm)   Pulse 101   Temp 98.7  F (37.1  C) (Oral)   Resp 16   Ht 1.803 m (5' 11\")   Wt 82.5 kg (181 lb 14.4 oz)   SpO2 97%   BMI 25.37 kg/m       Elevated BP, MD notified, restarted back on Cardizem and Losartan. Alert and oriented, reports feeling drowsy from previous pain medications and declined pain medications including scheduled Tylenol.     Shift: 4534-1477  VS:  Elevated BP on RA, afebrile  Neuro: AOx4  BG: NA  Labs: K+ 4.8, Hgb 7.9  Cardiac: Tele, NSR  Respiratory: WNL  Pain/Nausea/PRN: Scheduled Tylenol, PRN Dilaudid, and  Robaxin given X1 Diet: Regular diet, eating small at a time  LDA:  L PIV X2 SL, R shunt  GI/: Anuria, hemodialysis pt. No BM. No bowel sound  Skin: Dry flaky skin, incision dressing removed. Staple intact, surrounding skin intact no redness  Mobility:Up ad kapil  Plan: Pain management, encourage ambulation, dialysis tomorrow.  Will continue with POC and notify MD with changes or concerns.      "

## 2021-02-21 NOTE — PLAN OF CARE
"/80 (BP Location: Left arm)   Pulse 101   Temp 98.4  F (36.9  C) (Oral)   Resp 18   Ht 1.803 m (5' 11\")   Wt 83.2 kg (183 lb 8 oz)   SpO2 96%   BMI 25.59 kg/m      [id number]  Patient A&O. VSS on RA, tele sinus tach to NSR. Up with one, GB and walker. Regular diet. No blood sugar checks. Prn robaxin and dilaudid for pain. 2 PIV SL. No complaints of nausea. Surgical dressing still in place, drainage marked with no change.     Will continue to monitor and update the team with any changes.    "

## 2021-02-22 PROBLEM — G89.18 ACUTE POST-OPERATIVE PAIN: Status: ACTIVE | Noted: 2021-02-22

## 2021-02-22 PROBLEM — Z91.148 NONADHERENCE TO MEDICATION: Status: ACTIVE | Noted: 2021-02-22

## 2021-02-22 PROBLEM — Z90.5 S/P NEPHRECTOMY: Status: ACTIVE | Noted: 2021-02-22

## 2021-02-22 PROBLEM — I10 ESSENTIAL HYPERTENSION: Status: ACTIVE | Noted: 2021-02-22

## 2021-02-22 PROBLEM — E87.5 HYPERKALEMIA: Status: ACTIVE | Noted: 2021-02-22

## 2021-02-22 LAB
ANION GAP SERPL CALCULATED.3IONS-SCNC: 10 MMOL/L (ref 3–14)
BUN SERPL-MCNC: 38 MG/DL (ref 7–30)
CALCIUM SERPL-MCNC: 7.9 MG/DL (ref 8.5–10.1)
CHLORIDE SERPL-SCNC: 101 MMOL/L (ref 94–109)
CO2 SERPL-SCNC: 23 MMOL/L (ref 20–32)
CREAT SERPL-MCNC: 8.63 MG/DL (ref 0.66–1.25)
ERYTHROCYTE [DISTWIDTH] IN BLOOD BY AUTOMATED COUNT: 15 % (ref 10–15)
FERRITIN SERPL-MCNC: 1505 NG/ML (ref 26–388)
GFR SERPL CREATININE-BSD FRML MDRD: 6 ML/MIN/{1.73_M2}
GLUCOSE SERPL-MCNC: 139 MG/DL (ref 70–99)
HCT VFR BLD AUTO: 25.3 % (ref 40–53)
HGB BLD-MCNC: 7.7 G/DL (ref 13.3–17.7)
IRON SATN MFR SERPL: 14 % (ref 15–46)
IRON SERPL-MCNC: 23 UG/DL (ref 35–180)
MCH RBC QN AUTO: 27.7 PG (ref 26.5–33)
MCHC RBC AUTO-ENTMCNC: 30.4 G/DL (ref 31.5–36.5)
MCV RBC AUTO: 91 FL (ref 78–100)
PLATELET # BLD AUTO: ABNORMAL 10E9/L (ref 150–450)
POTASSIUM SERPL-SCNC: 5 MMOL/L (ref 3.4–5.3)
RBC # BLD AUTO: 2.78 10E12/L (ref 4.4–5.9)
SODIUM SERPL-SCNC: 134 MMOL/L (ref 133–144)
TIBC SERPL-MCNC: 162 UG/DL (ref 240–430)
WBC # BLD AUTO: 6.5 10E9/L (ref 4–11)

## 2021-02-22 PROCEDURE — 120N000011 HC R&B TRANSPLANT UMMC

## 2021-02-22 PROCEDURE — 258N000003 HC RX IP 258 OP 636: Performed by: INTERNAL MEDICINE

## 2021-02-22 PROCEDURE — 250N000013 HC RX MED GY IP 250 OP 250 PS 637: Performed by: NURSE PRACTITIONER

## 2021-02-22 PROCEDURE — 90937 HEMODIALYSIS REPEATED EVAL: CPT

## 2021-02-22 PROCEDURE — 85027 COMPLETE CBC AUTOMATED: CPT | Performed by: NURSE PRACTITIONER

## 2021-02-22 PROCEDURE — 36415 COLL VENOUS BLD VENIPUNCTURE: CPT | Performed by: NURSE PRACTITIONER

## 2021-02-22 PROCEDURE — 83550 IRON BINDING TEST: CPT | Performed by: NURSE PRACTITIONER

## 2021-02-22 PROCEDURE — 83540 ASSAY OF IRON: CPT | Performed by: NURSE PRACTITIONER

## 2021-02-22 PROCEDURE — 250N000013 HC RX MED GY IP 250 OP 250 PS 637: Performed by: SURGERY

## 2021-02-22 PROCEDURE — 82728 ASSAY OF FERRITIN: CPT | Performed by: NURSE PRACTITIONER

## 2021-02-22 PROCEDURE — 250N000011 HC RX IP 250 OP 636: Performed by: INTERNAL MEDICINE

## 2021-02-22 PROCEDURE — 99232 SBSQ HOSP IP/OBS MODERATE 35: CPT | Performed by: INTERNAL MEDICINE

## 2021-02-22 PROCEDURE — 80048 BASIC METABOLIC PNL TOTAL CA: CPT | Performed by: NURSE PRACTITIONER

## 2021-02-22 PROCEDURE — 250N000013 HC RX MED GY IP 250 OP 250 PS 637: Performed by: STUDENT IN AN ORGANIZED HEALTH CARE EDUCATION/TRAINING PROGRAM

## 2021-02-22 RX ORDER — CLONIDINE HYDROCHLORIDE 0.1 MG/1
0.2 TABLET ORAL 2 TIMES DAILY
Status: DISCONTINUED | OUTPATIENT
Start: 2021-02-22 | End: 2021-02-27 | Stop reason: HOSPADM

## 2021-02-22 RX ORDER — CLONIDINE HYDROCHLORIDE 0.1 MG/1
0.2 TABLET ORAL 2 TIMES DAILY
Status: DISCONTINUED | OUTPATIENT
Start: 2021-02-22 | End: 2021-02-22

## 2021-02-22 RX ORDER — BISACODYL 10 MG
10 SUPPOSITORY, RECTAL RECTAL DAILY
Status: DISCONTINUED | OUTPATIENT
Start: 2021-02-22 | End: 2021-02-27 | Stop reason: HOSPADM

## 2021-02-22 RX ORDER — POLYETHYLENE GLYCOL 3350 17 G/17G
17 POWDER, FOR SOLUTION ORAL 2 TIMES DAILY
Status: DISCONTINUED | OUTPATIENT
Start: 2021-02-22 | End: 2021-02-27 | Stop reason: HOSPADM

## 2021-02-22 RX ADMIN — METHOCARBAMOL 750 MG: 750 TABLET, FILM COATED ORAL at 14:04

## 2021-02-22 RX ADMIN — LIDOCAINE 2 PATCH: 560 PATCH PERCUTANEOUS; TOPICAL; TRANSDERMAL at 08:03

## 2021-02-22 RX ADMIN — METHOCARBAMOL 750 MG: 750 TABLET, FILM COATED ORAL at 18:30

## 2021-02-22 RX ADMIN — LOSARTAN POTASSIUM 25 MG: 25 TABLET, FILM COATED ORAL at 19:44

## 2021-02-22 RX ADMIN — SODIUM CHLORIDE 300 ML: 9 INJECTION, SOLUTION INTRAVENOUS at 08:43

## 2021-02-22 RX ADMIN — CLONIDINE HYDROCHLORIDE 0.2 MG: 0.1 TABLET ORAL at 14:04

## 2021-02-22 RX ADMIN — DILTIAZEM HYDROCHLORIDE 240 MG: 240 CAPSULE, COATED, EXTENDED RELEASE ORAL at 07:52

## 2021-02-22 RX ADMIN — LOSARTAN POTASSIUM 25 MG: 25 TABLET, FILM COATED ORAL at 07:52

## 2021-02-22 RX ADMIN — CLONIDINE HYDROCHLORIDE 0.2 MG: 0.1 TABLET ORAL at 22:05

## 2021-02-22 RX ADMIN — DOCUSATE SODIUM 50 MG AND SENNOSIDES 8.6 MG 2 TABLET: 8.6; 5 TABLET, FILM COATED ORAL at 07:51

## 2021-02-22 RX ADMIN — ACETAMINOPHEN 975 MG: 325 TABLET, FILM COATED ORAL at 18:28

## 2021-02-22 RX ADMIN — Medication: at 08:44

## 2021-02-22 RX ADMIN — DARBEPOETIN ALFA 40 MCG: 40 INJECTION, SOLUTION INTRAVENOUS; SUBCUTANEOUS at 09:58

## 2021-02-22 RX ADMIN — SODIUM CHLORIDE 150 ML: 9 INJECTION, SOLUTION INTRAVENOUS at 08:43

## 2021-02-22 RX ADMIN — ACETAMINOPHEN 975 MG: 325 TABLET, FILM COATED ORAL at 07:31

## 2021-02-22 RX ADMIN — DOCUSATE SODIUM 50 MG AND SENNOSIDES 8.6 MG 2 TABLET: 8.6; 5 TABLET, FILM COATED ORAL at 19:44

## 2021-02-22 ASSESSMENT — ACTIVITIES OF DAILY LIVING (ADL)
ADLS_ACUITY_SCORE: 12
ADLS_ACUITY_SCORE: 12
ADLS_ACUITY_SCORE: 13
ADLS_ACUITY_SCORE: 13
ADLS_ACUITY_SCORE: 12

## 2021-02-22 ASSESSMENT — MIFFLIN-ST. JEOR: SCORE: 1729.13

## 2021-02-22 NOTE — PROGRESS NOTES
Transplant Surgery  Inpatient Daily Progress Note  02/22/2021    Assessment & Plan: Tristian Mckeon is a 50 year old male with PMH significant for HTN, ACD, Tobacco use, s/p kidney transplant x3. First two transplants and graft nephrectomies were at [facility]. His last transplant was at [facility] in 3/12/2015 with subsequent graft failure, now back on dialysis x1 month. Presented with acute renal vein thrombosis. S/p graft nephrectomy on 2/19/21 overall doing well post-operatively. Awaiting ROBF.      Graft nephrectomy: POD #3.   Acute post-op pain: On APAP, Robaxin. Add lidoderm. Discontinued dilaudid.   Immunosuppression management: Patient had not taken immunosuppression for 3 weeks prior to admission. Discussed with Nephrology. Will not restart.  Hematology:   Acute blood loss anemia:  Stable Hgb 7.7 was 7.9 yesterday. Received 2 units PRBC  Intraoperatively.  Thrombocytopenia: Present on admission. Monitor. Today platelets count unavailable due to clumping   Cardiorespiratory:   HTN: PTA Diltiazem 240mg daily and Cozaar 25mg BID, systolic BP up to 170s in last 24 hours, in dialysis now    GI/Nutrition: Regular diet. Bowel regimen with senna and Miralax   Endocrine: No issues  Fluid/Electrolytes: MIVF: none  Hyperkalemia: Required HD post-op for K 6.2. Now controlled.   ESKD s/p graft nephrectomy: Nephrology consulted for dialysis.  : Patient is anuric, no ramesh necessary  Infectious disease: Tmax 99.5F, WBC 6.5  Prophylaxis: fall  Disposition: 7A    Medical Decision Making: Medium    ANTOLIN/Fellow/Resident Provider: Preeti De Leon MD PGY-1     Faculty: Ren Romeo M.D., Ph.D.  _________________________________________________________________  Transplant History:   3/12/2015 (Kidney), 4/1/1994 (Kidney), 10/1/2000 (Kidney), Postoperative day: 2174     Interval History: History is obtained from the patient  Overnight events: Pain tolerable, no flatus, no BM. Able to eat. No abdominal pain, currently in dialysis     ROS:  "  A 10-point review of systems was negative except as noted above.    Meds:    sodium chloride 0.9%  250 mL Intravenous Once in dialysis     acetaminophen  975 mg Oral Q8H     darbepoetin sharon for ESRD on Dialysis  40 mcg Intravenous Weekly     gelatin absorbable  1 each Topical During Hemodialysis (from stock)     lidocaine  2 patch Transdermal Q24H     lidocaine   Transdermal Q8H     losartan  25 mg Oral BID     senna-docusate  1 tablet Oral BID    Or     senna-docusate  2 tablet Oral BID     sodium chloride (PF)  3 mL Intravenous Q8H       Physical Exam:     Admit Weight: 83 kg (182 lb 15.7 oz)    Current vitals:   BP (!) 155/87   Pulse 87   Temp 96.6  F (35.9  C) (Axillary)   Resp 15   Ht 1.803 m (5' 11\")   Wt 84.2 kg (185 lb 10 oz)   SpO2 95%   BMI 25.89 kg/m        Vital sign ranges:    Temp:  [96.6  F (35.9  C)-99.5  F (37.5  C)] 96.6  F (35.9  C)  Pulse:  [] 87  Resp:  [13-22] 15  BP: (110-173)/(54-98) 155/87  SpO2:  [94 %-98 %] 95 %  Patient Vitals for the past 24 hrs:   BP Temp Temp src Pulse Resp SpO2 Weight   02/22/21 1100 (!) 155/87 -- -- 87 15 95 % --   02/22/21 1045 (!) 153/86 -- -- 89 16 95 % --   02/22/21 1030 (!) 152/87 -- -- 87 15 94 % --   02/22/21 1015 (!) 162/88 -- -- 92 15 95 % --   02/22/21 1000 (!) 157/87 -- -- 87 15 96 % --   02/22/21 0945 (!) 152/85 -- -- 86 15 95 % --   02/22/21 0930 (!) 154/88 -- -- 85 13 97 % --   02/22/21 0915 (!) 168/86 -- -- 92 14 96 % --   02/22/21 0900 (!) 160/90 -- -- 92 15 97 % --   02/22/21 0845 (!) 163/93 -- -- 92 17 95 % --   02/22/21 0830 (!) 154/87 -- -- 90 22 95 % --   02/22/21 0820 (!) 154/87 -- -- 90 20 97 % --   02/22/21 0812 110/54 96.6  F (35.9  C) Axillary 70 16 96 % --   02/22/21 0329 (!) 155/90 98.3  F (36.8  C) Oral 91 16 97 % 84.2 kg (185 lb 10 oz)   02/21/21 2337 134/80 -- -- 81 16 -- --   02/21/21 2230 (!) 173/93 99.5  F (37.5  C) Oral 99 16 98 % --   02/21/21 2051 (!) 163/93 97.9  F (36.6  C) Oral 99 16 98 % --   02/21/21 1612 (!) " 169/98 98.7  F (37.1  C) Oral 101 16 97 % --   02/21/21 1144 (!) 139/90 98.3  F (36.8  C) Oral 97 16 98 % --     General Appearance: in no apparent distress.   Skin: normal  Heart: Perfused  Lungs: Unlabored, RA  Abdomen: The abdomen is round and very soft, incision c/d/i. Abdomen non ttp  : ramesh is not present.   Extremities: edema: absent.    Neurologic: awake, alert and oriented x4. Tremor absent..     Data:   CMP  Recent Labs   Lab 02/22/21  0657 02/21/21  0611 02/19/21 2250 02/19/21 2250 02/19/21  1130    132*  --  133 133   POTASSIUM 5.0 4.8   < > 5.9* 5.2   CHLORIDE 101 99  --  100 98   CO2 23 27  --  24 30   * 113*  --  130* 109*   BUN 38* 26  --  36* 31*   CR 8.63* 6.76*  --  8.47* 8.17*   GFRESTIMATED 6* 9*  --  7* 7*   GFRESTBLACK 8* 10*  --  8* 8*   MEERA 7.9* 8.3*  --  8.0* 8.7   MAG  --   --   --  2.3  --    PHOS  --   --   --  5.9*  --    LIPASE  --   --   --   --  66*   ALBUMIN  --   --   --   --  2.3*   BILITOTAL  --   --   --   --  0.4   ALKPHOS  --   --   --   --  76   AST  --   --   --   --  34   ALT  --   --   --   --  16    < > = values in this interval not displayed.     CBC  Recent Labs   Lab 02/22/21  0657 02/21/21  0610   HGB 7.7* 7.9*   WBC 6.5 8.8   PLT Platelets clumped, platelet count unavailable Canceled, Test credited     COAGS  Recent Labs   Lab 02/19/21  2250 02/19/21  1528   INR 1.80* 1.85*   PTT 39* 41*      Urinalysis  Recent Labs   Lab Test 01/07/21  1241 02/21/20  0859 08/22/19  1033 01/24/16 1435 01/24/16 1435   COLOR Yellow  --   --   --  Yellow   APPEARANCE Clear  --   --   --  Clear   URINEGLC Negative  --   --   --  Negative   URINEBILI Negative  --   --   --  Negative   URINEKETONE Negative  --   --   --  Negative   SG 1.013  --   --   --  1.010   UBLD Negative  --   --   --  Negative   URINEPH 5.5  --   --   --  6.0   PROTEIN 30*  --   --   --  Negative   NITRITE Negative  --   --   --  Negative   LEUKEST Trace*  --   --   --  Negative   RBCU 1  --   --    --  1   WBCU 5  --   --   --  4*   UTPG  --  0.37* 0.54*   < >  --     < > = values in this interval not displayed.     Virology:  CMV DNA Quantitation Specimen   Date Value Ref Range Status   11/09/2015 EDTA PLASMA  Final     CMV IgG Antibody   Date Value Ref Range Status   01/23/2009 >160.0 EU/mL Final     Comment:     Positive for anti-CMV IgG     Hepatitis C Antibody   Date Value Ref Range Status   08/08/2018 Nonreactive NR^Nonreactive Final     Comment:     Assay performance characteristics have not been established for newborns,   infants, and children       Hep B Surface Farrah   Date Value Ref Range Status   01/23/2009 0.3  Final     Comment:     Negative, No antibody detected when the value is less than 5.0 mlU/mL.     BK Virus Result   Date Value Ref Range Status   01/23/2019 BK Virus DNA Not Detected BKNEG^BK Virus DNA Not Detected copies/mL Final   08/08/2018 BK Virus DNA Not Detected BKNEG^BK Virus DNA Not Detected copies/mL Final   05/08/2018 BK Virus DNA Not Detected BKNEG^BK Virus DNA Not Detected copies/mL Final   08/03/2017 BK Virus DNA Not Detected BKNEG copies/mL Final   12/07/2015 BK Virus DNA Not Detected BKNEG copies/mL Final   11/23/2015 BK Virus DNA Not Detected BKNEG copies/mL Final   10/22/2015 BK Virus DNA Not Detected BKNEG copies/mL Final   07/08/2015 BK Virus DNA Not Detected BKNEG copies/mL Final   05/14/2015 BK Virus DNA Not Detected BKNEG copies/mL Final   05/07/2015 BK Virus DNA Not Detected BKNEG copies/mL Final   04/10/2015 BK Virus DNA Not Detected BKNEG copies/mL Final

## 2021-02-22 NOTE — PROGRESS NOTES
HEMODIALYSIS TREATMENT NOTE    Date: 2/22/2021  Time: 1:54 PM    Data:  Pre Wt: 83 kg (182 lb 15.7 oz)   Desired Wt: 83.2 kg   Post Wt:  82.2 (estimate)  Weight change: 1 kg  Ultrafiltration - Post Run Net Total Removed (mL): 1000 mL  Vascular Access Status: AVF  Dialyzer Rinse: Light  Total Blood Volume Processed: 100.77 L   Total Dialysis (Treatment) Time: 4 hours   Dialysate Bath: K 2, Ca 3    Lab:   no    Interventions:Assessment:    Pt treated for 4 hrs with 1000 ml net removed. Pt was cannulated with two 15g needle. BFR was stayed with desired limit. Slept throughout the treatment. Completed treatment without complication. Hemostasis was archived in 10 min.  Report was given to Neli.     Plan:      Per Renal Team.

## 2021-02-22 NOTE — PLAN OF CARE
1900-0730: Pt is a/o x 4. Tele SR ST. Sleepy but awakes easily. Did not ambulate yesterday as pt stated he was too groggy from pain meds. Has not had narcotics since 920 am yesterday. Pt states he has some incisional pain and chronic back pain but declines all pain meds including tylenol. Sat in chair for 30 minutes last pm. Encouraged IS use. Abdominal inc intact with staples SAGE ORONA. Had elevated BP despite restarting cozar and diltiazem yesterday, was 173/93 MD notified and labetalol 10mg IV given. BP down to 134/80. Pt will have HD today. Need to encourage activity.    Try miralax daily and drink water

## 2021-02-22 NOTE — PROGRESS NOTES
"[facility]   Transplant Nephrology Progress Note  Date of Admission:  2/19/2021  Today's Date: 02/22/2021    Recommendations:  -stop diltiazem  -Restart clonidine 0.2mg PO BID (home med), continue losartan 25mg BID, hold hydralazine  -Continue MWF HD  -Give darbe 40mcg x1 in HD today  -Given that he is not a retransplant candidate due to non compliance would not restart any immunosuppressant medications      Assessment & Plan   # DDKT: s/p graft nephrectomy 2/19/21   - Date DSA Last Checked: Feb/2021      Latest DSA: Yes, A30, B13, CW6   - BK Viremia: No Nov 2019    - Kidney Tx Biopsy: Dec 23, 2015; Result:  Results:  Mild glomerulitis and capillary C4d staining consistent with mild, persistent antibody-mediated rejection. 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    Currently doing HD on MWF at [facility], runs for 4 hours. Next HD on Weds 2/24     # graft nephrectomy:   -S/p graft nephrectomy 2/19/21 by transplant surgery. No immunosuppressants as below   -Management per transplant surgery    # Immunosuppression:    Prior to admit:         Tacrolimus extended release (goal 4-6), Mycophenolic acid (dose 1080 mg every 12 hours) and Prednisone (dose 5 mg daily)     -patient self stopped triple IS 3 weeks prior to admission which led to symptomatic rejection necessitating graft nephrectomy. This was his 3rd kidney transplant, highly sensitized PRA-100% expect uptrend in DSA post nephrectomy. Risks of restarting IS \"infectious & malignant\" outweigh potential benefit \"reduced sensitization\" as he is anuric & is not a re-transplant candidate due to non compliance. He does not have living donors    # Infection Prophylaxis:   - PJP: None    # Hypertension: Inadequate control;  Goal BP: < 130/80   - Volume status: Euvolemic  EDW ~ 83Kg  PTA on Clonidine 0.2 mg BID, Hydralazine 50 mg TID, Losartan  25 mg BID  Diltiazem  ER  240 MG " daily-taking with Astagraf-stopped >3 weeks prior to admission.  Bumex -stopped prior to admission                 - Changes: Yes. Stop diltiazem. Restart clonidine 0.2mg bid, hold hydralazine, continue losartan 25mg BID     Anemia in Chronic Renal Disease: Hgb: Trend down 7.7 after transfusion     RAYMOND: No   - Iron studies: Replete, given RAYMOND darbepoetin 40mcg on 2/22    # Mineral Bone Disorder:   - Secondary renal hyperparathyroidism; PTH level: Minimally elevated ( pg/ml)        On treatment: None  - Vitamin D; level:  Not checked recently     On supplement: No  - Calcium; level: Low        On supplement: No  - Phosphorus; level: High        On binder: No , check phos tmrw to see if it must be restarted      # Electrolytes:   - Potassium; level: High normal   On supplement: No, manage with HD  - Magnesium; level: Normal        On supplement: No  - Bicarbonate; level: Normal        On supplement: No, manage with HD  - Sodium; level: Low       # HFpEF with Pulmonary Hypertension:               - Recommend patient start weighing himself daily.               - On HD now     # Moderate Pericardial Effusion,per Echo Jan 2021: Likely secondary to hypervolemia. No evidence of tamponade. Pericardiocentesis was not recommended by cardiology earlier      # Transplant History:  Etiology of Kidney Failure: Hypertension  Tx: DDKT  Transplant: 3/12/2015 (Kidney), 4/1/1994 (Kidney), 10/1/2000 (Kidney)   Donor Type: Donation after Brain Death        Donor Class: Standard Criteria Donor  Significant changes in immunosuppression: None  Significant transplant-related complications: Acute cellular-mediated rejection and Acute antibody-mediated rejection    Recommendations were communicated to the primary team via this note.      Cayetano Pizano MD   Pager: [phone]    Interval History   States he has some abdominal pain but it is controlled. Denies N/V/D, fever, chills       Review of Systems   4 point ROS was obtained and  "negative except as noted in the Interval History.    MEDICATIONS:    acetaminophen  975 mg Oral Q8H     bisacodyl  10 mg Rectal Daily     cloNIDine  0.2 mg Oral BID     darbepoetin sharon for ESRD on Dialysis  40 mcg Intravenous Weekly     lidocaine  2 patch Transdermal Q24H     lidocaine   Transdermal Q8H     losartan  25 mg Oral BID     polyethylene glycol  17 g Oral BID     senna-docusate  1 tablet Oral BID    Or     senna-docusate  2 tablet Oral BID     sodium chloride (PF)  3 mL Intravenous Q8H         Physical Exam   Temp  Av.5  F (36.9  C)  Min: 97.5  F (36.4  C)  Max: 100.3  F (37.9  C)      Pulse  Av.1  Min: 78  Max: 92 Resp  Av.1  Min: 12  Max: 21  SpO2  Av.3 %  Min: 94 %  Max: 100 %     /82 (BP Location: Left arm)   Pulse 92   Temp 98.7  F (37.1  C) (Oral)   Resp 16   Ht 1.803 m (5' 11\")   Wt 84.2 kg (185 lb 10 oz)   SpO2 94%   BMI 25.89 kg/m     Date 21 0700 - 21 0659   Shift 4793-4988 0609-4333 6602-0446 24 Hour Total   INTAKE   P.O. 110   110   Other 0   0   Shift Total(mL/kg) 110(1.32)   110(1.32)   OUTPUT   Other 0   0   Shift Total(mL/kg) 0(0)   0(0)   Weight (kg) 83.23 83.23 83.23 83.23      Admit Weight: 83 kg (182 lb 15.7 oz)     GENERAL APPEARANCE: alert and no distress  HENT: mouth without ulcers or lesions  LYMPHATICS: no cervical or supraclavicular nodes  RESP: lungs clear to auscultation - no rales, rhonchi or wheezes  CV: regular rhythm, normal rate, no rub, no murmur  EDEMA: no LE edema bilaterally  ABDOMEN: soft, nondistended, appropriately tender to RLQ, bowel sounds hypoactive, incision with staples CDI  MS: extremities normal - no gross deformities noted, no evidence of inflammation in joints, no muscle tenderness  RUE fistula +bruit/thrill  SKIN: no rash    Data   All labs reviewed by me.  CMP  Recent Labs   Lab 21  0657 21  0611 21  0610 21  2147 21  2250 210 21  1130    132*  --   --   -- "  133 133   POTASSIUM 5.0 4.8 4.8 4.7   < > 5.9* 5.2   CHLORIDE 101 99  --   --   --  100 98   CO2 23 27  --   --   --  24 30   ANIONGAP 10 6  --   --   --  9 5   * 113*  --   --   --  130* 109*   BUN 38* 26  --   --   --  36* 31*   CR 8.63* 6.76*  --   --   --  8.47* 8.17*   GFRESTIMATED 6* 9*  --   --   --  7* 7*   GFRESTBLACK 8* 10*  --   --   --  8* 8*   MEERA 7.9* 8.3*  --   --   --  8.0* 8.7   MAG  --   --   --   --   --  2.3  --    PHOS  --   --   --   --   --  5.9*  --    PROTTOTAL  --   --   --   --   --   --  6.9   ALBUMIN  --   --   --   --   --   --  2.3*   BILITOTAL  --   --   --   --   --   --  0.4   ALKPHOS  --   --   --   --   --   --  76   AST  --   --   --   --   --   --  34   ALT  --   --   --   --   --   --  16    < > = values in this interval not displayed.     CBC  Recent Labs   Lab 02/22/21  0657 02/21/21  0610 02/20/21  2147 02/20/21  1733 02/19/21  2250 02/19/21  2250 02/19/21  1130   HGB 7.7* 7.9* 8.1* 7.3*   < > 8.0* 6.5*   WBC 6.5 8.8  --   --   --  11.4* 12.1*   RBC 2.78* 2.92*  --   --   --  2.93* 2.47*   HCT 25.3* 26.9*  --   --   --  26.4* 22.2*   MCV 91 92  --   --   --  90 90   MCH 27.7 27.1  --   --   --  27.3 26.3*   MCHC 30.4* 29.4*  --   --   --  30.3* 29.3*   RDW 15.0 15.2*  --   --   --  15.0 14.5   PLT Platelets clumped, platelet count unavailable Canceled, Test credited  --   --   --  129* 125*    < > = values in this interval not displayed.     INR  Recent Labs   Lab 02/19/21  2250 02/19/21  1528   INR 1.80* 1.85*   PTT 39* 41*     ABGNo lab results found in last 7 days.   Urine Studies  Recent Labs   Lab Test 01/07/21  1241 01/24/16  1435 09/10/15  1600 08/29/15  1425 06/25/15  0910   COLOR Yellow Yellow Light Yellow Light Yellow Light Yellow   APPEARANCE Clear Clear Clear Clear Clear   URINEGLC Negative Negative Negative Negative Negative   URINEBILI Negative Negative Negative Negative Negative   URINEKETONE Negative Negative Negative Negative Negative   SG 1.013  1.010 1.007 1.007 1.009   UBLD Negative Negative Negative Negative Negative   URINEPH 5.5 6.0 5.0 5.0 5.0   PROTEIN 30* Negative Negative Negative Negative   NITRITE Negative Negative Negative Positive* Positive*   LEUKEST Trace* Negative Negative Moderate* Moderate*   RBCU 1 1  --  <1 0   WBCU 5 4*  --  14* 11*     Recent Labs   Lab Test 02/21/20  0859 08/22/19  1033 01/23/19  0848 05/08/18  0812 08/03/17  0827 06/15/16  0905 05/19/16  1330 09/21/15  0958 09/10/15  1600 07/24/15  1136 05/07/15  0824   UTPG 0.37* 0.54* 0.76* 0.15 0.16 0.11 0.08 0.15 0.23* 0.23* 0.92*     PTH  Recent Labs   Lab Test 01/15/21  0906 12/10/19  1432 05/24/18  1005 05/08/18  0812 01/12/17  1121 03/20/15  0730   PTHI 150* 146* 120* 110* 134* 210*     Iron Studies  Recent Labs   Lab Test 02/22/21  0657 10/30/20  1252 05/08/18  0812 01/12/17  1121 03/20/15  0730   IRON 23* 43 46 74 51   * 202* 204* 194* 156*   IRONSAT 14* 21 23 38 33   CARLOS A 1,505* 445* 414* 371 1,172*       IMAGING:  All imaging studies reviewed by me.

## 2021-02-22 NOTE — DISCHARGE SUMMARY
[facility]    Discharge Summary  Transplant Surgery    Date of Admission:  2/19/2021  Date of Discharge:  2/26/2021  Discharging Provider: MOUSTAPHA Sebastian/Aimee Carreon MD    Discharge Diagnoses   Principal Problem:    Renal vein thrombosis (H)  Active Problems:    End stage renal disease (H)    Ileus, postoperative (H)    S/p nephrectomy    Acute post-operative pain    Nonadherence to medication    Hyperkalemia    Essential hypertension      History of Present Illness   Tristian Mckeon is a 50 year old male with PMH significant for HTN, ACD, tobacco use, s/p kidney transplant x3. First two transplants and graft nephrectomies were at [facility]. His last transplant was at [facility] in 3/12/2015 with subsequent graft failure, now back on dialysis x1 month. Presented with acute renal vein thrombosis. S/p graft nephrectomy on 2/19/21.    Hospital Course   Graft nephrectomy: Pain controlled, tolerating diet, and ambulating prior to discharge.    Immunosuppression management: Patient had not taken immunosuppression medications for 3 weeks prior to admission. He was not advised to stop but made the decision to do so on his own Discussed with Nephrology, will not restart at this time.    Acute post-op pain: Initially controlled with oral dilaudid, acetaminophen, and lidoderm. Will discharge on robaxin and acetaminophen PRN.    Multifactorial Anemia-JAYANT/ACD: Received 2 units pRBC  Intraoperatively. slowly downtrending,7.5 (6.8). Received 1 unit pRBC yesterday. Aranesp qweek 2/22 with dialysis. Iron supplement (pt. Inconsistently taking d/t concerns of constipation). Iron sat 14 and iron ferritin 1505. Will stop iron supplement    HTN: BP uncontrolled post-operatively. He initially declined all antihypertensives. On POD #3 he was agreeable to resume home clonidine 0.2 BID and Losartan 25mg BID and hydralazine 50 mg TID as per home regimen.     ESKD: Required dialysis overnight  "post-op for K 6.2. Received additional dialysis to remove fluid due to diffuse anasarca, ascites, and large pericardial effusion.     Large pericardial effusion: Seen on CT 2/24 and 2/25 ECHO large pericardial effusion, non obstructive. Repeat ECHO (TTE)  in 7 days (3/3/2021). Cardiology consulted.     Non adherence: Patient demonstrates a pattern, both prior to admission and while hospitalized, of stopping or refusing medications without discussing with the medical team. He reports that he stopped taking his immunosuppression medications because,\"I do not like taking medications, and I do not want all of those medications in my body.\"    Severe malnutrition in the context of acute on chronic illness: Eating poorly prior to admission. PPN started 2/24 due to prolonged NPO status. Vitamin A, D,E,  and Zinc, Copper levels in process. B12 level 5,160, would continue B12 SL supplement, 500 mg dose. Recommend patient eat smaller meals frequently due to history of gastric bypass. Recommend patient follow up outpatient with dietitian. Discharged on low residue diet and dialysis diet.    Ileus: Senna s ,Miralax, dulcolax supppositories (pt. Inconsistent with taking bowel regimen medications). 2/23 AXR-Multiple air-filled, dilated loops of small bowel consistent with ileus versus obstruction. 2/24 CT A/P non contrast with SBO with transition point in the pelvis. NGT placed-200cc output prior to pt. NG removed due to discomfort per patient request. Repeat AXR yesterday showed similar multiple air-filled, dilated loops of small bowel as previously seen. Moderate stool burden. Patient now passing flatus and having bowel movements. Will start low residue diet and continue x 2 weeks.      Disposition: Patient adamant that he discharges today and not stay overnight to make sure he is tolerating diet. Patient feels he is safe to go home and that if he has issues with abdominal pain or not tolerating intake he will return to the ED. "     Significant Results and Procedures   2/19/21 Lap graft nephrectomy  Surgeon: Ren Romeo MD    Pending Results   These results will be followed up by outpatient dietitian (vitamin and mineral level). Kidney path: Dr. Chavis  Unresulted Labs Ordered in the Past 30 Days of this Admission     No orders found from 1/20/2021 to 2/20/2021.          Code Status   Full    Primary Care Physician   Physician No Ref-Primary    Physical Exam                     Vitals:    02/25/21 0500 02/25/21 1739 02/26/21 1256   Weight: 81 kg (178 lb 9.2 oz) 78 kg (171 lb 15.3 oz) 76.5 kg (168 lb 10.4 oz)     Vital Signs with Ranges     No intake/output data recorded.    General Appearance: in no apparent distress.   Skin: normal, dry, warm  Heart: Perfused  Lungs: Unlabored, RA  Abdomen: The abdomen is soft, minimally TTP.  incision c/d/i.  : anuric  Extremities: edema: absent.  +fistula RUE  Neurologic: awake, alert and oriented x4. Tremor absent.    Time Spent on this Encounter   I, Candy Graham PA-C, personally saw the patient today and spent greater than 30 minutes discharging this patient.    Discharge Disposition   Discharged to home  Condition at discharge: Stable    Consultations This Hospital Stay   VASCULAR ACCESS CARE ADULT IP CONSULT  PHARMACY IP CONSULT  PHARMACY IP CONSULT  MEDICATION HISTORY IP PHARMACY CONSULT  NEPHROLOGY GENERAL ADULT IP CONSULT  NEPHROLOGY GENERAL ADULT IP CONSULT  PHARMACY IP CONSULT  CARE MANAGEMENT / SOCIAL WORK IP CONSULT  PHYSICAL THERAPY ADULT IP CONSULT  PHARMACY/NUTRITION TO START AND MANAGE TPN  VASCULAR ACCESS CARE ADULT IP CONSULT  CARDIOLOGY GENERAL ADULT IP CONSULT  PHARMACY IP CONSULT    Discharge Orders       NUTRITION REFERRAL      Reason for your hospital stay    Status post transplant nephrectomy  Post operative ileus   History of constipation  Cardiac effusion, increased in size     Adult [facility] Follow-up and recommended labs and tests    Follow up with Limited ECHO to re  evaluate cardiac effusion in 1 week (3/3/2021)  Follow up with Dr. Ren Romeo and Dr. Jonathan Chavis as scheduled. See future appointments.      Appointments on [place] and/or [facility] (with [facility] or [facility] provider or service). Call [phone] if you haven't heard regarding these appointments within 7 days of discharge.     Activity    Your activity upon discharge: Don't lift anything heavier than 10 pounds for 6 weeks (or longer if your surgeon has discussed this with you). Walk regularly.    Wear abdominal binder for comfort if you desire, but only when out of bed.     When to contact your care team    Please contact your surgeon if you have a temperature > 101, redness, swelling or drainage from surgical incision, increased pain. Pain, abdominal distension or vomiting after eating.    Call hospital and have transplant kidney surgery fellow paged. [facility]: [phone].     Wound care and dressings    Instructions to care for your wound at home: OK to shower. Gently wash around your incisions with soap and water.   Don't bathe/submerge incisions until ok per surgeon. Wear loose-fitting clothes. This will help you be more comfortable and cause less irritation around your incisions.     Echocardiogram Limited    Administration of IV contrast will be tailored to this examination per the appropriate written protocol listed in the Echocardiography department Protocol Book, or by the supervising Cardiologist. This may result in an order change.    Use of contrast is at the discretion of the supervising Cardiologist.     Red blood cell have available     Platelets have available     Plasma have available     Discharge Medications    Discharge Medication List as of 2/26/2021  5:39 PM      START taking these medications    Details   acetaminophen (TYLENOL) 325 MG tablet Take 2 tablets (650 mg) by mouth every 4 hours as needed for other (multimodal surgical pain management along with NSAIDS  and opioid medication as indicated based on pain control and physical function.), OTC      darbepoetin sharon (ARANESP) 40 MCG/0.4ML injection Inject 0.4 mLs (40 mcg) Subcutaneous once a week, No Print Out      Lidocaine (LIDOCARE) 4 % Patch Place 2 patches onto the skin every 24 hours To prevent lidocaine toxicity, patient should be patch free for 12 hrs daily.Disp-4 patch, [id number]      methocarbamol (ROBAXIN) 750 MG tablet Take 1 tablet (750 mg) by mouth 4 times daily as needed for muscle spasms, Disp-12 tablet, R-0, E-Prescribe      ondansetron (ZOFRAN-ODT) 4 MG ODT tab Take 1 tablet (4 mg) by mouth every 6 hours as needed for nausea or vomiting, Disp-12 tablet, R-1, E-Prescribe      polyethylene glycol (MIRALAX) 17 GM/Dose powder Take 17 g by mouth daily, Disp-510 g, R-3, E-Prescribe      senna-docusate (SENOKOT-S/PERICOLACE) 8.6-50 MG tablet Take 1-2 tablets by mouth 2 times daily, Disp-120 tablet, R-3, E-Prescribe         CONTINUE these medications which have CHANGED    Details   Calcium Acetate 667 MG TABS Take 2 tablets by mouth 3 times daily (with meals), Disp-180 tablet, R-11, No Print Out      Cyanocobalamin (VITAMIN B-12) 500 MCG SUBL Place 1 tablet under the tongue daily, Disp-30 tablet, R-11, No Print Out         CONTINUE these medications which have NOT CHANGED    Details   calcitRIOL (ROCALTROL) 0.25 MCG capsule TAKE ONE CAPSULE BY MOUTH ONCE DAILY, Disp-90 capsule, R-3, E-Prescribe      cloNIDine (CATAPRES) 0.1 MG tablet Take 0.2 mg by mouth 2 times daily, Historical      hydrALAZINE (APRESOLINE) 50 MG tablet Take 1 tablet (50 mg) by mouth 3 times daily Do not take if systolic blood pressure is less than 120., Disp-90 tablet, R-1, E-Prescribe      losartan (COZAAR) 25 MG tablet Take 25 mg by mouth 2 times daily , Historical      multivitamin RENAL (RENAVITE RX/NEPHROVITE) 1 MG tablet Take 1 tablet by mouth daily, Historical         STOP taking these medications       bumetanide (BUMEX) 1 MG  tablet Comments:   Reason for Stopping:         cyclobenzaprine (FLEXERIL) 10 MG tablet Comments:   Reason for Stopping:         diltiazem ER COATED BEADS (CARTIA XT) 240 MG 24 hr capsule Comments:   Reason for Stopping:         ferrous sulfate (IRON) 325 (65 Fe) MG tablet Comments:   Reason for Stopping:         Multiple Vitamin (DAILY MULTIVITAMIN PO) Comments:   Reason for Stopping:         mycophenolic acid (GENERIC EQUIVALENT) 360 MG EC tablet Comments:   Reason for Stopping:         predniSONE (DELTASONE) 5 MG tablet Comments:   Reason for Stopping:         tacrolimus (ASTAGRAF XL) 1 MG 24 hr capsule Comments:   Reason for Stopping:         tacrolimus (ASTAGRAF XL) 5 MG 24 hr capsule Comments:   Reason for Stopping:             Allergies   Allergies   Allergen Reactions     Cephalosporins Unknown     Cyclosporine      Duricef [Cefadroxil]      Oxycodone Itching     Data   Results for orders placed or performed during the hospital encounter of 02/19/21   US Renal Transplant     Value    Radiologist flags Acute renal vein thrombosis (AA)    Narrative    EXAMINATION: US RENAL TRANSPLANT,  2/19/2021 1:36 PM     COMPARISON: CT abdomen 2/19/2021.    HISTORY: Pain at site of transplanted kidney    TECHNIQUE:  Grey-scale, color Doppler and spectral flow analysis.    FINDINGS:  The transplant kidney located in the right lower quadrant, is enlarged  and measures 12.5 x 7.3 cm. Parenchymal echogenicity is increased.  There is no perinephric fluid collection or hydronephrosis. 1.2 cm  cyst is noted in the upper pole. Echogenic material is noted within  the collecting system extending to the ureter, likely blood clot.    Renal artery flow: There is diastolic flow reversal in the main renal  artery and intrarenal arcuate arteries.  130 cm/sec peak systolic at hilum.  175 peak systolic at anastomosis.  Arcuate artery resistive indices (upper to lower): 1, 1, 1    Renal Vein Flow:  There is absence of flow within the renal  vein at the hilum up to the  middle portion of the renal vein. A velocity of 250 cm/s noted at  anastomosis.    Iliac artery flow:  248 peak systolic above anastomosis.  186 peak systolic below anastomosis.    Iliac vein flow:  Patent above and below the anastomosis.      Impression    IMPRESSION:   1.  Enlarged right lower quadrant transplant kidney with acute renal  vein thrombosis and diastolic flow reversal in the renal artery.  2.  Echogenic nonvascular material within the collecting sytem, likely  representing clot.    [Critical Result: Acute renal vein thrombosis]    Finding was identified on 2/19/2021 1:40 PM.     Dr. Cochran was contacted by Dr. Smith at 2/19/2021 1:53 PM and  verbalized understanding of the critical finding.     KLEVER SMITH MD   CT Abdomen Pelvis w/o Contrast    Narrative    EXAMINATION: CT ABDOMEN PELVIS W/O CONTRAST, 2/19/2021 12:07 PM    TECHNIQUE:  Helical CT images from the lung bases through the  symphysis pubis were obtained without IV contrast.     COMPARISON: Ultrasound 1/24/2016, CT abdomen 9/10/2015. Chest  radiograph 1/7/2021.    HISTORY: Abdominal pain, acute, nonlocalized; Abdominal pain. Hx of  kidney transplant.    FINDINGS:    Lung bases: Moderate to large low-density pericardial effusion.  Bibasilar platelike atelectasis. Coronary artery calcification noted.  Patient appears anemic with decreased density of intraventricular  blood.    Abdomen and pelvis: Normal liver, spleen, and adrenal glands allowing  for noncontrast exam. Normal pancreas. Cholelithiasis without evidence  of cholecystitis. Right lower quadrant renal transplant measuring 13 x  7.8 cm in the largest axial dimension, previously 12.6 x 7.3 cm  (series 2 image 47). No clear evidence of hydronephrosis or  nephrolithiasis. Diffuse calcification of the abdominal vasculature.  Atrophic native kidneys. Normal caliber bowel without evidence of  bowel wall thickening. Normal caliber abdominal  vasculature.  Mesenteric and omental edema. Small volume of ascites in the pelvis.  No free air.    Bones and soft tissues: Right total hip arthroplasty. Degenerative  changes of thoracolumbar spine. Diffuse anasarca. Otherwise, no  concerning osseous or soft tissue findings.      Impression    IMPRESSION:   1. No clear evidence for hydronephrosis or nephrolithiasis related to  the transplant kidney. The transplant kidney does appear increased in  size when compared to CT September 2015.  2. Moderate to large pericardial effusion. This was also described on  recent chest radiograph.  3. Diffuse anasarca with overall third spacing of fluid.    I have personally reviewed the examination and initial interpretation  and I agree with the findings.    FRANSISCO TAN MD     Most Recent 3 CBC's:  Recent Labs   Lab Test 02/26/21  0555 02/25/21  0723 02/25/21  0539 02/24/21  0553 02/24/21  0553   WBC 5.6  --  4.9  --  5.3   HGB 7.5*  --  6.8*  --  7.5*   MCV 91  --  91  --  92   PLT 91* Platelets clumped, platelet count unavailable Platelets clumped, platelet count unavailable   < > Platelets clumped, platelet count unavailable    < > = values in this interval not displayed.     Most Recent 3 BMP's:  Recent Labs   Lab Test 02/26/21  0555 02/25/21  0539 02/24/21  0553    134 134   POTASSIUM 4.1 4.2 4.4   CHLORIDE 101 100 101   CO2 27 27 25   BUN 16 18 26   CR 4.10* 5.74* 7.34*   ANIONGAP 6 7 8   MEERA 8.6 8.0* 8.1*   * 104* 107*

## 2021-02-23 ENCOUNTER — APPOINTMENT (OUTPATIENT)
Dept: GENERAL RADIOLOGY | Facility: CLINIC | Age: 51
DRG: 659 | End: 2021-02-23
Attending: NURSE PRACTITIONER
Payer: COMMERCIAL

## 2021-02-23 LAB
ANION GAP SERPL CALCULATED.3IONS-SCNC: 7 MMOL/L (ref 3–14)
BUN SERPL-MCNC: 18 MG/DL (ref 7–30)
CALCIUM SERPL-MCNC: 8.2 MG/DL (ref 8.5–10.1)
CHLORIDE SERPL-SCNC: 102 MMOL/L (ref 94–109)
CO2 SERPL-SCNC: 27 MMOL/L (ref 20–32)
CREAT SERPL-MCNC: 5.69 MG/DL (ref 0.66–1.25)
ERYTHROCYTE [DISTWIDTH] IN BLOOD BY AUTOMATED COUNT: 15.1 % (ref 10–15)
GFR SERPL CREATININE-BSD FRML MDRD: 11 ML/MIN/{1.73_M2}
GLUCOSE SERPL-MCNC: 110 MG/DL (ref 70–99)
HCT VFR BLD AUTO: 23.8 % (ref 40–53)
HGB BLD-MCNC: 7.2 G/DL (ref 13.3–17.7)
MCH RBC QN AUTO: 27.1 PG (ref 26.5–33)
MCHC RBC AUTO-ENTMCNC: 30.3 G/DL (ref 31.5–36.5)
MCV RBC AUTO: 90 FL (ref 78–100)
PHOSPHATE SERPL-MCNC: 4 MG/DL (ref 2.5–4.5)
PLATELET # BLD AUTO: 101 10E9/L (ref 150–450)
POTASSIUM SERPL-SCNC: 4.3 MMOL/L (ref 3.4–5.3)
RBC # BLD AUTO: 2.66 10E12/L (ref 4.4–5.9)
SODIUM SERPL-SCNC: 137 MMOL/L (ref 133–144)
WBC # BLD AUTO: 5.1 10E9/L (ref 4–11)

## 2021-02-23 PROCEDURE — 250N000013 HC RX MED GY IP 250 OP 250 PS 637: Performed by: NURSE PRACTITIONER

## 2021-02-23 PROCEDURE — 80048 BASIC METABOLIC PNL TOTAL CA: CPT | Performed by: NURSE PRACTITIONER

## 2021-02-23 PROCEDURE — 74018 RADEX ABDOMEN 1 VIEW: CPT | Mod: 26 | Performed by: RADIOLOGY

## 2021-02-23 PROCEDURE — 36415 COLL VENOUS BLD VENIPUNCTURE: CPT | Performed by: NURSE PRACTITIONER

## 2021-02-23 PROCEDURE — 120N000011 HC R&B TRANSPLANT UMMC

## 2021-02-23 PROCEDURE — 250N000013 HC RX MED GY IP 250 OP 250 PS 637: Performed by: STUDENT IN AN ORGANIZED HEALTH CARE EDUCATION/TRAINING PROGRAM

## 2021-02-23 PROCEDURE — 85027 COMPLETE CBC AUTOMATED: CPT | Performed by: NURSE PRACTITIONER

## 2021-02-23 PROCEDURE — 84100 ASSAY OF PHOSPHORUS: CPT | Performed by: NURSE PRACTITIONER

## 2021-02-23 PROCEDURE — 99232 SBSQ HOSP IP/OBS MODERATE 35: CPT | Performed by: INTERNAL MEDICINE

## 2021-02-23 PROCEDURE — 250N000013 HC RX MED GY IP 250 OP 250 PS 637: Performed by: INTERNAL MEDICINE

## 2021-02-23 PROCEDURE — 250N000013 HC RX MED GY IP 250 OP 250 PS 637: Performed by: SURGERY

## 2021-02-23 PROCEDURE — 74018 RADEX ABDOMEN 1 VIEW: CPT

## 2021-02-23 RX ORDER — HYDRALAZINE HYDROCHLORIDE 25 MG/1
50 TABLET, FILM COATED ORAL EVERY 8 HOURS SCHEDULED
Status: DISCONTINUED | OUTPATIENT
Start: 2021-02-23 | End: 2021-02-27 | Stop reason: HOSPADM

## 2021-02-23 RX ORDER — CALCIUM ACETATE 667 MG/1
1 TABLET ORAL
Qty: 180 TABLET | Refills: 11 | Status: CANCELLED | COMMUNITY
Start: 2021-02-23

## 2021-02-23 RX ORDER — FERROUS SULFATE 325(65) MG
325 TABLET ORAL DAILY
Status: DISCONTINUED | OUTPATIENT
Start: 2021-02-23 | End: 2021-02-26

## 2021-02-23 RX ORDER — BISACODYL 10 MG
10 SUPPOSITORY, RECTAL RECTAL ONCE
Status: COMPLETED | OUTPATIENT
Start: 2021-02-23 | End: 2021-02-23

## 2021-02-23 RX ADMIN — METHOCARBAMOL 750 MG: 750 TABLET, FILM COATED ORAL at 14:49

## 2021-02-23 RX ADMIN — HYDRALAZINE HYDROCHLORIDE 50 MG: 25 TABLET, FILM COATED ORAL at 22:31

## 2021-02-23 RX ADMIN — HYDRALAZINE HYDROCHLORIDE 50 MG: 25 TABLET, FILM COATED ORAL at 14:49

## 2021-02-23 RX ADMIN — METHOCARBAMOL 750 MG: 750 TABLET, FILM COATED ORAL at 06:21

## 2021-02-23 RX ADMIN — POLYETHYLENE GLYCOL 3350 17 G: 17 POWDER, FOR SOLUTION ORAL at 09:09

## 2021-02-23 RX ADMIN — BISACODYL 10 MG: 10 SUPPOSITORY RECTAL at 07:49

## 2021-02-23 RX ADMIN — CLONIDINE HYDROCHLORIDE 0.2 MG: 0.1 TABLET ORAL at 07:49

## 2021-02-23 RX ADMIN — CLONIDINE HYDROCHLORIDE 0.2 MG: 0.1 TABLET ORAL at 20:15

## 2021-02-23 RX ADMIN — LIDOCAINE 2 PATCH: 560 PATCH PERCUTANEOUS; TOPICAL; TRANSDERMAL at 09:08

## 2021-02-23 RX ADMIN — DOCUSATE SODIUM 50 MG AND SENNOSIDES 8.6 MG 2 TABLET: 8.6; 5 TABLET, FILM COATED ORAL at 07:49

## 2021-02-23 RX ADMIN — ACETAMINOPHEN 650 MG: 325 TABLET, FILM COATED ORAL at 14:49

## 2021-02-23 RX ADMIN — DOCUSATE SODIUM 50 MG AND SENNOSIDES 8.6 MG 2 TABLET: 8.6; 5 TABLET, FILM COATED ORAL at 20:15

## 2021-02-23 RX ADMIN — ACETAMINOPHEN 650 MG: 325 TABLET, FILM COATED ORAL at 22:32

## 2021-02-23 RX ADMIN — LOSARTAN POTASSIUM 25 MG: 25 TABLET, FILM COATED ORAL at 20:15

## 2021-02-23 RX ADMIN — METHOCARBAMOL 750 MG: 750 TABLET, FILM COATED ORAL at 20:15

## 2021-02-23 RX ADMIN — ACETAMINOPHEN 650 MG: 325 TABLET, FILM COATED ORAL at 06:20

## 2021-02-23 RX ADMIN — POLYETHYLENE GLYCOL 3350 17 G: 17 POWDER, FOR SOLUTION ORAL at 20:15

## 2021-02-23 RX ADMIN — LOSARTAN POTASSIUM 25 MG: 25 TABLET, FILM COATED ORAL at 07:49

## 2021-02-23 RX ADMIN — HYDRALAZINE HYDROCHLORIDE 50 MG: 25 TABLET, FILM COATED ORAL at 09:36

## 2021-02-23 RX ADMIN — BISACODYL 10 MG: 10 SUPPOSITORY RECTAL at 17:26

## 2021-02-23 ASSESSMENT — ACTIVITIES OF DAILY LIVING (ADL)
ADLS_ACUITY_SCORE: 12
ADLS_ACUITY_SCORE: 12
DEPENDENT_IADLS:: INDEPENDENT
ADLS_ACUITY_SCORE: 12
ADLS_ACUITY_SCORE: 12
ADLS_ACUITY_SCORE: 13
ADLS_ACUITY_SCORE: 12

## 2021-02-23 ASSESSMENT — MIFFLIN-ST. JEOR: SCORE: 1699.13

## 2021-02-23 NOTE — PROGRESS NOTES
Transplant Surgery  Inpatient Daily Progress Note  02/23/2021    Assessment & Plan: Tristian Mckeon is a 50 year old male with PMH significant for HTN, ACD, Tobacco use, s/p kidney transplant x3. First two transplants and [place] [facility] [place] transplant was at [facility] in 3/12/2015 with subsequent graft failure, now back on dialysis x1 month. Presented with acute renal vein thrombosis. S/p graft nephrectomy on 2/19/21 overall doing well post-operatively. Awaiting ROBF.      Graft nephrectomy: POD #4   Acute post-op pain: On APAP, Robaxin. Add lidoderm.     Immunosuppression management: Patient independently stopped taking immunosuppression for 3 weeks prior to admission. Discussed with Nephrology. Will not restart.  Hematology:   Acute blood loss anemia:  Stable Hgb 7.2 (7.7). Received 2 units PRBC  Intraoperatively. Aranesp qweek 2/22 with dialysis.  Thrombocytopenia: Present on admission. 101  Cardiorespiratory:   HTN: Uncontrolled, -170s. Improving after restarting home Clonidine 0.2 BID and Losartan 25 BID.     GI/Nutrition: Regular diet.   Postop ileus: Continue Bowel regimen with senna and Miralax, +dulcolax. Enema today.  Endocrine: No issues  Fluid/Electrolytes: MIVF: none  Hyperkalemia: Required HD post-op for K 6.2. Now controlled. 4.3 today  ESKD s/p graft nephrectomy: Nephrology consulted for dialysis. Resume MWF.  : Patient is anuric, no ramesh necessary  Infectious disease: Tmax 99.5F, WBC 5.1  Prophylaxis: fall  Disposition: 7A    Medical Decision Making: Medium    ANTOLIN/Fellow/Resident Provider:  Lara Foster NP, #[id number]      Faculty: Aimee Carreon MD  _________________________________________________________________  Transplant History:   3/12/2015 (Kidney), 4/1/1994 (Kidney), 10/1/2000 (Kidney), Postoperative day: 2175     Interval History: History is obtained from the patient  Overnight events: Pain tolerable, no narcotics utilized yesterday.  +flatus, no BM.  "Anuric. C/o weakness/fatigue    ROS:   A 10-point review of systems was negative except as noted above.    Meds:    bisacodyl  10 mg Rectal Daily     cloNIDine  0.2 mg Oral BID     darbepoetin sharon for ESRD on Dialysis  40 mcg Intravenous Weekly     ferrous sulfate  325 mg Oral Daily     hydrALAZINE  50 mg Oral Q8H ARJUN     lidocaine  2 patch Transdermal Q24H     lidocaine   Transdermal Q8H     losartan  25 mg Oral BID     polyethylene glycol  17 g Oral BID     senna-docusate  1 tablet Oral BID    Or     senna-docusate  2 tablet Oral BID     sodium chloride (PF)  3 mL Intravenous Q8H       Physical Exam:     Admit Weight: 83 kg (182 lb 15.7 oz)    Current vitals:   BP (!) 168/85   Pulse 86   Temp 98.8  F (37.1  C)   Resp 16   Ht 1.803 m (5' 11\")   Wt 81.2 kg (179 lb 0.2 oz)   SpO2 96%   BMI 24.97 kg/m        Vital sign ranges:    Temp:  [97.8  F (36.6  C)-98.8  F (37.1  C)] 98.8  F (37.1  C)  Pulse:  [79-98] 86  Resp:  [13-24] 16  BP: (123-175)/() 168/85  SpO2:  [92 %-97 %] 96 %  Patient Vitals for the past 24 hrs:   BP Temp Temp src Pulse Resp SpO2 Weight   02/23/21 0716 (!) 168/85 98.8  F (37.1  C) -- 86 16 96 % --   02/23/21 0304 (!) 148/84 98.5  F (36.9  C) Oral 79 18 95 % --   02/23/21 0111 -- -- -- -- -- -- 81.2 kg (179 lb 0.2 oz)   02/23/21 0006 123/75 97.8  F (36.6  C) Oral 80 18 96 % --   02/22/21 1953 (!) 152/72 -- -- -- -- -- --   02/22/21 1951 (!) 169/69 97.9  F (36.6  C) Oral 90 16 94 % --   02/22/21 1500 133/82 98.7  F (37.1  C) Oral 92 16 94 % --   02/22/21 1318 (!) 171/107 -- -- -- -- -- --   02/22/21 1230 (!) 164/92 -- -- 93 16 95 % --   02/22/21 1222 (!) 165/87 98.2  F (36.8  C) Oral 98 23 94 % --   02/22/21 1215 (!) 151/87 -- -- 88 16 94 % --   02/22/21 1200 (!) 160/90 -- -- 88 16 94 % --   02/22/21 1145 (!) 175/106 -- -- 94 24 92 % --   02/22/21 1130 (!) 165/85 -- -- 90 13 94 % --   02/22/21 1115 (!) 160/85 -- -- 89 18 95 % --   02/22/21 1100 (!) 155/87 -- -- 87 15 95 % --   02/22/21 " 1045 (!) 153/86 -- -- 89 16 95 % --   02/22/21 1030 (!) 152/87 -- -- 87 15 94 % --   02/22/21 1015 (!) 162/88 -- -- 92 15 95 % --   02/22/21 1000 (!) 157/87 -- -- 87 15 96 % --   02/22/21 0945 (!) 152/85 -- -- 86 15 95 % --   02/22/21 0930 (!) 154/88 -- -- 85 13 97 % --   02/22/21 0915 (!) 168/86 -- -- 92 14 96 % --   02/22/21 0900 (!) 160/90 -- -- 92 15 97 % --   02/22/21 0845 (!) 163/93 -- -- 92 17 95 % --   02/22/21 0830 (!) 154/87 -- -- 90 22 95 % --   02/22/21 0820 (!) 154/87 -- -- 90 20 97 % --     General Appearance: in no apparent distress.   Skin: normal  Heart: Perfused  Lungs: Unlabored, RA  Abdomen: The abdomen is round and soft, hyperactive bs, incision c/d/i. Abdomen non ttp  : anuric  Extremities: edema: absent.    Neurologic: awake, alert and oriented x4. Tremor absent..     Data:   CMP  Recent Labs   Lab 02/23/21  0622 02/22/21  0657 02/19/21  2250 02/19/21  2250 02/19/21  1130    134   < > 133 133   POTASSIUM 4.3 5.0   < > 5.9* 5.2   CHLORIDE 102 101   < > 100 98   CO2 27 23   < > 24 30   * 139*   < > 130* 109*   BUN 18 38*   < > 36* 31*   CR 5.69* 8.63*   < > 8.47* 8.17*   GFRESTIMATED 11* 6*   < > 7* 7*   GFRESTBLACK 12* 8*   < > 8* 8*   MEERA 8.2* 7.9*   < > 8.0* 8.7   MAG  --   --   --  2.3  --    PHOS 4.0  --   --  5.9*  --    LIPASE  --   --   --   --  66*   ALBUMIN  --   --   --   --  2.3*   BILITOTAL  --   --   --   --  0.4   ALKPHOS  --   --   --   --  76   AST  --   --   --   --  34   ALT  --   --   --   --  16    < > = values in this interval not displayed.     CBC  Recent Labs   Lab 02/23/21  0622 02/22/21  0657   HGB 7.2* 7.7*   WBC 5.1 6.5   * Platelets clumped, platelet count unavailable     COAGS  Recent Labs   Lab 02/19/21  2250 02/19/21  1528   INR 1.80* 1.85*   PTT 39* 41*      Urinalysis  Recent Labs   Lab Test 01/07/21  1241 02/21/20  0859 08/22/19  1033 01/24/16  1435 01/24/16  1435   COLOR Yellow  --   --   --  Yellow   APPEARANCE Clear  --   --   --   Clear   URINEGLC Negative  --   --   --  Negative   URINEBILI Negative  --   --   --  Negative   URINEKETONE Negative  --   --   --  Negative   SG 1.013  --   --   --  1.010   UBLD Negative  --   --   --  Negative   URINEPH 5.5  --   --   --  6.0   PROTEIN 30*  --   --   --  Negative   NITRITE Negative  --   --   --  Negative   LEUKEST Trace*  --   --   --  Negative   RBCU 1  --   --   --  1   WBCU 5  --   --   --  4*   UTPG  --  0.37* 0.54*   < >  --     < > = values in this interval not displayed.     Virology:  CMV DNA Quantitation Specimen   Date Value Ref Range Status   11/09/2015 EDTA PLASMA  Final     CMV IgG Antibody   Date Value Ref Range Status   01/23/2009 >160.0 EU/mL Final     Comment:     Positive for anti-CMV IgG     Hepatitis C Antibody   Date Value Ref Range Status   08/08/2018 Nonreactive NR^Nonreactive Final     Comment:     Assay performance characteristics have not been established for newborns,   infants, and children       Hep B Surface Farrah   Date Value Ref Range Status   01/23/2009 0.3  Final     Comment:     Negative, No antibody detected when the value is less than 5.0 mlU/mL.     BK Virus Result   Date Value Ref Range Status   01/23/2019 BK Virus DNA Not Detected BKNEG^BK Virus DNA Not Detected copies/mL Final   08/08/2018 BK Virus DNA Not Detected BKNEG^BK Virus DNA Not Detected copies/mL Final   05/08/2018 BK Virus DNA Not Detected BKNEG^BK Virus DNA Not Detected copies/mL Final   08/03/2017 BK Virus DNA Not Detected BKNEG copies/mL Final   12/07/2015 BK Virus DNA Not Detected BKNEG copies/mL Final   11/23/2015 BK Virus DNA Not Detected BKNEG copies/mL Final   10/22/2015 BK Virus DNA Not Detected BKNEG copies/mL Final   07/08/2015 BK Virus DNA Not Detected BKNEG copies/mL Final   05/14/2015 BK Virus DNA Not Detected BKNEG copies/mL Final   05/07/2015 BK Virus DNA Not Detected BKNEG copies/mL Final   04/10/2015 BK Virus DNA Not Detected BKNEG copies/mL Final

## 2021-02-23 NOTE — PROGRESS NOTES
"[facility]   Transplant Nephrology Progress Note  Date of Admission:  2/19/2021  Today's Date: 02/23/2021    Recommendations:  -Phoslo 1 tab with meals  -Restart hydralazine 50mg PO TID      Assessment & Plan   # DDKT: s/p graft nephrectomy 2/19/21   - Date DSA Last Checked: Feb/2021      Latest DSA: Yes, A30, B13, CW6   - BK Viremia: No Nov 2019    - Kidney Tx Biopsy: Dec 23, 2015; Result:  Results:  Mild glomerulitis and capillary C4d staining consistent with mild, persistent antibody-mediated rejection. 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    Currently doing HD on MWF at [facility], runs for 4 hours. Next HD on Weds 2/24     # graft nephrectomy:   -S/p graft nephrectomy 2/19/21 by transplant surgery. No immunosuppressants as below   -Management per transplant surgery    # Immunosuppression:    Prior to admit:         Tacrolimus extended release (goal 4-6), Mycophenolic acid (dose 1080 mg every 12 hours) and Prednisone (dose 5 mg daily)     -patient self stopped triple IS 3 weeks prior to admission which led to symptomatic rejection necessitating graft nephrectomy. This was his 3rd kidney transplant, highly sensitized PRA-100% expect uptrend in DSA post nephrectomy. Risks of restarting IS \"infectious & malignant\" outweigh potential benefit \"reduced sensitization\" as he is anuric & is not a re-transplant candidate due to non compliance. He does not have living donors    # Infection Prophylaxis:   - PJP: None    # Hypertension: Inadequate control;  Goal BP: < 130/80   - Volume status: Euvolemic  EDW ~ 83Kg  PTA on Clonidine 0.2 mg BID, Hydralazine 50 mg TID, Losartan  25 mg BID  Diltiazem  ER  240 MG daily-taking with Astagraf-stopped >3 weeks prior to admission.  Bumex -stopped prior to admission                 - Changes: Yes. Stopped diltiazem. Restarted clonidine 0.2mg bid, restart hydralazine 50mg TID, continue " losartan 25mg BID     Anemia in Chronic Renal Disease: Hgb: Trend down 7.7 after transfusion     RAYMOND: No   - Iron studies: Replete, given RAYMOND darbepoetin 40mcg on 2/22    # Mineral Bone Disorder:   - Secondary renal hyperparathyroidism; PTH level: Minimally elevated ( pg/ml)        On treatment: None  - Vitamin D; level:  Not checked recently     On supplement: No  - Calcium; level: Low        On supplement: No  - Phosphorus; level: High        On binder: No , check phos tmrw to see if it must be restarted      # Electrolytes:   - Potassium; level: High normal   On supplement: No, manage with HD  - Magnesium; level: Normal        On supplement: No  - Bicarbonate; level: Normal        On supplement: No, manage with HD  - Sodium; level: Low       # HFpEF with Pulmonary Hypertension:               - Recommend patient start weighing himself daily.               - On HD now     # Moderate Pericardial Effusion,per Echo Jan 2021: Likely secondary to hypervolemia. No evidence of tamponade. Pericardiocentesis was not recommended by cardiology earlier      # Transplant History:  Etiology of Kidney Failure: Hypertension  Tx: DDKT  Transplant: 3/12/2015 (Kidney), 4/1/1994 (Kidney), 10/1/2000 (Kidney)   Donor Type: Donation after Brain Death        Donor Class: Standard Criteria Donor  Significant changes in immunosuppression: None  Significant transplant-related complications: Acute cellular-mediated rejection and Acute antibody-mediated rejection    Recommendations were communicated to the primary team verbally.      Cayetano Pizano MD   Pager: [phone]    Interval History   No bowel movement yet. Increased abdominal distension       Review of Systems   4 point ROS was obtained and negative except as noted in the Interval History.    MEDICATIONS:    bisacodyl  10 mg Rectal Daily     cloNIDine  0.2 mg Oral BID     darbepoetin sharon for ESRD on Dialysis  40 mcg Intravenous Weekly     ferrous sulfate  325 mg Oral Daily      "hydrALAZINE  50 mg Oral Q8H ARJUN     lidocaine  2 patch Transdermal Q24H     lidocaine   Transdermal Q8H     losartan  25 mg Oral BID     polyethylene glycol  17 g Oral BID     senna-docusate  1 tablet Oral BID    Or     senna-docusate  2 tablet Oral BID     sodium chloride (PF)  3 mL Intravenous Q8H         Physical Exam   Temp  Av.5  F (36.9  C)  Min: 97.5  F (36.4  C)  Max: 100.3  F (37.9  C)      Pulse  Av.1  Min: 78  Max: 92 Resp  Av.1  Min: 12  Max: 21  SpO2  Av.3 %  Min: 94 %  Max: 100 %     /83 (BP Location: Left arm)   Pulse 83   Temp 97.8  F (36.6  C) (Oral)   Resp 16   Ht 1.803 m (5' 11\")   Wt 81.2 kg (179 lb 0.2 oz)   SpO2 99%   BMI 24.97 kg/m     Date 21 0700 - 21 0659   Shift 7918-8819 4345-2508 8170-2695 24 Hour Total   INTAKE   P.O. 110   110   Other 0   0   Shift Total(mL/kg) 110(1.32)   110(1.32)   OUTPUT   Other 0   0   Shift Total(mL/kg) 0(0)   0(0)   Weight (kg) 83.23 83.23 83.23 83.23      Admit Weight: 83 kg (182 lb 15.7 oz)     GENERAL APPEARANCE: alert and no distress  HENT: mouth without ulcers or lesions  LYMPHATICS: no cervical or supraclavicular nodes  RESP: lungs clear to auscultation - no rales, rhonchi or wheezes  CV: regular rhythm, normal rate, no rub, no murmur  EDEMA: no LE edema bilaterally  ABDOMEN: soft, + distended, appropriately tender to RLQ, bowel sounds hypoactive, incision with staples CDI  MS: extremities normal - no gross deformities noted, no evidence of inflammation in joints, no muscle tenderness  RUE fistula +bruit/thrill  SKIN: no rash    Data   All labs reviewed by me.  CMP  Recent Labs   Lab 21  0622 21  0657 21  0611 21  0610 21  2250 21  2250 21  1130    134 132*  --   --  133 133   POTASSIUM 4.3 5.0 4.8 4.8   < > 5.9* 5.2   CHLORIDE 102 101 99  --   --  100 98   CO2 27 23 27  --   --  24 30   ANIONGAP 7 10 6  --   --  9 5   * 139* 113*  --   --  130* 109*   BUN " 18 38* 26  --   --  36* 31*   CR 5.69* 8.63* 6.76*  --   --  8.47* 8.17*   GFRESTIMATED 11* 6* 9*  --   --  7* 7*   GFRESTBLACK 12* 8* 10*  --   --  8* 8*   MEERA 8.2* 7.9* 8.3*  --   --  8.0* 8.7   MAG  --   --   --   --   --  2.3  --    PHOS 4.0  --   --   --   --  5.9*  --    PROTTOTAL  --   --   --   --   --   --  6.9   ALBUMIN  --   --   --   --   --   --  2.3*   BILITOTAL  --   --   --   --   --   --  0.4   ALKPHOS  --   --   --   --   --   --  76   AST  --   --   --   --   --   --  34   ALT  --   --   --   --   --   --  16    < > = values in this interval not displayed.     CBC  Recent Labs   Lab 02/23/21  0622 02/22/21  0657 02/21/21  0610 02/20/21  2147 02/19/21 2250 02/19/21 2250   HGB 7.2* 7.7* 7.9* 8.1*   < > 8.0*   WBC 5.1 6.5 8.8  --   --  11.4*   RBC 2.66* 2.78* 2.92*  --   --  2.93*   HCT 23.8* 25.3* 26.9*  --   --  26.4*   MCV 90 91 92  --   --  90   MCH 27.1 27.7 27.1  --   --  27.3   MCHC 30.3* 30.4* 29.4*  --   --  30.3*   RDW 15.1* 15.0 15.2*  --   --  15.0   * Platelets clumped, platelet count unavailable Canceled, Test credited  --   --  129*    < > = values in this interval not displayed.     INR  Recent Labs   Lab 02/19/21 2250 02/19/21  1528   INR 1.80* 1.85*   PTT 39* 41*     ABGNo lab results found in last 7 days.   Urine Studies  Recent Labs   Lab Test 01/07/21  1241 01/24/16  1435 09/10/15  1600 08/29/15  1425 06/25/15  0910   COLOR Yellow Yellow Light Yellow Light Yellow Light Yellow   APPEARANCE Clear Clear Clear Clear Clear   URINEGLC Negative Negative Negative Negative Negative   URINEBILI Negative Negative Negative Negative Negative   URINEKETONE Negative Negative Negative Negative Negative   SG 1.013 1.010 1.007 1.007 1.009   UBLD Negative Negative Negative Negative Negative   URINEPH 5.5 6.0 5.0 5.0 5.0   PROTEIN 30* Negative Negative Negative Negative   NITRITE Negative Negative Negative Positive* Positive*   LEUKEST Trace* Negative Negative Moderate* Moderate*   RBCU 1  1  --  <1 0   WBCU 5 4*  --  14* 11*     Recent Labs   Lab Test 02/21/20  0859 08/22/19  1033 01/23/19  0848 05/08/18  0812 08/03/17  0827 06/15/16  0905 05/19/16  1330 09/21/15  0958 09/10/15  1600 07/24/15  1136 05/07/15  0824   UTPG 0.37* 0.54* 0.76* 0.15 0.16 0.11 0.08 0.15 0.23* 0.23* 0.92*     PTH  Recent Labs   Lab Test 01/15/21  0906 12/10/19  1432 05/24/18  1005 05/08/18  0812 01/12/17  1121 03/20/15  0730   PTHI 150* 146* 120* 110* 134* 210*     Iron Studies  Recent Labs   Lab Test 02/22/21  0657 10/30/20  1252 05/08/18  0812 01/12/17  1121 03/20/15  0730   IRON 23* 43 46 74 51   * 202* 204* 194* 156*   IRONSAT 14* 21 23 38 33   CARLOS A 1,505* 445* 414* 371 1,172*       IMAGING:  All imaging studies reviewed by me.

## 2021-02-23 NOTE — PLAN OF CARE
"BP (!) 148/84 (BP Location: Right arm)   Pulse 79   Temp 98.5  F (36.9  C) (Oral)   Resp 18   Ht 1.803 m (5' 11\")   Wt 81.2 kg (179 lb 0.2 oz)   SpO2 95%   BMI 24.97 kg/m        0510-4247  Neuro: Pt. alert & Ox4  Behavior: Pt. calm & cooperative with cares.   Activity: Pt. up ad kapil.  Vital: Pt. hypertensive 169/69 last evening & received sched. BP meds, see eMar. Most recent BP: 148/84.  AOVSS on RA.  LDAs: Left PIV saline locked. Right AV fistula.  Cardiac: Hypertension  Respiratory: LS clear bilat.  GI/: Anuric, on HD. No stools this shift. Last BM 2/18/21  Skin: Midline incision stapled & c/d/I.  Pain: Pt. c/o abdominal & back pain & given prn Tylenol x1 & prn Robaxin.    Diet: Regular  Labs/Imaging: See chart for results.  Plan: Continue to follow POC & notify MD with change in status.      "

## 2021-02-23 NOTE — PLAN OF CARE
Pt remained hemodynamically stable throughout the shift. BP remained high through most of the day, even after HD run, provider notified. Pain remains at a tolerable level with use of PRN medications. Pt displays poor appetite. Incision remains CDI.

## 2021-02-23 NOTE — OP NOTE
OPERATIVE NOTE:    Preop Dx:  Acute transplant kidney thombosis.  Postop Dx:  Same  Surgeon:  Ren Romeo  Asst:  Heraclio Nagel.  No appropriate resident was available.  Procedure:  Lysis of adhesions (60 min) and Transplant nephrectomy.  Anesthesia:  General  Specimens:  Transplant kidney  EBL:  100.  Indictations:  Patient is 10 yrs s/p kidney transplant.  He has returned to dialysis.  He stopped his immunosuppression medications 3 weeks ago on his own accord and presented with acute abdominal pain.  Imaging showed venous thrombosis with reversal of flow in renal artery.  Note that his was an intraabdominal third kidney transplant:    Findings:  1.  Renal vein thrombosis of transplant kidney.  2.  Blood vessels are not suitable for retransplantation.    Description of procedure:  After induction of general anesthesia and sterile prep and drape, the patients previous lower midline incision was opened.  There were signficant adhesions of omentum and some bowel to the RLQ transplant.  We dissected the omental adhesions off of the kidney and were able to make a plane between the superior and lateral portions of the kidney that were adhesed to loops of bowel.  We carefully and circumferentially dissected around the kidney down to he renal hilum.  We were able to palpate the renal artery but it was encased in thick scar tissue.  We placed aortic clamps across the rnal hilar vessels en deirdre and we able to palpate a strong pulse in the femoral artery.  We excised the kidney and oversewed the vascular stumps individually and then with runnning 3-0 prolene over the whole hilum.  We released the clamps and there was not bleeding.  The femoral pulse was palpated and strong.  The wound was irrigated and closed in running fashion.  The patient awoke and was transferred to recovery without complication.  I was present or immediately available from incision until closing.       27.4

## 2021-02-23 NOTE — CONSULTS
Care Management Initial Consult    General Information  Assessment completed with: Patient,    Type of CM/SW Visit: Initial Assessment    Primary Care Provider verified and updated as needed: Needs PCP set up  Readmission within the last 30 days: no previous admission in last 30 days   Reason for Consult: Elevated Readmission Risk  Advance Care Planning: Patient does not have a HCD and does not want to complete one. His mother is his legal NOK.     Communication Assessment  Patient's communication style: spoken language (English or Bilingual)    Hearing Difficulty or Deaf: no   Wear Glasses or Blind: no    Cognitive  Cognitive/Neuro/Behavioral: .WDL except  Level of Consciousness: lethargic  Arousal Level: arouses to voice, arouses to touch/gentle shaking  Orientation: oriented x 4  Mood/Behavior: calm  Best Language: 0 - No aphasia  Speech: slurred    Living Environment:   People in home: child(jewel), dependent  16 and 13 year old children  Current living Arrangements: house      Able to return to prior arrangements: yes    Family/Social Support:  Care provided by: self, parent(s), spouse/significant other  Provides care for: child(jewel)  Marital Status: Lives with Significant Other  Support: Parents and significant other support him      Description of Support System: Supportive    Support Assessment: Adequate family and caregiver support    Current Resources:   Patient receiving home care services: No  Community Resources: None  Equipment currently used at home: none  Supplies currently used at home: None    Employment/Financial:  Employment Status: unemployed  Financial Concerns: No concerns identified(MFIP, Food [place], Cash Assistance)   Referral to Financial Counselor: No  Finance Comments: Patient is applying for SSDI    Lifestyle & Psychosocial Needs:  Socioeconomic History     Marital status: Single     Spouse name: Not on file     Number of children: 2     Years of education: Not on file     Highest  "education level: Not on file   Occupational History     Employer: UNEMPLOYED     Tobacco Use     Smoking status: Light Tobacco Smoker     Types: Cigars     Smokeless tobacco: Never Used     Tobacco comment: \"one a day\"   Substance and Sexual Activity     Alcohol use: Yes     Alcohol/week: 0.0 standard drinks     Comment: Minimal     Drug use: No     Sexual activity: Yes     Partners: Female     Functional Status:  Prior to admission patient needed assistance:   Dependent ADLs: Independent  Dependent IADLs: Independent  Assesssment of Functional Status: At functional baseline    Mental Health Status:  Mental Health Status: No Current Concerns       Chemical Dependency Status:  Chemical Dependency Status: No Current Concerns         Values/Beliefs:  Spiritual, Cultural Beliefs, Methodist Practices, Values that affect care: no          Additional Information:  Patient is a 50 year old male with PMH significant for HTN, ACD, Tobacco use, s/p kidney transplant x3. First two transplants and graft nephrectomies were at [facility] [place]. His last transplant was at [facility] in 3/12/2015 with subsequent graft failure, now back on dialysis x1 month. Presented with acute renal vein thrombosis. S/p graft nephrectomy on 2/19/21 overall doing well post-operatively. Awaiting ROBF.      SW spoke with patient via phone. He was lethargic throughout the call and SW had to repeat questions a few times. Patient would like help in establishing with a PCP near where he lives. He does dialysis at [facility] (Telephone: [phone], Fax:   [phone]) on MWF from 2:30-6:30. SW or RNCC will need to send discharge orders and update unit on when patient is expected to return.     TREE Carrillo, GENET  7A   Ph: [phone]  Pager: [phone]  "

## 2021-02-24 ENCOUNTER — APPOINTMENT (OUTPATIENT)
Dept: GENERAL RADIOLOGY | Facility: CLINIC | Age: 51
DRG: 659 | End: 2021-02-24
Payer: COMMERCIAL

## 2021-02-24 ENCOUNTER — APPOINTMENT (OUTPATIENT)
Dept: CT IMAGING | Facility: CLINIC | Age: 51
DRG: 659 | End: 2021-02-24
Attending: NURSE PRACTITIONER
Payer: COMMERCIAL

## 2021-02-24 LAB
ANION GAP SERPL CALCULATED.3IONS-SCNC: 8 MMOL/L (ref 3–14)
BUN SERPL-MCNC: 26 MG/DL (ref 7–30)
CALCIUM SERPL-MCNC: 8.1 MG/DL (ref 8.5–10.1)
CHLORIDE SERPL-SCNC: 101 MMOL/L (ref 94–109)
CO2 SERPL-SCNC: 25 MMOL/L (ref 20–32)
CREAT SERPL-MCNC: 7.34 MG/DL (ref 0.66–1.25)
ERYTHROCYTE [DISTWIDTH] IN BLOOD BY AUTOMATED COUNT: 15.3 % (ref 10–15)
GFR SERPL CREATININE-BSD FRML MDRD: 8 ML/MIN/{1.73_M2}
GLUCOSE SERPL-MCNC: 107 MG/DL (ref 70–99)
HCT VFR BLD AUTO: 24.9 % (ref 40–53)
HGB BLD-MCNC: 7.5 G/DL (ref 13.3–17.7)
MAGNESIUM SERPL-MCNC: 2.2 MG/DL (ref 1.6–2.3)
MCH RBC QN AUTO: 27.6 PG (ref 26.5–33)
MCHC RBC AUTO-ENTMCNC: 30.1 G/DL (ref 31.5–36.5)
MCV RBC AUTO: 92 FL (ref 78–100)
PHOSPHATE SERPL-MCNC: 4.3 MG/DL (ref 2.5–4.5)
PLATELET # BLD AUTO: 156 10E9/L (ref 150–450)
PLATELET # BLD AUTO: ABNORMAL 10E9/L (ref 150–450)
POTASSIUM SERPL-SCNC: 4.4 MMOL/L (ref 3.4–5.3)
RBC # BLD AUTO: 2.72 10E12/L (ref 4.4–5.9)
SODIUM SERPL-SCNC: 134 MMOL/L (ref 133–144)
WBC # BLD AUTO: 5.3 10E9/L (ref 4–11)

## 2021-02-24 PROCEDURE — 83735 ASSAY OF MAGNESIUM: CPT | Performed by: NURSE PRACTITIONER

## 2021-02-24 PROCEDURE — 250N000013 HC RX MED GY IP 250 OP 250 PS 637: Performed by: STUDENT IN AN ORGANIZED HEALTH CARE EDUCATION/TRAINING PROGRAM

## 2021-02-24 PROCEDURE — 250N000013 HC RX MED GY IP 250 OP 250 PS 637: Performed by: INTERNAL MEDICINE

## 2021-02-24 PROCEDURE — 258N000003 HC RX IP 258 OP 636: Performed by: INTERNAL MEDICINE

## 2021-02-24 PROCEDURE — 999N000065 XR ABDOMEN PORT 1 VW

## 2021-02-24 PROCEDURE — 84100 ASSAY OF PHOSPHORUS: CPT | Performed by: NURSE PRACTITIONER

## 2021-02-24 PROCEDURE — 85049 AUTOMATED PLATELET COUNT: CPT | Performed by: TRANSPLANT SURGERY

## 2021-02-24 PROCEDURE — 250N000013 HC RX MED GY IP 250 OP 250 PS 637: Performed by: NURSE PRACTITIONER

## 2021-02-24 PROCEDURE — 36415 COLL VENOUS BLD VENIPUNCTURE: CPT | Performed by: NURSE PRACTITIONER

## 2021-02-24 PROCEDURE — 250N000009 HC RX 250: Performed by: NURSE PRACTITIONER

## 2021-02-24 PROCEDURE — 85027 COMPLETE CBC AUTOMATED: CPT | Performed by: NURSE PRACTITIONER

## 2021-02-24 PROCEDURE — 80048 BASIC METABOLIC PNL TOTAL CA: CPT | Performed by: NURSE PRACTITIONER

## 2021-02-24 PROCEDURE — 120N000011 HC R&B TRANSPLANT UMMC

## 2021-02-24 PROCEDURE — 250N000011 HC RX IP 250 OP 636: Performed by: SURGERY

## 2021-02-24 PROCEDURE — 74176 CT ABD & PELVIS W/O CONTRAST: CPT

## 2021-02-24 PROCEDURE — 90937 HEMODIALYSIS REPEATED EVAL: CPT

## 2021-02-24 PROCEDURE — 250N000011 HC RX IP 250 OP 636: Performed by: NURSE PRACTITIONER

## 2021-02-24 PROCEDURE — 3E0336Z INTRODUCTION OF NUTRITIONAL SUBSTANCE INTO PERIPHERAL VEIN, PERCUTANEOUS APPROACH: ICD-10-PCS | Performed by: TRANSPLANT SURGERY

## 2021-02-24 PROCEDURE — 36415 COLL VENOUS BLD VENIPUNCTURE: CPT | Performed by: TRANSPLANT SURGERY

## 2021-02-24 PROCEDURE — 250N000013 HC RX MED GY IP 250 OP 250 PS 637

## 2021-02-24 PROCEDURE — 90935 HEMODIALYSIS ONE EVALUATION: CPT | Performed by: INTERNAL MEDICINE

## 2021-02-24 PROCEDURE — 250N000013 HC RX MED GY IP 250 OP 250 PS 637: Performed by: SURGERY

## 2021-02-24 PROCEDURE — 74176 CT ABD & PELVIS W/O CONTRAST: CPT | Mod: 26 | Performed by: RADIOLOGY

## 2021-02-24 PROCEDURE — 250N000009 HC RX 250: Performed by: TRANSPLANT SURGERY

## 2021-02-24 PROCEDURE — 74018 RADEX ABDOMEN 1 VIEW: CPT | Mod: 26 | Performed by: RADIOLOGY

## 2021-02-24 RX ORDER — AMOXICILLIN 250 MG
2 CAPSULE ORAL 2 TIMES DAILY
Qty: 60 TABLET | Refills: 0 | Status: CANCELLED | OUTPATIENT
Start: 2021-02-24

## 2021-02-24 RX ORDER — ACETAMINOPHEN 325 MG/1
650 TABLET ORAL EVERY 4 HOURS PRN
Status: CANCELLED | COMMUNITY
Start: 2021-02-24

## 2021-02-24 RX ORDER — DEXTROSE MONOHYDRATE 100 MG/ML
INJECTION, SOLUTION INTRAVENOUS CONTINUOUS PRN
Status: DISCONTINUED | OUTPATIENT
Start: 2021-02-24 | End: 2021-02-27 | Stop reason: HOSPADM

## 2021-02-24 RX ORDER — BENZOCAINE/MENTHOL 6 MG-10 MG
LOZENGE MUCOUS MEMBRANE 2 TIMES DAILY
Status: DISCONTINUED | OUTPATIENT
Start: 2021-02-24 | End: 2021-02-24

## 2021-02-24 RX ORDER — OXYCODONE HYDROCHLORIDE 5 MG/1
5 TABLET ORAL EVERY 6 HOURS PRN
Qty: 6 TABLET | Refills: 0 | Status: CANCELLED | OUTPATIENT
Start: 2021-02-24 | End: 2021-02-27

## 2021-02-24 RX ORDER — HEPARIN SODIUM 5000 [USP'U]/.5ML
5000 INJECTION, SOLUTION INTRAVENOUS; SUBCUTANEOUS EVERY 8 HOURS
Status: DISCONTINUED | OUTPATIENT
Start: 2021-02-24 | End: 2021-02-27 | Stop reason: HOSPADM

## 2021-02-24 RX ORDER — CALCIUM ACETATE 667 MG/1
667 CAPSULE ORAL
Status: DISCONTINUED | OUTPATIENT
Start: 2021-02-24 | End: 2021-02-24

## 2021-02-24 RX ORDER — METHOCARBAMOL 750 MG/1
750 TABLET, FILM COATED ORAL 4 TIMES DAILY PRN
Qty: 10 TABLET | Refills: 0 | Status: CANCELLED | OUTPATIENT
Start: 2021-02-24

## 2021-02-24 RX ORDER — LANOLIN ALCOHOL/MO/W.PET/CERES
3 CREAM (GRAM) TOPICAL
Status: DISCONTINUED | OUTPATIENT
Start: 2021-02-24 | End: 2021-02-27 | Stop reason: HOSPADM

## 2021-02-24 RX ADMIN — HYDRALAZINE HYDROCHLORIDE 50 MG: 25 TABLET, FILM COATED ORAL at 05:52

## 2021-02-24 RX ADMIN — METHOCARBAMOL 750 MG: 750 TABLET, FILM COATED ORAL at 20:16

## 2021-02-24 RX ADMIN — LIDOCAINE 2 PATCH: 560 PATCH PERCUTANEOUS; TOPICAL; TRANSDERMAL at 12:33

## 2021-02-24 RX ADMIN — HEPARIN SODIUM 5000 UNITS: 5000 INJECTION, SOLUTION INTRAVENOUS; SUBCUTANEOUS at 15:28

## 2021-02-24 RX ADMIN — SODIUM CHLORIDE 250 ML: 9 INJECTION, SOLUTION INTRAVENOUS at 08:11

## 2021-02-24 RX ADMIN — HEPARIN SODIUM 5000 UNITS: 5000 INJECTION, SOLUTION INTRAVENOUS; SUBCUTANEOUS at 23:07

## 2021-02-24 RX ADMIN — I.V. FAT EMULSION 250 ML: 20 EMULSION INTRAVENOUS at 23:09

## 2021-02-24 RX ADMIN — ASCORBIC ACID, VITAMIN A PALMITATE, CHOLECALCIFEROL, THIAMINE HYDROCHLORIDE, RIBOFLAVIN-5 PHOSPHATE SODIUM, PYRIDOXINE HYDROCHLORIDE, NIACINAMIDE, DEXPANTHENOL, ALPHA-TOCOPHEROL ACETATE, VITAMIN K1, FOLIC ACID, BIOTIN, CYANOCOBALAMIN: 200; 3300; 200; 6; 3.6; 6; 40; 15; 10; 150; 600; 60; 5 INJECTION, SOLUTION INTRAVENOUS at 23:08

## 2021-02-24 RX ADMIN — ONDANSETRON 4 MG: 4 TABLET, ORALLY DISINTEGRATING ORAL at 18:29

## 2021-02-24 RX ADMIN — Medication: at 08:11

## 2021-02-24 RX ADMIN — LOSARTAN POTASSIUM 25 MG: 25 TABLET, FILM COATED ORAL at 12:21

## 2021-02-24 RX ADMIN — CLONIDINE HYDROCHLORIDE 0.2 MG: 0.1 TABLET ORAL at 12:21

## 2021-02-24 RX ADMIN — MELATONIN TAB 3 MG 3 MG: 3 TAB at 23:08

## 2021-02-24 RX ADMIN — HYDRALAZINE HYDROCHLORIDE 50 MG: 25 TABLET, FILM COATED ORAL at 15:28

## 2021-02-24 RX ADMIN — ACETAMINOPHEN 650 MG: 325 TABLET, FILM COATED ORAL at 20:16

## 2021-02-24 RX ADMIN — LOSARTAN POTASSIUM 25 MG: 25 TABLET, FILM COATED ORAL at 20:17

## 2021-02-24 RX ADMIN — HYDRALAZINE HYDROCHLORIDE 50 MG: 25 TABLET, FILM COATED ORAL at 23:08

## 2021-02-24 RX ADMIN — CLONIDINE HYDROCHLORIDE 0.2 MG: 0.1 TABLET ORAL at 20:17

## 2021-02-24 RX ADMIN — SODIUM CHLORIDE 300 ML: 9 INJECTION, SOLUTION INTRAVENOUS at 08:10

## 2021-02-24 ASSESSMENT — ACTIVITIES OF DAILY LIVING (ADL)
ADLS_ACUITY_SCORE: 12
ADLS_ACUITY_SCORE: 12
ADLS_ACUITY_SCORE: 14
ADLS_ACUITY_SCORE: 12

## 2021-02-24 ASSESSMENT — MIFFLIN-ST. JEOR: SCORE: 1699.13

## 2021-02-24 NOTE — PROGRESS NOTES
HEMODIALYSIS TREATMENT NOTE    Date: 2/24/2021  Time: 11:45 AM    Data:  Pre Wt: 81.2 kg (179 lb 0.2 oz)   Desired Wt: 81.2 kg   Post Wt: 80.5 kg (177 lb 7.5 oz)  Weight change: 0.7 kg  Ultrafiltration - Post Run Net Total Removed (mL): 700 mL  Vascular Access Status: patent  Dialyzer Rinse: Light  Total Blood Volume Processed: 75.65 L Liters  Total Dialysis (Treatment) Time: 3.25 Hours    Lab:        Interventions:  3.25 hours of a 4 hour run. Patient insisted on coming off as he was feeling couped up and anxious. 700 mls pulled. 400 BFR via R upper arm fistula.      Assessment:  ESRD patient s/p nephrectomy in for his regular scheduled HD run VSS A+O       Plan:    Per renal

## 2021-02-24 NOTE — PLAN OF CARE
"BP (!) 155/89   Pulse 90   Temp 98.1  F (36.7  C) (Oral)   Resp 20   Ht 1.803 m (5' 11\")   Wt 81.2 kg (179 lb 0.2 oz)   SpO2 98%   BMI 24.97 kg/m       [id number]  Hypertensive, all other VSS on RA. Very little appetite and PO intake. Patient had dialysis this morning; 3 hr run. Had CT with PO contrast this afternoon. CT showed patient was + for bowel obstruction. Anticipating NG placement this evening. Patient is aware of plan. Has not had a bowel movement in days and remains very distended and uncomfortable. Reported earlier this morning passing very little flatus. Refused all bowel medications. Tolerated other PO meds this afternoon despite the distention and discomfort. Up with SB. Calls appropriately. Right fistula; + bruit and thrill. Anuric. Continue plan of care, please notify MD with any changes.   "

## 2021-02-24 NOTE — PLAN OF CARE
"  BP (!) 149/68 (BP Location: Left arm)   Pulse 85   Temp 98.4  F (36.9  C) (Oral)   Resp 16   Ht 1.803 m (5' 11\")   Wt 81.2 kg (179 lb 0.2 oz)   SpO2 95%   BMI 24.97 kg/m      [id number]  VSS on RA, no s/s of distress. A/Ox4, pleasant and cooperative with cares. Endorsed mild abdominal pain--given PRN robaxin and tylenol with good relief. Incision stapled, CDI. PIV SL. ISAAC fistula, CDI. Denied n/v--on regular diet with fair appetite. Up SBA in room; calling appropriately; PT consulted. Anuric; no BMs this shift; miralax and senna given this evening. Plan for HD @0810 tomorrow morning; night RN aware. Plan for discharge once pt has return of bowel function; continue to encourage activity and PO intake. Pt resting quietly, watching television most of shift; pt sleeping now; call light and belongings within reach. Will continue to monitor and continue POC/notify team as needed.   "

## 2021-02-24 NOTE — PLAN OF CARE
"BP (!) 149/84   Pulse 87   Temp 98.5  F (36.9  C) (Oral)   Resp 14   Ht 1.803 m (5' 11\")   Wt 81.2 kg (179 lb 0.2 oz)   SpO2 96%   BMI 24.97 kg/m      Neuro: AAOx4  Cardiac: VSS  Respiratory: Adequate O2 sats on RA, LS clear  GI/:  Abdomen distended and uncomfortable, +BS, only passing small amounts of gas; anuric - plan for HD today at 0810, has R AVF  Diet/appetite: Poor appetite  Activity: Up with walker, ambulating in hallway  Pain: Abdominal discomfort d/t feeling constipated  Skin: Midline incision with staples intact  Lines/Drains: L PIV, saline locked; R AVF    Plan: Awaiting return of bowel function, dialysis today    "

## 2021-02-24 NOTE — PLAN OF CARE
"BP (!) 149/68 (BP Location: Left arm)   Pulse 85   Temp 98.4  F (36.9  C) (Oral)   Resp 16   Ht 1.803 m (5' 11\")   Wt 81.2 kg (179 lb 0.2 oz)   SpO2 95%   BMI 24.97 kg/m        Shift: 2429-6095  VS:  Elevated BP on RA, afebrile  Neuro: AOx4  BG: NA  Labs: K+ 4.3,  Hgb 7.2  Respiratory: WNL  Pain/Nausea/PRN: PRN Tylenol and  Robaxin given X1   Diet: Regular diet, eating small at a time  LDA:  L PIV X1 SL, R shunt  GI/: Anuria, hemodialysis pt. No BM., scheduled senna S, soap olegario enema X1 and bisacodyl supp X2 given  Skin: Dry flaky skin, staple intact, surrounding skin intact no redness  Mobility:Up ad with walker and SBA. Lost balance due to weakness and almost fell this AM. Remind pt to call for help. PT ordered  Plan: Return of bowel function, encourage ambulation, pain management     Will continue with POC and notify MD with changes or concerns.    "

## 2021-02-24 NOTE — PROGRESS NOTES
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    [facility]. Next HD on Friday 2/26    # graft nephrectomy:   -S/p graft nephrectomy 2/19/21 by transplant surgery. No immunosuppressants as below   -Management per transplant surgery    # Immunosuppression:    Prior to admit:         Tacrolimus extended release (goal 4-6), Mycophenolic acid (dose 1080 mg every 12 hours) and Prednisone (dose 5 mg daily)     -patient self stopped triple IS 3 weeks prior to admission which led to symptomatic rejection necessitating graft nephrectomy. This was his 3rd kidney transplant, highly sensitized PRA-100% expect uptrend in DSA post nephrectomy. Risks of restarting IS \"infectious & malignant\" outweigh potential benefit \"reduced sensitization\" as he is anuric & is not a re-transplant candidate due to non compliance. He does not have living donors    # Suspected ileus:   -concern for ileus vs obstruction. Had KUB on 2/23 with distended loops of bowel. Plan for CT with PO contrast today       # Infection Prophylaxis:   - PJP: None    # Hypertension: Borderline control;  Goal BP: < 130/80   - Volume status: Euvolemic  EDW ~ 83Kg  PTA on Clonidine 0.2 mg BID, Hydralazine 50 mg TID, Losartan  25 mg BID  Diltiazem  ER  240 MG daily-taking with Astagraf-stopped >3 weeks " prior to admission.  Bumex -stopped prior to admission                 - Changes: No. Stopped diltiazem. Restarted clonidine 0.2mg bid, restarted hydralazine 50mg TID, continued losartan 25mg BID     Anemia in Chronic Renal Disease: Hgb: Trend down 7.5 today    RAYMOND: Yes given RAYMOND darbepoetin 40mcg on 2/22   - Iron studies: Replete,     # Mineral Bone Disorder:   - Secondary renal hyperparathyroidism; PTH level: Minimally elevated ( pg/ml)        On treatment: None  - Vitamin D; level:  Not checked recently     On supplement: No  - Calcium; level: Low        On supplement: No  - Phosphorus; level: High        On binder: No , check phos tmrw to see if it must be restarted      # Electrolytes:   - Potassium; level: Normal   On supplement: No, manage with HD  - Magnesium; level: Normal        On supplement: No  - Bicarbonate; level: Normal        On supplement: No, manage with HD  - Sodium; level: Low normal       # HFpEF with Pulmonary Hypertension:               - Recommend patient start weighing himself daily.               - On HD now     # Moderate Pericardial Effusion,per Echo Jan 2021: Likely secondary to hypervolemia. No evidence of tamponade. Pericardiocentesis was not recommended by cardiology earlier      HD note: Seen on HD for ESRD. Tolerating well, 1L UF, Qb 400ml/min via RUE AVF    # Transplant History:  Etiology of Kidney Failure: Hypertension  Tx: DDKT  Transplant: 3/12/2015 (Kidney), 4/1/1994 (Kidney), 10/1/2000 (Kidney)   Donor Type: Donation after Brain Death        Donor Class: Standard Criteria Donor  Significant changes in immunosuppression: None  Significant transplant-related complications: Acute cellular-mediated rejection and Acute antibody-mediated rejection    Recommendations were communicated to the primary team verbally.      Cayetano Pizano MD   Pager: [phone]    Interval History   Seen on HD today. Complains of increasing abdominal distension        Review of Systems   4 point ROS  "was obtained and negative except as noted in the Interval History.    MEDICATIONS:    bisacodyl  10 mg Rectal Daily     calcium acetate  667 mg Oral TID w/meals     cloNIDine  0.2 mg Oral BID     darbepoetin sharon for ESRD on Dialysis  40 mcg Intravenous Weekly     ferrous sulfate  325 mg Oral Daily     heparin ANTICOAGULANT  5,000 Units Subcutaneous Q8H     hydrALAZINE  50 mg Oral Q8H ARJUN     lidocaine  2 patch Transdermal Q24H     lidocaine   Transdermal Q8H     losartan  25 mg Oral BID     polyethylene glycol  17 g Oral BID     senna-docusate  1 tablet Oral BID    Or     senna-docusate  2 tablet Oral BID     sodium chloride (PF)  3 mL Intravenous Q8H         Physical Exam   Temp  Av.5  F (36.9  C)  Min: 97.5  F (36.4  C)  Max: 100.3  F (37.9  C)      Pulse  Av.1  Min: 78  Max: 92 Resp  Av.1  Min: 12  Max: 21  SpO2  Av.3 %  Min: 94 %  Max: 100 %     BP (!) 157/87 (BP Location: Left arm)   Pulse 99   Temp 98  F (36.7  C) (Oral)   Resp 20   Ht 1.803 m (5' 11\")   Wt 81.2 kg (179 lb 0.2 oz)   SpO2 100%   BMI 24.97 kg/m     Date 21 0700 - 21 0659   Shift 5480-9860 0179-9089 9047-9451 24 Hour Total   INTAKE   P.O. 110   110   Other 0   0   Shift Total(mL/kg) 110(1.32)   110(1.32)   OUTPUT   Other 0   0   Shift Total(mL/kg) 0(0)   0(0)   Weight (kg) 83.23 83.23 83.23 83.23      Admit Weight: 83 kg (182 lb 15.7 oz)     GENERAL APPEARANCE: alert and no distress  HENT: mouth without ulcers or lesions  LYMPHATICS: no cervical or supraclavicular nodes  RESP: lungs clear to auscultation - no rales, rhonchi or wheezes  CV: regular rhythm, normal rate, no rub, no murmur  EDEMA: no LE edema bilaterally  ABDOMEN: soft, + distended, appropriately tender to RLQ, bowel sounds hypoactive, incision with staples CDI  MS: extremities normal - no gross deformities noted, no evidence of inflammation in joints, no muscle tenderness  RUE fistula +bruit/thrill  SKIN: no rash    Data   All labs reviewed by " me.  CMP  Recent Labs   Lab 02/24/21  0553 02/23/21  0622 02/22/21  0657 02/21/21  0611 02/19/21  2250 02/19/21  2250 02/19/21  1130    137 134 132*  --  133 133   POTASSIUM 4.4 4.3 5.0 4.8   < > 5.9* 5.2   CHLORIDE 101 102 101 99  --  100 98   CO2 25 27 23 27  --  24 30   ANIONGAP 8 7 10 6  --  9 5   * 110* 139* 113*  --  130* 109*   BUN 26 18 38* 26  --  36* 31*   CR 7.34* 5.69* 8.63* 6.76*  --  8.47* 8.17*   GFRESTIMATED 8* 11* 6* 9*  --  7* 7*   GFRESTBLACK 9* 12* 8* 10*  --  8* 8*   MEERA 8.1* 8.2* 7.9* 8.3*  --  8.0* 8.7   MAG  --   --   --   --   --  2.3  --    PHOS 4.3 4.0  --   --   --  5.9*  --    PROTTOTAL  --   --   --   --   --   --  6.9   ALBUMIN  --   --   --   --   --   --  2.3*   BILITOTAL  --   --   --   --   --   --  0.4   ALKPHOS  --   --   --   --   --   --  76   AST  --   --   --   --   --   --  34   ALT  --   --   --   --   --   --  16    < > = values in this interval not displayed.     CBC  Recent Labs   Lab 02/24/21  0738 02/24/21  0553 02/23/21  0622 02/22/21  0657 02/21/21  0610   HGB  --  7.5* 7.2* 7.7* 7.9*   WBC  --  5.3 5.1 6.5 8.8   RBC  --  2.72* 2.66* 2.78* 2.92*   HCT  --  24.9* 23.8* 25.3* 26.9*   MCV  --  92 90 91 92   MCH  --  27.6 27.1 27.7 27.1   MCHC  --  30.1* 30.3* 30.4* 29.4*   RDW  --  15.3* 15.1* 15.0 15.2*    Platelets clumped, platelet count unavailable 101* Platelets clumped, platelet count unavailable Canceled, Test credited     INR  Recent Labs   Lab 02/19/21  2250 02/19/21  1528   INR 1.80* 1.85*   PTT 39* 41*     ABGNo lab results found in last 7 days.   Urine Studies  Recent Labs   Lab Test 01/07/21  1241 01/24/16  1435 09/10/15  1600 08/29/15  1425 06/25/15  0910   COLOR Yellow Yellow Light Yellow Light Yellow Light Yellow   APPEARANCE Clear Clear Clear Clear Clear   URINEGLC Negative Negative Negative Negative Negative   URINEBILI Negative Negative Negative Negative Negative   URINEKETONE Negative Negative Negative Negative Negative   SG 1.013  1.010 1.007 1.007 1.009   UBLD Negative Negative Negative Negative Negative   URINEPH 5.5 6.0 5.0 5.0 5.0   PROTEIN 30* Negative Negative Negative Negative   NITRITE Negative Negative Negative Positive* Positive*   LEUKEST Trace* Negative Negative Moderate* Moderate*   RBCU 1 1  --  <1 0   WBCU 5 4*  --  14* 11*     Recent Labs   Lab Test 02/21/20  0859 08/22/19  1033 01/23/19  0848 05/08/18  0812 08/03/17  0827 06/15/16  0905 05/19/16  1330 09/21/15  0958 09/10/15  1600 07/24/15  1136 05/07/15  0824   UTPG 0.37* 0.54* 0.76* 0.15 0.16 0.11 0.08 0.15 0.23* 0.23* 0.92*     PTH  Recent Labs   Lab Test 01/15/21  0906 12/10/19  1432 05/24/18  1005 05/08/18  0812 01/12/17  1121 03/20/15  0730   PTHI 150* 146* 120* 110* 134* 210*     Iron Studies  Recent Labs   Lab Test 02/22/21  0657 10/30/20  1252 05/08/18  0812 01/12/17  1121 03/20/15  0730   IRON 23* 43 46 74 51   * 202* 204* 194* 156*   IRONSAT 14* 21 23 38 33   CARLOS A 1,505* 445* 414* 371 1,172*       IMAGING:  All imaging studies reviewed by me.

## 2021-02-24 NOTE — PLAN OF CARE
"PT cancel: Attempted to see patient after he came back from CT this afternoon; patient declined do to \"feeling awful.\" Will reschedule as able/appropriate.   " ER

## 2021-02-24 NOTE — PROGRESS NOTES
Transplant Surgery  Inpatient Daily Progress Note  02/24/2021    Assessment & Plan: Tristian Mckeon is a 50 year old male with PMH significant for HTN, ACD, Tobacco use, s/p kidney transplant x3. First two transplants and graft nephrectomies were at [facility] [place]. His last transplant was at [facility] in 3/12/2015 with subsequent graft failure, now back on dialysis x1 month. Presented with acute renal vein thrombosis. S/p graft nephrectomy on 2/19/21 overall doing well post-operatively. Awaiting ROBF.      Graft nephrectomy: POD #5   Acute post-op pain: On APAP, Robaxin, lidoderm.     Immunosuppression management: Patient independently stopped taking immunosuppression for 3 weeks prior to admission. Discussed with Nephrology. Will not restart.  Hematology:   Multifactorial Anemia-JAYANT/ACD:  Stable Hgb 7.5 (7.2). Received 2 units PRBC  Intraoperatively. Aranesp qweek 2/22 with dialysis. Iron   Thrombocytopenia: Present on admission. 101  Cardiorespiratory:   HTN: Uncontrolled, -190s. Improving after restarting home Clonidine 0.2 BID, Losartan 25 BID, Hydralazine 50 TID.   Dialysis today.  GI/Nutrition: Regular diet.   Postop ileus: Continue Bowel regimen with senna and Miralax, +dulcolax. AXR-Multiple air-filled, dilated loops of small bowel consistent with  ileus versus obstruction. CT A/P non contrast.   Endocrine: No issues  Fluid/Electrolytes: MIVF: none  Hyperkalemia: Required HD post-op for K 6.2. Now controlled. 4.3 today  ESKD s/p graft nephrectomy: Nephrology consulted for dialysis. Resume MWF.  Hyperphosphatemia: Phos 4.3, restart phoslo, reduce to 1 TID  : Patient is anuric   Infectious disease: Tmax 99.5F, WBC 5.3  Prophylaxis: fall  Disposition: 7A    Medical Decision Making: Medium    ANTOLIN/Fellow/Resident Provider:  Lara Foster NP, #[id number]      Faculty: Aimee Carreon MD  _________________________________________________________________  Transplant History:   3/12/2015 (Kidney), 4/1/1994 (Kidney),  "10/1/2000 (Kidney), Postoperative day: 2176     Interval History: History is obtained from the patient  Overnight events: +flatus/ no BM after enema and suppository.  Pain controlled. Reports fair appetite. C/o weakness    ROS:   A 10-point review of systems was negative except as noted above.    Meds:    bisacodyl  10 mg Rectal Daily     cloNIDine  0.2 mg Oral BID     darbepoetin sharon for ESRD on Dialysis  40 mcg Intravenous Weekly     ferrous sulfate  325 mg Oral Daily     hydrALAZINE  50 mg Oral Q8H ARJUN     lidocaine  2 patch Transdermal Q24H     lidocaine   Transdermal Q8H     losartan  25 mg Oral BID     polyethylene glycol  17 g Oral BID     senna-docusate  1 tablet Oral BID    Or     senna-docusate  2 tablet Oral BID     sodium chloride (PF)  3 mL Intravenous Q8H       Physical Exam:     Admit Weight: 83 kg (182 lb 15.7 oz)    Current vitals:   BP (!) 149/84   Pulse 87   Temp 98.5  F (36.9  C) (Oral)   Resp 14   Ht 1.803 m (5' 11\")   Wt 81.2 kg (179 lb 0.2 oz)   SpO2 96%   BMI 24.97 kg/m        Vital sign ranges:    Temp:  [97.7  F (36.5  C)-98.8  F (37.1  C)] 98.5  F (36.9  C)  Pulse:  [80-91] 87  Resp:  [14-16] 14  BP: (127-168)/(68-88) 149/84  SpO2:  [95 %-99 %] 96 %  Patient Vitals for the past 24 hrs:   BP Temp Temp src Pulse Resp SpO2   02/24/21 0435 (!) 149/84 98.5  F (36.9  C) Oral 87 14 96 %   02/23/21 2338 (!) 144/84 97.7  F (36.5  C) Oral 91 16 99 %   02/23/21 1900 (!) 149/68 98.4  F (36.9  C) Oral 85 16 95 %   02/23/21 1613 138/88 98.3  F (36.8  C) Oral 86 16 98 %   02/23/21 1227 127/83 97.8  F (36.6  C) Oral 83 16 99 %   02/23/21 0937 (!) 161/85 -- -- 80 16 96 %   02/23/21 0716 (!) 168/85 98.8  F (37.1  C) Oral 86 16 96 %     General Appearance: in no apparent distress.   Skin: normal  Heart: Perfused  Lungs: Unlabored, RA  Abdomen: The abdomen is rounded and semifirm, NTTP, rare bs, tympanic. incision c/d/i.  : anuric  Extremities: edema: absent.  +fistula RUE  Neurologic: awake, alert " and oriented x4. Tremor absent.    Data:   CMP  Recent Labs   Lab 02/24/21  0553 02/23/21  0622 02/19/21  2250 02/19/21  2250 02/19/21  1130    137   < > 133 133   POTASSIUM 4.4 4.3   < > 5.9* 5.2   CHLORIDE 101 102   < > 100 98   CO2 25 27   < > 24 30   * 110*   < > 130* 109*   BUN 26 18   < > 36* 31*   CR 7.34* 5.69*   < > 8.47* 8.17*   GFRESTIMATED 8* 11*   < > 7* 7*   GFRESTBLACK 9* 12*   < > 8* 8*   MEERA 8.1* 8.2*   < > 8.0* 8.7   MAG  --   --   --  2.3  --    PHOS  --  4.0  --  5.9*  --    LIPASE  --   --   --   --  66*   ALBUMIN  --   --   --   --  2.3*   BILITOTAL  --   --   --   --  0.4   ALKPHOS  --   --   --   --  76   AST  --   --   --   --  34   ALT  --   --   --   --  16    < > = values in this interval not displayed.     CBC  Recent Labs   Lab 02/24/21  0553 02/23/21 0622   HGB 7.5* 7.2*   WBC 5.3 5.1    101*     COAGS  Recent Labs   Lab 02/19/21 2250 02/19/21  1528   INR 1.80* 1.85*   PTT 39* 41*      Urinalysis  Recent Labs   Lab Test 01/07/21  1241 02/21/20  0859 08/22/19  1033 01/24/16  1435 01/24/16  1435   COLOR Yellow  --   --   --  Yellow   APPEARANCE Clear  --   --   --  Clear   URINEGLC Negative  --   --   --  Negative   URINEBILI Negative  --   --   --  Negative   URINEKETONE Negative  --   --   --  Negative   SG 1.013  --   --   --  1.010   UBLD Negative  --   --   --  Negative   URINEPH 5.5  --   --   --  6.0   PROTEIN 30*  --   --   --  Negative   NITRITE Negative  --   --   --  Negative   LEUKEST Trace*  --   --   --  Negative   RBCU 1  --   --   --  1   WBCU 5  --   --   --  4*   UTPG  --  0.37* 0.54*   < >  --     < > = values in this interval not displayed.     Virology:  CMV DNA Quantitation Specimen   Date Value Ref Range Status   11/09/2015 EDTA PLASMA  Final     CMV IgG Antibody   Date Value Ref Range Status   01/23/2009 >160.0 EU/mL Final     Comment:     Positive for anti-CMV IgG     Hepatitis C Antibody   Date Value Ref Range Status   08/08/2018  Nonreactive NR^Nonreactive Final     Comment:     Assay performance characteristics have not been established for newborns,   infants, and children       Hep B Surface Farrah   Date Value Ref Range Status   01/23/2009 0.3  Final     Comment:     Negative, No antibody detected when the value is less than 5.0 mlU/mL.     BK Virus Result   Date Value Ref Range Status   01/23/2019 BK Virus DNA Not Detected BKNEG^BK Virus DNA Not Detected copies/mL Final   08/08/2018 BK Virus DNA Not Detected BKNEG^BK Virus DNA Not Detected copies/mL Final   05/08/2018 BK Virus DNA Not Detected BKNEG^BK Virus DNA Not Detected copies/mL Final   08/03/2017 BK Virus DNA Not Detected BKNEG copies/mL Final   12/07/2015 BK Virus DNA Not Detected BKNEG copies/mL Final   11/23/2015 BK Virus DNA Not Detected BKNEG copies/mL Final   10/22/2015 BK Virus DNA Not Detected BKNEG copies/mL Final   07/08/2015 BK Virus DNA Not Detected BKNEG copies/mL Final   05/14/2015 BK Virus DNA Not Detected BKNEG copies/mL Final   05/07/2015 BK Virus DNA Not Detected BKNEG copies/mL Final   04/10/2015 BK Virus DNA Not Detected BKNEG copies/mL Final

## 2021-02-25 ENCOUNTER — APPOINTMENT (OUTPATIENT)
Dept: GENERAL RADIOLOGY | Facility: CLINIC | Age: 51
DRG: 659 | End: 2021-02-25
Attending: SURGERY
Payer: COMMERCIAL

## 2021-02-25 ENCOUNTER — APPOINTMENT (OUTPATIENT)
Dept: CARDIOLOGY | Facility: CLINIC | Age: 51
DRG: 659 | End: 2021-02-25
Attending: NURSE PRACTITIONER
Payer: COMMERCIAL

## 2021-02-25 LAB
ABO + RH BLD: NORMAL
ABO + RH BLD: NORMAL
ALBUMIN SERPL-MCNC: 2.2 G/DL (ref 3.4–5)
ALP SERPL-CCNC: 72 U/L (ref 40–150)
ALT SERPL W P-5'-P-CCNC: 15 U/L (ref 0–70)
ANION GAP SERPL CALCULATED.3IONS-SCNC: 7 MMOL/L (ref 3–14)
AST SERPL W P-5'-P-CCNC: 26 U/L (ref 0–45)
BILIRUB DIRECT SERPL-MCNC: 0.2 MG/DL (ref 0–0.2)
BILIRUB SERPL-MCNC: 0.6 MG/DL (ref 0.2–1.3)
BLD GP AB SCN SERPL QL: NORMAL
BLD PROD TYP BPU: NORMAL
BLD PROD TYP BPU: NORMAL
BLD UNIT ID BPU: 0
BLOOD BANK CMNT PATIENT-IMP: NORMAL
BLOOD PRODUCT CODE: NORMAL
BPU ID: NORMAL
BUN SERPL-MCNC: 18 MG/DL (ref 7–30)
CALCIUM SERPL-MCNC: 8 MG/DL (ref 8.5–10.1)
CHLORIDE SERPL-SCNC: 100 MMOL/L (ref 94–109)
CO2 SERPL-SCNC: 27 MMOL/L (ref 20–32)
CREAT SERPL-MCNC: 5.74 MG/DL (ref 0.66–1.25)
DEPRECATED CALCIDIOL+CALCIFEROL SERPL-MC: 44 UG/L (ref 20–75)
ERYTHROCYTE [DISTWIDTH] IN BLOOD BY AUTOMATED COUNT: 15.2 % (ref 10–15)
GFR SERPL CREATININE-BSD FRML MDRD: 11 ML/MIN/{1.73_M2}
GLUCOSE BLDC GLUCOMTR-MCNC: 100 MG/DL (ref 70–99)
GLUCOSE BLDC GLUCOMTR-MCNC: 102 MG/DL (ref 70–99)
GLUCOSE BLDC GLUCOMTR-MCNC: 93 MG/DL (ref 70–99)
GLUCOSE BLDC GLUCOMTR-MCNC: 99 MG/DL (ref 70–99)
GLUCOSE SERPL-MCNC: 104 MG/DL (ref 70–99)
HCT VFR BLD AUTO: 23 % (ref 40–53)
HGB BLD-MCNC: 6.8 G/DL (ref 13.3–17.7)
INR PPP: 1.6 (ref 0.86–1.14)
MAGNESIUM SERPL-MCNC: 2 MG/DL (ref 1.6–2.3)
MCH RBC QN AUTO: 26.8 PG (ref 26.5–33)
MCHC RBC AUTO-ENTMCNC: 29.6 G/DL (ref 31.5–36.5)
MCV RBC AUTO: 91 FL (ref 78–100)
NUM BPU REQUESTED: 1
PHOSPHATE SERPL-MCNC: 4 MG/DL (ref 2.5–4.5)
PLATELET # BLD AUTO: ABNORMAL 10E9/L (ref 150–450)
PLATELET # BLD AUTO: NORMAL 10E9/L (ref 150–450)
POTASSIUM SERPL-SCNC: 4.2 MMOL/L (ref 3.4–5.3)
PREALB SERPL IA-MCNC: 12 MG/DL (ref 15–45)
PROT SERPL-MCNC: 6.4 G/DL (ref 6.8–8.8)
RBC # BLD AUTO: 2.54 10E12/L (ref 4.4–5.9)
SODIUM SERPL-SCNC: 134 MMOL/L (ref 133–144)
SPECIMEN EXP DATE BLD: NORMAL
TRANSFUSION STATUS PATIENT QL: NORMAL
TRANSFUSION STATUS PATIENT QL: NORMAL
VIT B12 SERPL-MCNC: 5160 PG/ML (ref 193–986)
WBC # BLD AUTO: 4.9 10E9/L (ref 4–11)

## 2021-02-25 PROCEDURE — 250N000013 HC RX MED GY IP 250 OP 250 PS 637: Performed by: INTERNAL MEDICINE

## 2021-02-25 PROCEDURE — 250N000013 HC RX MED GY IP 250 OP 250 PS 637: Performed by: STUDENT IN AN ORGANIZED HEALTH CARE EDUCATION/TRAINING PROGRAM

## 2021-02-25 PROCEDURE — 99233 SBSQ HOSP IP/OBS HIGH 50: CPT | Performed by: INTERNAL MEDICINE

## 2021-02-25 PROCEDURE — 85027 COMPLETE CBC AUTOMATED: CPT | Performed by: NURSE PRACTITIONER

## 2021-02-25 PROCEDURE — 82306 VITAMIN D 25 HYDROXY: CPT | Performed by: NURSE PRACTITIONER

## 2021-02-25 PROCEDURE — 99221 1ST HOSP IP/OBS SF/LOW 40: CPT | Mod: 25 | Performed by: INTERNAL MEDICINE

## 2021-02-25 PROCEDURE — 120N000011 HC R&B TRANSPLANT UMMC

## 2021-02-25 PROCEDURE — 250N000013 HC RX MED GY IP 250 OP 250 PS 637: Performed by: NURSE PRACTITIONER

## 2021-02-25 PROCEDURE — 86850 RBC ANTIBODY SCREEN: CPT | Performed by: TRANSPLANT SURGERY

## 2021-02-25 PROCEDURE — 84630 ASSAY OF ZINC: CPT | Performed by: NURSE PRACTITIONER

## 2021-02-25 PROCEDURE — 93308 TTE F-UP OR LMTD: CPT | Mod: 26 | Performed by: INTERNAL MEDICINE

## 2021-02-25 PROCEDURE — 250N000013 HC RX MED GY IP 250 OP 250 PS 637: Performed by: SURGERY

## 2021-02-25 PROCEDURE — 82607 VITAMIN B-12: CPT | Performed by: NURSE PRACTITIONER

## 2021-02-25 PROCEDURE — 83735 ASSAY OF MAGNESIUM: CPT | Performed by: NURSE PRACTITIONER

## 2021-02-25 PROCEDURE — 84446 ASSAY OF VITAMIN E: CPT | Performed by: NURSE PRACTITIONER

## 2021-02-25 PROCEDURE — 258N000003 HC RX IP 258 OP 636: Performed by: INTERNAL MEDICINE

## 2021-02-25 PROCEDURE — 93321 DOPPLER ECHO F-UP/LMTD STD: CPT | Mod: 26 | Performed by: INTERNAL MEDICINE

## 2021-02-25 PROCEDURE — 86900 BLOOD TYPING SEROLOGIC ABO: CPT | Performed by: TRANSPLANT SURGERY

## 2021-02-25 PROCEDURE — 82248 BILIRUBIN DIRECT: CPT | Performed by: NURSE PRACTITIONER

## 2021-02-25 PROCEDURE — 85049 AUTOMATED PLATELET COUNT: CPT | Performed by: TRANSPLANT SURGERY

## 2021-02-25 PROCEDURE — 250N000009 HC RX 250: Performed by: NURSE PRACTITIONER

## 2021-02-25 PROCEDURE — 85610 PROTHROMBIN TIME: CPT | Performed by: NURSE PRACTITIONER

## 2021-02-25 PROCEDURE — 84425 ASSAY OF VITAMIN B-1: CPT | Performed by: NURSE PRACTITIONER

## 2021-02-25 PROCEDURE — 999N000128 HC STATISTIC PERIPHERAL IV START W/O US GUIDANCE

## 2021-02-25 PROCEDURE — 36415 COLL VENOUS BLD VENIPUNCTURE: CPT | Performed by: NURSE PRACTITIONER

## 2021-02-25 PROCEDURE — 84100 ASSAY OF PHOSPHORUS: CPT | Performed by: NURSE PRACTITIONER

## 2021-02-25 PROCEDURE — P9016 RBC LEUKOCYTES REDUCED: HCPCS | Performed by: TRANSPLANT SURGERY

## 2021-02-25 PROCEDURE — 82525 ASSAY OF COPPER: CPT | Performed by: NURSE PRACTITIONER

## 2021-02-25 PROCEDURE — 86901 BLOOD TYPING SEROLOGIC RH(D): CPT | Performed by: TRANSPLANT SURGERY

## 2021-02-25 PROCEDURE — 84134 ASSAY OF PREALBUMIN: CPT | Performed by: NURSE PRACTITIONER

## 2021-02-25 PROCEDURE — 80053 COMPREHEN METABOLIC PANEL: CPT | Performed by: NURSE PRACTITIONER

## 2021-02-25 PROCEDURE — 93321 DOPPLER ECHO F-UP/LMTD STD: CPT

## 2021-02-25 PROCEDURE — 74019 RADEX ABDOMEN 2 VIEWS: CPT | Mod: 26

## 2021-02-25 PROCEDURE — 36415 COLL VENOUS BLD VENIPUNCTURE: CPT | Performed by: TRANSPLANT SURGERY

## 2021-02-25 PROCEDURE — 250N000009 HC RX 250: Performed by: TRANSPLANT SURGERY

## 2021-02-25 PROCEDURE — 250N000011 HC RX IP 250 OP 636: Performed by: NURSE PRACTITIONER

## 2021-02-25 PROCEDURE — 84590 ASSAY OF VITAMIN A: CPT | Performed by: NURSE PRACTITIONER

## 2021-02-25 PROCEDURE — 90937 HEMODIALYSIS REPEATED EVAL: CPT

## 2021-02-25 PROCEDURE — 74019 RADEX ABDOMEN 2 VIEWS: CPT

## 2021-02-25 PROCEDURE — 999N001017 HC STATISTIC GLUCOSE BY METER IP

## 2021-02-25 PROCEDURE — 86923 COMPATIBILITY TEST ELECTRIC: CPT | Performed by: TRANSPLANT SURGERY

## 2021-02-25 RX ORDER — ALBUMIN (HUMAN) 12.5 G/50ML
50 SOLUTION INTRAVENOUS
Status: DISCONTINUED | OUTPATIENT
Start: 2021-02-25 | End: 2021-02-25

## 2021-02-25 RX ADMIN — ACETAMINOPHEN 650 MG: 325 TABLET, FILM COATED ORAL at 18:41

## 2021-02-25 RX ADMIN — POLYETHYLENE GLYCOL 3350 17 G: 17 POWDER, FOR SOLUTION ORAL at 12:27

## 2021-02-25 RX ADMIN — I.V. FAT EMULSION 250 ML: 20 EMULSION INTRAVENOUS at 20:33

## 2021-02-25 RX ADMIN — METHOCARBAMOL 750 MG: 750 TABLET, FILM COATED ORAL at 23:15

## 2021-02-25 RX ADMIN — ACETAMINOPHEN 650 MG: 325 TABLET, FILM COATED ORAL at 12:29

## 2021-02-25 RX ADMIN — HYDRALAZINE HYDROCHLORIDE 50 MG: 25 TABLET, FILM COATED ORAL at 18:34

## 2021-02-25 RX ADMIN — SODIUM CHLORIDE 300 ML: 9 INJECTION, SOLUTION INTRAVENOUS at 13:39

## 2021-02-25 RX ADMIN — DOCUSATE SODIUM 50 MG AND SENNOSIDES 8.6 MG 2 TABLET: 8.6; 5 TABLET, FILM COATED ORAL at 12:28

## 2021-02-25 RX ADMIN — HYDRALAZINE HYDROCHLORIDE 50 MG: 25 TABLET, FILM COATED ORAL at 23:15

## 2021-02-25 RX ADMIN — METHOCARBAMOL 750 MG: 750 TABLET, FILM COATED ORAL at 12:29

## 2021-02-25 RX ADMIN — HEPARIN SODIUM 5000 UNITS: 5000 INJECTION, SOLUTION INTRAVENOUS; SUBCUTANEOUS at 20:46

## 2021-02-25 RX ADMIN — METHOCARBAMOL 750 MG: 750 TABLET, FILM COATED ORAL at 18:41

## 2021-02-25 RX ADMIN — SODIUM CHLORIDE 250 ML: 9 INJECTION, SOLUTION INTRAVENOUS at 13:40

## 2021-02-25 RX ADMIN — ACETAMINOPHEN 650 MG: 325 TABLET, FILM COATED ORAL at 23:15

## 2021-02-25 RX ADMIN — DOCUSATE SODIUM 50 MG AND SENNOSIDES 8.6 MG 2 TABLET: 8.6; 5 TABLET, FILM COATED ORAL at 20:39

## 2021-02-25 RX ADMIN — LOSARTAN POTASSIUM 25 MG: 25 TABLET, FILM COATED ORAL at 20:39

## 2021-02-25 RX ADMIN — ASCORBIC ACID, VITAMIN A PALMITATE, CHOLECALCIFEROL, THIAMINE HYDROCHLORIDE, RIBOFLAVIN-5 PHOSPHATE SODIUM, PYRIDOXINE HYDROCHLORIDE, NIACINAMIDE, DEXPANTHENOL, ALPHA-TOCOPHEROL ACETATE, VITAMIN K1, FOLIC ACID, BIOTIN, CYANOCOBALAMIN: 200; 3300; 200; 6; 3.6; 6; 40; 15; 10; 150; 600; 60; 5 INJECTION, SOLUTION INTRAVENOUS at 20:33

## 2021-02-25 RX ADMIN — CLONIDINE HYDROCHLORIDE 0.2 MG: 0.1 TABLET ORAL at 20:45

## 2021-02-25 RX ADMIN — HYDRALAZINE HYDROCHLORIDE 50 MG: 25 TABLET, FILM COATED ORAL at 06:00

## 2021-02-25 RX ADMIN — ACETAMINOPHEN 650 MG: 325 TABLET, FILM COATED ORAL at 00:46

## 2021-02-25 RX ADMIN — POLYETHYLENE GLYCOL 3350 17 G: 17 POWDER, FOR SOLUTION ORAL at 20:39

## 2021-02-25 RX ADMIN — BISACODYL 10 MG: 10 SUPPOSITORY RECTAL at 09:21

## 2021-02-25 ASSESSMENT — MIFFLIN-ST. JEOR
SCORE: 1667.13
SCORE: 1697.13

## 2021-02-25 ASSESSMENT — ACTIVITIES OF DAILY LIVING (ADL)
ADLS_ACUITY_SCORE: 12
ADLS_ACUITY_SCORE: 14
ADLS_ACUITY_SCORE: 12

## 2021-02-25 NOTE — PROVIDER NOTIFICATION
"Action: sent text page @2:08 AM to 0254:CALLY MckeonIliana [id number]: patient complaining of severe pain. Can he get anything? thanks - Sofi [phone].   Result: Provider Dr. Saul Rush to bedside to examine patient. Provider strongly encouraged replacing NG tube. Patient refusing NG at this time but requesting \"extra-strength tylenol\". Dr. Rush told pt he would review chart and place orders for additional analgesics as appropriate. No new orders were placed.   "

## 2021-02-25 NOTE — PLAN OF CARE
"[id number]    Patient in a lot of pain at 1900 do to abdominal distension/SBO. NG was placed previous to my shift and xray verification came back requiring advancement by 4cm. Tube then placed to suction per on call MD and by 2000, patient stated \"the tube moved in his throat\" and was \"making him gag\". He then said he could taste the output and wanted the tube out now before he choked. NG removed, patient has been more comfortable since. A total of 200cc of brown output came from NG before it was out.    Patient then took tylenol and robaxin for pain and used positioning to stay comfortable in the room. PPN/lipids started late due to IV pump arrival. Melatonin ordered for sleep.   "

## 2021-02-25 NOTE — CONSULTS
Cardiology Consult                                                               2021  Tristian Mckeon MRN: [id number]  Age: 50 year old, : 1970        Reason for consult:      Pericardial effusion w/o tamponade        Assessment and Recommendation:     49 yo male with history of HTN, ACD, tobacco use disorder, ESRD s/p kidney transplant x 3 who presented with graft failure/ renal vein thrombosis who is now s/p graft nephrectomy. He had prior TTE showing a large pericardial effusion last month, repeat TTE today again demonstrates large effusion. He has been HD stable though. Unclear etiology, likely from kidney failure and volume overload. Also with some moderate ascites on CT a/p, demonstrating that he is 3rd spacing. For now, no therapeutic or diagnostic pericardiocentesis. TSH  wnl    Would get TTE again next week  If it remains persistent despite increased ultrafiltration, can then consider diagnostic pericardiocentesis  Fluid management/dialysis UF per nephrology    Cardiology will sign off, please contact us if there are further questions or concerns        Patient discussed with staff attending, Dr. Leal and the note reflects our joint plan. Thank you for consulting the cardiovascular services at the [facility]. Please do not hesitate to call with questions or concerns.     Ben Killian MD MPH  PGY3          History of Present Illness:     From H&P on admission:    Tristian Mckeon is a 50 year old male with PMH significant for HTN, ACD, Tobacco use s/p kidney transplant x3. First two transplants and graft nephrectomies were at [facility] [place]. His last transplant was at [facility] in 3/12/2015 with subsequent graft failure now back on dialysis [id number].  He currently dialyzes MWF via a RUE fistula. He missed Wed and reports he last dialyzed on .  He reports pain ongoing with intermittent hematuria for about 1 month.  He reports pain  worsened over the last 24 hrs and this warranted his presentation to the ED.     A 10-point ROS is negative except as mentioned above      Past Medical History:     Patient Active Problem List   Diagnosis     HTN, kidney transplant related     End stage renal disease (H)     Tobacco abuse     Depression     Kidney replaced by transplant     Immunosuppression (H)     Anemia in chronic renal disease     Aftercare following organ transplant     Secondary renal hyperparathyroidism (H)     Vitamin D deficiency     Kidney transplant rejection     Steroid-induced hyperglycemia     Metabolic acidosis     Antibody mediated rejection of kidney transplant     Hyperglycemia     Hemorrhage following kidney biopsy     Thrombocytopenia (H)     CKD (chronic kidney disease) stage 4, GFR 15-29 ml/min (H)     Anemia of chronic renal failure, stage 4 (severe) (H)     Dyspnea on exertion     Shortness of breath     Hypervolemia, unspecified hypervolemia type     Renal vein thrombosis (H)     S/p nephrectomy     Acute post-operative pain     Nonadherence to medication     Hyperkalemia     Essential hypertension         Past Surgical History:      Past Surgical History:   Procedure Laterality Date     BIOPSY      Kidney     C HIP CORE DECOMPRESSION Left 2000     C TOTAL HIP ARTHROPLASTY Right      CYSTOSCOPY, REMOVE STENT(S), COMBINED Right 4/22/2015    Procedure: COMBINED CYSTOSCOPY, REMOVE STENT(S);  Surgeon: Ren Romeo MD;  Location: [facility] OR     GASTRIC BYPASS  7/12/2005     HERNIA REPAIR Right     inguinal     HYDROCELECTOMY INGUINAL Right      LAPAROTOMY EXPLORATORY N/A 2/19/2021    Procedure: LAPAROTOMY;  Surgeon: Ren Romeo MD;  Location: [facility] OR     NEPHRECTOMY Right 1998    allograft nephrectomy     NEPHRECTOMY Left 2007    allograft nephrectomy     NEPHRECTOMY N/A 2/19/2021    Procedure: NEPHRECTOMY, TOTAL;  Surgeon: Ren Romeo MD;  Location: [facility] OR     PARATHYROIDECTOMY       TRANSPLANT KIDNEY RECIPIENT  " DONOR N/A 3/12/2015    Procedure: TRANSPLANT KIDNEY RECIPIENT  DONOR;  Surgeon: Ren Romeo MD;  Location: [facility] OR     TRANSPLANT KIDNEY RECIPIENT LIVING RELATED      s/p right allograft nephrectomy     TRANSPLANT KIDNEY RECIPIENT LIVING RELATED      s/p left allograft nephrectomy     VASCULAR SURGERY           Social History:     Social History     Socioeconomic History     Marital status: Single     Spouse name: Not on file     Number of children: 2     Years of education: Not on file     Highest education level: Not on file   Occupational History     Employer: UNEMPLOYED   Social Needs     Financial resource strain: Not on file     Food insecurity     Worry: Not on file     Inability: Not on file     Transportation needs     Medical: Not on file     Non-medical: Not on file   Tobacco Use     Smoking status: Light Tobacco Smoker     Types: Cigars     Smokeless tobacco: Never Used     Tobacco comment: \"one a day\"   Substance and Sexual Activity     Alcohol use: Yes     Alcohol/week: 0.0 standard drinks     Comment: Minimal     Drug use: No     Sexual activity: Yes     Partners: Female   Lifestyle     Physical activity     Days per week: Not on file     Minutes per session: Not on file     Stress: Not on file   Relationships     Social connections     Talks on phone: Not on file     Gets together: Not on file     Attends Jain service: Not on file     Active member of club or organization: Not on file     Attends meetings of clubs or organizations: Not on file     Relationship status: Not on file     Intimate partner violence     Fear of current or ex partner: Not on file     Emotionally abused: Not on file     Physically abused: Not on file     Forced sexual activity: Not on file   Other Topics Concern     Parent/sibling w/ CABG, MI or angioplasty before 65F 55M? Not Asked      Service Not Asked     Blood Transfusions Yes     Caffeine Concern No     Occupational Exposure " Not Asked     Hobby Hazards Not Asked     Sleep Concern Not Asked     Stress Concern Not Asked     Weight Concern Not Asked     Special Diet Not Asked     Back Care Not Asked     Exercise Not Asked     Bike Helmet Not Asked     Seat Belt Not Asked     Self-Exams Not Asked   Social History Narrative     Not on file         Family History:     Family History   Problem Relation Age of Onset     Diabetes Brother      Blood Disease Sister         sickle cell     Hypertension Father      Heart Disease Father      Cerebrovascular Disease Brother      Arthritis Mother      Heart Disease Mother          Allergies:     Allergies   Allergen Reactions     Cephalosporins Unknown     Cyclosporine      Duricef [Cefadroxil]      Oxycodone Itching         Medications:     No current facility-administered medications on file prior to encounter.   bumetanide (BUMEX) 1 MG tablet, Take 2 tablets (2 mg) by mouth 2 times daily  calcitRIOL (ROCALTROL) 0.25 MCG capsule, TAKE ONE CAPSULE BY MOUTH ONCE DAILY  Calcium Acetate 667 MG TABS, Take 2 tablets by mouth 3 times daily (with meals)  cloNIDine (CATAPRES) 0.1 MG tablet, Take 0.2 mg by mouth 2 times daily  Cyanocobalamin (VITAMIN B-12 SL), Place 1 tablet under the tongue daily  cyclobenzaprine (FLEXERIL) 10 MG tablet, Take 10 mg by mouth 3 times daily as needed for muscle spasms  diltiazem ER COATED BEADS (CARTIA XT) 240 MG 24 hr capsule, TAKE ONE CAPSULE BY MOUTH ONCE DAILY  ferrous sulfate (IRON) 325 (65 Fe) MG tablet, Take 1 tablet (325 mg) by mouth daily (Patient taking differently: Take 1 tablet by mouth every other day )  hydrALAZINE (APRESOLINE) 50 MG tablet, Take 1 tablet (50 mg) by mouth 3 times daily Do not take if systolic blood pressure is less than 120.  losartan (COZAAR) 25 MG tablet, Take 25 mg by mouth 2 times daily   Multiple Vitamin (DAILY MULTIVITAMIN PO), Take 1 tablet by mouth daily   multivitamin RENAL (RENAVITE RX/NEPHROVITE) 1 MG tablet, Take 1 tablet by mouth  daily  mycophenolic acid (GENERIC EQUIVALENT) 360 MG EC tablet, Take 1,080 mg by mouth 2 times daily  predniSONE (DELTASONE) 5 MG tablet, Take 1 tablet (5 mg) by mouth daily  tacrolimus (ASTAGRAF XL) 1 MG 24 hr capsule, Take 3 mg by mouth every morning (before breakfast) Take with 25mg for total dose of 28mg daily  tacrolimus (ASTAGRAF XL) 5 MG 24 hr capsule, Take 5 capsules (25 mg) by mouth every morning (before breakfast)            Physical Exam:     B/P: 142/84, T: 98.1, P: 88, R: 23    Wt Readings from Last 4 Encounters:   02/25/21 81 kg (178 lb 9.2 oz)   01/11/21 86.6 kg (191 lb)   01/07/21 92.1 kg (203 lb)   01/26/21 88 kg (194 lb 0.1 oz)         Intake/Output Summary (Last 24 hours) at 2/25/2021 1552  Last data filed at 2/25/2021 1234  Gross per 24 hour   Intake 876.93 ml   Output 200 ml   Net 676.93 ml       Gen: AA&Ox3, no acute distress  HEENT: Neck supple. Mucous membranes pink, moist without plaque or exudate  PULM/THORAX: Non labored respirations.  CV: No significant JVD  ABD: Midline scar from prior nephrectomy  EXT: No edema,  NEURO: Non focal deficits, strength 5/5 throughout.      Data:     Labs Reviewed on Admission    Troponin   Lab Results   Component Value Date    TROPI <0.015 01/08/2021    TROPI 0.015 01/07/2021    TROPI 0.040 11/20/2015     BMP  Recent Labs   Lab 02/25/21  0539 02/24/21  0553 02/23/21  0622 02/22/21  0657    134 137 134   POTASSIUM 4.2 4.4 4.3 5.0   CHLORIDE 100 101 102 101   MEERA 8.0* 8.1* 8.2* 7.9*   CO2 27 25 27 23   BUN 18 26 18 38*   CR 5.74* 7.34* 5.69* 8.63*   * 107* 110* 139*     CBC  Recent Labs   Lab 02/25/21  0723 02/25/21  0539 02/24/21  0738 02/24/21  0553 02/23/21  0622 02/22/21  0657   WBC  --  4.9  --  5.3 5.1 6.5   RBC  --  2.54*  --  2.72* 2.66* 2.78*   HGB  --  6.8*  --  7.5* 7.2* 7.7*   HCT  --  23.0*  --  24.9* 23.8* 25.3*   MCV  --  91  --  92 90 91   MCH  --  26.8  --  27.6 27.1 27.7   MCHC  --  29.6*  --  30.1* 30.3* 30.4*   RDW  --  15.2*   --  15.3* 15.1* 15.0   PLT Platelets clumped, platelet count unavailable Platelets clumped, platelet count unavailable 156 Platelets clumped, platelet count unavailable 101* Platelets clumped, platelet count unavailable     INR  Recent Labs   Lab 02/25/21  0539 02/19/21  2250 02/19/21  1528   INR 1.60* 1.80* 1.85*      Hepatic Panel   Lab Results   Component Value Date    AST 26 02/25/2021     Lab Results   Component Value Date    ALT 15 02/25/2021     Lab Results   Component Value Date    BILICONJ 0.0 01/23/2009      Lab Results   Component Value Date    BILITOTAL 0.6 02/25/2021     Lab Results   Component Value Date    ALBUMIN 2.2 02/25/2021     Lab Results   Component Value Date    PROTTOTAL 6.4 02/25/2021      Lab Results   Component Value Date    ALKPHOS 72 02/25/2021           Most Recent Imaging:     Echo 2/25/21    Interpretation Summary  Limited echo to evaluate for pericardial effusion.     Left ventricular function, chamber size, wall motion, and wall thickness are  normal.The EF is 60-65%.  No regional wall motion abnormalities are seen.  Right ventricular function, chamber size, wall motion, and thickness are  normal.  large circumferential pericardial effusion is present. On On 1 tomogram there  is a 30% inflow variation across the mitral valve yet on repeat images this is  not seen. There is no evidence for chanmer collapse. The IVC is normal in size  and with preserved respiratory variation.     Compared to prior TTE the pericardial effusion is slightly larger at 2.35cm  but IVC isnow normal in size and respiratory variation.

## 2021-02-25 NOTE — PLAN OF CARE
7A    PT: Orders received, chart reviewed, eval attempted. Waiting transfusion in AM, going down for dialysis during PM check back. Will reschedule.

## 2021-02-25 NOTE — PROGRESS NOTES
"CLINICAL NUTRITION SERVICES - ASSESSMENT NOTE     Nutrition Prescription    RECOMMENDATIONS FOR MDs/PROVIDERS TO ORDER:  Consider PICC line placement to provide more of patient's nutritional needs.     Recommend checking thiamine (B1), vitamin A, D, E, B12, zinc and copper levels  Thiamine deficiency can cause wet beriberi (involving cardiovascular system).  Could consider trial of thiamine IV/injection of 100 mg x 3-5 days and evaluate for improvement.     Start renal MVI d/t losses with dialysis.     Start vitamin B 12 sublingual 500 mcg daily d/t hx gastric bypass.      Malnutrition Status:    Severe malnutrition in the context of acute on chronic illness    Future/Additional Recommendations:  TPN recommendations: 960 ml (40 ml/hr) - Dex 175 grams,  grams + 250 ml IV lipids daily.  Pending lytes/BG, recommend advancement of dextrose by 40-50 grams each day to goal dextrose of 335 grams/day.  Goal regimen will provide 2039 kcal (25 kcal/kg), 1.2g/kg protein, 25% kcal from fat.  Micronutrient provisions pending recommended lab work above.     Trial of oral protein supplements upon diet advancement  Consider calorie counts upon diet advancement      REASON FOR ASSESSMENT  Tristian Mckeon is a/an 50 year old male assessed by the dietitian for Pharmacy/Nutrition to Start and Manage PN    NUTRITION HISTORY  Hx lauryn en y gastric bypass (2005).     He notes he was eating poorly prior to admission for graft nephrectomy for a long time. He reports he works 1 meal/day d/t time constraints and working schedule.  He has a strenuous job and doesn't eat during his work day.  He reports not consuming enough protein, fruits or vegetables.  He limits his K+/Phos intake d/t CKD/dialysis.      When he is not working, he eats ~2 meals/day and snacks frequently, but reports his snacking is usually with chips.  He reports oral supplements like Boost Plus are \"too sweet\" and cause abdominal discomfort.  He reports he takes MVI, " "calcium, iron and B12 d/t his hx of gastric bypass (but these were not listed on his outpatient med list).      CURRENT NUTRITION ORDERS  Diet: NPO    Clinimix peripheral formula @ 40 ml/hr + 250 ml IV lipids daily  -provides 827 kcal/day (10 kcal/kg), 41 grams protein (0.5g/kg), 48 grams dextrose  -meets 40% kcal and protein needs    Intake/Tolerance: Patient was NPO 2/19-2/20 s/p graft nephrectomy then advanced to regular diet 2/20-2/24. His PO intake during his regular diet ranged from 0-100% of meals consumed. His appetite/intake was noted to be mostly poor by nursing. He notes that he was eating just enough to make him not feel hungry.  He estimates that his intake during this time was ~25-50% of his usual intake (which of note at baseline is inadequate). Patient removed NGT this morning.     LABS  CT scan (2/24) - distal small bowel obstruction, moderate ascites, diffuse anasarca  Iron 23 (low) - 2/22/21    MEDICATIONS  Ferrous sulfate 325 mg daily  Miralax BID    ANTHROPOMETRICS  Height: 180.3 cm (5' 11\")  Most Recent Weight: 81 kg (178 lb 9.2 oz)    IBW: 78.2 kg  BMI: Normal BMI  Weight History: Patient with 2 kg (2.4%) weight loss in the last ~1 week since admission. Down 6.8% within the last 6 weeks per chart review.   Dosing Weight: 81 kg (actual wt)    ASSESSED NUTRITION NEEDS  Estimated Energy Needs: 7164-4015 kcals/day (25 - 30 kcals/kg)  Justification: Maintenance  Estimated Protein Needs:  grams protein/day (1.2-1.5 grams of pro/kg)  Justification: Dialysis  Estimated Fluid Needs: per MD    PHYSICAL FINDINGS  See malnutrition section below.    MALNUTRITION  % Intake: </= 50% for >/= 5 days (severe)  % Weight Loss: > 2% in 1 week (severe)  Subcutaneous Fat Loss: Thoracic/intercostal:  moderate  Muscle Loss: Temporal:  moderate, Facial & jaw region: mild, Thoracic region (clavicle, acromium bone, deltoid, trapezius, pectoral): moderate and Upper arm (bicep, tricep): moderate  Fluid " Accumulation/Edema: Moderate ascites, diffuse anasarca of unknown etiology  Malnutrition Diagnosis: Severe malnutrition in the context of acute on chronic illness    NUTRITION DIAGNOSIS  Inadequate protein-energy intake related to PPN limitations, small bowel obstruction as evidenced by patient with PPN meeting 40% of assessed nutritional needs.     INTERVENTIONS  Implementation  Nutrition Education: Provided education on the need for smaller, more frequent meals (planning for PO intake during work breaks - bringing nutrient dense foods with him).  Discussed trial of alternative oral protein supplements (lower CHO drinks, bars, etc).  Encouraged ongoing need for micronutrient supplementation as part of gastric bypass history.  Discussed PPN with patient and overall limitations without PICC line.      Collaboration with other providers - Discussed PPN with team.  Currently PPN providing ~40% of nutritional needs d/t volume restrictions.  Team will evaluate whether PICC line can be placed.  Unfortunately, with current volume status and HD requirements, likely unsafe to give more volume at this time.  Reviewed with PharmD as well.     Goals  Total avg nutritional intake to meet a minimum of 25 kcal/kg and 1.2 g PRO/kg daily (per dosing wt 81 kg).     Monitoring/Evaluation  Progress toward goals will be monitored and evaluated per protocol.    Talisha Mckeon MS, RD, LD  Pager [phone]    Addendum (3902) - Patient with slight improvements on exam, had BM/passing flatus.  Plan to place PICC if needed, possibly tomorrow pending ongoing exam.  Planning for daily dialysis d/t anasarca so team okay with slight increase in PPN volume for now.  Discussed with PharmD, so will increase Clinimix PPN from 40 ml/hr to 65 ml/hr.  New rate will provide 1029 kcal (13 kcal/kg) and 66 grams protein (0.8g/kg), meeting 50% kcal needs and 70% pro needs.

## 2021-02-25 NOTE — PLAN OF CARE
2300 - 0730    Vitals: Temp: 98.8  F (37.1  C) Temp src: Oral BP: 130/76 Pulse: 90   Resp: 16 SpO2: 98 % O2 Device: None (Room air)    Pain/Nausea: Denies nausea; endorses abdominal discomfort/pain.   Interventions: 650mg tylenol x1.   Diet: NPO except meds.   BG orders: TPN BG orders in place. 121 & 102 this shift.   LDA: LPIV, w/ppn & lipids running; Rfistula.  GI: Abdomen is distended, tender.   : Anuric on HD.   Skin: WDL.   Neuro: A&Ox4.   Mobility: Assist of 1 w/walker, gait belt.   Education: Discussed plan of care, small bowel obstructions, NGs.  Plan: Continue to monitor.     Of note this shift: Encouraged patient to allow reinsertion of NG. He declined at this time but stated that he would continue to consider it. Hgb critical this AM at 6.8. Team verbally notified, reported to on-coming RN.

## 2021-02-25 NOTE — PROVIDER NOTIFICATION
Notified on call provider about abdominal xray and possible repositioning of NG tube.     Dr. Rush said to advance NG tube 3-4cm and then turn on suction. Awaiting suction clarification.

## 2021-02-25 NOTE — PROGRESS NOTES
"[facility]   Transplant Nephrology Progress Note  Date of Admission:  2/19/2021  Today's Date: 02/25/2021    Recommendations:  -perform echo due to enlarging pericardial effusion on echo  -If echo shows that he does have an enlarging pericardial effusion would recommend cardiology consult for assistance with management and possible drainage. 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    Currently doing HD on MWF at [facility], runs for 4 hours.    -Plan for HD daily for now due to anasarca and ?uremic pericardial effusion    # graft nephrectomy:   -S/p graft nephrectomy 2/19/21 by transplant surgery. No immunosuppressants as below   -Management per transplant surgery    # Immunosuppression:    Prior to admit:         Tacrolimus extended release (goal 4-6), Mycophenolic acid (dose 1080 mg every 12 hours) and Prednisone (dose 5 mg daily)     -patient self stopped triple IS 3 weeks prior to admission which led to symptomatic rejection necessitating graft nephrectomy. This was his 3rd kidney transplant, highly sensitized PRA-100% expect uptrend in DSA post nephrectomy. Risks of restarting IS \"infectious & malignant\" outweigh potential benefit \"reduced sensitization\" as he is anuric & is not a re-transplant " candidate due to non compliance. He does not have living donors    # Small bowel obstruction:   -CT on 2/24 showed SBO with transition point in the pelvis. Pt had NG tube placement but could not tolerate it.    -Recommend replacement of NG tube     # Pericardial effusion:   -enlarging based on CT on 2/24. Recommend echo to see if it has enlarged since 1/2021. I doubt uremic effusion given that he has been regularly dialyzing. If hemodynamically significant on echo would ask cardiology to consider drainage.       # Infection Prophylaxis:   - PJP: None    # Hypertension: Controlled;  Goal BP: < 130/80   - Volume status: Euvolemic  EDW ~ 83Kg  PTA on Clonidine 0.2 mg BID, Hydralazine 50 mg TID, Losartan  25 mg BID  Diltiazem  ER  240 MG daily-taking with Astagraf-stopped >3 weeks prior to admission.  Bumex -stopped prior to admission                 - Changes: No. Stopped diltiazem. Restarted clonidine 0.2mg bid, restarted hydralazine 50mg TID, continued losartan 25mg BID. Plan for aggressive UF given anasarca found on CT on 2/24     Anemia in Chronic Renal Disease: Hgb: Trend down    RAYMOND: Yes given RAYMOND darbepoetin 40mcg on 2/22, next dose on 3/1   - Iron studies: Replete    -Transfused 1U PRBC on 2/25    # Mineral Bone Disorder:   - Secondary renal hyperparathyroidism; PTH level: Minimally elevated ( pg/ml)        On treatment: None  - Vitamin D; level:  Not checked recently     On supplement: No  - Calcium; level: Low        On supplement: No  - Phosphorus; level: High        On binder: No , check phos tmrw to see if it must be restarted      # Electrolytes:   - Potassium; level: Normal   On supplement: No, manage with HD  - Magnesium; level: Normal        On supplement: No  - Bicarbonate; level: Normal        On supplement: No, manage with HD  - Sodium; level: Low normal       # HFpEF with Pulmonary Hypertension:               - Recommend patient start weighing himself daily.               - On HD now    #  "Transplant History:  Etiology of Kidney Failure: Hypertension  Tx: DDKT  Transplant: 3/12/2015 (Kidney), 1994 (Kidney), 10/1/2000 (Kidney)   Donor Type: Donation after Brain Death        Donor Class: Standard Criteria Donor  Significant changes in immunosuppression: None  Significant transplant-related complications: Acute cellular-mediated rejection and Acute antibody-mediated rejection    Recommendations were communicated to the primary team verbally.      Cayetano Pizano MD   Pager: [phone]    Interval History   NG placed for SBO, pt wanted removal, then did not let team replace. Said it was like medieval torture       Review of Systems   4 point ROS was obtained and negative except as noted in the Interval History.    MEDICATIONS:    bisacodyl  10 mg Rectal Daily     cloNIDine  0.2 mg Oral BID     darbepoetin sharon for ESRD on Dialysis  40 mcg Intravenous Weekly     ferrous sulfate  325 mg Oral Daily     gelatin absorbable  1 each Topical During Hemodialysis (from stock)     heparin ANTICOAGULANT  5,000 Units Subcutaneous Q8H     hydrALAZINE  50 mg Oral Q8H ARJUN     lidocaine  2 patch Transdermal Q24H     lidocaine   Transdermal Q8H     lipids  250 mL Intravenous Q24H     losartan  25 mg Oral BID     - MEDICATION INSTRUCTIONS -   Does not apply Once     polyethylene glycol  17 g Oral BID     senna-docusate  1 tablet Oral BID    Or     senna-docusate  2 tablet Oral BID     sodium chloride (PF)  3 mL Intravenous Q8H       dextrose       parenteral nutrition - Clinimix E 40 mL/hr at 21 2330     - MEDICATION INSTRUCTIONS -         Physical Exam   Temp  Av.5  F (36.9  C)  Min: 97.5  F (36.4  C)  Max: 100.3  F (37.9  C)      Pulse  Av.1  Min: 78  Max: 92 Resp  Av.1  Min: 12  Max: 21  SpO2  Av.3 %  Min: 94 %  Max: 100 %     /84   Pulse 82   Temp 98.1  F (36.7  C) (Oral)   Resp 28   Ht 1.803 m (5' 11\")   Wt 81 kg (178 lb 9.2 oz)   SpO2 100%   BMI 24.91 kg/m     Date 21 0700 " - 02/21/21 0659   Shift [id number] 8319-0557 0140-0659 24 Hour Total   INTAKE   P.O. 110   110   Other 0   0   Shift Total(mL/kg) 110(1.32)   110(1.32)   OUTPUT   Other 0   0   Shift Total(mL/kg) 0(0)   0(0)   Weight (kg) 83.23 83.23 83.23 83.23      Admit Weight: 83 kg (182 lb 15.7 oz)     GENERAL APPEARANCE: alert and no distress  HENT: mouth without ulcers or lesions  LYMPHATICS: no cervical or supraclavicular nodes  RESP: lungs clear to auscultation - no rales, rhonchi or wheezes  CV: regular rhythm, normal rate, no rub, no murmur  EDEMA: no LE edema bilaterally  ABDOMEN: soft, + distended, appropriately tender to RLQ, bowel sounds hypoactive, incision with staples CDI  MS: extremities normal - no gross deformities noted, no evidence of inflammation in joints, no muscle tenderness  RUE fistula +bruit/thrill  SKIN: no rash    Data   All labs reviewed by me.  CMP  Recent Labs   Lab 02/25/21  0539 02/24/21  0553 02/23/21  0622 02/22/21  0657 02/19/21  2250 02/19/21  2250 02/19/21  1130    134 137 134   < > 133 133   POTASSIUM 4.2 4.4 4.3 5.0   < > 5.9* 5.2   CHLORIDE 100 101 102 101   < > 100 98   CO2 27 25 27 23   < > 24 30   ANIONGAP 7 8 7 10   < > 9 5   * 107* 110* 139*   < > 130* 109*   BUN 18 26 18 38*   < > 36* 31*   CR 5.74* 7.34* 5.69* 8.63*   < > 8.47* 8.17*   GFRESTIMATED 11* 8* 11* 6*   < > 7* 7*   GFRESTBLACK 12* 9* 12* 8*   < > 8* 8*   MEERA 8.0* 8.1* 8.2* 7.9*   < > 8.0* 8.7   MAG 2.0 2.2  --   --   --  2.3  --    PHOS 4.0 4.3 4.0  --   --  5.9*  --    PROTTOTAL 6.4*  --   --   --   --   --  6.9   ALBUMIN 2.2*  --   --   --   --   --  2.3*   BILITOTAL 0.6  --   --   --   --   --  0.4   ALKPHOS 72  --   --   --   --   --  76   AST 26  --   --   --   --   --  34   ALT 15  --   --   --   --   --  16    < > = values in this interval not displayed.     CBC  Recent Labs   Lab 02/25/21  0723 02/25/21  0539 02/24/21  0738 02/24/21  0553 02/23/21  0622 02/22/21  0657   HGB  --  6.8*  --  7.5* 7.2*  7.7*   WBC  --  4.9  --  5.3 5.1 6.5   RBC  --  2.54*  --  2.72* 2.66* 2.78*   HCT  --  23.0*  --  24.9* 23.8* 25.3*   MCV  --  91  --  92 90 91   MCH  --  26.8  --  27.6 27.1 27.7   MCHC  --  29.6*  --  30.1* 30.3* 30.4*   RDW  --  15.2*  --  15.3* 15.1* 15.0   PLT Platelets clumped, platelet count unavailable Platelets clumped, platelet count unavailable 156 Platelets clumped, platelet count unavailable 101* Platelets clumped, platelet count unavailable     INR  Recent Labs   Lab 02/25/21  0539 02/19/21  2250 02/19/21  1528   INR 1.60* 1.80* 1.85*   PTT  --  39* 41*     ABGNo lab results found in last 7 days.   Urine Studies  Recent Labs   Lab Test 01/07/21  1241 01/24/16  1435 09/10/15  1600 08/29/15  1425 06/25/15  0910   COLOR Yellow Yellow Light Yellow Light Yellow Light Yellow   APPEARANCE Clear Clear Clear Clear Clear   URINEGLC Negative Negative Negative Negative Negative   URINEBILI Negative Negative Negative Negative Negative   URINEKETONE Negative Negative Negative Negative Negative   SG 1.013 1.010 1.007 1.007 1.009   UBLD Negative Negative Negative Negative Negative   URINEPH 5.5 6.0 5.0 5.0 5.0   PROTEIN 30* Negative Negative Negative Negative   NITRITE Negative Negative Negative Positive* Positive*   LEUKEST Trace* Negative Negative Moderate* Moderate*   RBCU 1 1  --  <1 0   WBCU 5 4*  --  14* 11*     Recent Labs   Lab Test 02/21/20  0859 08/22/19  1033 01/23/19  0848 05/08/18  0812 08/03/17  0827 06/15/16  0905 05/19/16  1330 09/21/15  0958 09/10/15  1600 07/24/15  1136 05/07/15  0824   UTPG 0.37* 0.54* 0.76* 0.15 0.16 0.11 0.08 0.15 0.23* 0.23* 0.92*     PTH  Recent Labs   Lab Test 01/15/21  0906 12/10/19  1432 05/24/18  1005 05/08/18  0812 01/12/17  1121 03/20/15  0730   PTHI 150* 146* 120* 110* 134* 210*     Iron Studies  Recent Labs   Lab Test 02/22/21  0657 10/30/20  1252 05/08/18  0812 01/12/17  1121 03/20/15  0730   IRON 23* 43 46 74 51   * 202* 204* 194* 156*   IRONSAT 14* 21 23 38 33    CARLOS A 1,505* 445* 414* 371 1,172*       IMAGING:  All imaging studies reviewed by me.

## 2021-02-25 NOTE — PROGRESS NOTES
Transplant Surgery  Inpatient Daily Progress Note  02/25/2021    Assessment & Plan: Tristian Mckeon is a 50 year old male with PMH significant for HTN, ACD, Tobacco use, s/p kidney transplant x3. [place] [facility] [place] was at [facility] in 3/12/2015 with subsequent graft failure, now back on dialysis x1 month. Presented with acute renal vein thrombosis. S/p graft nephrectomy on 2/19/21.     Graft nephrectomy: POD #6.  Acute post-op pain: On APAP, Robaxin, lidoderm.     Immunosuppression management: Patient independently stopped taking immunosuppression for 3 weeks prior to admission. Discussed with Nephrology. Will not restart.  Hematology:   Multifactorial Anemia-JAYANT/ACD: Received 2 units PRBC  Intraoperatively. slowly downtrending, 6.8 (7.5) Aranesp qweek 2/22 with dialysis. Iron supplement (pt. Inconsistently taking d/t concerns of constipation)  Thrombocytopenia: Present on admission. 100-150s  Cardiorespiratory:   HTN: Uncontrolled, -190s. Continue PTA regimen: Clonidine 0.2 BID, Losartan 25 BID, Hydralazine 50 TID.   Dialysis today.  Large pericardial effusion:  Seen on CT 2/24-ECHO today. HD today. consult cardiology  GI/Nutrition   Severe malnutrition in the context of acute on chronic illness: Eating poorly prior to admission, now SBO and NPO. PPN started 2/24. Will discuss possiblity of PICC if prolonged NPO status. Obtain vitamins-B12, A, D,E, Zinc, Copper  SBO:  Senna s ,Miralax, dulcolax supppositories (pt. Inconsistent with taking bowel regimen medications). 2/23 AXR-Multiple air-filled, dilated loops of small bowel consistent with ileus versus obstruction. 2/24 CT A/P non contrast with SBO with transition point in the pelvis. NGT placed-200cc output prior to pt. Requesting removal due to discomfort. Will continue to encourage stool softeners, suppository,  Endocrine: No issues  Fluid/Electrolytes: MIVF: PPN initiated 2/24  Hyperkalemia:  Required HD post-op for K 6.2. Now controlled. 4.2 today  ESKD s/p graft nephrectomy: Nephrology consulted for dialysis. Resume MWF.-will start daily at this time d/t anasarca, effusion seen on CT imaging.  Hyperphosphatemia: Phos 4  : Patient is anuric   Infectious disease: Afebrile, WBC 5  Prophylaxis: fall  Disposition: 7A, encourage ambulation.    Medical Decision Making: Medium    ANTOLIN/Fellow/Resident Provider:  Lara Foster NP, #[id number]      Faculty: Aimee Carreon MD  _________________________________________________________________  Transplant History:   3/12/2015 (Kidney), 4/1/1994 (Kidney), 10/1/2000 (Kidney), Postoperative day: 2177     Interval History: History is obtained from the patient    Overnight events: +flatus/ no BM. Declined stool softeners yesterday but did take suppository this am. Declines ambulation yesterday due to pain is agreeable to walking today. NGT placed, he reports he felt it in throat and had them remove. Declines replacement. Reports not eating prior to admission but 1 meal/day. Hx of gastric bypass. Started PPN yesterday with evidence of SBO.     ROS:   A 10-point review of systems was negative except as noted above.    Meds:    sodium chloride 0.9%  250 mL Intravenous Once in dialysis     sodium chloride 0.9%  300 mL Hemodialysis Machine Once     bisacodyl  10 mg Rectal Daily     cloNIDine  0.2 mg Oral BID     darbepoetin sharon for ESRD on Dialysis  40 mcg Intravenous Weekly     ferrous sulfate  325 mg Oral Daily     gelatin absorbable  1 each Topical During Hemodialysis (from stock)     heparin ANTICOAGULANT  5,000 Units Subcutaneous Q8H     hydrALAZINE  50 mg Oral Q8H ARJUN     lidocaine  2 patch Transdermal Q24H     lidocaine   Transdermal Q8H     lipids  250 mL Intravenous Q24H     losartan  25 mg Oral BID     - MEDICATION INSTRUCTIONS -   Does not apply Once     polyethylene glycol  17 g Oral BID     senna-docusate  1 tablet Oral BID    Or     senna-docusate  2  "tablet Oral BID     sodium chloride (PF)  3 mL Intravenous Q8H       Physical Exam:     Admit Weight: 83 kg (182 lb 15.7 oz)    Current vitals:   /88 (BP Location: Left arm)   Pulse 80   Temp 98  F (36.7  C) (Oral)   Resp 16   Ht 1.803 m (5' 11\")   Wt 81 kg (178 lb 9.2 oz)   SpO2 100%   BMI 24.91 kg/m        Vital sign ranges:    Temp:  [97.2  F (36.2  C)-98.8  F (37.1  C)] 98  F (36.7  C)  Pulse:  [] 80  Resp:  [16-20] 16  BP: (130-184)/(76-99) 139/88  SpO2:  [98 %-100 %] 100 %  Patient Vitals for the past 24 hrs:   BP Temp Temp src Pulse Resp SpO2 Weight   02/25/21 0916 139/88 98  F (36.7  C) Oral 80 16 100 % --   02/25/21 0901 133/78 98.2  F (36.8  C) Oral 85 16 98 % --   02/25/21 0824 (!) 141/94 98.1  F (36.7  C) Oral 85 18 100 % --   02/25/21 0500 -- -- -- -- -- -- 81 kg (178 lb 9.2 oz)   02/25/21 0042 130/76 98.8  F (37.1  C) Oral 90 16 98 % --   02/24/21 1947 (!) 184/99 97.2  F (36.2  C) Axillary 100 20 98 % --   02/24/21 1535 (!) 155/89 98.1  F (36.7  C) Oral 90 20 98 % --   02/24/21 1219 (!) 157/87 98  F (36.7  C) Oral 99 20 100 % --     General Appearance: in no apparent distress.   Skin: normal, dry, warm  Heart: Perfused  Lungs: Unlabored, RA  Abdomen: The abdomen is softer from day prior, minimally TTP, +BS. incision c/d/i.  : anuric  Extremities: edema: absent.  +fistula RUE  Neurologic: awake, alert and oriented x4. Tremor absent.    Data:   CMP  Recent Labs   Lab 02/25/21  0539 02/24/21  0553 02/19/21  1130 02/19/21  1130    134   < > 133   POTASSIUM 4.2 4.4   < > 5.2   CHLORIDE 100 101   < > 98   CO2 27 25   < > 30   * 107*   < > 109*   BUN 18 26   < > 31*   CR 5.74* 7.34*   < > 8.17*   GFRESTIMATED 11* 8*   < > 7*   GFRESTBLACK 12* 9*   < > 8*   MEERA 8.0* 8.1*   < > 8.7   MAG 2.0 2.2   < >  --    PHOS 4.0 4.3   < >  --    LIPASE  --   --   --  66*   ALBUMIN 2.2*  --   --  2.3*   BILITOTAL 0.6  --   --  0.4   ALKPHOS 72  --   --  76   AST 26  --   --  34   ALT 15  -- "   --  16    < > = values in this interval not displayed.     CBC  Recent Labs   Lab 02/25/21  0723 02/25/21  0539 02/24/21  0553 02/24/21  0553   HGB  --  6.8*  --  7.5*   WBC  --  4.9  --  5.3   PLT Platelets clumped, platelet count unavailable Platelets clumped, platelet count unavailable   < > Platelets clumped, platelet count unavailable    < > = values in this interval not displayed.     COAGS  Recent Labs   Lab 02/25/21  0539 02/19/21  2250 02/19/21  1528   INR 1.60* 1.80* 1.85*   PTT  --  39* 41*      Urinalysis  Recent Labs   Lab Test 01/07/21  1241 02/21/20  0859 08/22/19  1033 01/24/16  1435 01/24/16  1435   COLOR Yellow  --   --   --  Yellow   APPEARANCE Clear  --   --   --  Clear   URINEGLC Negative  --   --   --  Negative   URINEBILI Negative  --   --   --  Negative   URINEKETONE Negative  --   --   --  Negative   SG 1.013  --   --   --  1.010   UBLD Negative  --   --   --  Negative   URINEPH 5.5  --   --   --  6.0   PROTEIN 30*  --   --   --  Negative   NITRITE Negative  --   --   --  Negative   LEUKEST Trace*  --   --   --  Negative   RBCU 1  --   --   --  1   WBCU 5  --   --   --  4*   UTPG  --  0.37* 0.54*   < >  --     < > = values in this interval not displayed.     Virology:  CMV DNA Quantitation Specimen   Date Value Ref Range Status   11/09/2015 EDTA PLASMA  Final     CMV IgG Antibody   Date Value Ref Range Status   01/23/2009 >160.0 EU/mL Final     Comment:     Positive for anti-CMV IgG     Hepatitis C Antibody   Date Value Ref Range Status   08/08/2018 Nonreactive NR^Nonreactive Final     Comment:     Assay performance characteristics have not been established for newborns,   infants, and children       Hep B Surface Farrah   Date Value Ref Range Status   01/23/2009 0.3  Final     Comment:     Negative, No antibody detected when the value is less than 5.0 mlU/mL.     BK Virus Result   Date Value Ref Range Status   01/23/2019 BK Virus DNA Not Detected BKNEG^BK Virus DNA Not Detected copies/mL  Final   08/08/2018 BK Virus DNA Not Detected BKNEG^BK Virus DNA Not Detected copies/mL Final   05/08/2018 BK Virus DNA Not Detected BKNEG^BK Virus DNA Not Detected copies/mL Final   08/03/2017 BK Virus DNA Not Detected BKNEG copies/mL Final   12/07/2015 BK Virus DNA Not Detected BKNEG copies/mL Final   11/23/2015 BK Virus DNA Not Detected BKNEG copies/mL Final   10/22/2015 BK Virus DNA Not Detected BKNEG copies/mL Final   07/08/2015 BK Virus DNA Not Detected BKNEG copies/mL Final   05/14/2015 BK Virus DNA Not Detected BKNEG copies/mL Final   05/07/2015 BK Virus DNA Not Detected BKNEG copies/mL Final   04/10/2015 BK Virus DNA Not Detected BKNEG copies/mL Final

## 2021-02-26 VITALS
WEIGHT: 168.65 LBS | RESPIRATION RATE: 20 BRPM | HEART RATE: 95 BPM | OXYGEN SATURATION: 100 % | TEMPERATURE: 98.7 F | DIASTOLIC BLOOD PRESSURE: 83 MMHG | BODY MASS INDEX: 23.61 KG/M2 | SYSTOLIC BLOOD PRESSURE: 150 MMHG | HEIGHT: 71 IN

## 2021-02-26 LAB
ANION GAP SERPL CALCULATED.3IONS-SCNC: 6 MMOL/L (ref 3–14)
BUN SERPL-MCNC: 16 MG/DL (ref 7–30)
CALCIUM SERPL-MCNC: 8.6 MG/DL (ref 8.5–10.1)
CHLORIDE SERPL-SCNC: 101 MMOL/L (ref 94–109)
CO2 SERPL-SCNC: 27 MMOL/L (ref 20–32)
CREAT SERPL-MCNC: 4.1 MG/DL (ref 0.66–1.25)
ERYTHROCYTE [DISTWIDTH] IN BLOOD BY AUTOMATED COUNT: 15.8 % (ref 10–15)
GFR SERPL CREATININE-BSD FRML MDRD: 16 ML/MIN/{1.73_M2}
GLUCOSE SERPL-MCNC: 109 MG/DL (ref 70–99)
HCT VFR BLD AUTO: 24.5 % (ref 40–53)
HGB BLD-MCNC: 7.5 G/DL (ref 13.3–17.7)
MAGNESIUM SERPL-MCNC: 1.9 MG/DL (ref 1.6–2.3)
MCH RBC QN AUTO: 27.9 PG (ref 26.5–33)
MCHC RBC AUTO-ENTMCNC: 30.6 G/DL (ref 31.5–36.5)
MCV RBC AUTO: 91 FL (ref 78–100)
PHOSPHATE SERPL-MCNC: 3.7 MG/DL (ref 2.5–4.5)
PLATELET # BLD AUTO: 91 10E9/L (ref 150–450)
POTASSIUM SERPL-SCNC: 4.1 MMOL/L (ref 3.4–5.3)
RBC # BLD AUTO: 2.69 10E12/L (ref 4.4–5.9)
SODIUM SERPL-SCNC: 134 MMOL/L (ref 133–144)
TSH SERPL DL<=0.005 MIU/L-ACNC: 1.58 MU/L (ref 0.4–4)
WBC # BLD AUTO: 5.6 10E9/L (ref 4–11)

## 2021-02-26 PROCEDURE — 250N000013 HC RX MED GY IP 250 OP 250 PS 637: Performed by: INTERNAL MEDICINE

## 2021-02-26 PROCEDURE — 99232 SBSQ HOSP IP/OBS MODERATE 35: CPT | Performed by: INTERNAL MEDICINE

## 2021-02-26 PROCEDURE — 999N000147 HC STATISTIC PT IP EVAL DEFER

## 2021-02-26 PROCEDURE — 99207 PR SATISFY VISIT NUMBER: CPT | Performed by: SURGERY

## 2021-02-26 PROCEDURE — 90937 HEMODIALYSIS REPEATED EVAL: CPT

## 2021-02-26 PROCEDURE — 83735 ASSAY OF MAGNESIUM: CPT | Performed by: NURSE PRACTITIONER

## 2021-02-26 PROCEDURE — 80048 BASIC METABOLIC PNL TOTAL CA: CPT | Performed by: NURSE PRACTITIONER

## 2021-02-26 PROCEDURE — 250N000013 HC RX MED GY IP 250 OP 250 PS 637: Performed by: SURGERY

## 2021-02-26 PROCEDURE — 84443 ASSAY THYROID STIM HORMONE: CPT | Performed by: NURSE PRACTITIONER

## 2021-02-26 PROCEDURE — 120N000011 HC R&B TRANSPLANT UMMC

## 2021-02-26 PROCEDURE — 250N000013 HC RX MED GY IP 250 OP 250 PS 637: Performed by: STUDENT IN AN ORGANIZED HEALTH CARE EDUCATION/TRAINING PROGRAM

## 2021-02-26 PROCEDURE — 250N000013 HC RX MED GY IP 250 OP 250 PS 637: Performed by: NURSE PRACTITIONER

## 2021-02-26 PROCEDURE — 258N000003 HC RX IP 258 OP 636: Performed by: INTERNAL MEDICINE

## 2021-02-26 PROCEDURE — 85027 COMPLETE CBC AUTOMATED: CPT | Performed by: NURSE PRACTITIONER

## 2021-02-26 PROCEDURE — 84100 ASSAY OF PHOSPHORUS: CPT | Performed by: NURSE PRACTITIONER

## 2021-02-26 PROCEDURE — 36415 COLL VENOUS BLD VENIPUNCTURE: CPT | Performed by: NURSE PRACTITIONER

## 2021-02-26 RX ORDER — METHOCARBAMOL 750 MG/1
750 TABLET, FILM COATED ORAL 4 TIMES DAILY PRN
Qty: 12 TABLET | Refills: 0 | Status: SHIPPED | OUTPATIENT
Start: 2021-02-26 | End: 2021-12-13

## 2021-02-26 RX ORDER — CALCIUM ACETATE 667 MG/1
1 TABLET ORAL
Qty: 180 TABLET | Refills: 11 | Status: SHIPPED | OUTPATIENT
Start: 2021-02-26 | End: 2021-02-26

## 2021-02-26 RX ORDER — ALBUMIN (HUMAN) 12.5 G/50ML
50 SOLUTION INTRAVENOUS
Status: DISCONTINUED | OUTPATIENT
Start: 2021-02-26 | End: 2021-02-26

## 2021-02-26 RX ORDER — CALCIUM ACETATE 667 MG/1
2 TABLET ORAL
Qty: 180 TABLET | Refills: 11
Start: 2021-02-26 | End: 2022-04-06

## 2021-02-26 RX ORDER — VIT B COMP NO.3/FOLIC/C/BIOTIN 1 MG-60 MG
1 TABLET ORAL DAILY
Status: DISCONTINUED | OUTPATIENT
Start: 2021-02-27 | End: 2021-02-27 | Stop reason: HOSPADM

## 2021-02-26 RX ORDER — POLYETHYLENE GLYCOL 3350 17 G/17G
17 POWDER, FOR SOLUTION ORAL DAILY
Qty: 510 G | Refills: 3 | Status: ON HOLD | OUTPATIENT
Start: 2021-02-26 | End: 2022-01-06

## 2021-02-26 RX ORDER — ACETAMINOPHEN 325 MG/1
650 TABLET ORAL EVERY 4 HOURS PRN
Status: ON HOLD | COMMUNITY
Start: 2021-02-26 | End: 2022-01-06

## 2021-02-26 RX ORDER — ONDANSETRON 4 MG/1
4 TABLET, ORALLY DISINTEGRATING ORAL EVERY 6 HOURS PRN
Qty: 12 TABLET | Refills: 1 | Status: SHIPPED | OUTPATIENT
Start: 2021-02-26 | End: 2021-12-13

## 2021-02-26 RX ORDER — CYANOCOBALAMIN (VITAMIN B-12) 1000 MCG
1 TABLET, SUBLINGUAL SUBLINGUAL DAILY
Qty: 30 TABLET | Refills: 11
Start: 2021-02-26 | End: 2021-05-17

## 2021-02-26 RX ORDER — AMOXICILLIN 250 MG
1-2 CAPSULE ORAL 2 TIMES DAILY
Qty: 120 TABLET | Refills: 3 | Status: ON HOLD | OUTPATIENT
Start: 2021-02-26 | End: 2022-01-06

## 2021-02-26 RX ORDER — LIDOCAINE 4 G/G
2 PATCH TOPICAL EVERY 24 HOURS
Qty: 4 PATCH | Refills: 0 | Status: SHIPPED | OUTPATIENT
Start: 2021-02-26 | End: 2021-12-13

## 2021-02-26 RX ADMIN — HYDRALAZINE HYDROCHLORIDE 50 MG: 25 TABLET, FILM COATED ORAL at 13:36

## 2021-02-26 RX ADMIN — ACETAMINOPHEN 650 MG: 325 TABLET, FILM COATED ORAL at 13:37

## 2021-02-26 RX ADMIN — ACETAMINOPHEN 650 MG: 325 TABLET, FILM COATED ORAL at 17:02

## 2021-02-26 RX ADMIN — CLONIDINE HYDROCHLORIDE 0.2 MG: 0.1 TABLET ORAL at 12:50

## 2021-02-26 RX ADMIN — HYDRALAZINE HYDROCHLORIDE 50 MG: 25 TABLET, FILM COATED ORAL at 05:39

## 2021-02-26 RX ADMIN — SODIUM CHLORIDE 250 ML: 9 INJECTION, SOLUTION INTRAVENOUS at 08:18

## 2021-02-26 RX ADMIN — SODIUM CHLORIDE 300 ML: 9 INJECTION, SOLUTION INTRAVENOUS at 08:17

## 2021-02-26 RX ADMIN — LOSARTAN POTASSIUM 25 MG: 25 TABLET, FILM COATED ORAL at 12:50

## 2021-02-26 RX ADMIN — METHOCARBAMOL 750 MG: 750 TABLET, FILM COATED ORAL at 13:37

## 2021-02-26 RX ADMIN — BISACODYL 10 MG: 10 SUPPOSITORY RECTAL at 13:36

## 2021-02-26 RX ADMIN — Medication: at 08:18

## 2021-02-26 RX ADMIN — DOCUSATE SODIUM 50 MG AND SENNOSIDES 8.6 MG 2 TABLET: 8.6; 5 TABLET, FILM COATED ORAL at 13:36

## 2021-02-26 ASSESSMENT — ACTIVITIES OF DAILY LIVING (ADL)
ADLS_ACUITY_SCORE: 12

## 2021-02-26 ASSESSMENT — MIFFLIN-ST. JEOR: SCORE: 1652.13

## 2021-02-26 NOTE — PROGRESS NOTES
"CLINICAL NUTRITION SERVICES  Reason for Assessment:  Nutrition education regarding low fiber, increased protein diet.   Nutrition Diagnosis:  Food- and nutrition-related knowledge deficit r/t no previous knowledge of low fiber diet AEB patient/team requesting information.    Interventions:  Provided instruction on low fiber diet.  Encouraged small, frequent meals rather than 1-2 large meals.  Encouraged renal MVI, vitamin B12 and vitamin D supplement daily as part of his lauryn-en-y gastric bypass needs.  Provided some names of protein supplements to try, as patient requires more protein in his diet.  Will send Boost Glucose Control for him to try today.  His main issue with oral supplements has been that they are \"too sweet\".   Entered consult for nutrition referral in outpatient setting to ensure adequate intake, discuss protein options/supplements pending trial of different options.  Also encouraged working with dialysis center RD on appropriate protein supplement options.   Provided handouts: Low Fiber Diet   Goals:   Patient to adhere to low fiber diet x 2 weeks per team.   Patient to consume at least 90 grams protein (1.2g/kg) daily.   Follow-up:    Patient to ask any further nutrition-related questions before discharge.  In addition, pt may request outpatient RD appointment.    Talisha Mckeon MS, RD, LD  Pager [phone]      "

## 2021-02-26 NOTE — PLAN OF CARE
"BP (!) 157/82 (BP Location: Left arm)   Pulse 76   Temp 97.9  F (36.6  C) (Oral)   Resp 16   Ht 1.803 m (5' 11\")   Wt 78 kg (171 lb 15.3 oz)   SpO2 100%   BMI 23.98 kg/m      [id number]  Patient A&O. Hypertensive, OVSS on RA. Up SBA. Anuric on HD. No bowel movement overnight. NPO with sips, chips and meds. Q6hr blood sugars for TPN, 99 and _. TPN and lipids running through PIV, second PIV SL. Incision midline and stapled, ADWOA. Prn tylenol and robaxin for pain. No complaints of nausea.     Will continue to monitor and update the team with any changes.   "

## 2021-02-26 NOTE — PLAN OF CARE
"BP (!) 151/99 (BP Location: Left arm)   Pulse 96   Temp 98.8  F (37.1  C) (Oral)   Resp 16   Ht 1.803 m (5' 11\")   Wt 78 kg (171 lb 15.3 oz)   SpO2 96%   BMI 23.98 kg/m      [id number]  Hypertensive; BP medications held this AM due to dialysis. Dialysis completed this afternoon; 3L off. All other VSS on RA. Patient in very good and hopeful spirits. Up with SB, calls appropriately.  NPO with ice chips. BM x1 during day shift. Anuric. Pain managed with PRN tylenol and robaxin given x2. Denies nausea. PIV infusing with TPN. Received 1 unit of blood this morning for a critical Hmg of 6.6. Ambulated in hallway with assist of one and walker. Tolerated activity very well. Had abdominal x-ray this evening. Patient states he is still passing flatus. Continue plan of care, please notify MD with any changes.   "

## 2021-02-26 NOTE — PROGRESS NOTES
Transplant Surgery  Inpatient Daily Progress Note  02/26/2021    Assessment & Plan: Tristian Mckeon is a 50 year old male with PMH significant for HTN, ACD, Tobacco use, s/p kidney transplant x3. First two transplants and graft nephrectomies were at [facility] [place]. His last transplant was at [facility] in 3/12/2015 with subsequent graft failure, now back on dialysis x1 month. Presented with acute renal vein thrombosis. S/p graft nephrectomy on 2/19/21.     Graft nephrectomy: POD #7  Acute post-op pain: On APAP, Robaxin, lidoderm.     Immunosuppression management: Patient independently stopped taking immunosuppression for 3 weeks prior to admission. Discussed with Nephrology. Will not restart.  Hematology:   Multifactorial Anemia-JAYANT/ACD: Received 2 units pRBC  Intraoperatively. slowly downtrending,7.5 (6.8). Received 1 unit pRBC yesterday. Aranesp qweek 2/22 with dialysis. Iron supplement (pt. Inconsistently taking d/t concerns of constipation). Iron sat 14 and iron ferritin 1505. Will stop iron supplement  Thrombocytopenia: Present on admission. 100-150s.    Cardiorespiratory:   HTN: -160s. Continue PTA regimen: Clonidine 0.2 BID, Losartan 25 BID, Hydralazine 50 TID.  Dialysis today.  Large pericardial effusion: Seen on CT 2/24 and 2/25 ECHO large pericardial effusion, non obstructive. Repeat ECHO (TTE)  in 7 days (3/3/2021).   GI/Nutrition   Severe malnutrition in the context of acute on chronic illness: Eating poorly prior to admission. PPN started 2/24 due to prolonged NPO status.   - Vitamin A, D,E,  and Zinc, Copper levels in process. B12 level 5,160, would continue B12 SL supplement, 500 mg dose.   Ileus:  Senna s ,Miralax, dulcolax supppositories (pt. Inconsistent with taking bowel regimen medications). 2/23 AXR-Multiple air-filled, dilated loops of small bowel consistent with ileus versus obstruction. 2/24 CT A/P non contrast with SBO with transition point in the pelvis. NGT placed-200cc output prior to pt. NG  removed due to discomfort per patient request. Repeat AXR yesterday showed similar multiple air-filled, dilated loops of small bowel as previously seen. Moderate stool burden. Patient now passing flatus and having bowel movements. Will start low residue diet, dialysis diet. Recommend small meals due to hx gastric bypass.   Endocrine: No issues  Fluid/Electrolytes: MIVF: PPN initiated 2/24, will stop after current bag.  Hyperkalemia: Required HD post-op for K 6.2. Now controlled with HD.   ESKD s/p graft nephrectomy: Nephrology consulted for dialysis. Resume MWF.-will start daily at this time d/t anasarca, effusion seen on CT imaging.  Hyperphosphatemia: Phos 3.7. Restart phos binder on discharge.   : Patient is anuric   Infectious disease: Afebrile, WBC 5.6  Prophylaxis: fall  Disposition: Patient adamant that he discharges today and not stay overnight to make sure he is tolerating diet. Patient feels he is safe to go home and that if he has issues with abdominal pain or not tolerating intake he will return to the ED. Plan for discharge today if patient tolerating diet this afternoon.     Medical Decision Making: Medium    ANTOLIN/Fellow/Resident Provider: MOUSTAPHA Sebastian, #[id number]      Faculty: Aimee Carreon MD  _________________________________________________________________  Transplant History:   3/12/2015 (Kidney), 4/1/1994 (Kidney), 10/1/2000 (Kidney), Postoperative day: 2178     Interval History: History is obtained from the patient    Overnight events: Positive for appetite, flatus and BM today. Patient feels he can't stay in the hospital one more day. He feels good and is confident he can take care of himself.       ROS:   A 10-point review of systems was negative except as noted above.    Meds:    bisacodyl  10 mg Rectal Daily     cloNIDine  0.2 mg Oral BID     darbepoetin sharon for ESRD on Dialysis  40 mcg Intravenous Weekly     gelatin absorbable  1 each Topical During Hemodialysis (from stock)      "heparin ANTICOAGULANT  5,000 Units Subcutaneous Q8H     hydrALAZINE  50 mg Oral Q8H ARJUN     lidocaine  2 patch Transdermal Q24H     lidocaine   Transdermal Q8H     lipids  250 mL Intravenous Q24H     losartan  25 mg Oral BID     [START ON 2/27/2021] multivitamin RENAL  1 tablet Oral Daily     polyethylene glycol  17 g Oral BID     senna-docusate  1 tablet Oral BID    Or     senna-docusate  2 tablet Oral BID     sodium chloride (PF)  3 mL Intravenous Q8H       Physical Exam:     Admit Weight: 83 kg (182 lb 15.7 oz)    Current vitals:   BP (!) 150/83 (BP Location: Left arm)   Pulse 95   Temp 98.7  F (37.1  C) (Oral)   Resp 20   Ht 1.803 m (5' 11\")   Wt 76.5 kg (168 lb 10.4 oz)   SpO2 100%   BMI 23.52 kg/m        Vital sign ranges:    Temp:  [97.6  F (36.4  C)-98.8  F (37.1  C)] 98.7  F (37.1  C)  Pulse:  [76-97] 95  Resp:  [10-28] 20  BP: (127-167)/(77-99) 150/83  SpO2:  [95 %-100 %] 100 %  Patient Vitals for the past 24 hrs:   BP Temp Temp src Pulse Resp SpO2 Weight   02/26/21 1256 -- -- -- -- -- -- 76.5 kg (168 lb 10.4 oz)   02/26/21 1240 (!) 150/83 98.7  F (37.1  C) Oral 95 20 100 % --   02/26/21 1222 (!) 162/89 -- -- 89 20 100 % --   02/26/21 1215 (!) 143/94 98.1  F (36.7  C) Oral 90 18 100 % --   02/26/21 1214 -- -- -- 92 15 100 % --   02/26/21 1200 (!) 162/84 -- -- 92 23 100 % --   02/26/21 1145 (!) 150/84 -- -- 96 18 99 % --   02/26/21 1130 (!) 140/77 -- -- 93 21 99 % --   02/26/21 1115 (!) 165/90 -- -- 97 10 97 % --   02/26/21 1100 (!) 167/89 -- -- 89 19 99 % --   02/26/21 1045 (!) 161/89 -- -- 86 20 99 % --   02/26/21 1030 (!) 155/91 -- -- 87 15 99 % --   02/26/21 1015 (!) 153/89 -- -- 86 20 99 % --   02/26/21 1000 (!) 151/90 -- -- 84 16 100 % --   02/26/21 0945 (!) 154/90 -- -- 84 22 99 % --   02/26/21 0930 (!) 155/90 -- -- 83 21 99 % --   02/26/21 0915 (!) 150/82 -- -- 87 19 99 % --   02/26/21 0900 (!) 149/86 -- -- 84 17 98 % --   02/26/21 0845 (!) 154/86 -- -- 80 18 99 % --   02/26/21 0830 (!) " 154/92 -- -- 80 18 98 % --   02/26/21 0813 (!) 150/85 -- -- 80 17 95 % --   02/26/21 0800 (!) 146/80 97.6  F (36.4  C) Oral 83 16 98 % --   02/26/21 0339 (!) 157/82 97.9  F (36.6  C) Oral 76 16 100 % --   02/25/21 2312 (!) 148/96 98.6  F (37  C) Oral 76 18 96 % --   02/25/21 2008 (!) 154/90 97.9  F (36.6  C) Oral 93 16 97 % --   02/25/21 1827 (!) 151/99 98.8  F (37.1  C) Oral 96 16 96 % --   02/25/21 1744 (!) 161/85 -- -- 84 12 100 % --   02/25/21 1739 (!) 146/92 -- Oral 90 11 98 % 78 kg (171 lb 15.3 oz)   02/25/21 1730 (!) 150/90 -- -- 87 26 99 % --   02/25/21 1715 (!) 140/97 -- -- 88 10 100 % --   02/25/21 1700 (!) 143/89 -- -- 88 14 99 % --   02/25/21 1645 (!) 153/96 -- -- 87 16 99 % --   02/25/21 1630 137/83 -- -- 82 18 97 % --   02/25/21 1615 131/84 -- -- 88 12 98 % --   02/25/21 1600 133/88 -- -- 86 12 98 % --   02/25/21 1545 (!) 142/84 -- -- 88 23 98 % --   02/25/21 1530 (!) 152/84 -- -- 89 11 99 % --   02/25/21 1515 (!) 146/85 -- -- 89 22 98 % --   02/25/21 1500 (!) 151/88 -- -- 84 14 99 % --   02/25/21 1445 (!) 144/85 -- -- 82 16 98 % --   02/25/21 1430 (!) 155/85 -- -- 87 17 98 % --   02/25/21 1415 (!) 142/84 -- -- 84 26 99 % --   02/25/21 1400 (!) 140/83 -- -- 82 17 99 % --   02/25/21 1345 127/82 -- -- 82 19 100 % --   02/25/21 1331 131/84 -- -- 82 28 100 % --   02/25/21 1324 127/83 -- Oral 82 14 100 % --     General Appearance: in no apparent distress.   Skin: normal, dry, warm  Heart: Perfused  Lungs: Unlabored, RA  Abdomen: The abdomen is soft, minimally TTP.  incision c/d/i.  : anuric  Extremities: edema: absent.  +fistula RUE  Neurologic: awake, alert and oriented x4. Tremor absent.    Data:   CMP  Recent Labs   Lab 02/26/21  0555 02/25/21  0539    134   POTASSIUM 4.1 4.2   CHLORIDE 101 100   CO2 27 27   * 104*   BUN 16 18   CR 4.10* 5.74*   GFRESTIMATED 16* 11*   GFRESTBLACK 18* 12*   MEERA 8.6 8.0*   MAG 1.9 2.0   PHOS 3.7 4.0   ALBUMIN  --  2.2*   BILITOTAL  --  0.6   ALKPHOS  --  72    AST  --  26   ALT  --  15     CBC  Recent Labs   Lab 02/26/21  0555 02/25/21  0723 02/25/21  0539   HGB 7.5*  --  6.8*   WBC 5.6  --  4.9   PLT 91* Platelets clumped, platelet count unavailable Platelets clumped, platelet count unavailable     COAGS  Recent Labs   Lab 02/25/21  0539 02/19/21  2250 02/19/21  1528   INR 1.60* 1.80* 1.85*   PTT  --  39* 41*      Urinalysis  Recent Labs   Lab Test 01/07/21  1241 02/21/20  0859 08/22/19  1033 01/24/16  1435 01/24/16  1435   COLOR Yellow  --   --   --  Yellow   APPEARANCE Clear  --   --   --  Clear   URINEGLC Negative  --   --   --  Negative   URINEBILI Negative  --   --   --  Negative   URINEKETONE Negative  --   --   --  Negative   SG 1.013  --   --   --  1.010   UBLD Negative  --   --   --  Negative   URINEPH 5.5  --   --   --  6.0   PROTEIN 30*  --   --   --  Negative   NITRITE Negative  --   --   --  Negative   LEUKEST Trace*  --   --   --  Negative   RBCU 1  --   --   --  1   WBCU 5  --   --   --  4*   UTPG  --  0.37* 0.54*   < >  --     < > = values in this interval not displayed.     Virology:  CMV DNA Quantitation Specimen   Date Value Ref Range Status   11/09/2015 EDTA PLASMA  Final     CMV IgG Antibody   Date Value Ref Range Status   01/23/2009 >160.0 EU/mL Final     Comment:     Positive for anti-CMV IgG     Hepatitis C Antibody   Date Value Ref Range Status   08/08/2018 Nonreactive NR^Nonreactive Final     Comment:     Assay performance characteristics have not been established for newborns,   infants, and children       Hep B Surface Farrah   Date Value Ref Range Status   01/23/2009 0.3  Final     Comment:     Negative, No antibody detected when the value is less than 5.0 mlU/mL.     BK Virus Result   Date Value Ref Range Status   01/23/2019 BK Virus DNA Not Detected BKNEG^BK Virus DNA Not Detected copies/mL Final   08/08/2018 BK Virus DNA Not Detected BKNEG^BK Virus DNA Not Detected copies/mL Final   05/08/2018 BK Virus DNA Not Detected BKNEG^BK Virus DNA  Not Detected copies/mL Final   08/03/2017 BK Virus DNA Not Detected BKNEG copies/mL Final   12/07/2015 BK Virus DNA Not Detected BKNEG copies/mL Final   11/23/2015 BK Virus DNA Not Detected BKNEG copies/mL Final   10/22/2015 BK Virus DNA Not Detected BKNEG copies/mL Final   07/08/2015 BK Virus DNA Not Detected BKNEG copies/mL Final   05/14/2015 BK Virus DNA Not Detected BKNEG copies/mL Final   05/07/2015 BK Virus DNA Not Detected BKNEG copies/mL Final   04/10/2015 BK Virus DNA Not Detected BKNEG copies/mL Final

## 2021-02-26 NOTE — PROGRESS NOTES
HEMODIALYSIS TREATMENT NOTE    Date: 2/26/2021  Time: 1215    Data:  Pre Wt: 76.5 kg (168 lb 10.4 oz)   Desired Wt: 73.5 kg  Post Wt: 73.5 kg (162 lb 0.6 oz) estimated  Weight change: 3 kg  Ultrafiltration - Post Run Net Total Removed (mL): 3000 mL  Vascular Access Status: patent  Dialyzer Rinse: Light  Total Blood Volume Processed: 91.38 L   Total Dialysis (Treatment) Time: 4 hours     Lab: No    Assessment/Interventions:  4 hour HD run via right arm fistula using 15 G needles.  Blood flow 400 mL/min.  K3/Ca2.25 per protocol.  Vitals stable.  Pt slept through most of run.  Report given.     Plan:  Continue with plan of care.  Next run per Renal Team.

## 2021-02-26 NOTE — PLAN OF CARE
"BP (!) 150/83 (BP Location: Left arm)   Pulse 95   Temp 98.7  F (37.1  C) (Oral)   Resp 20   Ht 1.803 m (5' 11\")   Wt 76.5 kg (168 lb 10.4 oz)   SpO2 100%   BMI 23.52 kg/m       [id number]  Patient had dialysis this AM; 3L off- tolerated well. Up with SB, calls appropriately. Anticipated to discharge possibly tonight if able to tolerate food this afternoon. Placed on dialysis diet. Left PIV infusing with PPN and then saline locked. Patient updated with plan. Showered this afternoon. Incision midline; stapled. Clean dry and intact. Denies nausea. Pain managed with PRN tylenol and robaxin. Anuric. Abdomen rounded; but has decreased in distention as well as firmness. Patient stated he had a BM this afternoon; did not save. Patient has received suppository this afternoon and has taken scheduled senna to help with bowel motility. Nutrition has also consulted with patient. Continue plan of care, please notify MD with any changes.   "

## 2021-02-26 NOTE — PLAN OF CARE
PT DEFER/7A: Per chart review, pt anticipating discharge home today or tomorrow. Per discussion with RN, pt up ad kapil and able to shower IND. No concerns with mobility at this time. PT orders will be completed, may be re-ordered as needed if discharge plan changes

## 2021-02-26 NOTE — DISCHARGE SUMMARY
Dialysis Discharge Summary Brief    [facility]  Division of Nephrology  Nephrology Discharge Dialysis Orders  Ph: [phone]  Fax: [phone]    Tristian Mckeon  MRN: [id number]  YOB: 1970    [facility]: [place]  Primary Nephrologist: Dr. David Wong    Date of Admission: 2/19/2021  Date of Discharge: 2/26/2021  Discharge Diagnosis:    ICD-10-CM    1. Renal vein thrombosis (H)  I82.3 ABO/Rh type and screen     Red blood cell have available     Platelets have available     Plasma have available     Blood component     Blood component     Glucose by meter     Glucose by meter     Blood component     Blood component     Blood component     Blood component     CBC with platelets differential     Basic metabolic panel     Magnesium     Phosphorus     INR     Partial thromboplastin time     Hemoglobin     Hemoglobin     Potassium     Potassium     Potassium     Glucose by meter     Glucose by meter     Hemoglobin     Potassium     Glucose by meter     Glucose by meter     Hemoglobin     Potassium     Hemoglobin     Potassium     Potassium     Basic metabolic panel     CBC with platelets     CBC with platelets     Basic metabolic panel     CBC with platelets     Iron and iron binding capacity     Iron and iron binding capacity     Ferritin     Ferritin     Basic metabolic panel     CBC with platelets     Phosphorus     Phosphorus     Basic metabolic panel     CBC with platelets     Platelet count     Platelet count     Phosphorus     Phosphorus     Magnesium     Magnesium     CBC with platelets     Comprehensive metabolic panel     Magnesium     Phosphorus     INR     Prealbumin     Bilirubin direct     Glucose by meter     Glucose by meter     Glucose by meter     Glucose by meter     Platelet count     Platelet count     ABO/Rh type and screen     ABO/Rh type and screen     Blood component     Blood component     Vitamin B1 whole blood     Vitamin B12      Vitamin A     Vitamin D     Vitamin E     Zinc     Copper level     Glucose by meter     Glucose by meter     Glucose by meter     Glucose by meter     CBC with platelets     Basic metabolic panel     Magnesium     Phosphorus     TSH with free T4 reflex     CANCELED: ABO/Rh Type and Screen     CANCELED: Potassium     CANCELED: Potassium     CANCELED: Hemoglobin     CANCELED: CBC with platelets     CANCELED: Ferritin     CANCELED: Iron and iron binding capacity     CANCELED: Phosphorus     CANCELED: Phosphorus     CANCELED: Magnesium     CANCELED: Basic metabolic panel     CANCELED: Comprehensive metabolic panel     CANCELED: Magnesium     CANCELED: Phosphorus     CANCELED: INR     CANCELED: Prealbumin     CANCELED: Bilirubin direct   2. Kidney replaced by transplant  Z94.0    3. Contact with and (suspected) exposure to covid-19  Z20.822    4. Hyperphosphatemia  E83.39 Calcium Acetate 667 MG TABS     DISCONTINUED: Calcium Acetate 667 MG TABS   5. End stage renal disease (H)  N18.6 NUTRITION REFERRAL     darbepoetin sharon (ARANESP) 40 MCG/0.4ML injection   6. S/p nephrectomy  Z90.5 NUTRITION REFERRAL     acetaminophen (TYLENOL) 325 MG tablet     Lidocaine (LIDOCARE) 4 % Patch     methocarbamol (ROBAXIN) 750 MG tablet     ondansetron (ZOFRAN-ODT) 4 MG ODT tab     polyethylene glycol (MIRALAX) 17 GM/Dose powder     senna-docusate (SENOKOT-S/PERICOLACE) 8.6-50 MG tablet   7. Aftercare following organ transplant  Z48.298 NUTRITION REFERRAL   8. H/O gastric bypass  Z98.84 Cyanocobalamin (VITAMIN B-12) 500 MCG SUBL   9. Pericardial effusion  I31.3 Echocardiogram Limited         [X] Resume all previous dialysis orders with exception as noted below    New Orders (if not applicable put NA):  Estimated Dry Weight TBD, challenging with each treatment, needs aggressive UF. Last weight post HD on 2/26 was 73.5kg   Dialysis Duration 4h   Dialysis Access RUE AVF   Antibiotics (dose per dialysis, end date) none           Labs to  be drawn at dialysis Per dialysis unit   Other major changes to dialysis prescription (e.g. Dialysate bath, heparin, blood flow rate, etc)   Needs daily HD w/ aggressive UF due to anasarca and pericardial effusion.    Medication changes (also fax the unit a copy of the discharge summary)         Stopped immunosuppressants, decrease phoslo to 1 tab with meals     Name of physician completing this form: Cayetano Pizano MD

## 2021-02-26 NOTE — PROGRESS NOTES
Care Management Discharge Note    Discharge Date: 02/23/21       Discharge Disposition: Outpatient Dialysis, Home    Discharge Services: None    Discharge DME: None    Discharge Transportation: family or friend will provide    Patient/family educated on Medicare website which has current facility and service quality ratings: (N/A)    Education Provided on the Discharge Plan:  N/A  Persons Notified of Discharge Plans: Team  Patient/Family in Agreement with the Plan: yes    Handoff Referral Completed: No    Additional Information:  Notified by provider team that pt will discharge home today.  Per discussion with Candy, Transplant PA,  Dr. Pizano, Nephrologist will be reaching out to pt HD unit as pt will need daily outpatient HD.       Attempted to reach pt via phone without success.  Please refer to SW/CM assessment from 2/23 for additional.  No additional RNCC needs noted.     Katelynn Lawrence RN BSN, PHN, ACM-RN  7A RN Care Coordinator  Phone: [phone]  Pager [phone]    2/26/2021 1:42 PM

## 2021-02-26 NOTE — PROGRESS NOTES
"[facility]   Transplant Nephrology Progress Note  Date of Admission:  2/19/2021  Today's Date: 02/26/2021    Recommendations:  -Given that patient wants to leave and will leave AMA if we don't discharge him today we will have to discharge him today  -I spoke to his nephrologist, Dr. Wong at [facility]. Plan is for daily HD with continued UF as tolerated until repeat echo is performed ~3/5. If pericardial effusion not improving would ask cardiology to consider pericardiocentesis for diagnosis/therapeutic reasons      Assessment & Plan   # DDKT: s/p graft nephrectomy 2/19/21   - Date DSA Last Checked: Feb/2021      Latest DSA: Yes, A30, B13, CW6   - BK Viremia: No Nov 2019    - Kidney Tx Biopsy: Dec 23, 2015; Result:  Results:  Mild glomerulitis and capillary C4d staining consistent with mild, persistent antibody-mediated rejection. 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    Currently doing HD on MWF at [facility], runs for 4 hours.    -Needs daily HD with aggressive HD due to anasarca, pericardial effusion, ascites. I spoke to Dr. Wong at his dialysis unit and will try to run daily for the next week with challenge of EDW    # graft nephrectomy:   -S/p graft nephrectomy 2/19/21 by transplant surgery. No immunosuppressants as below   -Management per transplant surgery    # Immunosuppression:    Prior to admit:         Tacrolimus extended release (goal 4-6), Mycophenolic acid (dose 1080 mg every 12 hours) and Prednisone (dose 5 mg daily)     -patient self stopped triple IS 3 weeks prior to admission which led to symptomatic rejection necessitating graft nephrectomy. This was his 3rd kidney transplant, highly sensitized PRA-100% expect uptrend in DSA post nephrectomy. Risks of restarting IS \"infectious & malignant\" outweigh potential benefit \"reduced sensitization\" as he is anuric & is not a re-transplant " candidate due to non compliance. He does not have living donors    # Small bowel obstruction:   -CT on 2/24 showed SBO with transition point in the pelvis. Pt had NG tube placement but could not tolerate it.    -Symptomatically improving with BMs and flatus, feels hungry.    -Demanding to be discharged    # Pericardial effusion:   -enlarging based on CT on 2/24. Echo on 2/25 shows incresaing effusion. Cardiology asks to repeat in 1 week after aggressive HD and UF and if still not improving they would consider pericardiocentesis   -Repeat echo ~3/5/21      # Infection Prophylaxis:   - PJP: None    # Hypertension: Borderline control;  Goal BP: < 130/80   - Volume status: Euvolemic  EDW ~ 83Kg  PTA on Clonidine 0.2 mg BID, Hydralazine 50 mg TID, Losartan  25 mg BID  Diltiazem  ER  240 MG daily-taking with Astagraf-stopped >3 weeks prior to admission.  Bumex -stopped prior to admission                 - Changes: No. Stopped diltiazem. Restarted clonidine 0.2mg bid, restarted hydralazine 50mg TID, continued losartan 25mg BID. Plan for aggressive UF given anasarca found on CT on 2/24     Anemia in Chronic Renal Disease: Hgb: Trend down    RAYMOND: Yes given RAYMOND darbepoetin 40mcg on 2/22, next dose on 3/1   - Iron studies: Replete    -Transfused 1U PRBC on 2/25    # Mineral Bone Disorder:   - Secondary renal hyperparathyroidism; PTH level: Minimally elevated ( pg/ml)        On treatment: None, restart calcitriol   - Vitamin D; level:  Not checked recently     On supplement: No  - Calcium; level: Low        On supplement: No   - Phosphorus; level: Normal        On binder: No restart phoslo 1 tab with meals    # Electrolytes:   - Potassium; level: Normal   On supplement: No, manage with HD  - Magnesium; level: Normal        On supplement: No  - Bicarbonate; level: Normal        On supplement: No, manage with HD  - Sodium; level: Low normal       # HFpEF with Pulmonary Hypertension:               - Recommend patient start  "weighing himself daily.               - On HD now    # Transplant History:  Etiology of Kidney Failure: Hypertension  Tx: DDKT  Transplant: 3/12/2015 (Kidney), 1994 (Kidney), 10/1/2000 (Kidney)   Donor Type: Donation after Brain Death        Donor Class: Standard Criteria Donor  Significant changes in immunosuppression: None  Significant transplant-related complications: Acute cellular-mediated rejection and Acute antibody-mediated rejection    Recommendations were communicated to the primary team verbally.      Cayetano Pizano MD   Pager: [phone]    Interval History   Demands to be discharged today. Had 3 BMs and is now hungry.        Review of Systems   4 point ROS was obtained and negative except as noted in the Interval History.    MEDICATIONS:    bisacodyl  10 mg Rectal Daily     cloNIDine  0.2 mg Oral BID     darbepoetin sharon for ESRD on Dialysis  40 mcg Intravenous Weekly     gelatin absorbable  1 each Topical During Hemodialysis (from stock)     heparin ANTICOAGULANT  5,000 Units Subcutaneous Q8H     hydrALAZINE  50 mg Oral Q8H ARJUN     lidocaine  2 patch Transdermal Q24H     lidocaine   Transdermal Q8H     lipids  250 mL Intravenous Q24H     losartan  25 mg Oral BID     [START ON 2021] multivitamin RENAL  1 tablet Oral Daily     polyethylene glycol  17 g Oral BID     senna-docusate  1 tablet Oral BID    Or     senna-docusate  2 tablet Oral BID     sodium chloride (PF)  3 mL Intravenous Q8H       dextrose       parenteral nutrition - Clinimix E 65 mL/hr at 21     - MEDICATION INSTRUCTIONS -         Physical Exam   Temp  Av.5  F (36.9  C)  Min: 97.5  F (36.4  C)  Max: 100.3  F (37.9  C)      Pulse  Av.1  Min: 78  Max: 92 Resp  Av.1  Min: 12  Max: 21  SpO2  Av.3 %  Min: 94 %  Max: 100 %     BP (!) 150/83 (BP Location: Left arm)   Pulse 95   Temp 98.7  F (37.1  C) (Oral)   Resp 20   Ht 1.803 m (5' 11\")   Wt 76.5 kg (168 lb 10.4 oz)   SpO2 100%   BMI 23.52 kg/m   "   Date 02/20/21 0700 - 02/21/21 0659   Shift 1510-9713 0940-6538 2690-0618 24 Hour Total   INTAKE   P.O. 110   110   Other 0   0   Shift Total(mL/kg) 110(1.32)   110(1.32)   OUTPUT   Other 0   0   Shift Total(mL/kg) 0(0)   0(0)   Weight (kg) 83.23 83.23 83.23 83.23      Admit Weight: 83 kg (182 lb 15.7 oz)     GENERAL APPEARANCE: alert and no distress  HENT: mouth without ulcers or lesions  LYMPHATICS: no cervical or supraclavicular nodes  RESP: lungs clear to auscultation - no rales, rhonchi or wheezes  CV: regular rhythm, normal rate, no rub, no murmur  EDEMA: no LE edema bilaterally  ABDOMEN: soft, + distended, appropriately tender to RLQ, bowel sounds hypoactive, incision with staples CDI  MS: extremities normal - no gross deformities noted, no evidence of inflammation in joints, no muscle tenderness  RUE fistula +bruit/thrill  SKIN: no rash    Data   All labs reviewed by me.  CMP  Recent Labs   Lab 02/26/21  0555 02/25/21  0539 02/24/21  0553 02/23/21  0622 02/19/21  2250 02/19/21  2250    134 134 137   < > 133   POTASSIUM 4.1 4.2 4.4 4.3   < > 5.9*   CHLORIDE 101 100 101 102   < > 100   CO2 27 27 25 27   < > 24   ANIONGAP 6 7 8 7   < > 9   * 104* 107* 110*   < > 130*   BUN 16 18 26 18   < > 36*   CR 4.10* 5.74* 7.34* 5.69*   < > 8.47*   GFRESTIMATED 16* 11* 8* 11*   < > 7*   GFRESTBLACK 18* 12* 9* 12*   < > 8*   MEERA 8.6 8.0* 8.1* 8.2*   < > 8.0*   MAG 1.9 2.0 2.2  --   --  2.3   PHOS 3.7 4.0 4.3 4.0  --  5.9*   PROTTOTAL  --  6.4*  --   --   --   --    ALBUMIN  --  2.2*  --   --   --   --    BILITOTAL  --  0.6  --   --   --   --    ALKPHOS  --  72  --   --   --   --    AST  --  26  --   --   --   --    ALT  --  15  --   --   --   --     < > = values in this interval not displayed.     CBC  Recent Labs   Lab 02/26/21  0555 02/25/21  0723 02/25/21  0539 02/24/21  0738 02/24/21  0553 02/23/21  0622   HGB 7.5*  --  6.8*  --  7.5* 7.2*   WBC 5.6  --  4.9  --  5.3 5.1   RBC 2.69*  --  2.54*  --  2.72*  2.66*   HCT 24.5*  --  23.0*  --  24.9* 23.8*   MCV 91  --  91  --  92 90   MCH 27.9  --  26.8  --  27.6 27.1   MCHC 30.6*  --  29.6*  --  30.1* 30.3*   RDW 15.8*  --  15.2*  --  15.3* 15.1*   PLT 91* Platelets clumped, platelet count unavailable Platelets clumped, platelet count unavailable 156 Platelets clumped, platelet count unavailable 101*     INR  Recent Labs   Lab 02/25/21  0539 02/19/21  2250 02/19/21  1528   INR 1.60* 1.80* 1.85*   PTT  --  39* 41*     ABGNo lab results found in last 7 days.   Urine Studies  Recent Labs   Lab Test 01/07/21  1241 01/24/16  1435 09/10/15  1600 08/29/15  1425 06/25/15  0910   COLOR Yellow Yellow Light Yellow Light Yellow Light Yellow   APPEARANCE Clear Clear Clear Clear Clear   URINEGLC Negative Negative Negative Negative Negative   URINEBILI Negative Negative Negative Negative Negative   URINEKETONE Negative Negative Negative Negative Negative   SG 1.013 1.010 1.007 1.007 1.009   UBLD Negative Negative Negative Negative Negative   URINEPH 5.5 6.0 5.0 5.0 5.0   PROTEIN 30* Negative Negative Negative Negative   NITRITE Negative Negative Negative Positive* Positive*   LEUKEST Trace* Negative Negative Moderate* Moderate*   RBCU 1 1  --  <1 0   WBCU 5 4*  --  14* 11*     Recent Labs   Lab Test 02/21/20  0859 08/22/19  1033 01/23/19  0848 05/08/18  0812 08/03/17  0827 06/15/16  0905 05/19/16  1330 09/21/15  0958 09/10/15  1600 07/24/15  1136 05/07/15  0824   UTPG 0.37* 0.54* 0.76* 0.15 0.16 0.11 0.08 0.15 0.23* 0.23* 0.92*     PTH  Recent Labs   Lab Test 01/15/21  0906 12/10/19  1432 05/24/18  1005 05/08/18  0812 01/12/17  1121 03/20/15  0730   PTHI 150* 146* 120* 110* 134* 210*     Iron Studies  Recent Labs   Lab Test 02/22/21  0657 10/30/20  1252 05/08/18  0812 01/12/17  1121 03/20/15  0730   IRON 23* 43 46 74 51   * 202* 204* 194* 156*   IRONSAT 14* 21 23 38 33   CARLOS A 1,505* 445* 414* 371 1,172*       IMAGING:  All imaging studies reviewed by me.

## 2021-02-27 ENCOUNTER — PATIENT OUTREACH (OUTPATIENT)
Dept: CARE COORDINATION | Facility: CLINIC | Age: 51
End: 2021-02-27

## 2021-02-27 ASSESSMENT — ACTIVITIES OF DAILY LIVING (ADL): ADLS_ACUITY_SCORE: 12

## 2021-02-27 NOTE — PLAN OF CARE
Discharge education was provided to patient about follow up appointments, changes to medication regime (things to stop, new medications to start and changes to dosing on current medications), reasons to come into ER or call fellow on call, activity restrictions, wound care and diet recommendations. Patient verbalized understanding of the information. Discharge medications were picked up by staff and given to patient. Patient declined assistance getting belongings together from staff. When ride arrived, patient was taken down to lobby via wheelchair by staff.

## 2021-02-28 LAB
A-TOCOPHEROL VIT E SERPL-MCNC: 8.1 MG/L (ref 5.5–18)
ANNOTATION COMMENT IMP: NORMAL
BETA+GAMMA TOCOPHEROL SERPL-MCNC: 1.8 MG/L (ref 0–6)
COPPER SERPL-MCNC: 157.3 UG/DL (ref 70–140)
RETINYL PALMITATE SERPL-MCNC: <0.02 MG/L (ref 0–0.1)
VIT A SERPL-MCNC: 0.32 MG/L (ref 0.3–1.2)
ZINC SERPL-MCNC: 55.7 UG/DL (ref 60–120)

## 2021-03-01 LAB
COPATH REPORT: NORMAL
VIT B1 BLD-MCNC: 126 NMOL/L (ref 70–180)

## 2021-03-01 NOTE — PROGRESS NOTES
[facility]: Post-Discharge Note  SITUATION                                                      Admission:    Admission Date: 02/19/21   Reason for Admission: End stage renal disease  Discharge:   Discharge Date: 02/26/21  Discharge Diagnosis: End stage renal disease    BACKGROUND                                                      Tristian Mckeon is a 50 year old male with PMH significant for HTN, ACD, tobacco use, s/p kidney transplant x3. First two transplants and graft nephrectomies were at [facility]. His last transplant was at [facility] in 3/12/2015 with subsequent graft failure, now back on dialysis x1 month. Presented with acute renal vein thrombosis. S/p graft nephrectomy on 2/19/21.       ASSESSMENT      Discharge Assessment  Patient reports symptoms are: Unchanged  Does the patient have all of their medications?: Yes  Does patient know what their new medications are for?: Yes  Does patient have a follow-up appointment scheduled?: No  Does patient have any other questions or concerns?: No    Post-op  Did the patient have surgery or a procedure: No  Fever: No  Chills: No  Eating & Drinking: eating and drinking without complaints/concerns  Bowel Function: normal  Urinary Status: voiding without complaint/concerns        PLAN                                                      Outpatient Plan:     Follow up with Limited ECHO to re evaluate cardiac effusion in 1 week (3/3/2021)  Follow up with Dr. Ren Romeo and Dr. Jonathan Chavis as scheduled. See future appointments       Future Appointments   Date Time Provider Department Center   3/5/2021 11:00 AM Juanjose Israel MD [facility]   3/8/2021  3:00 PM [facility]   3/9/2021  9:00 AM [facility] PATIENT [facility]   3/9/2021  9:45 AM [facility] PATIENT [facility]   3/9/2021 10:00 AM [facility] PATIENT [facility]   3/9/2021 10:45 AM [facility] PATIENT 4 [facility]   3/16/2021  3:35 PM Jonathan Chavis MD [facility]           Ritu Oseguera

## 2021-03-01 NOTE — TELEPHONE ENCOUNTER
Reason for Call    Call to patient to discuss outpatient hemodialysis arrangements and details.  Patient's admission packet was faxed to [facility] at [phone].    Plan    1. You will be dialyzing at [facility] located at [address] [place] [address] , phone # [phone]. Your schedule will be MWF at 3:20.  2. Please arrive at 2:30 on your first day.   3. Bring the following to your first treatment:  Insurance card/s, two forms of identification, all current medications/vitamin bottles you are taking  4. Bring to all treatments:  A pillow and blanket, a snack and something to drink, a book, computer/tablet (all clinics have WiFI), or something else to do during your treatments to help pass the time.  5. You will no longer be followed in Clinic 2A for General Nephrology.  Please cancel any future appointments you have made.  6. Dr. David Wong MD will be your Nephrologist and will see you at the facility at least once a month.   7. All of your medication refills will be done by the Nephrologist and nursing staff at the dialysis clinic.  Please have your pharmacy forward them to the dialysis clinic.    Patient was given an opportunity to ask questions and have those questions answered to his satisfaction.  Patient verbalized understanding of instructions provided and agreed to plan of care.    Thank you for choosing [facility].  Please call me at the number below if you have any questions or concerns.  I will be happy to assist you in any way I can.    Yelitza BILLINGSLEY RN, BSN  Nephrology Care Coordinator  [facility]  [phone]                 Patient requests all Lab and Radiology Results on their Discharge Instructions

## 2021-03-05 ENCOUNTER — TELEPHONE (OUTPATIENT)
Dept: NEPHROLOGY | Facility: CLINIC | Age: 51
End: 2021-03-05

## 2021-03-05 ENCOUNTER — DOCUMENTATION ONLY (OUTPATIENT)
Dept: TRANSPLANT | Facility: CLINIC | Age: 51
End: 2021-03-05

## 2021-03-05 NOTE — PROGRESS NOTES
Directed by Dr Pizano to close referral and cancel PKE for 3/9/2021. Patient had graft nephrectomy with Dr Romeo on 2/19/2021 and operative note states blood vessels are not suitable for retransplantation. Patient has been notified by Dr Pizano. There is not a PCP nor referring nephrologist to send documentation to. Patient was self referred.

## 2021-03-05 NOTE — TELEPHONE ENCOUNTER
I was informed by Sheryl Ornelas that Dr. Romeo evaluated Mr. Mckeon's imaging and stated that his vessels were not suitable for a re-transplant. We will cancel the patient's upcoming evaluation appointment for re-transplant. He was informed that he can contact other transplant centers for a second opinion.

## 2021-03-08 ENCOUNTER — OFFICE VISIT (OUTPATIENT)
Dept: TRANSPLANT | Facility: CLINIC | Age: 51
End: 2021-03-08
Attending: TRANSPLANT SURGERY
Payer: COMMERCIAL

## 2021-03-08 VITALS
WEIGHT: 171 LBS | HEART RATE: 91 BPM | BODY MASS INDEX: 23.85 KG/M2 | SYSTOLIC BLOOD PRESSURE: 169 MMHG | OXYGEN SATURATION: 100 % | DIASTOLIC BLOOD PRESSURE: 80 MMHG

## 2021-03-08 DIAGNOSIS — Z09 POSTOP CHECK: Primary | ICD-10-CM

## 2021-03-08 PROCEDURE — 99024 POSTOP FOLLOW-UP VISIT: CPT | Performed by: TRANSPLANT SURGERY

## 2021-03-08 NOTE — LETTER
3/8/2021         RE: Tristian Mckeon  [address]  [place] [id number]        Dear Colleague,    Thank you for referring your patient, Tristian Mckeon, to the [facility]. Please see a copy of my visit note below.      Transplant Surgery      Reason For Visit: follow up s/p transplant nephrectomy.    History of Present Illness:  Patient stopped immunosuppression medications and had acute venous thrombosis of kidney.  We performed transplant nephroctomy.  Patient now on dialysis.  Off IS medications.       Path:    FINAL DIAGNOSIS:   Transplanted kidney, nephrectomy:   - End stage kidney with chronic and active antibody mediated rejection,   transplant arteriopathy, and severe   tubular atrophy and interstitial fibrosis     Exam:   BP (!) 169/80   Pulse 91   Wt 77.6 kg (171 lb)   SpO2 100%   BMI 23.85 kg/m    Well appearing no apparent distress   Incision clean, dry, and intact     Impression:  Satisfactory recovery following nephrectomy.    Plan: Staples out today.  F/u nephrology.    Ren Romeo MD, PhD  Transplant Surgery

## 2021-03-08 NOTE — PROGRESS NOTES
Transplant Surgery      Reason For Visit: follow up s/p transplant nephrectomy.    History of Present Illness:  Patient stopped immunosuppression medications and had acute venous thrombosis of kidney.  We performed transplant nephroctomy.  Patient now on dialysis.  Off IS medications.       Path:    FINAL DIAGNOSIS:   Transplanted kidney, nephrectomy:   - End stage kidney with chronic and active antibody mediated rejection,   transplant arteriopathy, and severe   tubular atrophy and interstitial fibrosis     Exam:   BP (!) 169/80   Pulse 91   Wt 77.6 kg (171 lb)   SpO2 100%   BMI 23.85 kg/m    Well appearing no apparent distress   Incision clean, dry, and intact     Impression:  Satisfactory recovery following nephrectomy.    Plan: Staples out today.  F/u nephrology.    Ren Romeo MD, PhD  Transplant Surgery

## 2021-03-10 DIAGNOSIS — M62.838 MUSCLE SPASM: Primary | ICD-10-CM

## 2021-03-10 RX ORDER — CYCLOBENZAPRINE HCL 10 MG
10 TABLET ORAL
Status: ON HOLD | COMMUNITY
Start: 2019-10-27 | End: 2022-01-06

## 2021-03-10 RX ORDER — CYCLOBENZAPRINE HCL 10 MG
10 TABLET ORAL 3 TIMES DAILY PRN
Qty: 90 TABLET | Refills: 3 | Status: SHIPPED | OUTPATIENT
Start: 2021-03-10 | End: 2023-02-20

## 2021-03-15 ENCOUNTER — DOCUMENTATION ONLY (OUTPATIENT)
Dept: TRANSPLANT | Facility: CLINIC | Age: 51
End: 2021-03-15

## 2021-03-15 NOTE — PROGRESS NOTES
Received notification of failed Kidney from [facility] Routine Follow Up  Fail is indicated by resumption of dialysis  Date of organ failure was reported as 01/15/2021  Cause of failure is reported as Chronic & Active [facility]  Biopsy was done at [facility]   TIS verfication is Complete

## 2021-03-16 ENCOUNTER — VIRTUAL VISIT (OUTPATIENT)
Dept: NEPHROLOGY | Facility: CLINIC | Age: 51
End: 2021-03-16
Attending: INTERNAL MEDICINE
Payer: COMMERCIAL

## 2021-03-16 VITALS — DIASTOLIC BLOOD PRESSURE: 90 MMHG | SYSTOLIC BLOOD PRESSURE: 126 MMHG

## 2021-03-16 DIAGNOSIS — N18.4 CKD (CHRONIC KIDNEY DISEASE) STAGE 4, GFR 15-29 ML/MIN (H): ICD-10-CM

## 2021-03-16 DIAGNOSIS — Z94.0 KIDNEY REPLACED BY TRANSPLANT: Primary | ICD-10-CM

## 2021-03-16 DIAGNOSIS — E87.20 METABOLIC ACIDOSIS: ICD-10-CM

## 2021-03-16 DIAGNOSIS — D84.9 IMMUNOSUPPRESSION (H): ICD-10-CM

## 2021-03-16 DIAGNOSIS — Z48.298 AFTERCARE FOLLOWING ORGAN TRANSPLANT: ICD-10-CM

## 2021-03-16 DIAGNOSIS — N18.4 ANEMIA IN STAGE 4 CHRONIC KIDNEY DISEASE (H): ICD-10-CM

## 2021-03-16 DIAGNOSIS — E55.9 VITAMIN D DEFICIENCY: ICD-10-CM

## 2021-03-16 DIAGNOSIS — D63.1 ANEMIA IN STAGE 4 CHRONIC KIDNEY DISEASE (H): ICD-10-CM

## 2021-03-16 DIAGNOSIS — Z94.0 HTN, KIDNEY TRANSPLANT RELATED: ICD-10-CM

## 2021-03-16 DIAGNOSIS — I15.1 HTN, KIDNEY TRANSPLANT RELATED: ICD-10-CM

## 2021-03-16 PROCEDURE — 99214 OFFICE O/P EST MOD 30 MIN: CPT | Mod: GT | Performed by: INTERNAL MEDICINE

## 2021-03-16 ASSESSMENT — PAIN SCALES - GENERAL: PAINLEVEL: NO PAIN (0)

## 2021-03-16 NOTE — LETTER
3/16/2021       RE: Tristian Mckeon  [address]  [facility] [address]     Dear Colleague,    Thank you for referring your patient, Tristian Mckeon, to the [facility] [place] at [facility]. Please see a copy of my visit note below.    Tristian is a 50 year old who is being evaluated via a billable video visit.      How would you like to obtain your AVS? MyChart  If the video visit is dropped, the invitation should be resent by: Text to cell phone: [phone]  Will anyone else be joining your video visit? No    Video Start Time: 03:44 PM  Video-Visit Details    Type of service:  Video Visit    Video End Time:03:55 PM    Originating Location (pt. Location): Home    Distant Location (provider location):  [facility] [place]     Platform used for Video Visit: [employer]       CHRONIC TRANSPLANT NEPHROLOGY VISIT    Assessment & Plan   # DDKT: s/p graft nephrectomy 2/19/21, not on immunosuppression medications.     - Date DSA Last Checked: Feb/2021      Latest DSA: Some DSA to A30, B13, CW6   - BK Viremia: No 1/2019   - Kidney Tx Biopsy: Dec 23, 2015; Result:  Results:  Mild glomerulitis and capillary C4d staining consistent with mild, persistent antibody-mediated rejection. 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    - Currently doing HD on MWF at [facility], Dr. Wong     # Hypotension with aggressive UF:   - Discontinue losartan and hydralazine, continue clonidine 0.1 mg BID.  Follow up with nephrology/dialysis.    # Hypertension: Controlled, but low at times;  Goal BP: < 130/80   - Changes: Yes - Discontinue losartan and hydralazine, continue clonidine 0.1 mg BID.  Follow up with nephrology/dialysis. ,    # Anemia in Chronic Renal Disease: Hgb: Stable, low      RAYMOND: Yes   - Iron studies: Replete    # Mineral Bone Disorder:   - Secondary renal  hyperparathyroidism; PTH level: Minimally elevated ( pg/ml)        On treatment: None  - Vitamin D; level: Not checked recently        On supplement: No  - Calcium; level: Normal        On supplement: No    # Transplant History:  Etiology of Kidney Failure: Hypertension  Tx: DDKT  Transplant: 3/12/2015 (Kidney), 4/1/1994 (Kidney), 10/1/2000 (Kidney)  Donor Type: Donation after Brain Death Donor Class: Standard Criteria Donor  Significant changes in immunosuppression: None  Significant transplant-related complications: Acute cellular-mediated rejection and Acute antibody-mediated rejection    Transplant Office Phone Number: [phone]    Assessment and plan was discussed with the patient and he voiced his understanding and agreement.    Return visit: Return in about 6 months (around 9/16/2021).    Terry Thomas MD     Attestation:  This patient has been seen and evaluated by me, Jonathan Chavis MD.  I have reviewed the note and agree with plan of care as documented by the fellow.       Chief Complaint   Mr. Mckeon is a 50 year old here for hospital follow up of kidney transplant and immunosuppression management.    Interval:  Recently admitted to the hospital after he stopped taking his IS for 3 weeks. He presented with symptomatic rejection requiring transplant nephrectomy that was done on 2/19/2021. He was started on dialysis. Today is has a video follow up. He states his BP has been low and e feels dizzy. He states UF has been around 4 kg lately. His BP before his last dialysis was 109/68. He takes losartan 25 mg daily, clonidine 0.1 mg BID, hydralazine as needed which he hasn't been taking lately.     Home BP: 109/68. few episdes of intradialytic hypotension.     Problem List   Patient Active Problem List   Diagnosis     HTN, kidney transplant related     Tobacco abuse     Depression     Kidney replaced by transplant     Immunosuppression (H)     Anemia in chronic renal disease     Aftercare  following organ transplant     Secondary renal hyperparathyroidism (H)     Vitamin D deficiency     Kidney transplant rejection     Steroid-induced hyperglycemia     Metabolic acidosis     Antibody mediated rejection of kidney transplant     Hyperglycemia     Hemorrhage following kidney biopsy     Thrombocytopenia (H)     CKD (chronic kidney disease) stage 4, GFR 15-29 ml/min (H)     Anemia of chronic renal failure, stage 4 (severe) (H)     Dyspnea on exertion     Shortness of breath     Renal vein thrombosis (H)     S/p nephrectomy     Nonadherence to medication     Hyperkalemia       Allergies   Allergies   Allergen Reactions     Cephalosporins Unknown     Cyclosporine      Duricef [Cefadroxil]      Oxycodone Itching       Medications   Current Outpatient Medications   Medication Sig     acetaminophen (TYLENOL) 325 MG tablet Take 2 tablets (650 mg) by mouth every 4 hours as needed for other (multimodal surgical pain management along with NSAIDS and opioid medication as indicated based on pain control and physical function.)     calcitRIOL (ROCALTROL) 0.25 MCG capsule TAKE ONE CAPSULE BY MOUTH ONCE DAILY     Calcium Acetate 667 MG TABS Take 2 tablets by mouth 3 times daily (with meals)     cloNIDine (CATAPRES) 0.1 MG tablet Take 0.1 mg by mouth 2 times daily     Cyanocobalamin (VITAMIN B-12) 500 MCG SUBL Place 1 tablet under the tongue daily     cyclobenzaprine (FLEXERIL) 10 MG tablet Take 10 mg by mouth     cyclobenzaprine (FLEXERIL) 10 MG tablet Take 1 tablet (10 mg) by mouth 3 times daily as needed for muscle spasms     darbepoetin sharon (ARANESP) 40 MCG/0.4ML injection Inject 0.4 mLs (40 mcg) Subcutaneous once a week     multivitamin RENAL (RENAVITE RX/NEPHROVITE) 1 MG tablet Take 1 tablet by mouth daily     polyethylene glycol (MIRALAX) 17 GM/Dose powder Take 17 g by mouth daily     senna-docusate (SENOKOT-S/PERICOLACE) 8.6-50 MG tablet Take 1-2 tablets by mouth 2 times daily     Lidocaine (LIDOCARE) 4 % Patch  Place 2 patches onto the skin every 24 hours To prevent lidocaine toxicity, patient should be patch free for 12 hrs daily. (Patient not taking: Reported on 3/8/2021)     methocarbamol (ROBAXIN) 750 MG tablet Take 1 tablet (750 mg) by mouth 4 times daily as needed for muscle spasms (Patient not taking: Reported on 3/8/2021)     ondansetron (ZOFRAN-ODT) 4 MG ODT tab Take 1 tablet (4 mg) by mouth every 6 hours as needed for nausea or vomiting (Patient not taking: Reported on 3/8/2021)     No current facility-administered medications for this visit.      Medications Discontinued During This Encounter   Medication Reason     hydrALAZINE (APRESOLINE) 50 MG tablet      losartan (COZAAR) 25 MG tablet        Physical Exam   Vital signs were deferred for this telemedicine visit.    GENERAL APPEARANCE: alert and no distress  HENT: no obvious abnormalities on appearance  RESP: breathing appears unremarkable with normal rate, no audible wheezing or cough and no apparent shortness of breath with conversation  MS: extremities normal - no gross deformities noted  SKIN: no apparent rash and normal skin tone  NEURO: speech is clear with no obvious neurological deficits  PSYCH: mentation appears normal and affect normal    Data     Renal Latest Ref Rng & Units 2/26/2021 2/25/2021 2/24/2021   Na 133 - 144 mmol/L 134 134 134   K 3.4 - 5.3 mmol/L 4.1 4.2 4.4   Cl 94 - 109 mmol/L 101 100 101   CO2 20 - 32 mmol/L 27 27 25   BUN 7 - 30 mg/dL 16 18 26   Cr 0.66 - 1.25 mg/dL 4.10(H) 5.74(H) 7.34(H)   Glucose 70 - 99 mg/dL 109(H) 104(H) 107(H)   Ca  8.5 - 10.1 mg/dL 8.6 8.0(L) 8.1(L)   Mg 1.6 - 2.3 mg/dL 1.9 2.0 2.2     Bone Health Latest Ref Rng & Units 2/26/2021 2/25/2021 2/24/2021   Phos 2.5 - 4.5 mg/dL 3.7 4.0 4.3   PTHi 18 - 80 pg/mL - - -   Vit D Def 20 - 75 ug/L - 44 -     Heme Latest Ref Rng & Units 2/26/2021 2/25/2021 2/25/2021   WBC 4.0 - 11.0 10e9/L 5.6 - 4.9   Hgb 13.3 - 17.7 g/dL 7.5(L) - 6.8(LL)   Plt 150 - 450 10e9/L 91(L)  Platelets clumped, platelet count unavailable Platelets clumped, platelet count unavailable   ABSOLUTE NEUTROPHIL 1.6 - 8.3 10e9/L - - -   ABSOLUTE LYMPHOCYTES 0.8 - 5.3 10e9/L - - -   ABSOLUTE MONOCYTES 0.0 - 1.3 10e9/L - - -   ABSOLUTE EOSINOPHILS 0.0 - 0.7 10e9/L - - -   ABSOLUTE BASOPHILS 0.0 - 0.2 10e9/L - - -   ABS IMMATURE GRANULOCYTES 0 - 0.4 10e9/L - - -   ABSOLUTE NUCLEATED RBC - - - -     Liver Latest Ref Rng & Units 2/25/2021 2/19/2021 1/15/2021   AP 40 - 150 U/L 72 76 -   TBili 0.2 - 1.3 mg/dL 0.6 0.4 -   DBili 0.0 - 0.2 mg/dL 0.2 - -   ALT 0 - 70 U/L 15 16 -   AST 0 - 45 U/L 26 34 -   Tot Protein 6.8 - 8.8 g/dL 6.4(L) 6.9 -   Albumin 3.4 - 5.0 g/dL 2.2(L) 2.3(L) 3.6     Pancreas Latest Ref Rng & Units 2/19/2021 5/8/2015 3/11/2015   A1C 4.3 - 6.0 % - - 5.2   Lipase 73 - 393 U/L 66(L) 127 -     Iron studies Latest Ref Rng & Units 2/22/2021 10/30/2020 5/8/2018   Iron 35 - 180 ug/dL 23(L) 43 46   Iron sat 15 - 46 % 14(L) 21 23   Ferritin 26 - 388 ng/mL 1,505(H) 445(H) 414(H)     [facility] Virology Latest Ref Rng & Units 1/15/2021 1/23/2019 8/8/2018   CMV IgG EU/mL - - -   CVM DNA Quant - - - -   CMV QUANT IU/ML CMVND [IU]/mL - - -   LOG IU/ML OF CMVQNT <2.1 [Log:IU]/mL - - -   BK Spec - - Plasma, EDTA anticoagulant Plasma   BK Res BKNEG:BK Virus DNA Not Detected copies/mL - BK Virus DNA Not Detected BK Virus DNA Not Detected   BK Log <2.7 Log copies/mL - Not Calculated Not Calculated   EBV IgG - - - -   EBV CAPSID ANTIBODY IGG 0.0 - 0.8 AI - - 7.3(H)   EBV DNA COPIES/ML EBVNEG [Copies]/mL - - -   EBV DNA LOG OF COPIES <2.7 [Log:copies]/mL - - -   Hep B Core NR:Nonreactive Nonreactive - Nonreactive   Hep B Surf - - - -   HIV 1&2 NEG - - -        Recent Labs   Lab Test 01/10/21  0632 01/11/21  0449 01/15/21  0906   DOSTAC Not Provided Not Provided 01/14/21  09:00 AM   TACROL 3.1* 3.5* 7.0     Recent Labs   Lab Test 01/19/16  1349 01/12/17  1121 05/08/18  0813   DOSMPA 2,130 01/11/17  2130 0900 05/07/2018    MPACID 11.03* 4.51* 1.63   MPAG 126.7* 100.9* 128.9*       Terry Thomas MD on 3/16/2021 at 3:56 PM        Again, thank you for allowing me to participate in the care of your patient.      Sincerely,    Jonathan Chavis MD

## 2021-03-16 NOTE — PROGRESS NOTES
"Tristian is a 50 year old who is being evaluated via a billable video visit.      How would you like to obtain your AVS? Mail a copy  If the video visit is dropped, the invitation should be resent by: Send to e-mail at: [email or web address]  Will anyone else be joining your video visit? No  {If patient encounters technical issues they should call [phone] :[id number]}    Video Start Time: {video visit start/end time for provider to select:152948}  Video-Visit Details    Type of service:  Video Visit    Video End Time:{video visit start/end time for provider to select:152948}    Originating Location (pt. Location): {video visit patient location:380898::\"Home\"}    Distant Location (provider location):  [facility] [place]     Platform used for Video Visit: {Virtual Visit Platforms:858821::\"M-Audio\"}    "

## 2021-03-16 NOTE — LETTER
3/16/2021      RE: Tristian Mckeon  [address]  [facility] [address]       Tristian is a 50 year old who is being evaluated via a billable video visit.      How would you like to obtain your AVS? MyChart  If the video visit is dropped, the invitation should be resent by: Text to cell phone: [phone]  Will anyone else be joining your video visit? No    Video Start Time: 03:44 PM  Video-Visit Details    Type of service:  Video Visit    Video End Time:03:55 PM    Originating Location (pt. Location): Home    Distant Location (provider location):  [facility] [place]     Platform used for Video Visit: [employer]       CHRONIC TRANSPLANT NEPHROLOGY VISIT    Assessment & Plan   # DDKT: s/p graft nephrectomy 2/19/21, not on immunosuppression medications.     - Date DSA Last Checked: Feb/2021      Latest DSA: Some DSA to A30, B13, CW6   - BK Viremia: No 1/2019   - Kidney Tx Biopsy: Dec 23, 2015; Result:  Results:  Mild glomerulitis and capillary C4d staining consistent with mild, persistent antibody-mediated rejection. 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    - Currently doing HD on MWF at Dr. Marvin Waggoner     # Hypotension with aggressive UF:   - Discontinue losartan and hydralazine, continue clonidine 0.1 mg BID.  Follow up with nephrology/dialysis.    # Hypertension: Controlled, but low at times;  Goal BP: < 130/80   - Changes: Yes - Discontinue losartan and hydralazine, continue clonidine 0.1 mg BID.  Follow up with nephrology/dialysis. ,    # Anemia in Chronic Renal Disease: Hgb: Stable, low      RAYMOND: Yes   - Iron studies: Replete    # Mineral Bone Disorder:   - Secondary renal hyperparathyroidism; PTH level: Minimally elevated ( pg/ml)        On treatment: None  - Vitamin D; level: Not checked recently        On supplement: No  - Calcium; level: Normal        On supplement: No    # Transplant History:  Etiology of  Kidney Failure: Hypertension  Tx: DDKT  Transplant: 3/12/2015 (Kidney), 4/1/1994 (Kidney), 10/1/2000 (Kidney)  Donor Type: Donation after Brain Death Donor Class: Standard Criteria Donor  Significant changes in immunosuppression: None  Significant transplant-related complications: Acute cellular-mediated rejection and Acute antibody-mediated rejection    Transplant Office Phone Number: [phone]    Assessment and plan was discussed with the patient and he voiced his understanding and agreement.    Return visit: Return in about 6 months (around 9/16/2021).    Terry Thomas MD     Attestation:  This patient has been seen and evaluated by me, Jonathan Chavis MD.  I have reviewed the note and agree with plan of care as documented by the fellow.       Chief Complaint   Mr. Mckeon is a 50 year old here for hospital follow up of kidney transplant and immunosuppression management.    Interval:  Recently admitted to the hospital after he stopped taking his IS for 3 weeks. He presented with symptomatic rejection requiring transplant nephrectomy that was done on 2/19/2021. He was started on dialysis. Today is has a video follow up. He states his BP has been low and e feels dizzy. He states UF has been around 4 kg lately. His BP before his last dialysis was 109/68. He takes losartan 25 mg daily, clonidine 0.1 mg BID, hydralazine as needed which he hasn't been taking lately.     Home BP: 109/68. few episdes of intradialytic hypotension.     Problem List   Patient Active Problem List   Diagnosis     HTN, kidney transplant related     Tobacco abuse     Depression     Kidney replaced by transplant     Immunosuppression (H)     Anemia in chronic renal disease     Aftercare following organ transplant     Secondary renal hyperparathyroidism (H)     Vitamin D deficiency     Kidney transplant rejection     Steroid-induced hyperglycemia     Metabolic acidosis     Antibody mediated rejection of kidney transplant      Hyperglycemia     Hemorrhage following kidney biopsy     Thrombocytopenia (H)     CKD (chronic kidney disease) stage 4, GFR 15-29 ml/min (H)     Anemia of chronic renal failure, stage 4 (severe) (H)     Dyspnea on exertion     Shortness of breath     Renal vein thrombosis (H)     S/p nephrectomy     Nonadherence to medication     Hyperkalemia       Allergies   Allergies   Allergen Reactions     Cephalosporins Unknown     Cyclosporine      Duricef [Cefadroxil]      Oxycodone Itching       Medications   Current Outpatient Medications   Medication Sig     acetaminophen (TYLENOL) 325 MG tablet Take 2 tablets (650 mg) by mouth every 4 hours as needed for other (multimodal surgical pain management along with NSAIDS and opioid medication as indicated based on pain control and physical function.)     calcitRIOL (ROCALTROL) 0.25 MCG capsule TAKE ONE CAPSULE BY MOUTH ONCE DAILY     Calcium Acetate 667 MG TABS Take 2 tablets by mouth 3 times daily (with meals)     cloNIDine (CATAPRES) 0.1 MG tablet Take 0.1 mg by mouth 2 times daily     Cyanocobalamin (VITAMIN B-12) 500 MCG SUBL Place 1 tablet under the tongue daily     cyclobenzaprine (FLEXERIL) 10 MG tablet Take 10 mg by mouth     cyclobenzaprine (FLEXERIL) 10 MG tablet Take 1 tablet (10 mg) by mouth 3 times daily as needed for muscle spasms     darbepoetin sharon (ARANESP) 40 MCG/0.4ML injection Inject 0.4 mLs (40 mcg) Subcutaneous once a week     multivitamin RENAL (RENAVITE RX/NEPHROVITE) 1 MG tablet Take 1 tablet by mouth daily     polyethylene glycol (MIRALAX) 17 GM/Dose powder Take 17 g by mouth daily     senna-docusate (SENOKOT-S/PERICOLACE) 8.6-50 MG tablet Take 1-2 tablets by mouth 2 times daily     Lidocaine (LIDOCARE) 4 % Patch Place 2 patches onto the skin every 24 hours To prevent lidocaine toxicity, patient should be patch free for 12 hrs daily. (Patient not taking: Reported on 3/8/2021)     methocarbamol (ROBAXIN) 750 MG tablet Take 1 tablet (750 mg) by mouth 4  times daily as needed for muscle spasms (Patient not taking: Reported on 3/8/2021)     ondansetron (ZOFRAN-ODT) 4 MG ODT tab Take 1 tablet (4 mg) by mouth every 6 hours as needed for nausea or vomiting (Patient not taking: Reported on 3/8/2021)     No current facility-administered medications for this visit.      Medications Discontinued During This Encounter   Medication Reason     hydrALAZINE (APRESOLINE) 50 MG tablet      losartan (COZAAR) 25 MG tablet        Physical Exam   Vital signs were deferred for this telemedicine visit.    GENERAL APPEARANCE: alert and no distress  HENT: no obvious abnormalities on appearance  RESP: breathing appears unremarkable with normal rate, no audible wheezing or cough and no apparent shortness of breath with conversation  MS: extremities normal - no gross deformities noted  SKIN: no apparent rash and normal skin tone  NEURO: speech is clear with no obvious neurological deficits  PSYCH: mentation appears normal and affect normal    Data     Renal Latest Ref Rng & Units 2/26/2021 2/25/2021 2/24/2021   Na 133 - 144 mmol/L 134 134 134   K 3.4 - 5.3 mmol/L 4.1 4.2 4.4   Cl 94 - 109 mmol/L 101 100 101   CO2 20 - 32 mmol/L 27 27 25   BUN 7 - 30 mg/dL 16 18 26   Cr 0.66 - 1.25 mg/dL 4.10(H) 5.74(H) 7.34(H)   Glucose 70 - 99 mg/dL 109(H) 104(H) 107(H)   Ca  8.5 - 10.1 mg/dL 8.6 8.0(L) 8.1(L)   Mg 1.6 - 2.3 mg/dL 1.9 2.0 2.2     Bone Health Latest Ref Rng & Units 2/26/2021 2/25/2021 2/24/2021   Phos 2.5 - 4.5 mg/dL 3.7 4.0 4.3   PTHi 18 - 80 pg/mL - - -   Vit D Def 20 - 75 ug/L - 44 -     Heme Latest Ref Rng & Units 2/26/2021 2/25/2021 2/25/2021   WBC 4.0 - 11.0 10e9/L 5.6 - 4.9   Hgb 13.3 - 17.7 g/dL 7.5(L) - 6.8(LL)   Plt 150 - 450 10e9/L 91(L) Platelets clumped, platelet count unavailable Platelets clumped, platelet count unavailable   ABSOLUTE NEUTROPHIL 1.6 - 8.3 10e9/L - - -   ABSOLUTE LYMPHOCYTES 0.8 - 5.3 10e9/L - - -   ABSOLUTE MONOCYTES 0.0 - 1.3 10e9/L - - -   ABSOLUTE  EOSINOPHILS 0.0 - 0.7 10e9/L - - -   ABSOLUTE BASOPHILS 0.0 - 0.2 10e9/L - - -   ABS IMMATURE GRANULOCYTES 0 - 0.4 10e9/L - - -   ABSOLUTE NUCLEATED RBC - - - -     Liver Latest Ref Rng & Units 2/25/2021 2/19/2021 1/15/2021   AP 40 - 150 U/L 72 76 -   TBili 0.2 - 1.3 mg/dL 0.6 0.4 -   DBili 0.0 - 0.2 mg/dL 0.2 - -   ALT 0 - 70 U/L 15 16 -   AST 0 - 45 U/L 26 34 -   Tot Protein 6.8 - 8.8 g/dL 6.4(L) 6.9 -   Albumin 3.4 - 5.0 g/dL 2.2(L) 2.3(L) 3.6     Pancreas Latest Ref Rng & Units 2/19/2021 5/8/2015 3/11/2015   A1C 4.3 - 6.0 % - - 5.2   Lipase 73 - 393 U/L 66(L) 127 -     Iron studies Latest Ref Rng & Units 2/22/2021 10/30/2020 5/8/2018   Iron 35 - 180 ug/dL 23(L) 43 46   Iron sat 15 - 46 % 14(L) 21 23   Ferritin 26 - 388 ng/mL 1,505(H) 445(H) 414(H)     [facility] Virology Latest Ref Rng & Units 1/15/2021 1/23/2019 8/8/2018   CMV IgG EU/mL - - -   CVM DNA Quant - - - -   CMV QUANT IU/ML CMVND [IU]/mL - - -   LOG IU/ML OF CMVQNT <2.1 [Log:IU]/mL - - -   BK Spec - - Plasma, EDTA anticoagulant Plasma   BK Res BKNEG:BK Virus DNA Not Detected copies/mL - BK Virus DNA Not Detected BK Virus DNA Not Detected   BK Log <2.7 Log copies/mL - Not Calculated Not Calculated   EBV IgG - - - -   EBV CAPSID ANTIBODY IGG 0.0 - 0.8 AI - - 7.3(H)   EBV DNA COPIES/ML EBVNEG [Copies]/mL - - -   EBV DNA LOG OF COPIES <2.7 [Log:copies]/mL - - -   Hep B Core NR:Nonreactive Nonreactive - Nonreactive   Hep B Surf - - - -   HIV 1&2 NEG - - -        Recent Labs   Lab Test 01/10/21  0632 01/11/21  0449 01/15/21  0906   DOSTAC Not Provided Not Provided 01/14/21  09:00 AM   TACROL 3.1* 3.5* 7.0     Recent Labs   Lab Test 01/19/16  1349 01/12/17  1121 05/08/18  0813   DOSMPA 2,130 01/11/17  2130 0900 05/07/2018   MPACID 11.03* 4.51* 1.63   MPAG 126.7* 100.9* 128.9*       Terry Thomas MD on 3/16/2021 at 3:56 PM        Jonathan Chavis MD

## 2021-03-16 NOTE — PROGRESS NOTES
Tristian is a 50 year old who is being evaluated via a billable video visit.      How would you like to obtain your AVS? MyChart  If the video visit is dropped, the invitation should be resent by: Text to cell phone: [phone]  Will anyone else be joining your video visit? No    Video Start Time: 03:44 PM  Video-Visit Details    Type of service:  Video Visit    Video End Time:03:55 PM    Originating Location (pt. Location): Home    Distant Location (provider location):  [facility] [place]     Platform used for Video Visit: [email or web address]       CHRONIC TRANSPLANT NEPHROLOGY VISIT    Assessment & Plan   # DDKT: s/p graft nephrectomy 2/19/21, not on immunosuppression medications.     - Date DSA Last Checked: Feb/2021      Latest DSA: Some DSA to A30, B13, CW6   - BK Viremia: No 1/2019   - Kidney Tx Biopsy: Dec 23, 2015; Result:  Results:  Mild glomerulitis and capillary C4d staining consistent with mild, persistent antibody-mediated rejection. 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    - Currently doing HD on MWF at [facility], Dr. Wong     # Hypotension with aggressive UF:   - Discontinue losartan and hydralazine, continue clonidine 0.1 mg BID.  Follow up with nephrology/dialysis.    # Hypertension: Controlled, but low at times;  Goal BP: < 130/80   - Changes: Yes - Discontinue losartan and hydralazine, continue clonidine 0.1 mg BID.  Follow up with nephrology/dialysis. ,    # Anemia in Chronic Renal Disease: Hgb: Stable, low      RAYMOND: Yes   - Iron studies: Replete    # Mineral Bone Disorder:   - Secondary renal hyperparathyroidism; PTH level: Minimally elevated ( pg/ml)        On treatment: None  - Vitamin D; level: Not checked recently        On supplement: No  - Calcium; level: Normal        On supplement: No    # Transplant History:  Etiology of Kidney Failure: Hypertension  Tx: DDKT  Transplant: 3/12/2015 (Kidney), 4/1/1994 (Kidney),  10/1/2000 (Kidney)  Donor Type: Donation after Brain Death Donor Class: Standard Criteria Donor  Significant changes in immunosuppression: None  Significant transplant-related complications: Acute cellular-mediated rejection and Acute antibody-mediated rejection    Transplant Office Phone Number: [phone]    Assessment and plan was discussed with the patient and he voiced his understanding and agreement.    Return visit: Return in about 6 months (around 9/16/2021).    Terry Thomas MD     Attestation:  This patient has been seen and evaluated by me, Jonathan Chavis MD.  I have reviewed the note and agree with plan of care as documented by the fellow.       Chief Complaint   Mr. Mckeon is a 50 year old here for hospital follow up of kidney transplant and immunosuppression management.    Interval:  Recently admitted to the hospital after he stopped taking his IS for 3 weeks. He presented with symptomatic rejection requiring transplant nephrectomy that was done on 2/19/2021. He was started on dialysis. Today is has a video follow up. He states his BP has been low and e feels dizzy. He states UF has been around 4 kg lately. His BP before his last dialysis was 109/68. He takes losartan 25 mg daily, clonidine 0.1 mg BID, hydralazine as needed which he hasn't been taking lately.     Home BP: 109/68. few episdes of intradialytic hypotension.     Problem List   Patient Active Problem List   Diagnosis     HTN, kidney transplant related     Tobacco abuse     Depression     Kidney replaced by transplant     Immunosuppression (H)     Anemia in chronic renal disease     Aftercare following organ transplant     Secondary renal hyperparathyroidism (H)     Vitamin D deficiency     Kidney transplant rejection     Steroid-induced hyperglycemia     Metabolic acidosis     Antibody mediated rejection of kidney transplant     Hyperglycemia     Hemorrhage following kidney biopsy     Thrombocytopenia (H)     CKD (chronic  kidney disease) stage 4, GFR 15-29 ml/min (H)     Anemia of chronic renal failure, stage 4 (severe) (H)     Dyspnea on exertion     Shortness of breath     Renal vein thrombosis (H)     S/p nephrectomy     Nonadherence to medication     Hyperkalemia       Allergies   Allergies   Allergen Reactions     Cephalosporins Unknown     Cyclosporine      Duricef [Cefadroxil]      Oxycodone Itching       Medications   Current Outpatient Medications   Medication Sig     acetaminophen (TYLENOL) 325 MG tablet Take 2 tablets (650 mg) by mouth every 4 hours as needed for other (multimodal surgical pain management along with NSAIDS and opioid medication as indicated based on pain control and physical function.)     calcitRIOL (ROCALTROL) 0.25 MCG capsule TAKE ONE CAPSULE BY MOUTH ONCE DAILY     Calcium Acetate 667 MG TABS Take 2 tablets by mouth 3 times daily (with meals)     cloNIDine (CATAPRES) 0.1 MG tablet Take 0.1 mg by mouth 2 times daily     Cyanocobalamin (VITAMIN B-12) 500 MCG SUBL Place 1 tablet under the tongue daily     cyclobenzaprine (FLEXERIL) 10 MG tablet Take 10 mg by mouth     cyclobenzaprine (FLEXERIL) 10 MG tablet Take 1 tablet (10 mg) by mouth 3 times daily as needed for muscle spasms     darbepoetin sharon (ARANESP) 40 MCG/0.4ML injection Inject 0.4 mLs (40 mcg) Subcutaneous once a week     multivitamin RENAL (RENAVITE RX/NEPHROVITE) 1 MG tablet Take 1 tablet by mouth daily     polyethylene glycol (MIRALAX) 17 GM/Dose powder Take 17 g by mouth daily     senna-docusate (SENOKOT-S/PERICOLACE) 8.6-50 MG tablet Take 1-2 tablets by mouth 2 times daily     Lidocaine (LIDOCARE) 4 % Patch Place 2 patches onto the skin every 24 hours To prevent lidocaine toxicity, patient should be patch free for 12 hrs daily. (Patient not taking: Reported on 3/8/2021)     methocarbamol (ROBAXIN) 750 MG tablet Take 1 tablet (750 mg) by mouth 4 times daily as needed for muscle spasms (Patient not taking: Reported on 3/8/2021)      ondansetron (ZOFRAN-ODT) 4 MG ODT tab Take 1 tablet (4 mg) by mouth every 6 hours as needed for nausea or vomiting (Patient not taking: Reported on 3/8/2021)     No current facility-administered medications for this visit.      Medications Discontinued During This Encounter   Medication Reason     hydrALAZINE (APRESOLINE) 50 MG tablet      losartan (COZAAR) 25 MG tablet        Physical Exam   Vital signs were deferred for this telemedicine visit.    GENERAL APPEARANCE: alert and no distress  HENT: no obvious abnormalities on appearance  RESP: breathing appears unremarkable with normal rate, no audible wheezing or cough and no apparent shortness of breath with conversation  MS: extremities normal - no gross deformities noted  SKIN: no apparent rash and normal skin tone  NEURO: speech is clear with no obvious neurological deficits  PSYCH: mentation appears normal and affect normal    Data     Renal Latest Ref Rng & Units 2/26/2021 2/25/2021 2/24/2021   Na 133 - 144 mmol/L 134 134 134   K 3.4 - 5.3 mmol/L 4.1 4.2 4.4   Cl 94 - 109 mmol/L 101 100 101   CO2 20 - 32 mmol/L 27 27 25   BUN 7 - 30 mg/dL 16 18 26   Cr 0.66 - 1.25 mg/dL 4.10(H) 5.74(H) 7.34(H)   Glucose 70 - 99 mg/dL 109(H) 104(H) 107(H)   Ca  8.5 - 10.1 mg/dL 8.6 8.0(L) 8.1(L)   Mg 1.6 - 2.3 mg/dL 1.9 2.0 2.2     Bone Health Latest Ref Rng & Units 2/26/2021 2/25/2021 2/24/2021   Phos 2.5 - 4.5 mg/dL 3.7 4.0 4.3   PTHi 18 - 80 pg/mL - - -   Vit D Def 20 - 75 ug/L - 44 -     Heme Latest Ref Rng & Units 2/26/2021 2/25/2021 2/25/2021   WBC 4.0 - 11.0 10e9/L 5.6 - 4.9   Hgb 13.3 - 17.7 g/dL 7.5(L) - 6.8(LL)   Plt 150 - 450 10e9/L 91(L) Platelets clumped, platelet count unavailable Platelets clumped, platelet count unavailable   ABSOLUTE NEUTROPHIL 1.6 - 8.3 10e9/L - - -   ABSOLUTE LYMPHOCYTES 0.8 - 5.3 10e9/L - - -   ABSOLUTE MONOCYTES 0.0 - 1.3 10e9/L - - -   ABSOLUTE EOSINOPHILS 0.0 - 0.7 10e9/L - - -   ABSOLUTE BASOPHILS 0.0 - 0.2 10e9/L - - -   ABS IMMATURE  GRANULOCYTES 0 - 0.4 10e9/L - - -   ABSOLUTE NUCLEATED RBC - - - -     Liver Latest Ref Rng & Units 2/25/2021 2/19/2021 1/15/2021   AP 40 - 150 U/L 72 76 -   TBili 0.2 - 1.3 mg/dL 0.6 0.4 -   DBili 0.0 - 0.2 mg/dL 0.2 - -   ALT 0 - 70 U/L 15 16 -   AST 0 - 45 U/L 26 34 -   Tot Protein 6.8 - 8.8 g/dL 6.4(L) 6.9 -   Albumin 3.4 - 5.0 g/dL 2.2(L) 2.3(L) 3.6     Pancreas Latest Ref Rng & Units 2/19/2021 5/8/2015 3/11/2015   A1C 4.3 - 6.0 % - - 5.2   Lipase 73 - 393 U/L 66(L) 127 -     Iron studies Latest Ref Rng & Units 2/22/2021 10/30/2020 5/8/2018   Iron 35 - 180 ug/dL 23(L) 43 46   Iron sat 15 - 46 % 14(L) 21 23   Ferritin 26 - 388 ng/mL 1,505(H) 445(H) 414(H)     [facility] Txp Virology Latest Ref Rng & Units 1/15/2021 1/23/2019 8/8/2018   CMV IgG EU/mL - - -   CVM DNA Quant - - - -   CMV QUANT IU/ML CMVND [IU]/mL - - -   LOG IU/ML OF CMVQNT <2.1 [Log:IU]/mL - - -   BK Spec - - Plasma, EDTA anticoagulant Plasma   BK Res BKNEG:BK Virus DNA Not Detected copies/mL - BK Virus DNA Not Detected BK Virus DNA Not Detected   BK Log <2.7 Log copies/mL - Not Calculated Not Calculated   EBV IgG - - - -   EBV CAPSID ANTIBODY IGG 0.0 - 0.8 AI - - 7.3(H)   EBV DNA COPIES/ML EBVNEG [Copies]/mL - - -   EBV DNA LOG OF COPIES <2.7 [Log:copies]/mL - - -   Hep B Core NR:Nonreactive Nonreactive - Nonreactive   Hep B Surf - - - -   HIV 1&2 NEG - - -        Recent Labs   Lab Test 01/10/21  0632 01/11/21  0449 01/15/21  0906   DOSTAC Not Provided Not Provided 01/14/21  09:00 AM   TACROL 3.1* 3.5* 7.0     Recent Labs   Lab Test 01/19/16  1349 01/12/17  1121 05/08/18  0813   DOSMPA 2,130 01/11/17  2130 0900 05/07/2018   MPACID 11.03* 4.51* 1.63   MPAG 126.7* 100.9* 128.9*       Terry Thomas MD on 3/16/2021 at 3:56 PM

## 2021-03-21 PROBLEM — E87.70 HYPERVOLEMIA, UNSPECIFIED HYPERVOLEMIA TYPE: Status: RESOLVED | Noted: 2021-01-08 | Resolved: 2021-03-21

## 2021-03-21 PROBLEM — G89.18 ACUTE POST-OPERATIVE PAIN: Status: RESOLVED | Noted: 2021-02-22 | Resolved: 2021-03-21

## 2021-03-21 PROBLEM — I10 ESSENTIAL HYPERTENSION: Status: RESOLVED | Noted: 2021-02-22 | Resolved: 2021-03-21

## 2021-03-26 DIAGNOSIS — I31.39 PERICARDIAL EFFUSION: Primary | ICD-10-CM

## 2021-03-30 ENCOUNTER — TELEPHONE (OUTPATIENT)
Dept: CARDIOLOGY | Facility: CLINIC | Age: 51
End: 2021-03-30

## 2021-03-30 NOTE — TELEPHONE ENCOUNTER
"Pt needs to schedule an appointment with a general cardiologist per a referral.    \"cardiology eval adult referral\" has been cancelled; can be found in the \"finalized requests\" tab of the patient's appointment desk.    PLEASE REINSTATE REFERRAL REQUEST AND LINK TO NEW APPOINTMENT WHEN SCHEDULING.   "

## 2021-04-30 DIAGNOSIS — Z98.84 H/O GASTRIC BYPASS: ICD-10-CM

## 2021-05-09 ENCOUNTER — HEALTH MAINTENANCE LETTER (OUTPATIENT)
Age: 51
End: 2021-05-09

## 2021-05-17 RX ORDER — CYANOCOBALAMIN (VITAMIN B-12) 1000 MCG
1 TABLET, SUBLINGUAL SUBLINGUAL DAILY
Qty: 30 TABLET | Refills: 11 | Status: SHIPPED | OUTPATIENT
Start: 2021-05-17 | End: 2023-03-06

## 2021-05-18 ENCOUNTER — OFFICE VISIT (OUTPATIENT)
Dept: CARDIOLOGY | Facility: CLINIC | Age: 51
End: 2021-05-18
Attending: INTERNAL MEDICINE
Payer: COMMERCIAL

## 2021-05-18 VITALS
HEIGHT: 70 IN | DIASTOLIC BLOOD PRESSURE: 73 MMHG | BODY MASS INDEX: 24.62 KG/M2 | SYSTOLIC BLOOD PRESSURE: 112 MMHG | WEIGHT: 172 LBS

## 2021-05-18 DIAGNOSIS — Z94.0 HTN, KIDNEY TRANSPLANT RELATED: ICD-10-CM

## 2021-05-18 DIAGNOSIS — T86.11 KIDNEY TRANSPLANT REJECTION: Primary | ICD-10-CM

## 2021-05-18 DIAGNOSIS — I31.39 PERICARDIAL EFFUSION: ICD-10-CM

## 2021-05-18 DIAGNOSIS — N18.4 ANEMIA OF CHRONIC RENAL FAILURE, STAGE 4 (SEVERE) (H): ICD-10-CM

## 2021-05-18 DIAGNOSIS — N18.6 ESRD (END STAGE RENAL DISEASE) ON DIALYSIS (H): ICD-10-CM

## 2021-05-18 DIAGNOSIS — Z94.0 KIDNEY REPLACED BY TRANSPLANT: ICD-10-CM

## 2021-05-18 DIAGNOSIS — I15.1 HTN, KIDNEY TRANSPLANT RELATED: ICD-10-CM

## 2021-05-18 DIAGNOSIS — Z99.2 ESRD (END STAGE RENAL DISEASE) ON DIALYSIS (H): ICD-10-CM

## 2021-05-18 DIAGNOSIS — D63.1 ANEMIA OF CHRONIC RENAL FAILURE, STAGE 4 (SEVERE) (H): ICD-10-CM

## 2021-05-18 PROCEDURE — 93005 ELECTROCARDIOGRAM TRACING: CPT

## 2021-05-18 PROCEDURE — 99214 OFFICE O/P EST MOD 30 MIN: CPT | Performed by: INTERNAL MEDICINE

## 2021-05-18 PROCEDURE — G0463 HOSPITAL OUTPT CLINIC VISIT: HCPCS | Mod: 25

## 2021-05-18 ASSESSMENT — ENCOUNTER SYMPTOMS
PALPITATIONS: 0
PARALYSIS: 0
SLEEP DISTURBANCES DUE TO BREATHING: 0
WEAKNESS: 1
DIZZINESS: 0
LOSS OF CONSCIOUSNESS: 0
LEG PAIN: 0
HYPERTENSION: 0
HEADACHES: 0
SPEECH CHANGE: 0
TINGLING: 1
EXERCISE INTOLERANCE: 0
HYPOTENSION: 1
ORTHOPNEA: 0
SYNCOPE: 0
NUMBNESS: 1
SEIZURES: 0
MEMORY LOSS: 0
TREMORS: 0
LIGHT-HEADEDNESS: 1
DISTURBANCES IN COORDINATION: 0

## 2021-05-18 ASSESSMENT — PAIN SCALES - GENERAL: PAINLEVEL: NO PAIN (0)

## 2021-05-18 ASSESSMENT — MIFFLIN-ST. JEOR: SCORE: 1643.5

## 2021-05-18 NOTE — PROGRESS NOTES
I am delighted to see Tristian Mckeon in consultation.The primary encounter diagnosis was Kidney transplant rejection. Diagnoses of Pericardial effusion, Kidney replaced by transplant, ESRD (end stage renal disease) on dialysis (H), HTN, kidney transplant related, and Anemia of chronic renal failure, stage 4 (severe) (H) were also pertinent to this visit.   As you know, the patient is a 50 year old -American male. He   has a past medical history of Anemia, AVN (avascular necrosis of bone) (H), Depression, DJD (degenerative joint disease), Erectile dysfunction, Genital condyloma, male, Gout, History of blood transfusion, Hyperlipidemia, Hypertension, Hypogonadism male, Kidney replaced by transplant (1994), Kidney replaced by transplant (2000), Kidney replaced by transplant (3/12/2015), Kidney transplant rejection (3/25/15), Medical non-compliance, Obesity, Organ transplant candidate (2009), Osteoporosis, Thyroid disease, and Vitamin D deficiency..    On this visit, the patient states that he has no chest pain or shortness of breath.  The patient denies chest pressure/discomfort, dyspnea, palpitations, near-syncope, syncope, orthopnea, paroxysmal nocturnal dyspnea, lower extermity edema and shortness of breath.    The patient's cardiovascular risk factors include hypertension and high cholesterol.    The following portions of the patient's history were reviewed and updated as appropriate: allergies, current medications, past family history, past medical history, past social history, past surgical history, and the problem list.    PMH: The patient's past medical history includes:    Past Medical History:   Diagnosis Date     Anemia      AVN (avascular necrosis of bone) (H)     Left femoral head     Depression      DJD (degenerative joint disease)      Erectile dysfunction      Genital condyloma, male     S/p resection     Gout      History of blood transfusion      Hyperlipidemia      Hypertension       Hypogonadism male      Kidney replaced by transplant     LDKT.  Early ACR, ureteral strictures.  Failed  d/t chronic rejection.  S/p graft nephrectomy.     Kidney replaced by transplant     LDKT.  ACR, non-compliance, CAN.  Failed in .  S/p graft nephrectomy.     Kidney replaced by transplant 3/12/2015    DDKT.  Induction w/ thymo 600mg.     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  3/25/15 DSA:  A1 mfi 1604, A30 mfi 4387, B13 mfi 1013.     Medical non-compliance      Obesity     S/p gastric bypass      Organ transplant candidate      Osteoporosis      Thyroid disease      Vitamin D deficiency       Past Surgical History:   Procedure Laterality Date     BIOPSY      Kidney     C HIP CORE DECOMPRESSION Left      C TOTAL HIP ARTHROPLASTY Right      CYSTOSCOPY, REMOVE STENT(S), COMBINED Right 2015    Procedure: COMBINED CYSTOSCOPY, REMOVE STENT(S);  Surgeon: Ren Romeo MD;  Location: [facility] OR     GASTRIC BYPASS  2005     HERNIA REPAIR Right     inguinal     HYDROCELECTOMY INGUINAL Right      LAPAROTOMY EXPLORATORY N/A 2021    Procedure: LAPAROTOMY;  Surgeon: Ren Romeo MD;  Location: [facility] OR     NEPHRECTOMY Right     allograft nephrectomy     NEPHRECTOMY Left     allograft nephrectomy     NEPHRECTOMY N/A 2021    Procedure: NEPHRECTOMY, TOTAL;  Surgeon: Ren Romeo MD;  Location: [facility] OR     PARATHYROIDECTOMY       TRANSPLANT KIDNEY RECIPIENT  DONOR N/A 3/12/2015    Procedure: TRANSPLANT KIDNEY RECIPIENT  DONOR;  Surgeon: Ren Romeo MD;  Location: [facility] OR     TRANSPLANT KIDNEY RECIPIENT LIVING RELATED      s/p right allograft nephrectomy     TRANSPLANT KIDNEY RECIPIENT LIVING RELATED      s/p left allograft nephrectomy     VASCULAR SURGERY         The patient's medications as of the current encounter are:     Current Outpatient Medications   Medication Sig Dispense Refill      Calcium Acetate 667 MG TABS Take 2 tablets by mouth 3 times daily (with meals) 180 tablet 11     Cyanocobalamin (VITAMIN B-12) 500 MCG SUBL Place 1 tablet under the tongue daily 30 tablet 11     cyclobenzaprine (FLEXERIL) 10 MG tablet Take 10 mg by mouth       multivitamin RENAL (RENAVITE RX/NEPHROVITE) 1 MG tablet Take 1 tablet by mouth daily       acetaminophen (TYLENOL) 325 MG tablet Take 2 tablets (650 mg) by mouth every 4 hours as needed for other (multimodal surgical pain management along with NSAIDS and opioid medication as indicated based on pain control and physical function.)       calcitRIOL (ROCALTROL) 0.25 MCG capsule TAKE ONE CAPSULE BY MOUTH ONCE DAILY 90 capsule 3     cloNIDine (CATAPRES) 0.1 MG tablet Take 0.1 mg by mouth 2 times daily       cyclobenzaprine (FLEXERIL) 10 MG tablet Take 1 tablet (10 mg) by mouth 3 times daily as needed for muscle spasms 90 tablet 3     darbepoetin sharon (ARANESP) 40 MCG/0.4ML injection Inject 0.4 mLs (40 mcg) Subcutaneous once a week       Lidocaine (LIDOCARE) 4 % Patch Place 2 patches onto the skin every 24 hours To prevent lidocaine toxicity, patient should be patch free for 12 hrs daily. (Patient not taking: Reported on 3/8/2021) 4 patch 0     methocarbamol (ROBAXIN) 750 MG tablet Take 1 tablet (750 mg) by mouth 4 times daily as needed for muscle spasms (Patient not taking: Reported on 3/8/2021) 12 tablet 0     ondansetron (ZOFRAN-ODT) 4 MG ODT tab Take 1 tablet (4 mg) by mouth every 6 hours as needed for nausea or vomiting (Patient not taking: Reported on 3/8/2021) 12 tablet 1     polyethylene glycol (MIRALAX) 17 GM/Dose powder Take 17 g by mouth daily 510 g 3     senna-docusate (SENOKOT-S/PERICOLACE) 8.6-50 MG tablet Take 1-2 tablets by mouth 2 times daily 120 tablet 3       Labs:     No results displayed because visit has over 200 results.          Allergies:    Allergies   Allergen Reactions     Cephalosporins Unknown     Cyclosporine      Duricef [Cefadroxil]   "    Oxycodone Itching       Family History:   Family History   Problem Relation Age of Onset     Diabetes Brother      Blood Disease Sister         sickle cell     Sickle Cell Anemia Sister      Hypertension Father      Heart Disease Father      Arthritis Mother      Heart Disease Mother      No Known Problems Son      No Known Problems Daughter        Psychosocial history:  reports that he has been smoking cigars. He has never used smokeless tobacco. He reports current alcohol use. He reports that he does not use drugs.    Review of systems: negative for, exertional chest pain or pressure, paroxysmal nocturnal dyspnea, dyspnea on exertion, orthopnea, lower extremity edema, syncope or near-syncope, claudication and exercise intolerance    In addition,   General: No change in weight, sleep or appetite.  Normal energy.  No fever or chills  Eyes: Negative for vision changes or eye problems  ENT: No problems with ears, nose or throat.  No difficulty swallowing.  Resp: No coughing, wheezing or shortness of breath  GI: No nausea, vomiting,  heartburn, abdominal pain, diarrhea, constipation or change in bowel habits  : ESRD on dialysis  Musculoskeletal: No significant muscle or joint pains  Neurologic: bilat hand paraesthesias  Psychiatric: No problems with anxiety, depression or mental health  Heme/immune/allergy: anemia of CKD  Endocrine: No history of thyroid disease, diabetes or other endocrine disorders  Skin: No rashes,worrisome lesions or skin problems  Vascular:  No claudication, lifestyle limiting or otherwise; no ischemic rest pain; no non-healing ulcers. No weakness      Physical examination  Vitals: /73 (BP Location: Left arm, Patient Position: Chair, Cuff Size: Adult Regular)   Ht 1.765 m (5' 9.5\")   Wt 78 kg (172 lb)   BMI 25.04 kg/m    BMI= Body mass index is 25.04 kg/m . No pulsus    In general, the patient is a pleasant male in no apparent distress.    HEENT: Normiocephalic and atraumatic.  " PERRLA.  EOMI.  Sclerae white, not injected.  Nares clear.  Pharynx without erythema or exudate.  Dentition intact.    Neck: No adenopathy.  No thyromegaly. Carotids +2/2 bilaterally without bruits.  No jugular venous distension.   Heart:  The PMI is in the 5th ICS in the midclavicular line. There is no heave. Regular rate and rhythm. Normal S1, S2 splits physiologically. No murmur, rub, click, or gallop.    Lungs: Clear to asculation.  No ronchi, wheezes, rales.  No dullness to percussion.   Abdomen: Soft, nontender, nondistended. No organomegaly. No AAA.  No bruits.   Extremities: No clubbing, cyanosis, or edema. Shunts in both arms.  Neurological: The neurological examination reveal a patient who was oriented to person, place, and time.  The remainder of the examination was nonfocal.    Cardiac tests include:    ECG: normal sinus rhythm, FAVB, normal voltage, normal axis, waveforms  Last echo: normal EF, circumferential effusion without tamponade physiology    Assessment and Plan    1. Pericardial effusion - stable  - plan repeat echo  - probably secondary to renal disease  2. HTN - stable  3. ESRD - on dialysis    The patient is to return  In 3 months to see ANTOLIN. The patient understood the treatment plan as outlined above.  There were no barriers to learning.      Johnny Tuttle MD

## 2021-05-18 NOTE — NURSING NOTE
Chief Complaint   Patient presents with     New Patient     present for  Pericardial effusion follow up      Vitals were taken and medications where reconciled. EKG was performed   KILO Champion  10:16 AM

## 2021-05-18 NOTE — NURSING NOTE
Cardiac Testing: Patient given instructions regarding  echocardiogram . Discussed purpose, preparation, procedure and when to expect results reported back to the patient. Patient demonstrated understanding of this information and agreed to call with further questions or concerns.  Return Appointment: Patient given instructions regarding scheduling next clinic visit. Patient demonstrated understanding of this information and agreed to call with further questions or concerns.  Patient stated he understood all health information given and agreed to call with further questions or concerns.

## 2021-05-18 NOTE — PATIENT INSTRUCTIONS
"You were seen today in the Cardiovascular Clinic at the [facility].     Cardiology Providers you saw during your visit: Cipriano Tuttle MD     Diagnosis:   Encounter Diagnoses   Name Primary?     Pericardial effusion      Kidney transplant rejection Yes     Kidney replaced by transplant      ESRD (end stage renal disease) on dialysis (H)      HTN, kidney transplant related      Anemia of chronic renal failure, stage 4 (severe) (H)         Results: Discussed with you today       Orders:   Orders Placed This Encounter   Procedures     Follow-Up with Cardiac Advanced Practice Provider     EKG 12-lead, tracing only (Same Day)     Echocardiogram Complete       Recommendations:   1. Follow up 3 months with ANTOLIN  2. echocardiogram    Please feel free to call me with any questions or concerns.       Amita La LPN   Questions: [phone].   First press #1 for the [employer] for \"Medical Questions\" to reach us Cardiology Nurses.     [phone].   First press #1 for the [employer] and then press #1     On Call Cardiologist for after hours or on weekends: [phone]   option #4 and ask to speak to the on-call Cardiologist.          If you need a medication refill please contact your pharmacy.  Please allow 3 business days for your refill to be completed.  ________________________________________________________________________________________________________________________________      "

## 2021-05-18 NOTE — LETTER
5/18/2021      RE: Tristian Mckeon  [address]  [facility] [address]       Dear Colleague,    Thank you for the opportunity to participate in the care of your patient, Tristian Mckeon, at the [facility] [place] [facility]. Please see a copy of my visit note below.    I am delighted to see Tristian Mckeon in consultation.The primary encounter diagnosis was Kidney transplant rejection. Diagnoses of Pericardial effusion, Kidney replaced by transplant, ESRD (end stage renal disease) on dialysis (H), HTN, kidney transplant related, and Anemia of chronic renal failure, stage 4 (severe) (H) were also pertinent to this visit.   As you know, the patient is a 50 year old -American male. He   has a past medical history of Anemia, AVN (avascular necrosis of bone) (H), Depression, DJD (degenerative joint disease), Erectile dysfunction, Genital condyloma, male, Gout, History of blood transfusion, Hyperlipidemia, Hypertension, Hypogonadism male, Kidney replaced by transplant (1994), Kidney replaced by transplant (2000), Kidney replaced by transplant (3/12/2015), Kidney transplant rejection (3/25/15), Medical non-compliance, Obesity, Organ transplant candidate (2009), Osteoporosis, Thyroid disease, and Vitamin D deficiency..    On this visit, the patient states that he has no chest pain or shortness of breath.  The patient denies chest pressure/discomfort, dyspnea, palpitations, near-syncope, syncope, orthopnea, paroxysmal nocturnal dyspnea, lower extermity edema and shortness of breath.    The patient's cardiovascular risk factors include hypertension and high cholesterol.    The following portions of the patient's history were reviewed and updated as appropriate: allergies, current medications, past family history, past medical history, past social history, past surgical history, and the problem list.    PMH: The patient's past medical  history includes:    Past Medical History:   Diagnosis Date     Anemia      AVN (avascular necrosis of bone) (H)     Left femoral head     Depression      DJD (degenerative joint disease)      Erectile dysfunction      Genital condyloma, male     S/p resection     Gout      History of blood transfusion      Hyperlipidemia      Hypertension      Hypogonadism male      Kidney replaced by transplant     LDKT.  Early ACR, ureteral strictures.  Failed  d/t chronic rejection.  S/p graft nephrectomy.     Kidney replaced by transplant     LDKT.  ACR, non-compliance, CAN.  Failed in .  S/p graft nephrectomy.     Kidney replaced by transplant 3/12/2015    DDKT.  Induction w/ thymo 600mg.     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  3/25/15 DSA:  A1 mfi 1604, A30 mfi 4387, B13 mfi 1013.     Medical non-compliance      Obesity     S/p gastric bypass      Organ transplant candidate      Osteoporosis      Thyroid disease      Vitamin D deficiency       Past Surgical History:   Procedure Laterality Date     BIOPSY      Kidney     C HIP CORE DECOMPRESSION Left      C TOTAL HIP ARTHROPLASTY Right      CYSTOSCOPY, REMOVE STENT(S), COMBINED Right 2015    Procedure: COMBINED CYSTOSCOPY, REMOVE STENT(S);  Surgeon: Ren Romeo MD;  Location: [facility] OR     GASTRIC BYPASS  2005     HERNIA REPAIR Right     inguinal     HYDROCELECTOMY INGUINAL Right      LAPAROTOMY EXPLORATORY N/A 2021    Procedure: LAPAROTOMY;  Surgeon: Ren Romeo MD;  Location: [facility] OR     NEPHRECTOMY Right     allograft nephrectomy     NEPHRECTOMY Left     allograft nephrectomy     NEPHRECTOMY N/A 2021    Procedure: NEPHRECTOMY, TOTAL;  Surgeon: Ren Romeo MD;  Location: [facility] OR     PARATHYROIDECTOMY       TRANSPLANT KIDNEY RECIPIENT  DONOR N/A 3/12/2015    Procedure: TRANSPLANT KIDNEY RECIPIENT  DONOR;  Surgeon: Ren Romeo MD;   Location: [facility] OR     TRANSPLANT KIDNEY RECIPIENT LIVING RELATED  1994    s/p right allograft nephrectomy     TRANSPLANT KIDNEY RECIPIENT LIVING RELATED  2000    s/p left allograft nephrectomy     VASCULAR SURGERY         The patient's medications as of the current encounter are:     Current Outpatient Medications   Medication Sig Dispense Refill     Calcium Acetate 667 MG TABS Take 2 tablets by mouth 3 times daily (with meals) 180 tablet 11     Cyanocobalamin (VITAMIN B-12) 500 MCG SUBL Place 1 tablet under the tongue daily 30 tablet 11     cyclobenzaprine (FLEXERIL) 10 MG tablet Take 10 mg by mouth       multivitamin RENAL (RENAVITE RX/NEPHROVITE) 1 MG tablet Take 1 tablet by mouth daily       acetaminophen (TYLENOL) 325 MG tablet Take 2 tablets (650 mg) by mouth every 4 hours as needed for other (multimodal surgical pain management along with NSAIDS and opioid medication as indicated based on pain control and physical function.)       calcitRIOL (ROCALTROL) 0.25 MCG capsule TAKE ONE CAPSULE BY MOUTH ONCE DAILY 90 capsule 3     cloNIDine (CATAPRES) 0.1 MG tablet Take 0.1 mg by mouth 2 times daily       cyclobenzaprine (FLEXERIL) 10 MG tablet Take 1 tablet (10 mg) by mouth 3 times daily as needed for muscle spasms 90 tablet 3     darbepoetin sharon (ARANESP) 40 MCG/0.4ML injection Inject 0.4 mLs (40 mcg) Subcutaneous once a week       Lidocaine (LIDOCARE) 4 % Patch Place 2 patches onto the skin every 24 hours To prevent lidocaine toxicity, patient should be patch free for 12 hrs daily. (Patient not taking: Reported on 3/8/2021) 4 patch 0     methocarbamol (ROBAXIN) 750 MG tablet Take 1 tablet (750 mg) by mouth 4 times daily as needed for muscle spasms (Patient not taking: Reported on 3/8/2021) 12 tablet 0     ondansetron (ZOFRAN-ODT) 4 MG ODT tab Take 1 tablet (4 mg) by mouth every 6 hours as needed for nausea or vomiting (Patient not taking: Reported on 3/8/2021) 12 tablet 1     polyethylene glycol (MIRALAX) 17  GM/Dose powder Take 17 g by mouth daily 510 g 3     senna-docusate (SENOKOT-S/PERICOLACE) 8.6-50 MG tablet Take 1-2 tablets by mouth 2 times daily 120 tablet 3       Labs:     No results displayed because visit has over 200 results.          Allergies:    Allergies   Allergen Reactions     Cephalosporins Unknown     Cyclosporine      Duricef [Cefadroxil]      Oxycodone Itching       Family History:   Family History   Problem Relation Age of Onset     Diabetes Brother      Blood Disease Sister         sickle cell     Sickle Cell Anemia Sister      Hypertension Father      Heart Disease Father      Arthritis Mother      Heart Disease Mother      No Known Problems Son      No Known Problems Daughter        Psychosocial history:  reports that he has been smoking cigars. He has never used smokeless tobacco. He reports current alcohol use. He reports that he does not use drugs.    Review of systems: negative for, exertional chest pain or pressure, paroxysmal nocturnal dyspnea, dyspnea on exertion, orthopnea, lower extremity edema, syncope or near-syncope, claudication and exercise intolerance    In addition,   General: No change in weight, sleep or appetite.  Normal energy.  No fever or chills  Eyes: Negative for vision changes or eye problems  ENT: No problems with ears, nose or throat.  No difficulty swallowing.  Resp: No coughing, wheezing or shortness of breath  GI: No nausea, vomiting,  heartburn, abdominal pain, diarrhea, constipation or change in bowel habits  : ESRD on dialysis  Musculoskeletal: No significant muscle or joint pains  Neurologic: bilat hand paraesthesias  Psychiatric: No problems with anxiety, depression or mental health  Heme/immune/allergy: anemia of CKD  Endocrine: No history of thyroid disease, diabetes or other endocrine disorders  Skin: No rashes,worrisome lesions or skin problems  Vascular:  No claudication, lifestyle limiting or otherwise; no ischemic rest pain; no non-healing ulcers. No  "weakness      Physical examination  Vitals: /73 (BP Location: Left arm, Patient Position: Chair, Cuff Size: Adult Regular)   Ht 1.765 m (5' 9.5\")   Wt 78 kg (172 lb)   BMI 25.04 kg/m    BMI= Body mass index is 25.04 kg/m . No pulsus    In general, the patient is a pleasant male in no apparent distress.    HEENT: Normiocephalic and atraumatic.  PERRLA.  EOMI.  Sclerae white, not injected.  Nares clear.  Pharynx without erythema or exudate.  Dentition intact.    Neck: No adenopathy.  No thyromegaly. Carotids +2/2 bilaterally without bruits.  No jugular venous distension.   Heart:  The PMI is in the 5th ICS in the midclavicular line. There is no heave. Regular rate and rhythm. Normal S1, S2 splits physiologically. No murmur, rub, click, or gallop.    Lungs: Clear to asculation.  No ronchi, wheezes, rales.  No dullness to percussion.   Abdomen: Soft, nontender, nondistended. No organomegaly. No AAA.  No bruits.   Extremities: No clubbing, cyanosis, or edema. Shunts in both arms.  Neurological: The neurological examination reveal a patient who was oriented to person, place, and time.  The remainder of the examination was nonfocal.    Cardiac tests include:    ECG: normal sinus rhythm, FAVB, normal voltage, normal axis, waveforms  Last echo: normal EF, circumferential effusion without tamponade physiology    Assessment and Plan    1. Pericardial effusion - stable  - plan repeat echo  - probably secondary to renal disease  2. HTN - stable  3. ESRD - on dialysis    The patient is to return  In 3 months to see ANTOLIN. The patient understood the treatment plan as outlined above.  There were no barriers to learning.      Johnny Tuttle MD         "

## 2021-05-19 LAB — INTERPRETATION ECG - MUSE: NORMAL

## 2021-05-27 ENCOUNTER — ANCILLARY PROCEDURE (OUTPATIENT)
Dept: CARDIOLOGY | Facility: CLINIC | Age: 51
End: 2021-05-27
Attending: INTERNAL MEDICINE
Payer: COMMERCIAL

## 2021-05-27 DIAGNOSIS — I31.39 PERICARDIAL EFFUSION: ICD-10-CM

## 2021-05-27 PROCEDURE — 93306 TTE W/DOPPLER COMPLETE: CPT | Performed by: INTERNAL MEDICINE

## 2021-06-28 DIAGNOSIS — L30.9 DERMATITIS: Primary | ICD-10-CM

## 2021-06-28 RX ORDER — TRIAMCINOLONE ACETONIDE 1 MG/G
CREAM TOPICAL 2 TIMES DAILY PRN
Qty: 30 G | Refills: 1 | Status: ON HOLD | OUTPATIENT
Start: 2021-06-28 | End: 2022-01-06

## 2021-07-05 ENCOUNTER — TELEPHONE (OUTPATIENT)
Dept: CARDIOLOGY | Facility: CLINIC | Age: 51
End: 2021-07-05

## 2021-07-05 NOTE — TELEPHONE ENCOUNTER
Pt needs to reschedule august 12th follow up with krista bello.    CAN RESCHEDULE WITH AMY SANCHEZ.

## 2021-07-12 ENCOUNTER — TELEPHONE (OUTPATIENT)
Dept: CARDIOLOGY | Facility: CLINIC | Age: 51
End: 2021-07-12

## 2021-07-12 NOTE — TELEPHONE ENCOUNTER
"Pt needs to reschedule 8/12 f/u with krista bello.    FOLLOW UP REQUEST HAS BEEN CANCELLED; can be found in the \"finalized requests\" tab of the pt's appointment desk. PLEASE REINSTATE REQUEST (right click on request and select \"reinstate\") AND LINK TO APPOINTMENT WHEN SCHEDULING.   "

## 2021-07-28 DIAGNOSIS — Z98.84 H/O GASTRIC BYPASS: Primary | ICD-10-CM

## 2021-07-28 RX ORDER — VIT B COMP NO.3/FOLIC/C/BIOTIN 1 MG-60 MG
1 TABLET ORAL 2 TIMES DAILY
Qty: 60 TABLET | Refills: 11 | Status: SHIPPED | OUTPATIENT
Start: 2021-07-28 | End: 2022-03-01

## 2021-08-27 DIAGNOSIS — M79.671 RIGHT FOOT PAIN: Primary | ICD-10-CM

## 2021-09-03 NOTE — TELEPHONE ENCOUNTER
DIAGNOSIS: Right heel pain/no img/David Wong MD   APPOINTMENT DATE: 10.13.21   NOTES STATUS DETAILS   OFFICE NOTE from referring provider N/A    OFFICE NOTE from other specialist N/A    DISCHARGE SUMMARY from hospital N/A    DISCHARGE REPORT from the ER N/A    OPERATIVE REPORT N/A    MEDICATION LIST Internal    EMG (for Spine) N/A    IMPLANT RECORD/STICKER N/A    LABS     CBC/DIFF N/A    CULTURES N/A    INJECTIONS DONE IN RADIOLOGY N/A    MRI N/A    CT SCAN N/A    XRAYS (IMAGES & REPORTS) N/A    TUMOR     PATHOLOGY  Slides & report N/A

## 2021-10-13 ENCOUNTER — PRE VISIT (OUTPATIENT)
Dept: ORTHOPEDICS | Facility: CLINIC | Age: 51
End: 2021-10-13

## 2021-10-24 ENCOUNTER — HEALTH MAINTENANCE LETTER (OUTPATIENT)
Age: 51
End: 2021-10-24

## 2021-11-13 ENCOUNTER — TELEPHONE (OUTPATIENT)
Dept: CARDIOLOGY | Facility: CLINIC | Age: 51
End: 2021-11-13
Payer: COMMERCIAL

## 2021-11-13 NOTE — TELEPHONE ENCOUNTER
Called and spoke with patient to schedule follow up with cardiac ANTOLIN. Per pt he does not need to see cardiology and will call if he needs to see us again.

## 2021-11-24 DIAGNOSIS — M79.671 BILATERAL FOOT PAIN: ICD-10-CM

## 2021-11-24 DIAGNOSIS — M79.672 BILATERAL FOOT PAIN: ICD-10-CM

## 2021-11-24 DIAGNOSIS — Z98.84 GASTRIC BYPASS STATUS FOR OBESITY: Primary | ICD-10-CM

## 2021-11-24 DIAGNOSIS — Z99.2 ESRD ON DIALYSIS (H): ICD-10-CM

## 2021-11-24 DIAGNOSIS — N18.6 ESRD ON DIALYSIS (H): ICD-10-CM

## 2021-12-13 ENCOUNTER — APPOINTMENT (OUTPATIENT)
Dept: GENERAL RADIOLOGY | Facility: CLINIC | Age: 51
End: 2021-12-13
Attending: EMERGENCY MEDICINE
Payer: COMMERCIAL

## 2021-12-13 ENCOUNTER — APPOINTMENT (OUTPATIENT)
Dept: ULTRASOUND IMAGING | Facility: CLINIC | Age: 51
End: 2021-12-13
Attending: EMERGENCY MEDICINE
Payer: COMMERCIAL

## 2021-12-13 ENCOUNTER — HOSPITAL ENCOUNTER (EMERGENCY)
Facility: CLINIC | Age: 51
Discharge: HOME | End: 2021-12-13
Attending: EMERGENCY MEDICINE | Admitting: EMERGENCY MEDICINE
Payer: COMMERCIAL

## 2021-12-13 VITALS
HEIGHT: 71 IN | DIASTOLIC BLOOD PRESSURE: 74 MMHG | HEART RATE: 75 BPM | BODY MASS INDEX: 24.5 KG/M2 | WEIGHT: 175 LBS | OXYGEN SATURATION: 96 % | TEMPERATURE: 98.3 F | RESPIRATION RATE: 16 BRPM | SYSTOLIC BLOOD PRESSURE: 146 MMHG

## 2021-12-13 DIAGNOSIS — M25.511 ACUTE PAIN OF RIGHT SHOULDER: ICD-10-CM

## 2021-12-13 PROCEDURE — 73030 X-RAY EXAM OF SHOULDER: CPT | Mod: RT

## 2021-12-13 PROCEDURE — 73060 X-RAY EXAM OF HUMERUS: CPT | Mod: RT

## 2021-12-13 PROCEDURE — 73030 X-RAY EXAM OF SHOULDER: CPT | Mod: 26 | Performed by: RADIOLOGY

## 2021-12-13 PROCEDURE — 99284 EMERGENCY DEPT VISIT MOD MDM: CPT | Performed by: EMERGENCY MEDICINE

## 2021-12-13 PROCEDURE — 73060 X-RAY EXAM OF HUMERUS: CPT | Mod: 26 | Performed by: RADIOLOGY

## 2021-12-13 PROCEDURE — 93990 DOPPLER FLOW TESTING: CPT | Mod: 26 | Performed by: RADIOLOGY

## 2021-12-13 PROCEDURE — 250N000013 HC RX MED GY IP 250 OP 250 PS 637: Performed by: EMERGENCY MEDICINE

## 2021-12-13 PROCEDURE — 93990 DOPPLER FLOW TESTING: CPT

## 2021-12-13 PROCEDURE — 99285 EMERGENCY DEPT VISIT HI MDM: CPT | Mod: 25 | Performed by: EMERGENCY MEDICINE

## 2021-12-13 RX ORDER — TRAMADOL HYDROCHLORIDE 50 MG/1
50 TABLET ORAL EVERY 6 HOURS PRN
Qty: 12 TABLET | Refills: 0 | Status: SHIPPED | OUTPATIENT
Start: 2021-12-13 | End: 2021-12-16

## 2021-12-13 RX ORDER — METHYLPREDNISOLONE 4 MG
TABLET, DOSE PACK ORAL
Qty: 21 TABLET | Refills: 0 | Status: ON HOLD | OUTPATIENT
Start: 2021-12-13 | End: 2022-01-06

## 2021-12-13 RX ORDER — HYDROCODONE BITARTRATE AND ACETAMINOPHEN 5; 325 MG/1; MG/1
1 TABLET ORAL ONCE
Status: COMPLETED | OUTPATIENT
Start: 2021-12-13 | End: 2021-12-13

## 2021-12-13 RX ADMIN — HYDROCODONE BITARTRATE AND ACETAMINOPHEN 1 TABLET: 5; 325 TABLET ORAL at 13:13

## 2021-12-13 ASSESSMENT — MIFFLIN-ST. JEOR: SCORE: 1675.92

## 2021-12-13 NOTE — Clinical Note
Tristian Mckeon was seen and treated in our emergency department on 12/13/2021.  He may return to work on 12/14/2021.       If you have any questions or concerns, please don't hesitate to call.      Moraima Lombardo, DO

## 2021-12-13 NOTE — ED PROVIDER NOTES
"    [place] EMERGENCY DEPARTMENT ([facility])  12/13/21  History     Chief Complaint   Patient presents with     Shoulder Pain     Right; radiates to neck, back, lower arm (muscoskeletal)     The history is provided by the patient and medical records.     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 Patient reports that this shoulder pain has been ongoing for \"weeks\". He reports this is a \"throbbing\" pain that is primarily located in the anterior portion of the right shoulder. He reports his pain has now began to radiate down into his right forearm over the last couple of days. He also reports radiation of this pain into the neck and right upper back. He reports he has been taking Tylenol, over-the-counter pain relievers, and arthritis medications with no relief. He denies history of pain like this in the past. Patient cannot identify any precipitating factors/events prior to the pain starting. Patient reports the pain is slightly worsened with flexion. Reports pain is more exacerbated by abduction and subsequent adduction. The patient does have a RUE fistula in the right bicep and also notes some worry about the possibility of fistula complication causing his right shoulder pain. He reports he has been using his fistula for dialysis, last dialyzed on Friday (12/10).    Past Medical History  Past Medical History:   Diagnosis Date     Anemia      AVN (avascular necrosis of bone) (H)     Left femoral head     Depression      DJD (degenerative joint disease)      Erectile dysfunction      Genital condyloma, male     S/p resection     Gout      History of blood " transfusion      Hyperlipidemia      Hypertension      Hypogonadism male      Kidney replaced by transplant     LDKT.  Early ACR, ureteral strictures.  Failed  d/t chronic rejection.  S/p graft nephrectomy.     Kidney replaced by transplant     LDKT.  ACR, non-compliance, CAN.  Failed in .  S/p graft nephrectomy.     Kidney replaced by transplant 3/12/2015    DDKT.  Induction w/ thymo 600mg.     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  3/25/15 DSA:  A1 mfi 1604, A30 mfi 4387, B13 mfi 1013.     Medical non-compliance      Obesity     S/p gastric bypass      Organ transplant candidate      Osteoporosis      Thyroid disease      Vitamin D deficiency      Past Surgical History:   Procedure Laterality Date     BIOPSY      Kidney     C HIP CORE DECOMPRESSION Left      C TOTAL HIP ARTHROPLASTY Right      CYSTOSCOPY, REMOVE STENT(S), COMBINED Right 2015    Procedure: COMBINED CYSTOSCOPY, REMOVE STENT(S);  Surgeon: Ren Romeo MD;  Location: [facility] OR     GASTRIC BYPASS  2005     HERNIA REPAIR Right     inguinal     HYDROCELECTOMY INGUINAL Right      LAPAROTOMY EXPLORATORY N/A 2021    Procedure: LAPAROTOMY;  Surgeon: Ren Romeo MD;  Location: [facility] OR     NEPHRECTOMY Right     allograft nephrectomy     NEPHRECTOMY Left     allograft nephrectomy     NEPHRECTOMY N/A 2021    Procedure: NEPHRECTOMY, TOTAL;  Surgeon: Ren Romeo MD;  Location: [facility] OR     PARATHYROIDECTOMY       TRANSPLANT KIDNEY RECIPIENT  DONOR N/A 3/12/2015    Procedure: TRANSPLANT KIDNEY RECIPIENT  DONOR;  Surgeon: Ren Romeo MD;  Location: [facility] OR     TRANSPLANT KIDNEY RECIPIENT LIVING RELATED      s/p right allograft nephrectomy     TRANSPLANT KIDNEY RECIPIENT LIVING RELATED      s/p left allograft nephrectomy     VASCULAR SURGERY       methylPREDNISolone (MEDROL DOSEPAK) 4 MG tablet therapy pack  traMADol (ULTRAM)  "50 MG tablet  acetaminophen (TYLENOL) 325 MG tablet  calcitRIOL (ROCALTROL) 0.25 MCG capsule  Calcium Acetate 667 MG TABS  cloNIDine (CATAPRES) 0.1 MG tablet  Cyanocobalamin (VITAMIN B-12) 500 MCG SUBL  cyclobenzaprine (FLEXERIL) 10 MG tablet  cyclobenzaprine (FLEXERIL) 10 MG tablet  darbepoetin sharon (ARANESP) 40 MCG/0.4ML injection  multivitamin RENAL (RENAVITE RX/NEPHROVITE) 1 MG tablet  polyethylene glycol (MIRALAX) 17 GM/Dose powder  senna-docusate (SENOKOT-S/PERICOLACE) 8.6-50 MG tablet  triamcinolone (KENALOG) 0.1 % external cream      Allergies   Allergen Reactions     Cephalosporins Unknown     Cyclosporine      Duricef [Cefadroxil]      Oxycodone Itching     Family History  Family History   Problem Relation Age of Onset     Diabetes Brother      Blood Disease Sister         sickle cell     Sickle Cell Anemia Sister      Hypertension Father      Heart Disease Father      Arthritis Mother      Heart Disease Mother      No Known Problems Son      No Known Problems Daughter      Social History   Social History     Tobacco Use     Smoking status: Light Tobacco Smoker     Types: Cigars     Smokeless tobacco: Never Used     Tobacco comment: \"one a day\"   Substance Use Topics     Alcohol use: Yes     Alcohol/week: 0.0 standard drinks     Comment: Minimal     Drug use: No      Past medical history, past surgical history, medications, allergies, family history, and social history were reviewed with the patient. No additional pertinent items.     I have reviewed the Medications, Allergies, Past Medical and Surgical History, and Social History in the Epic system.    Review of Systems  A complete review of systems was performed with pertinent positives and negatives noted in the HPI, and all other systems negative.    Physical Exam   BP: 122/61  Pulse: 72  Temp: 98.3  F (36.8  C)  Resp: 16  Height: 180.3 cm (5' 11\")  Weight: 79.4 kg (175 lb)  SpO2: 100 %      Physical Exam  Vitals and nursing note reviewed. "   Constitutional:       General: He is not in acute distress.     Appearance: Normal appearance. He is not diaphoretic.   HENT:      Head: Normocephalic and atraumatic.   Eyes:      General: No scleral icterus.     Pupils: Pupils are equal, round, and reactive to light.   Cardiovascular:      Rate and Rhythm: Normal rate and regular rhythm.      Heart sounds: Normal heart sounds.   Pulmonary:      Effort: Pulmonary effort is normal. No respiratory distress.      Breath sounds: Normal breath sounds.   Abdominal:      General: Bowel sounds are normal.      Palpations: Abdomen is soft.      Tenderness: There is no abdominal tenderness.   Musculoskeletal:      Right shoulder: Tenderness (anterior shoulder) present. No deformity. Decreased range of motion (decreased abduction ~45 degrees).      Right elbow: Normal.      Cervical back: Neck supple.      Comments: AV fistula in RUE with good palpable thrill. No tenderness to palpation over the fistula.   Skin:     General: Skin is warm.      Capillary Refill: Capillary refill takes less than 2 seconds.      Findings: No rash.   Neurological:      General: No focal deficit present.      Mental Status: He is alert and oriented to person, place, and time.   Psychiatric:         Mood and Affect: Mood normal.         Behavior: Behavior normal.         ED Course     At 11:03 AM the patient was seen and examined by Moraima Lombardo DO in Room ED05.     Procedures      Results for orders placed or performed during the hospital encounter of 21 (from the past 24 hour(s))   XR Humerus Right G/E 2 Views    Narrative    4 views right humerus radiographs 2021 10:24 AM  3 views right shoulder radiographs 2021 10:24 AM    History: Trauma    Additional History from EMR: 51-year-old male with a complex medical  history including end-stage renal disease status post  donor  kidney transplant. Patient stopped immunosuppression medication and  had acute venous thrombosis  of the kidney. Patient is now on dialysis.    Comparison: Chest radiographs 2021    Findings:    AP and lateral views of the proximal and distal right humerus, and AP,  AP extended and scapular Y views of the right shoulder were obtained.    No acute osseous abnormality.      The elbow and glenohumeral joints are congruent. Widening of the  acromioclavicular joint space similar to prior exam. No substantial  degenerative change of the elbow. Visualized portions of the radius  and ulna are unremarkable.    Postsurgical changes with multiple surgical clips in the soft tissues  adjacent to the humerus, and graft tubing at the level of the forearm,  presumably related to dialysis graft. No elbow joint effusion.    Degenerative changes spine with marginal osteophytosis and multilevel  disc space narrowing. No focal consolidation in the visualized portion  of the lungs.      Impression    Impression:  No evidence of fracture or dislocation involving the right shoulder or  right humerus.    I have personally reviewed the examination and initial interpretation  and I agree with the findings.    GREG WOODALL MD         SYSTEM ID:  [id number]   XR Shoulder Right G/E 3 Views    Narrative    4 views right humerus radiographs 2021 10:24 AM  3 views right shoulder radiographs 2021 10:24 AM    History: Trauma    Additional History from EMR: 51-year-old male with a complex medical  history including end-stage renal disease status post  donor  kidney transplant. Patient stopped immunosuppression medication and  had acute venous thrombosis of the kidney. Patient is now on dialysis.    Comparison: Chest radiographs 2021    Findings:    AP and lateral views of the proximal and distal right humerus, and AP,  AP extended and scapular Y views of the right shoulder were obtained.    No acute osseous abnormality.      The elbow and glenohumeral joints are congruent. Widening of the  acromioclavicular joint space  similar to prior exam. No substantial  degenerative change of the elbow. Visualized portions of the radius  and ulna are unremarkable.    Postsurgical changes with multiple surgical clips in the soft tissues  adjacent to the humerus, and graft tubing at the level of the forearm,  presumably related to dialysis graft. No elbow joint effusion.    Degenerative changes spine with marginal osteophytosis and multilevel  disc space narrowing. No focal consolidation in the visualized portion  of the lungs.      Impression    Impression:  No evidence of fracture or dislocation involving the right shoulder or  right humerus.    I have personally reviewed the examination and initial interpretation  and I agree with the findings.    GREG WOODALL MD         SYSTEM ID:  [id number]   US Ext Arterial Venous Dialys Acs Graft    Narrative    Exam: US EXTREMITY ARTERIAL VENOUS DIALYSIS ACCESS GRAFT, 12/13/2021  12:40 PM    Indication: RUE fistula, presents with right shoulder/upper arm pain    Comparison: None    Technique: Gray scale, spectral Doppler, and color Doppler images of  the dialysis circuit with in the right upper extremity are obtained    Findings:   Arterial side: There are low resistance arterial waveforms present  within the right subclavian artery extending through the right  brachial artery through the mid arm up to the dialysis anastomosis.  Near the anastomosis there is an elevated velocity within the right  brachial artery measuring up to 337 cm/s. Distal to the anastomosis at  the radial artery origin and ulnar artery origin there are normal  triphasic high resistance arterial waveforms with spectral broadening.    Venous side: The venous outflow is patent with normal velocities.    Anterior right shoulder there is a fluid collection which measures 1.6  x 0.5 x 1.2 cm without internal flow on color Doppler.      Impression    Impression:   1. Elevated velocities within the mid right brachial artery  suggestive  of stenosis.  2. No evidence of outflow vein stenosis.  3. 1.6 cm fluid collection in right anterior shoulder region.    I have personally reviewed the examination and initial interpretation  and I agree with the findings.    MELANIA ECHEVERRIA MD         SYSTEM ID:  [id number]     Medications   HYDROcodone-acetaminophen (NORCO) 5-325 MG per tablet 1 tablet (1 tablet Oral Given 12/13/21 1313)          Assessments & Plan (with Medical Decision Making)   Patient was seen and examined.  X-rays of the right shoulder and humerus were ordered which showed no acute fracture or dislocation.  Findings seem most consistent with a musculoskeletal etiology such as rotator cuff dysfunction.  However, because the patient's fistula is very near to where his pain is, an ultrasound of the fistula was ordered which was concerning for stenosis.  The patient denies any issues with dialysis including no increased pain with runs.  I was able to speak to the nephrology service who confirmed that outpatient follow-up for a fistulogram was reasonable.  Patient is also comfortable with this.  The ultrasound did also show a fluid collection in the anterior right shoulder, which could be consistent with a bursitis.  Patient has no other evidence to suggest a septic arthritis or other infectious process.  He was given a referral for orthopedics to evaluate this shoulder pain further.  He was given a small amount of tramadol as well as a Medrol Dosepak to help with his pain.  Discharged home in stable condition.    I have reviewed the nursing notes.    I have reviewed the findings, diagnosis, plan and need for follow up with the patient.    Discharge Medication List as of 12/13/2021  1:48 PM      START taking these medications    Details   methylPREDNISolone (MEDROL DOSEPAK) 4 MG tablet therapy pack Follow Package Directions, Disp-21 tablet, R-0, Local Print      traMADol (ULTRAM) 50 MG tablet Take 1 tablet (50 mg) by mouth every 6 hours as  needed for severe pain, Disp-12 tablet, R-0, Local Print             Final diagnoses:   Acute pain of right shoulder       IChristophe am serving as a trained medical scribe to document services personally performed by Moraima Lombardo DO, based on the provider's statements to me.      IMoraima DO, was physically present and have reviewed and verified the accuracy of this note documented by Christophe Dallas.     Moraima Lombardo DO  12/13/2021   [facility] EMERGENCY DEPARTMENT     Moraima Lombardo DO  12/13/21 [id number]

## 2021-12-14 DIAGNOSIS — Z99.2 ESRD ON DIALYSIS (H): Primary | ICD-10-CM

## 2021-12-14 DIAGNOSIS — N18.6 ESRD ON DIALYSIS (H): Primary | ICD-10-CM

## 2021-12-15 ENCOUNTER — TRANSFERRED RECORDS (OUTPATIENT)
Dept: HEALTH INFORMATION MANAGEMENT | Facility: CLINIC | Age: 51
End: 2021-12-15
Payer: COMMERCIAL

## 2021-12-15 NOTE — TELEPHONE ENCOUNTER
DIAGNOSIS: bilat foot pain   APPOINTMENT DATE: 1.18.22   NOTES STATUS DETAILS   OFFICE NOTE from referring provider Internal 11.24.21 NICKOLAS Wong Neph   OFFICE NOTE from other specialist N/A    DISCHARGE SUMMARY from hospital N/A    DISCHARGE REPORT from the ER N/A    OPERATIVE REPORT N/A    MEDICATION LIST Internal    EMG (for Spine) N/A    IMPLANT RECORD/STICKER N/A    LABS     CBC/DIFF Internal    CULTURES N/A    INJECTIONS DONE IN RADIOLOGY N/A    MRI N/A    CT SCAN N/A    XRAYS (IMAGES & REPORTS) N/A    TUMOR     PATHOLOGY  Slides & report N/A

## 2021-12-16 ENCOUNTER — TELEPHONE (OUTPATIENT)
Dept: NEPHROLOGY | Facility: CLINIC | Age: 51
End: 2021-12-16
Payer: COMMERCIAL

## 2021-12-16 ENCOUNTER — REFERRAL (OUTPATIENT)
Dept: TRANSPLANT | Facility: CLINIC | Age: 51
End: 2021-12-16
Attending: INTERNAL MEDICINE
Payer: COMMERCIAL

## 2021-12-16 DIAGNOSIS — N18.6 ESRD (END STAGE RENAL DISEASE) (H): Primary | ICD-10-CM

## 2021-12-16 DIAGNOSIS — T82.898A PROBLEM WITH DIALYSIS ACCESS, INITIAL ENCOUNTER (H): Primary | ICD-10-CM

## 2021-12-16 NOTE — LETTER
Tristian Mckeon  [address]  [facility] [address]           December 17, 2021                                                                                        MEDICAL RECORDS REQUEST    [facility] Records Request                      Fac[facility]: [facility]    Thank you for referring your patient to the [facility], in order to process the referral we will need the following information;    1. Demographics  2. 2728 form   3. Immunizations records  4. Providers Progress notes, (last 3 note)      Please call our office at [phone] if you have any questions or concerns.                Please fax all paper records to [phone] within 3-5 business days.      Thank you,   The [facility] Referral Intake Team     [facility]  Solid Organ Transplant Office  [address] [place] [address], [address] [address]

## 2021-12-16 NOTE — TELEPHONE ENCOUNTER
RUE AVF c/o right shoulder/ upper arm pain.  S/p ultrasound on 12/13/21 indicating possible stenosis.    Impression:   1. Elevated velocities within the mid right brachial artery suggestive  of stenosis.  2. No evidence of outflow vein stenosis.  3. 1.6 cm fluid collection in right anterior shoulder region.    Per nephrology NP, Angelic Varela, ordered fistulogram.    Sent request to IR and scheduling pool to schedule within 2 weeks and call pt with appointment info.    Per chart review, pt dialyzes at [facility]    VERNELL QUINTANILLA RN on 12/16/2021 at 3:42 PM  Dialysis Access Care Coordinator  Phone: [phone]

## 2021-12-16 NOTE — TELEPHONE ENCOUNTER
----- Message from Angelic Varela NP sent at 12/14/2021  9:59 AM CST -----  Regarding: fistulogram  Orlin Sen,    This patient needs a fistulogram. He is having arm pain and had an ultrasound demonstrating stenosis. Not an emergency, but in the next week or 10 days if possible. Can you coordinate?    Thanks,  CW

## 2021-12-17 VITALS — BODY MASS INDEX: 24.5 KG/M2 | HEIGHT: 71 IN | WEIGHT: 175 LBS

## 2021-12-17 DIAGNOSIS — Z11.59 ENCOUNTER FOR SCREENING FOR OTHER VIRAL DISEASES: ICD-10-CM

## 2021-12-17 ASSESSMENT — MIFFLIN-ST. JEOR: SCORE: 1675.92

## 2021-12-17 NOTE — TELEPHONE ENCOUNTER
"Note in dialysis records stating:  \"Blood vessels not suitable for re-transplant\"    PCP: MAINOR BORDEN   Referring Provider: David Wong  Referring Diagnosis: ESRD, hx kidney tx X3    GFR/Date: 18 (2/26/21)    Is patient under the age of 65? Y  Is patient diabetic? N  Is patient on insulin? N  Was patient offered a pancreas transplant referral? N    Is patient in a group home/assisted living? N  Does patient have a guardian? N    Referral intake process completed.  Patient is aware that after financial approval is received, medical records will be requested.   Patient confirmed for a callback from transplant coordinator on 12/31/21. (within 2 weeks)  Tentative evaluation date 1/26/2022. (within 4 weeks)    Confirmed coordinator will discuss evaluation process in more detail at the time of their call.   Patient is aware of the need to arrange age appropriate cancer screening, vaccinations, and dental care.  Reminded patient to complete questionnaire, complete medical records release, and review packet prior to evaluation visit .  Assessed patient for special needs (ie-wheelchair, assistance, guardian, and ):  no   Patient instructed to call [phone] with questions.     Patient gave verbal consent during intake call to obtain medical records and documents outside of [facility]:  yes    "

## 2021-12-17 NOTE — TELEPHONE ENCOUNTER
December 17, 2021 3:56 PM - Moraima Bingham LPN:   Message from CC that pt is not a tx candidate, requesting referral be closed.

## 2021-12-17 NOTE — PROGRESS NOTES
Outpatient IR Referral    Patient is a 50 y/o male with a PMH ESRD s/p renal transplant x 3, last one 3/12/2015 c/b rejection  3/25/2015, renal vein thrombosis s/p graft nephrectomy 2/19/2021, HD RUE fistula created at outside hospital?  Patient has c/o right upper arm and shoulder pain. Patient was seen in the ED 12/13/21 with complaints of RUE arm pain. ED notes AV fisutla in RUE had good palpable thrill, no tenderness to palpation over fistula.  US 12/13/21 notes stenosis.  IR has been asked for a RUE fistulogram to evaluate for US findings and right arm discomfort.      IR has asked referring team for detailed past records of fistula creation and prior interventions for appropriate scheduling.     US RUE 12/13/21    Impression:   1. Elevated velocities within the mid right brachial artery suggestive  of stenosis.  2. No evidence of outflow vein stenosis.  3. 1.6 cm fluid collection in right anterior shoulder region      Procedure order  placed.    Primary team Angelic Harper NP and Francy Sommer RN made aware of IR recommendations via epic messaging.     SHANE Millan CNP  Interventional Radiology   IR on-call pager: [phone]

## 2021-12-18 ENCOUNTER — LAB (OUTPATIENT)
Dept: LAB | Facility: CLINIC | Age: 51
End: 2021-12-18
Payer: COMMERCIAL

## 2021-12-18 DIAGNOSIS — N18.6 ESRD ON DIALYSIS (H): ICD-10-CM

## 2021-12-18 DIAGNOSIS — Z98.84 GASTRIC BYPASS STATUS FOR OBESITY: ICD-10-CM

## 2021-12-18 DIAGNOSIS — Z99.2 ESRD ON DIALYSIS (H): ICD-10-CM

## 2021-12-18 LAB — VIT B12 SERPL-MCNC: 3371 PG/ML (ref 193–986)

## 2021-12-18 PROCEDURE — 36415 COLL VENOUS BLD VENIPUNCTURE: CPT | Performed by: PATHOLOGY

## 2021-12-18 PROCEDURE — 82525 ASSAY OF COPPER: CPT | Mod: 90 | Performed by: PATHOLOGY

## 2021-12-18 PROCEDURE — 84207 ASSAY OF VITAMIN B-6: CPT | Mod: 90 | Performed by: PATHOLOGY

## 2021-12-18 PROCEDURE — 84590 ASSAY OF VITAMIN A: CPT | Mod: 90 | Performed by: PATHOLOGY

## 2021-12-18 PROCEDURE — 82607 VITAMIN B-12: CPT | Performed by: PATHOLOGY

## 2021-12-18 PROCEDURE — 84597 ASSAY OF VITAMIN K: CPT | Mod: 90 | Performed by: PATHOLOGY

## 2021-12-18 PROCEDURE — 84446 ASSAY OF VITAMIN E: CPT | Mod: 90 | Performed by: PATHOLOGY

## 2021-12-18 PROCEDURE — 99000 SPECIMEN HANDLING OFFICE-LAB: CPT | Performed by: PATHOLOGY

## 2021-12-18 PROCEDURE — 84425 ASSAY OF VITAMIN B-1: CPT | Mod: 90 | Performed by: PATHOLOGY

## 2021-12-18 PROCEDURE — 82306 VITAMIN D 25 HYDROXY: CPT | Mod: 90 | Performed by: PATHOLOGY

## 2021-12-18 PROCEDURE — 84630 ASSAY OF ZINC: CPT | Mod: 90 | Performed by: PATHOLOGY

## 2021-12-18 PROCEDURE — 82747 ASSAY OF FOLIC ACID RBC: CPT | Mod: 90 | Performed by: PATHOLOGY

## 2021-12-20 LAB — DEPRECATED CALCIDIOL+CALCIFEROL SERPL-MC: 54 UG/L (ref 20–75)

## 2021-12-23 LAB
A-TOCOPHEROL VIT E SERPL-MCNC: 9.8 MG/L
ANNOTATION COMMENT IMP: NORMAL
BETA+GAMMA TOCOPHEROL SERPL-MCNC: 0.8 MG/L
COPPER SERPL-MCNC: 111.3 UG/DL
PHYTONADIONE SERPL-MCNC: 0.58 NMOL/L
RETINYL PALMITATE SERPL-MCNC: <0.02 MG/L
VIT A SERPL-MCNC: 0.49 MG/L
ZINC SERPL-MCNC: 122.6 UG/DL

## 2021-12-24 LAB
FOLATE RBC-MCNC: NORMAL NG/ML
VIT B1 PYROPHOSHATE BLD-SCNC: 313 NMOL/L

## 2021-12-25 LAB — PYRIDOXAL PHOS SERPL-SCNC: NORMAL NMOL/L

## 2021-12-28 RX ORDER — HEPARIN SODIUM 200 [USP'U]/100ML
1 INJECTION, SOLUTION INTRAVENOUS CONTINUOUS PRN
Status: CANCELLED | OUTPATIENT
Start: 2021-12-28

## 2021-12-30 ENCOUNTER — MYC MEDICAL ADVICE (OUTPATIENT)
Dept: INTERVENTIONAL RADIOLOGY/VASCULAR | Facility: CLINIC | Age: 51
End: 2021-12-30
Payer: COMMERCIAL

## 2022-01-02 ENCOUNTER — LAB (OUTPATIENT)
Dept: FAMILY MEDICINE | Facility: CLINIC | Age: 52
End: 2022-01-02
Attending: NURSE PRACTITIONER
Payer: COMMERCIAL

## 2022-01-02 DIAGNOSIS — Z11.59 ENCOUNTER FOR SCREENING FOR OTHER VIRAL DISEASES: ICD-10-CM

## 2022-01-02 PROCEDURE — U0003 INFECTIOUS AGENT DETECTION BY NUCLEIC ACID (DNA OR RNA); SEVERE ACUTE RESPIRATORY SYNDROME CORONAVIRUS 2 (SARS-COV-2) (CORONAVIRUS DISEASE [COVID-19]), AMPLIFIED PROBE TECHNIQUE, MAKING USE OF HIGH THROUGHPUT TECHNOLOGIES AS DESCRIBED BY CMS-2020-01-R: HCPCS

## 2022-01-02 PROCEDURE — U0005 INFEC AGEN DETEC AMPLI PROBE: HCPCS

## 2022-01-03 LAB — SARS-COV-2 RNA RESP QL NAA+PROBE: NEGATIVE

## 2022-01-06 ENCOUNTER — APPOINTMENT (OUTPATIENT)
Dept: MEDSURG UNIT | Facility: CLINIC | Age: 52
End: 2022-01-06
Attending: INTERNAL MEDICINE
Payer: COMMERCIAL

## 2022-01-06 ENCOUNTER — APPOINTMENT (OUTPATIENT)
Dept: INTERVENTIONAL RADIOLOGY/VASCULAR | Facility: CLINIC | Age: 52
End: 2022-01-06
Attending: NURSE PRACTITIONER
Payer: COMMERCIAL

## 2022-01-06 ENCOUNTER — HOSPITAL ENCOUNTER (OUTPATIENT)
Facility: CLINIC | Age: 52
Discharge: HOME | End: 2022-01-06
Attending: INTERNAL MEDICINE | Admitting: INTERNAL MEDICINE
Payer: COMMERCIAL

## 2022-01-06 VITALS
DIASTOLIC BLOOD PRESSURE: 45 MMHG | HEART RATE: 80 BPM | TEMPERATURE: 98.2 F | SYSTOLIC BLOOD PRESSURE: 124 MMHG | HEIGHT: 71 IN | BODY MASS INDEX: 26.23 KG/M2 | RESPIRATION RATE: 16 BRPM | OXYGEN SATURATION: 98 % | WEIGHT: 187.39 LBS

## 2022-01-06 LAB
ANION GAP SERPL CALCULATED.3IONS-SCNC: 8 MMOL/L (ref 3–14)
APTT PPP: 38 SECONDS (ref 22–38)
BUN SERPL-MCNC: 23 MG/DL (ref 7–30)
CALCIUM SERPL-MCNC: 8.6 MG/DL (ref 8.5–10.1)
CHLORIDE BLD-SCNC: 98 MMOL/L (ref 94–109)
CO2 SERPL-SCNC: 28 MMOL/L (ref 20–32)
CREAT SERPL-MCNC: 8.35 MG/DL (ref 0.66–1.25)
ERYTHROCYTE [DISTWIDTH] IN BLOOD BY AUTOMATED COUNT: 14.2 % (ref 10–15)
GFR SERPL CREATININE-BSD FRML MDRD: 7 ML/MIN/1.73M2
GLUCOSE BLD-MCNC: 93 MG/DL (ref 70–99)
HCT VFR BLD AUTO: 40.8 % (ref 40–53)
HGB BLD-MCNC: 13.1 G/DL (ref 13.3–17.7)
INR PPP: 1.22 (ref 0.85–1.15)
MCH RBC QN AUTO: 30.4 PG (ref 26.5–33)
MCHC RBC AUTO-ENTMCNC: 32.1 G/DL (ref 31.5–36.5)
MCV RBC AUTO: 95 FL (ref 78–100)
PLATELET # BLD AUTO: 183 10E3/UL (ref 150–450)
POTASSIUM BLD-SCNC: 4.2 MMOL/L (ref 3.4–5.3)
RBC # BLD AUTO: 4.31 10E6/UL (ref 4.4–5.9)
SODIUM SERPL-SCNC: 134 MMOL/L (ref 133–144)
WBC # BLD AUTO: 4.9 10E3/UL (ref 4–11)

## 2022-01-06 PROCEDURE — 258N000003 HC RX IP 258 OP 636: Performed by: NURSE PRACTITIONER

## 2022-01-06 PROCEDURE — 76937 US GUIDE VASCULAR ACCESS: CPT | Mod: 26 | Performed by: RADIOLOGY

## 2022-01-06 PROCEDURE — 999N000142 HC STATISTIC PROCEDURE PREP ONLY

## 2022-01-06 PROCEDURE — 85730 THROMBOPLASTIN TIME PARTIAL: CPT | Performed by: NURSE PRACTITIONER

## 2022-01-06 PROCEDURE — 82310 ASSAY OF CALCIUM: CPT | Performed by: NURSE PRACTITIONER

## 2022-01-06 PROCEDURE — 36901 INTRO CATH DIALYSIS CIRCUIT: CPT | Mod: GC | Performed by: RADIOLOGY

## 2022-01-06 PROCEDURE — 36901 INTRO CATH DIALYSIS CIRCUIT: CPT

## 2022-01-06 PROCEDURE — 99152 MOD SED SAME PHYS/QHP 5/>YRS: CPT | Mod: GC | Performed by: RADIOLOGY

## 2022-01-06 PROCEDURE — C1769 GUIDE WIRE: HCPCS

## 2022-01-06 PROCEDURE — 85610 PROTHROMBIN TIME: CPT | Mod: GZ | Performed by: NURSE PRACTITIONER

## 2022-01-06 PROCEDURE — 255N000002 HC RX 255 OP 636: Performed by: INTERNAL MEDICINE

## 2022-01-06 PROCEDURE — 272N000504 HC NEEDLE CR4

## 2022-01-06 PROCEDURE — 250N000009 HC RX 250: Performed by: PHYSICIAN ASSISTANT

## 2022-01-06 PROCEDURE — 85041 AUTOMATED RBC COUNT: CPT | Performed by: NURSE PRACTITIONER

## 2022-01-06 PROCEDURE — 36415 COLL VENOUS BLD VENIPUNCTURE: CPT | Performed by: NURSE PRACTITIONER

## 2022-01-06 PROCEDURE — 99152 MOD SED SAME PHYS/QHP 5/>YRS: CPT

## 2022-01-06 PROCEDURE — 250N000011 HC RX IP 250 OP 636: Performed by: PHYSICIAN ASSISTANT

## 2022-01-06 PROCEDURE — 999N000132 HC STATISTIC PP CARE STAGE 1

## 2022-01-06 RX ORDER — NALOXONE HYDROCHLORIDE 0.4 MG/ML
0.4 INJECTION, SOLUTION INTRAMUSCULAR; INTRAVENOUS; SUBCUTANEOUS
Status: DISCONTINUED | OUTPATIENT
Start: 2022-01-06 | End: 2022-01-06 | Stop reason: HOSPADM

## 2022-01-06 RX ORDER — FENTANYL CITRATE 50 UG/ML
25-50 INJECTION, SOLUTION INTRAMUSCULAR; INTRAVENOUS EVERY 5 MIN PRN
Status: DISCONTINUED | OUTPATIENT
Start: 2022-01-06 | End: 2022-01-06 | Stop reason: HOSPADM

## 2022-01-06 RX ORDER — IODIXANOL 320 MG/ML
100 INJECTION, SOLUTION INTRAVASCULAR ONCE
Status: COMPLETED | OUTPATIENT
Start: 2022-01-06 | End: 2022-01-06

## 2022-01-06 RX ORDER — SODIUM CHLORIDE 9 MG/ML
INJECTION, SOLUTION INTRAVENOUS CONTINUOUS
Status: DISCONTINUED | OUTPATIENT
Start: 2022-01-06 | End: 2022-01-06 | Stop reason: HOSPADM

## 2022-01-06 RX ORDER — FLUMAZENIL 0.1 MG/ML
0.2 INJECTION, SOLUTION INTRAVENOUS
Status: DISCONTINUED | OUTPATIENT
Start: 2022-01-06 | End: 2022-01-06 | Stop reason: HOSPADM

## 2022-01-06 RX ORDER — NALOXONE HYDROCHLORIDE 0.4 MG/ML
0.2 INJECTION, SOLUTION INTRAMUSCULAR; INTRAVENOUS; SUBCUTANEOUS
Status: DISCONTINUED | OUTPATIENT
Start: 2022-01-06 | End: 2022-01-06 | Stop reason: HOSPADM

## 2022-01-06 RX ORDER — LIDOCAINE 40 MG/G
CREAM TOPICAL
Status: DISCONTINUED | OUTPATIENT
Start: 2022-01-06 | End: 2022-01-06 | Stop reason: HOSPADM

## 2022-01-06 RX ADMIN — FENTANYL CITRATE 50 MCG: 50 INJECTION INTRAMUSCULAR; INTRAVENOUS at 08:44

## 2022-01-06 RX ADMIN — FENTANYL CITRATE 25 MCG: 50 INJECTION INTRAMUSCULAR; INTRAVENOUS at 08:16

## 2022-01-06 RX ADMIN — MIDAZOLAM 0.5 MG: 1 INJECTION INTRAMUSCULAR; INTRAVENOUS at 08:36

## 2022-01-06 RX ADMIN — MIDAZOLAM 0.5 MG: 1 INJECTION INTRAMUSCULAR; INTRAVENOUS at 08:18

## 2022-01-06 RX ADMIN — LIDOCAINE HYDROCHLORIDE 2 ML: 10 INJECTION, SOLUTION EPIDURAL; INFILTRATION; INTRACAUDAL; PERINEURAL at 08:39

## 2022-01-06 RX ADMIN — MIDAZOLAM 1 MG: 1 INJECTION INTRAMUSCULAR; INTRAVENOUS at 08:44

## 2022-01-06 RX ADMIN — SODIUM CHLORIDE: 9 INJECTION, SOLUTION INTRAVENOUS at 07:24

## 2022-01-06 RX ADMIN — IODIXANOL 15 ML: 320 INJECTION, SOLUTION INTRAVASCULAR at 09:03

## 2022-01-06 RX ADMIN — FENTANYL CITRATE 25 MCG: 50 INJECTION INTRAMUSCULAR; INTRAVENOUS at 08:37

## 2022-01-06 RX ADMIN — MIDAZOLAM 1 MG: 1 INJECTION INTRAMUSCULAR; INTRAVENOUS at 08:30

## 2022-01-06 RX ADMIN — FENTANYL CITRATE 50 MCG: 50 INJECTION INTRAMUSCULAR; INTRAVENOUS at 08:30

## 2022-01-06 ASSESSMENT — MIFFLIN-ST. JEOR: SCORE: 1732.13

## 2022-01-06 NOTE — PROGRESS NOTES
Patient tolerated recovery stage well. VSS, RUE site clean/dry/intact with tipstop in place, no hematoma, and denies pain. Patient tolerated PO food and fluids. Teaching was done and discharge instructions were given. Patient ambulated and PIV was removed. Patient discharged from the hospital via wheel chair to home with his family.

## 2022-01-06 NOTE — PROGRESS NOTES
Returned from IR to 2A post procedure.  RUE fistulogram site C/D/I with tipstop in place.  Denies pain at site, right shoulder pain remains unchanged from admission:  5/10 throbbing constant ache.  Provided with po food & fluids.

## 2022-01-06 NOTE — PROCEDURES
[facility]    Procedure: RUE fistulagram, diagnostic only    Date/Time: 1/6/2022 8:57 AM  Performed by: Polo Palomares DO  Authorized by: Taye Peck MD   IR Fellow Physician: Jelani      UNIVERSAL PROTOCOL   Site Marked: NA  Prior Images Obtained and Reviewed:  Yes  Required items: Required blood products, implants, devices and special equipment available    Patient identity confirmed:  Verbally with patient, arm band, provided demographic data and hospital-assigned identification number  Patient was reevaluated immediately before administering moderate or deep sedation or anesthesia  Confirmation Checklist:  Patient's identity using two indicators, relevant allergies, procedure was appropriate and matched the consent or emergent situation and correct equipment/implants were available  Time out: Immediately prior to the procedure a time out was called    Universal Protocol: the Joint Commission Universal Protocol was followed    Preparation: Patient was prepped and draped in usual sterile fashion       ANESTHESIA    Anesthesia: Local infiltration  Local Anesthetic:  Lidocaine 1% without epinephrine      SEDATION  Patient Sedated: Yes    Sedation Type:  Moderate (conscious) sedation  Vital signs: Vital signs monitored during sedation    See dictated procedure note for full details.  Findings: Patent arterial anastamosis on ultrasound. Widely open brachiobasilic without evidence of venous outflow or central venous stenosis. No intervention indicated. Palpable thrill before and after procedure.    Specimens: none    Complications: None    Condition: Stable    Plan: Routine aftercare.  Fistula can be accessed immediately.      PROCEDURE    Patient Tolerance:  Patient tolerated the procedure well with no immediate complications  Length of time physician/provider present for 1:1 monitoring during sedation: 20

## 2022-01-06 NOTE — PRE-PROCEDURE
GENERAL PRE-PROCEDURE:   Procedure:  RUE Fistulagram  Date/Time:  1/6/2022 7:16 AM    Written consent obtained?: Yes    Risks and benefits: Risks, benefits and alternatives were discussed    Consent given by:  Patient  Patient states understanding of procedure being performed: Yes    Patient's understanding of procedure matches consent: Yes    Procedure consent matches procedure scheduled: Yes    Expected level of sedation:  Moderate  Appropriately NPO:  Yes  ASA Class:  2  Mallampati  :  Grade 2- soft palate, base of uvula, tonsillar pillars, and portion of posterior pharyngeal wall visible  Lungs:  Lungs clear with good breath sounds bilaterally  Heart:  Normal heart sounds and rate  History & Physical reviewed:  History and physical reviewed and no updates needed  Statement of review:  I have reviewed the lab findings, diagnostic data, medications, and the plan for sedation

## 2022-01-06 NOTE — PROGRESS NOTES
Patient prepped for RUE fistulogram.  Has right shoulder pain which radiates into elbow at times.  States today pain is more intense,  currently at 5/10 with rest, up to 10/10 after activity.  Thrill present RUE fistulogram site.  Has transportation available post procedure.  H& P current.  Consent signed.  Labs pending.

## 2022-01-06 NOTE — DISCHARGE INSTRUCTIONS
[facility]      Interventional Radiology  Discharge Instructions Following Fistulogram      ? You may resume normal activities as tolerated, but avoid any strenuous activity or heavy lifting (>10 lbs.) involving your access arm.    ? Elevate and rest your arm as much as possible for the first 24 hours after the procedure.  This will promote healing and reduce swelling.     ? If bleeding or oozing should occur, apply fingertip pressure to puncture site to control bleeding, but avoid excessive pressure as this may clot off the fistula.  Hold light pressure for 10 minutes, or until bleeding/oozing is controlled.  If excessive bleeding is noted or if you are having difficulty controlling the bleeding with direct pressure, call 911.     ? If you develop fever, chills, excessive pain/tenderness or drainage at the puncture site, or have questions call your Doctor or Dialysis Center.     ? If you received sedation for your procedure: An adult should stay with you for 24 hours, Do Not drive a motor vehicle, operate machinery, or drink alcoholic beverages for 24 hours.     ? You may resume your normal medications immediately    ? If you notice sudden loss of pulse or thrill (buzzing feeling) in your fistula, contact your Doctor or Dialysis unit immediately.       Follow up with your primary physician about shoulder pain      [facility] INTERVENTIONAL RADIOLOGY DEPARTMENT  Procedure Physician:          Dr. Peck & Dr. Palomares                                                Date of procedure: January 6, 2022  Telephone Numbers: [phone] Monday-Friday 8:00 am to 4:30 pm  [phone] After 4:30 pm Monday-Friday, Weekends & Holidays.   Ask for the Interventional Radiologist on call.  Someone is on call 24 hrs/day  [facility] toll free number: [phone] Monday-Friday 8:00 am to 4:30 pm  [facility] Emergency Dept: [phone]

## 2022-01-06 NOTE — IR NOTE
Patient Name: Tristian Mckeon  Medical Record Number: [id number]  Today's Date: 1/6/2022    Procedure: right arm fistulogram   Proceduralist: Dr. Jelani Brunner    Procedure Start: 08:29  Procedure end: 08:49  Sedation medications administered: 150 mcg fentanyl, 2.5 mg versed     Report given to:  ELIU Mcintyre  : none    Other Notes: Pt arrived to IR room 5 from . Consent reviewed. Pt denies any questions or concerns regarding procedure. Pt positioned supine and monitored per protocol. Pt tolerated procedure without any noted complications. Pt transferred back to .

## 2022-01-12 ENCOUNTER — NURSE TRIAGE (OUTPATIENT)
Dept: NURSING | Facility: CLINIC | Age: 52
End: 2022-01-12
Payer: COMMERCIAL

## 2022-01-12 DIAGNOSIS — L30.9 ECZEMA, UNSPECIFIED TYPE: Primary | ICD-10-CM

## 2022-01-12 RX ORDER — DESONIDE 0.5 MG/ML
LOTION TOPICAL 2 TIMES DAILY
Qty: 118 ML | Refills: 4 | Status: SHIPPED | OUTPATIENT
Start: 2022-01-12 | End: 2023-01-04

## 2022-01-12 NOTE — TELEPHONE ENCOUNTER
Had appointment last week. Shoulder hurting for two months.  She said it's a freeze shoulder. It's painful. Seen in the ER. Given Tramodol. Given dilaudid by primary. Neither med is working. Is it possible to get a cortisone shot in the shoulder, in the ER? Has appointment next week with Ortho. Needs to drive and can't get arm to stretch out. I told him it is up to the MD that he sees as to whether or not they do an injection into a shoulder. He said he will come in later today to see if they will do that. I called him back to tell him he'd have better luck with an orthopedic urgent care because the ER involvement would depend on whether or not ortho can come and do that injection. I told him to try [facility] orthopedics. He will do that.  Rebekah Dougherty RN  [facility]    Additional Information    Negative: Drug overdose and triager unable to answer question    Negative: Caller requesting information unrelated to medicine    Negative: Caller requesting a prescription for Strep throat and has a positive culture result    Negative: Rash while taking a medication or within 3 days of stopping it    Negative: Immunization reaction suspected    Negative: Asthma and having symptoms of asthma (cough, wheezing, etc.)    Negative: Breastfeeding questions about mother's medicines and diet    Negative: MORE THAN A DOUBLE DOSE of a prescription or over-the-counter (OTC) drug    Negative: DOUBLE DOSE (an extra dose or lesser amount) of over-the-counter (OTC) drug and any symptoms (e.g., dizziness, nausea, pain, sleepiness)    Negative: DOUBLE DOSE (an extra dose or lesser amount) of prescription drug and any symptoms (e.g., dizziness, nausea, pain, sleepiness)    Negative: Took another person's prescription drug    Negative: DOUBLE DOSE (an extra dose or lesser amount) of prescription drug and NO symptoms (Exception: a double dose of antibiotics)    Negative: Diabetes drug error or overdose (e.g., took wrong type  of insulin or took extra dose)    Negative: Caller has medication question about med not prescribed by PCP and triager unable to answer question (e.g., compatibility with other med, storage)    Negative: Request for URGENT new prescription or refill of 'essential' medication (i.e., likelihood of harm to patient if not taken) and triager unable to fill per department policy    Negative: Prescription not at pharmacy and was prescribed today by PCP    Negative: Pharmacy calling with prescription questions and triager unable to answer question    Negative: Caller has urgent medication question about med that PCP prescribed and triager unable to answer question    Negative: Caller has NON-URGENT medication question about med that PCP prescribed and triager unable to answer question    Negative: Caller requesting a NON-URGENT new prescription or refill and triager unable to refill per department policy    Negative: DOUBLE DOSE (an extra dose or lesser amount) of over-the-counter (OTC) drug and NO symptoms    Negative: DOUBLE DOSE (an extra dose or lesser amount) of antibiotic drug and NO symptoms    Negative: Caller has medication question only, adult not sick, and triager answers question    Caller has medication question, adult has minor symptoms, caller declines triage, and triager answers question    Protocols used: MEDICATION QUESTION CALL-A-OH

## 2022-01-18 ENCOUNTER — PRE VISIT (OUTPATIENT)
Dept: ORTHOPEDICS | Facility: CLINIC | Age: 52
End: 2022-01-18

## 2022-01-18 ENCOUNTER — TELEPHONE (OUTPATIENT)
Dept: TRANSPLANT | Facility: CLINIC | Age: 52
End: 2022-01-18

## 2022-01-18 NOTE — TELEPHONE ENCOUNTER
Patient Call:Missed his phone call from care team member 12/31/2021       Call back needed? Yes    Return Call Needed  Same as documented in contacts section  When to return call?: Same day: Route High Priority

## 2022-01-26 ENCOUNTER — TELEPHONE (OUTPATIENT)
Dept: PHARMACY | Facility: CLINIC | Age: 52
End: 2022-01-26
Payer: COMMERCIAL

## 2022-01-26 NOTE — TELEPHONE ENCOUNTER
Follow-up with anemia management service:    Received a call from Tristian silva for his Coordinator.  He said he was supposed to have a pre tx kidney eval today via Zoom but never heard from the clinic. Message sent to Varsha at Nephrology.      Anemia Latest Ref Rng & Units 11/7/2020 11/14/2020 11/21/2020 1/6/2022   RAYMOND Dose - 10,000 units 10,000 units 10,000 units -   Hemoglobin 13.3 - 17.7 g/dL - - - 13.1(L)         Rachel Raman RN   Anemia Services  [address] [address]   [email or web address]   Office : [phone]  Fax: [phone]

## 2022-02-28 DIAGNOSIS — Z98.84 H/O GASTRIC BYPASS: ICD-10-CM

## 2022-02-28 DIAGNOSIS — M19.90 ARTHRITIS PAIN: Primary | ICD-10-CM

## 2022-03-01 RX ORDER — VIT B COMP NO.3/FOLIC/C/BIOTIN 1 MG-60 MG
TABLET ORAL
Qty: 30 TABLET | Refills: 11 | Status: SHIPPED | OUTPATIENT
Start: 2022-03-01 | End: 2022-05-03

## 2022-04-06 ENCOUNTER — TELEPHONE (OUTPATIENT)
Dept: NEPHROLOGY | Facility: CLINIC | Age: 52
End: 2022-04-06
Payer: COMMERCIAL

## 2022-04-06 DIAGNOSIS — E83.39 HYPERPHOSPHATEMIA: ICD-10-CM

## 2022-04-06 RX ORDER — CALCIUM ACETATE 667 MG/1
2 TABLET ORAL
Qty: 180 TABLET | Refills: 11 | Status: SHIPPED | OUTPATIENT
Start: 2022-04-06 | End: 2022-04-13

## 2022-04-06 NOTE — TELEPHONE ENCOUNTER
[facility] Prior Authorization Team Request    Medication: calcium acetate 667mg tabs  Dosing: two tablets three times daily with meals  Qty: 180  Day Supply: 30  NDC (required for Medicaid members): [address]-18     Insurance   BIN: [id number]  PCN: [id number]  Grp: [id number]  ID: [id number]    CoverMyMeds Key (if applicable):     Additional documentation:       Filling Pharmacy: [facility] [place]  Phone Number: [phone]  Contact:    Pharmacy NPI (required for Medicaid members): [id number]

## 2022-04-07 NOTE — TELEPHONE ENCOUNTER
Central Prior Authorization Team   Phone: [phone]    PA Initiation    Medication: calcium acetate 667mg tabs  Insurance Company: [employer] - Phone [phone] Fax [phone]  Pharmacy Filling the Rx: [facility] [place], [place] - [address] [place] [address] [phone]  Filling Pharmacy Phone: [phone]  Filling Pharmacy Fax: [phone]  Start Date: 4/7/2022

## 2022-04-07 NOTE — TELEPHONE ENCOUNTER
[facility] Prior Authorization Team Request    Medication: calcium acetate 667 capsules  Dosing: two capsules three times a day with meals  Qty: 180  Day Supply: 30  NDC (required for Medicaid members): [address]-68     Insurance   BIN: [id number]  PCN: [id number]  Grp: [id number]  ID: [id number]    CoverMyMeds Key (if applicable):     Additional documentation: Provider has now switched this rx to capsules      Filling Pharmacy: [facility] [place]  Phone Number: [phone]  Contact:    Pharmacy NPI (required for Medicaid members): [id number]

## 2022-04-12 NOTE — TELEPHONE ENCOUNTER
Prior Authorization Not Needed per Insurance        Medication: calcium acetate 667mg tabs-PA NOT NEEDED   Insurance Company: [employer] - Phone [phone] Fax [phone]  Expected CoPay:      Pharmacy Filling the Rx: [facility] [place], [place] - [address] [place] [address] [phone]  Pharmacy Notified: Yes  Patient Notified: No    Called insurance and Rep Rebecca stated that patient has a limited benefit plan that covers only Part B drugs, anti-psychotic and some over-the-counter medications (OTC). Patient does have Medicare Part A, B, and D but with a different insurer. Rebecca advise to contact patient to see what other insurer patient may have. Called pharmacy and notify pharmacy that patients insurance [facility] has limited benefit plan and patient may have a different insurer.

## 2022-04-13 DIAGNOSIS — E83.39 HYPERPHOSPHATEMIA: ICD-10-CM

## 2022-04-13 RX ORDER — CALCIUM ACETATE 667 MG/1
2 TABLET ORAL
Qty: 180 TABLET | Refills: 11 | Status: SHIPPED | OUTPATIENT
Start: 2022-04-13 | End: 2022-04-18

## 2022-04-18 DIAGNOSIS — E83.39 HYPERPHOSPHATEMIA: ICD-10-CM

## 2022-04-18 RX ORDER — CALCIUM ACETATE 667 MG/1
2 TABLET ORAL
Qty: 540 TABLET | Refills: 3 | Status: SHIPPED | OUTPATIENT
Start: 2022-04-18 | End: 2023-09-18 | Stop reason: ALTCHOICE

## 2022-04-29 DIAGNOSIS — Z99.2 ESRD ON DIALYSIS (H): Primary | ICD-10-CM

## 2022-04-29 DIAGNOSIS — N18.6 ESRD ON DIALYSIS (H): Primary | ICD-10-CM

## 2022-04-29 RX ORDER — LIDOCAINE/PRILOCAINE 2.5 %-2.5%
CREAM (GRAM) TOPICAL PRN
Qty: 5800 G | Refills: 11 | Status: SHIPPED | OUTPATIENT
Start: 2022-04-29 | End: 2023-01-04

## 2022-05-03 DIAGNOSIS — Z98.84 H/O GASTRIC BYPASS: ICD-10-CM

## 2022-05-03 RX ORDER — VIT B COMP NO.3/FOLIC/C/BIOTIN 1 MG-60 MG
1 TABLET ORAL DAILY
Qty: 30 TABLET | Refills: 11 | Status: SHIPPED | OUTPATIENT
Start: 2022-05-03 | End: 2023-01-04

## 2022-06-05 ENCOUNTER — HEALTH MAINTENANCE LETTER (OUTPATIENT)
Age: 52
End: 2022-06-05

## 2022-07-20 ENCOUNTER — TELEPHONE (OUTPATIENT)
Dept: TRANSPLANT | Facility: CLINIC | Age: 52
End: 2022-07-20

## 2022-07-20 NOTE — TELEPHONE ENCOUNTER
General  Route to LPN    Reason for call: Maggy called to speak to coordinator who was dealing with Tristian's case in the past. He is attempting to get another transplant through them & they need to know if he has been complaint in the past or any concerns they should know about since his case will be reviewed next Wednesday to be a transplant candidate with them.     Call back needed? Yes  Return Call Needed  Same as documented in contacts section  When to return call?: Greater than one day: Route standard priority

## 2022-07-22 NOTE — TELEPHONE ENCOUNTER
RNCC was advised by patient care supervisor, patient records can be accessed in [facility]. [place] may contact medical records department after obtaining patient consent for records, if needed. RNCC returned call to Maggy Jennings , left VM message. Provided [employer] phone number, [phone].   For release of information form visit [email or web address]    Aby Zheng RN, BSN  Solid Organ Transplant, Post Kidney and Pancreas  Transplant Care Coordinator  [phone]

## 2022-10-12 NOTE — TELEPHONE ENCOUNTER
Per Dr. Collins: Needs labs asap   Needs to stop Ketoconazole immediately   He needs to call us before making any changes       Left message for patient to call back.    Erin Jefferson RN    Xray Forearm, Right

## 2022-10-15 ENCOUNTER — HEALTH MAINTENANCE LETTER (OUTPATIENT)
Age: 52
End: 2022-10-15

## 2022-10-31 DIAGNOSIS — L21.9 SEBORRHEIC DERMATITIS: Primary | ICD-10-CM

## 2022-10-31 DIAGNOSIS — L30.9 DERMATITIS, UNSPECIFIED: ICD-10-CM

## 2022-10-31 RX ORDER — FLUOCINOLONE ACETONIDE 0.11 MG/ML
OIL TOPICAL
Qty: 119 ML | Refills: 3 | Status: SHIPPED | OUTPATIENT
Start: 2022-10-31 | End: 2023-01-04

## 2022-10-31 RX ORDER — TRIAMCINOLONE ACETONIDE 1 MG/G
CREAM TOPICAL 2 TIMES DAILY
Qty: 15 G | Refills: 1 | Status: SHIPPED | OUTPATIENT
Start: 2022-10-31 | End: 2023-01-04

## 2022-12-22 ENCOUNTER — PATIENT OUTREACH (OUTPATIENT)
Dept: NEPHROLOGY | Facility: CLINIC | Age: 52
End: 2022-12-22

## 2022-12-22 NOTE — PROGRESS NOTES
Neph Tracking Flowsheet Last Filled Values     CKD Education Status Complete    CKD Education Referral Date 10/06/20    CKD Education Type Kidney Smart CKD Basics    Preferred Modality Hemodialysis    Final Modality Hemodialysis    Patient's Referral Dates Auto Populate Patient's Referral Dates    Dietician Referral 2/26/21    Home Care Referral 3/13/15    Diaylsis Access Type AV Fistula    Dialysis Access Site BROOKE    Dialysis Access Surgery 04/29/14  Outside Provider    Transplant Evaluation Referral 12/17/21    Transplant Status  Referred    Initiation of Dialysis Outpatient

## 2023-01-03 ENCOUNTER — TELEPHONE (OUTPATIENT)
Dept: CARDIOLOGY | Facility: CLINIC | Age: 53
End: 2023-01-03

## 2023-01-03 NOTE — TELEPHONE ENCOUNTER
[facility] Call Center    Phone Message    May a detailed message be left on voicemail: yes     Reason for Call: Other: Pt was added to Dr. Tuttle's schedule tomorrow at 1pm     Action Taken: Other: cardio    Travel Screening: Not Applicable

## 2023-01-04 ENCOUNTER — ANCILLARY PROCEDURE (OUTPATIENT)
Dept: CARDIOLOGY | Facility: CLINIC | Age: 53
End: 2023-01-04
Attending: INTERNAL MEDICINE
Payer: COMMERCIAL

## 2023-01-04 ENCOUNTER — PRE VISIT (OUTPATIENT)
Dept: CARDIOLOGY | Facility: CLINIC | Age: 53
End: 2023-01-04

## 2023-01-04 VITALS
HEART RATE: 91 BPM | DIASTOLIC BLOOD PRESSURE: 85 MMHG | BODY MASS INDEX: 28.56 KG/M2 | SYSTOLIC BLOOD PRESSURE: 149 MMHG | OXYGEN SATURATION: 100 % | WEIGHT: 204.8 LBS

## 2023-01-04 DIAGNOSIS — N18.5 ANEMIA OF CHRONIC RENAL FAILURE, STAGE 5 (H): ICD-10-CM

## 2023-01-04 DIAGNOSIS — N18.6 ESRD (END STAGE RENAL DISEASE) ON DIALYSIS (H): ICD-10-CM

## 2023-01-04 DIAGNOSIS — Z94.0 HTN, KIDNEY TRANSPLANT RELATED: ICD-10-CM

## 2023-01-04 DIAGNOSIS — I48.0 PAROXYSMAL ATRIAL FIBRILLATION (H): ICD-10-CM

## 2023-01-04 DIAGNOSIS — T86.11 KIDNEY TRANSPLANT REJECTION: ICD-10-CM

## 2023-01-04 DIAGNOSIS — N25.81 SECONDARY RENAL HYPERPARATHYROIDISM (H): ICD-10-CM

## 2023-01-04 DIAGNOSIS — Z99.2 ESRD (END STAGE RENAL DISEASE) ON DIALYSIS (H): ICD-10-CM

## 2023-01-04 DIAGNOSIS — I15.1 HTN, KIDNEY TRANSPLANT RELATED: ICD-10-CM

## 2023-01-04 DIAGNOSIS — D63.1 ANEMIA OF CHRONIC RENAL FAILURE, STAGE 5 (H): ICD-10-CM

## 2023-01-04 DIAGNOSIS — I48.0 PAROXYSMAL ATRIAL FIBRILLATION (H): Primary | ICD-10-CM

## 2023-01-04 PROBLEM — I31.39 PERICARDIAL EFFUSION: Status: RESOLVED | Noted: 2021-05-18 | Resolved: 2023-01-04

## 2023-01-04 PROCEDURE — 99214 OFFICE O/P EST MOD 30 MIN: CPT | Mod: 25 | Performed by: INTERNAL MEDICINE

## 2023-01-04 PROCEDURE — 93242 EXT ECG>48HR<7D RECORDING: CPT

## 2023-01-04 PROCEDURE — 93005 ELECTROCARDIOGRAM TRACING: CPT

## 2023-01-04 PROCEDURE — G0463 HOSPITAL OUTPT CLINIC VISIT: HCPCS | Mod: 25 | Performed by: INTERNAL MEDICINE

## 2023-01-04 PROCEDURE — G0463 HOSPITAL OUTPT CLINIC VISIT: HCPCS | Mod: 25

## 2023-01-04 PROCEDURE — 93244 EXT ECG>48HR<7D REV&INTERPJ: CPT | Performed by: INTERNAL MEDICINE

## 2023-01-04 RX ORDER — FERROUS FUMARATE 324(106)MG
106 TABLET ORAL
COMMUNITY

## 2023-01-04 RX ORDER — MULTIPLE VITAMINS W/ MINERALS TAB 9MG-400MCG
1 TAB ORAL DAILY
COMMUNITY

## 2023-01-04 ASSESSMENT — PAIN SCALES - GENERAL: PAINLEVEL: NO PAIN (0)

## 2023-01-04 NOTE — PROGRESS NOTES
Per Dr. Tuttle, patient to have 7-day zio  monitor placed.  Diagnosis: PAF  Monitor placed: Yes  Patient Instructed: Yes  Patient verbalized understanding: Yes  Holter # [id number]    Monitor was placed by KILO Abdul   1:56 PM

## 2023-01-04 NOTE — LETTER
1/4/2023      RE: Tristian Mckeon  [address]  [facility] [address]       Dear Colleague,    Thank you for the opportunity to participate in the care of your patient, Tristian Mckeon, at the [facility] [place] [facility]. Please see a copy of my visit note below.    I am delighted to see Tristian Mckeon in consultation.The primary encounter diagnosis was Paroxysmal atrial fibrillation (H). Diagnoses of Secondary renal hyperparathyroidism (H), HTN, kidney transplant related, ESRD (end stage renal disease) on dialysis (H), Kidney transplant rejection, and Anemia of chronic renal failure, stage 5 (H) were also pertinent to this visit.   As you know, the patient is a 52 year old -American male. 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    On this visit, the patient states that he has had one episode of palpitations.  The patient denies chest pressure/discomfort, near-syncope, syncope, orthopnea, paroxysmal nocturnal dyspnea and lower extermity edema.    The patient's cardiovascular risk factors include arrhythmia, hypertension and renal disease.    The following portions of the patient's history were reviewed and updated as appropriate: allergies, current medications, past family history, past medical history, past social history, past surgical history, and the problem list.    PMH: The patient's past  medical history includes:    Past Medical History:   Diagnosis Date     Anemia      AVN (avascular necrosis of bone) (H)     Left femoral head     Depression      DJD (degenerative joint disease)      Erectile dysfunction      Genital condyloma, male     S/p resection     Gout      History of blood transfusion      Hyperlipidemia      Hypertension      Hypogonadism male      Kidney replaced by transplant     LDKT.  Early ACR, ureteral strictures.  Failed  d/t chronic rejection.  S/p graft nephrectomy.     Kidney replaced by transplant     LDKT.  ACR, non-compliance, CAN.  Failed in .  S/p graft nephrectomy.     Kidney replaced by transplant 3/12/2015    DDKT.  Induction w/ thymo 600mg.     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  3/25/15 DSA:  A1 mfi 1604, A30 mfi 4387, B13 mfi 1013.     Medical non-compliance      Obesity     S/p gastric bypass      Organ transplant candidate      Osteoporosis      Pericardial effusion 2021     Thyroid disease      Vitamin D deficiency       Past Surgical History:   Procedure Laterality Date     BIOPSY      Kidney     CYSTOSCOPY, REMOVE STENT(S), COMBINED Right 2015    Procedure: COMBINED CYSTOSCOPY, REMOVE STENT(S);  Surgeon: Ren Romeo MD;  Location: [facility] OR     GASTRIC BYPASS  2005     HERNIA REPAIR Right     inguinal     HYDROCELECTOMY INGUINAL Right      IR DIALYSIS FISTULOGRAM RIGHT  2022     LAPAROTOMY EXPLORATORY N/A 2021    Procedure: LAPAROTOMY;  Surgeon: Ren Romeo MD;  Location: [facility] OR     NEPHRECTOMY Right     allograft nephrectomy     NEPHRECTOMY Left     allograft nephrectomy     NEPHRECTOMY N/A 2021    Procedure: NEPHRECTOMY, TOTAL;  Surgeon: Ren Romeo MD;  Location: [facility] OR     PARATHYROIDECTOMY       TRANSPLANT KIDNEY RECIPIENT  DONOR N/A 3/12/2015    Procedure: TRANSPLANT KIDNEY RECIPIENT  DONOR;  Surgeon: Ren Romeo  MD Tay;  Location: [facility] OR     TRANSPLANT KIDNEY RECIPIENT LIVING RELATED  1994    s/p right allograft nephrectomy     TRANSPLANT KIDNEY RECIPIENT LIVING RELATED  2000    s/p left allograft nephrectomy     VASCULAR SURGERY       ZZC HIP CORE DECOMPRESSION Left 2000     ZZC TOTAL HIP ARTHROPLASTY Right        The patient's medications as of the current encounter are:     Current Outpatient Medications   Medication Sig Dispense Refill     Calcium Acetate 667 MG TABS Take 2 tablets by mouth 3 times daily (with meals) 540 tablet 3     calcium carbonate (OS-MEERA) 1500 (600 Ca) MG tablet Take 600-1,800 mg by mouth       Cyanocobalamin (VITAMIN B-12) 500 MCG SUBL Place 1 tablet under the tongue daily 30 tablet 11     cyclobenzaprine (FLEXERIL) 10 MG tablet Take 1 tablet (10 mg) by mouth 3 times daily as needed for muscle spasms 90 tablet 3     ferrous fumarate (FERRETTS) 324 (106 Fe) MG TABS tablet Take 106 mg of iron by mouth       multivitamin w/minerals (MULTI-VITAMIN) tablet Take 1 tablet by mouth daily         Labs:     Admission on 01/06/2022, Discharged on 01/06/2022   Component Date Value Ref Range Status     Sodium 01/06/2022 134  133 - 144 mmol/L Final     Potassium 01/06/2022 4.2  3.4 - 5.3 mmol/L Final     Chloride 01/06/2022 98  94 - 109 mmol/L Final     Carbon Dioxide (CO2) 01/06/2022 28  20 - 32 mmol/L Final     Anion Gap 01/06/2022 8  3 - 14 mmol/L Final     Urea Nitrogen 01/06/2022 23  7 - 30 mg/dL Final     Creatinine 01/06/2022 8.35 (H)  0.66 - 1.25 mg/dL Final     Calcium 01/06/2022 8.6  8.5 - 10.1 mg/dL Final     Glucose 01/06/2022 93  70 - 99 mg/dL Final     GFR Estimate 01/06/2022 7 (L)  >60 mL/min/1.73m2 Final    Effective December 21, 2021 eGFRcr in adults is calculated using the 2021 CKD-EPI creatinine equation which includes age and gender (Amalia et al., NEJM, DOI: 10.1056/AATAuy3755558)     WBC Count 01/06/2022 4.9  4.0 - 11.0 10e3/uL Final     RBC Count 01/06/2022 4.31 (L)  4.40 - 5.90 10e6/uL  Final     Hemoglobin 01/06/2022 13.1 (L)  13.3 - 17.7 g/dL Final     Hematocrit 01/06/2022 40.8  40.0 - 53.0 % Final     MCV 01/06/2022 95  78 - 100 fL Final     MCH 01/06/2022 30.4  26.5 - 33.0 pg Final     MCHC 01/06/2022 32.1  31.5 - 36.5 g/dL Final     RDW 01/06/2022 14.2  10.0 - 15.0 % Final     Platelet Count 01/06/2022 183  150 - 450 10e3/uL Final     INR 01/06/2022 1.22 (H)  0.85 - 1.15 Final     aPTT 01/06/2022 38  22 - 38 Seconds Final       Allergies:    Allergies   Allergen Reactions     Cephalosporins Unknown     Cyclosporine      Duricef [Cefadroxil]      Oxycodone Itching       Family History:   Family History   Problem Relation Age of Onset     Diabetes Brother      Blood Disease Sister         sickle cell     Sickle Cell Anemia Sister      Hypertension Father      Heart Disease Father      Arthritis Mother      Heart Disease Mother      No Known Problems Son      No Known Problems Daughter        Psychosocial history:  reports that he has been smoking cigars. He has never used smokeless tobacco. He reports current alcohol use. He reports that he does not use drugs.    Review of systems: negative for, exertional chest pain or pressure, paroxysmal nocturnal dyspnea, dyspnea on exertion, orthopnea, lower extremity edema and syncope or near-syncope    In addition,   General: No change in weight, sleep or appetite.  Normal energy.  No fever or chills  Eyes: Negative for vision changes or eye problems  ENT: No problems with ears, nose or throat.  No difficulty swallowing.  Resp: No coughing, wheezing or shortness of breath  GI: No nausea, vomiting,  heartburn, abdominal pain, diarrhea, constipation or change in bowel habits  : ESRD on dialysis  Musculoskeletal: No significant muscle or joint pains  Neurologic: bilat hand paraesthesias  Psychiatric: No problems with anxiety, depression or mental health  Heme/immune/allergy: anemia of CKD  Endocrine: No history of thyroid disease, diabetes or other  endocrine disorders  Skin: No rashes,worrisome lesions or skin problems  Vascular:  No claudication, lifestyle limiting or otherwise; no ischemic rest pain; no non-healing ulcers. No weakness       Physical examination  Vitals: BP (!) 149/85 (BP Location: Left arm, Patient Position: Chair, Cuff Size: Adult Regular)   Pulse 91   Wt 92.9 kg (204 lb 12.8 oz)   SpO2 100%   BMI 28.56 kg/m    BMI= Body mass index is 28.56 kg/m .    In general, the patient is a pleasant male in no apparent distress.    HEENT: Normiocephalic and atraumatic.  PERRLA.  EOMI.  Sclerae white, not injected.  Nares clear.  Pharynx without erythema or exudate.  Dentition intact.    Neck: No adenopathy.  No thyromegaly. Carotids +2/2 bilaterally without bruits.  No jugular venous distension.   Heart:  The PMI is in the 5th ICS in the midclavicular line. There is no heave. Regular rate and rhythm. Normal S1, S2 splits physiologically. No murmur, rub, click, or gallop.    Lungs: Clear to asculation.  No ronchi, wheezes, rales.  No dullness to percussion.   Abdomen: Soft, nontender, nondistended. No organomegaly. No AAA.  No bruits.   Extremities: No clubbing, cyanosis, or edema. Right upper arm AV shunt   Neurological: The neurological examination reveal a patient who was oriented to person, place, and time.  The remainder of the examination was nonfocal.    Cardiac tests include:    ECG NSR, V1 V2 lead reversal normal axis, intervals, waveforms.    Assessment and Plan    1. Paroxysmal atrial fib - was hyperkalemic during dialysis, Ca and Mg normal  - plan echo, ziopatch to assess for atrial fibrillation burden  - check TSH  2. HTN - patient states it is usually under control  - consider adding beta blocker   3. ESRD - on dialysis    The patient is to return  In 1 month. The patient understood the treatment plan as outlined above.  There were no barriers to learning.      Johnny Tuttle MD         Please do not hesitate to contact me if you  have any questions/concerns.     Sincerely,     Johnny Tuttle MD

## 2023-01-04 NOTE — PATIENT INSTRUCTIONS
Patient Instructions:  It was a pleasure to see you in the cardiology clinic today.      If you have any questions, call  Jacklyn Cho RN, at [phone].   [facility] Cardiology Clinics.  To schedule an appointment or to leave a message for your Care Team Press #1  If you are a physician calling for another physician Press #2  For Billing Press #3  For Medical Records Press #4  We are encouraging the use of Plivo to communicate with your HealthCare Provider    Note the new medications: none  Stop the following medications: none    The results from today include: pending Ziopatch, ECHO and labs  Please follow up with an advanced practice professional in one month      If you have an urgent need after hours (8:00 am to 4:30 pm) please call [phone] and ask for the cardiology fellow on call.

## 2023-01-04 NOTE — PROGRESS NOTES
I am delighted to see Tristian Mckeon in consultation.The primary encounter diagnosis was Paroxysmal atrial fibrillation (H). Diagnoses of Secondary renal hyperparathyroidism (H), HTN, kidney transplant related, ESRD (end stage renal disease) on dialysis (H), Kidney transplant rejection, and Anemia of chronic renal failure, stage 5 (H) were also pertinent to this visit.   As you know, the patient is a 52 year old -American male. 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    On this visit, the patient states that he has had one episode of palpitations.  The patient denies chest pressure/discomfort, near-syncope, syncope, orthopnea, paroxysmal nocturnal dyspnea and lower extermity edema.    The patient's cardiovascular risk factors include arrhythmia, hypertension and renal disease.    The following portions of the patient's history were reviewed and updated as appropriate: allergies, current medications, past family history, past medical history, past social history, past surgical history, and the problem list.    PMH: The patient's past medical history includes:    Past Medical History:   Diagnosis Date     Anemia      AVN (avascular necrosis of bone) (H)     Left femoral head     Depression      DJD (degenerative joint disease)      Erectile dysfunction      Genital condyloma, male     S/p resection     Gout      History of blood transfusion      Hyperlipidemia      Hypertension       Hypogonadism male      Kidney replaced by transplant     LDKT.  Early ACR, ureteral strictures.  Failed  d/t chronic rejection.  S/p graft nephrectomy.     Kidney replaced by transplant     LDKT.  ACR, non-compliance, CAN.  Failed in .  S/p graft nephrectomy.     Kidney replaced by transplant 3/12/2015    DDKT.  Induction w/ thymo 600mg.     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  3/25/15 DSA:  A1 mfi 1604, A30 mfi 4387, B13 mfi 1013.     Medical non-compliance      Obesity     S/p gastric bypass      Organ transplant candidate      Osteoporosis      Pericardial effusion 2021     Thyroid disease      Vitamin D deficiency       Past Surgical History:   Procedure Laterality Date     BIOPSY      Kidney     CYSTOSCOPY, REMOVE STENT(S), COMBINED Right 2015    Procedure: COMBINED CYSTOSCOPY, REMOVE STENT(S);  Surgeon: Ren Romeo MD;  Location: [facility]     GASTRIC BYPASS  2005     HERNIA REPAIR Right     inguinal     HYDROCELECTOMY INGUINAL Right      IR DIALYSIS FISTULOGRAM RIGHT  2022     LAPAROTOMY EXPLORATORY N/A 2021    Procedure: LAPAROTOMY;  Surgeon: Ren Romeo MD;  Location: [facility] OR     NEPHRECTOMY Right     allograft nephrectomy     NEPHRECTOMY Left     allograft nephrectomy     NEPHRECTOMY N/A 2021    Procedure: NEPHRECTOMY, TOTAL;  Surgeon: Ren Romeo MD;  Location: [facility]     PARATHYROIDECTOMY       TRANSPLANT KIDNEY RECIPIENT  DONOR N/A 3/12/2015    Procedure: TRANSPLANT KIDNEY RECIPIENT  DONOR;  Surgeon: Ren Romeo MD;  Location: [facility] OR     TRANSPLANT KIDNEY RECIPIENT LIVING RELATED      s/p right allograft nephrectomy     TRANSPLANT KIDNEY RECIPIENT LIVING RELATED      s/p left allograft nephrectomy     VASCULAR SURGERY       [facility] HIP CORE DECOMPRESSION Left      [facility] TOTAL HIP ARTHROPLASTY Right        The patient's medications as of the current  encounter are:     Current Outpatient Medications   Medication Sig Dispense Refill     Calcium Acetate 667 MG TABS Take 2 tablets by mouth 3 times daily (with meals) 540 tablet 3     calcium carbonate (OS-MEERA) 1500 (600 Ca) MG tablet Take 600-1,800 mg by mouth       Cyanocobalamin (VITAMIN B-12) 500 MCG SUBL Place 1 tablet under the tongue daily 30 tablet 11     cyclobenzaprine (FLEXERIL) 10 MG tablet Take 1 tablet (10 mg) by mouth 3 times daily as needed for muscle spasms 90 tablet 3     ferrous fumarate (FERRETTS) 324 (106 Fe) MG TABS tablet Take 106 mg of iron by mouth       multivitamin w/minerals (MULTI-VITAMIN) tablet Take 1 tablet by mouth daily         Labs:     Admission on 01/06/2022, Discharged on 01/06/2022   Component Date Value Ref Range Status     Sodium 01/06/2022 134  133 - 144 mmol/L Final     Potassium 01/06/2022 4.2  3.4 - 5.3 mmol/L Final     Chloride 01/06/2022 98  94 - 109 mmol/L Final     Carbon Dioxide (CO2) 01/06/2022 28  20 - 32 mmol/L Final     Anion Gap 01/06/2022 8  3 - 14 mmol/L Final     Urea Nitrogen 01/06/2022 23  7 - 30 mg/dL Final     Creatinine 01/06/2022 8.35 (H)  0.66 - 1.25 mg/dL Final     Calcium 01/06/2022 8.6  8.5 - 10.1 mg/dL Final     Glucose 01/06/2022 93  70 - 99 mg/dL Final     GFR Estimate 01/06/2022 7 (L)  >60 mL/min/1.73m2 Final    Effective December 21, 2021 eGFRcr in adults is calculated using the 2021 CKD-EPI creatinine equation which includes age and gender (Amalia carrillo al., NEJM, DOI: 10.1056/IYIZru2588999)     WBC Count 01/06/2022 4.9  4.0 - 11.0 10e3/uL Final     RBC Count 01/06/2022 4.31 (L)  4.40 - 5.90 10e6/uL Final     Hemoglobin 01/06/2022 13.1 (L)  13.3 - 17.7 g/dL Final     Hematocrit 01/06/2022 40.8  40.0 - 53.0 % Final     MCV 01/06/2022 95  78 - 100 fL Final     MCH 01/06/2022 30.4  26.5 - 33.0 pg Final     MCHC 01/06/2022 32.1  31.5 - 36.5 g/dL Final     RDW 01/06/2022 14.2  10.0 - 15.0 % Final     Platelet Count 01/06/2022 183  150 - 450 10e3/uL  Final     INR 01/06/2022 1.22 (H)  0.85 - 1.15 Final     aPTT 01/06/2022 38  22 - 38 Seconds Final       Allergies:    Allergies   Allergen Reactions     Cephalosporins Unknown     Cyclosporine      Duricef [Cefadroxil]      Oxycodone Itching       Family History:   Family History   Problem Relation Age of Onset     Diabetes Brother      Blood Disease Sister         sickle cell     Sickle Cell Anemia Sister      Hypertension Father      Heart Disease Father      Arthritis Mother      Heart Disease Mother      No Known Problems Son      No Known Problems Daughter        Psychosocial history:  reports that he has been smoking cigars. He has never used smokeless tobacco. He reports current alcohol use. He reports that he does not use drugs.    Review of systems: negative for, exertional chest pain or pressure, paroxysmal nocturnal dyspnea, dyspnea on exertion, orthopnea, lower extremity edema and syncope or near-syncope    In addition,   General: No change in weight, sleep or appetite.  Normal energy.  No fever or chills  Eyes: Negative for vision changes or eye problems  ENT: No problems with ears, nose or throat.  No difficulty swallowing.  Resp: No coughing, wheezing or shortness of breath  GI: No nausea, vomiting,  heartburn, abdominal pain, diarrhea, constipation or change in bowel habits  : ESRD on dialysis  Musculoskeletal: No significant muscle or joint pains  Neurologic: bilat hand paraesthesias  Psychiatric: No problems with anxiety, depression or mental health  Heme/immune/allergy: anemia of CKD  Endocrine: No history of thyroid disease, diabetes or other endocrine disorders  Skin: No rashes,worrisome lesions or skin problems  Vascular:  No claudication, lifestyle limiting or otherwise; no ischemic rest pain; no non-healing ulcers. No weakness       Physical examination  Vitals: BP (!) 149/85 (BP Location: Left arm, Patient Position: Chair, Cuff Size: Adult Regular)   Pulse 91   Wt 92.9 kg (204 lb 12.8  oz)   SpO2 100%   BMI 28.56 kg/m    BMI= Body mass index is 28.56 kg/m .    In general, the patient is a pleasant male in no apparent distress.    HEENT: Normiocephalic and atraumatic.  PERRLA.  EOMI.  Sclerae white, not injected.  Nares clear.  Pharynx without erythema or exudate.  Dentition intact.    Neck: No adenopathy.  No thyromegaly. Carotids +2/2 bilaterally without bruits.  No jugular venous distension.   Heart:  The PMI is in the 5th ICS in the midclavicular line. There is no heave. Regular rate and rhythm. Normal S1, S2 splits physiologically. No murmur, rub, click, or gallop.    Lungs: Clear to asculation.  No ronchi, wheezes, rales.  No dullness to percussion.   Abdomen: Soft, nontender, nondistended. No organomegaly. No AAA.  No bruits.   Extremities: No clubbing, cyanosis, or edema. Right upper arm AV shunt   Neurological: The neurological examination reveal a patient who was oriented to person, place, and time.  The remainder of the examination was nonfocal.    Cardiac tests include:    ECG NSR, V1 V2 lead reversal normal axis, intervals, waveforms.    Assessment and Plan    1. Paroxysmal atrial fib - was hyperkalemic during dialysis, Ca and Mg normal  - plan echo, ziopatch to assess for atrial fibrillation burden  - check TSH  2. HTN - patient states it is usually under control  - consider adding beta blocker   3. ESRD - on dialysis    The patient is to return  In 1 month. The patient understood the treatment plan as outlined above.  There were no barriers to learning.      Johnny Tuttle MD

## 2023-01-04 NOTE — NURSING NOTE
Chief Complaint   Patient presents with     New Patient     present for hospital follow up for atrial fib     Vitals were taken, medications reconciled, and EKG was performed.    Ginger Gil EMT  1:07 PM

## 2023-01-04 NOTE — TELEPHONE ENCOUNTER
Action 1/4/23 [facility]  Fax #[phone]   Action Taken Requested US Cardiac 1-2-23    EKGs viewable in Epic    Resolved US Cardiac in PACS 1/4

## 2023-01-04 NOTE — LETTER
January 17, 2023      Tristian Mckeon  [address]  [place] [address]        Dear Rory,    We are writing to inform you of your test results.    Your monitor showed no evidence of atrial fibrillation    No results found from the In Basket message.    If you have any questions or concerns, please call the clinic at the number listed above.       Sincerely,      Johnny Tuttle MD

## 2023-01-04 NOTE — LETTER
Date:January 5, 2023      Patient was self referred, no letter generated. Do not send.        [facility] Health Information

## 2023-01-05 LAB
ATRIAL RATE - MUSE: 77 BPM
DIASTOLIC BLOOD PRESSURE - MUSE: NORMAL MMHG
INTERPRETATION ECG - MUSE: NORMAL
P AXIS - MUSE: 65 DEGREES
PR INTERVAL - MUSE: 224 MS
QRS DURATION - MUSE: 78 MS
QT - MUSE: 402 MS
QTC - MUSE: 454 MS
R AXIS - MUSE: 15 DEGREES
SYSTOLIC BLOOD PRESSURE - MUSE: NORMAL MMHG
T AXIS - MUSE: 49 DEGREES
VENTRICULAR RATE- MUSE: 77 BPM

## 2023-02-20 DIAGNOSIS — M62.838 MUSCLE SPASM: ICD-10-CM

## 2023-02-20 RX ORDER — CYCLOBENZAPRINE HCL 10 MG
10 TABLET ORAL 3 TIMES DAILY PRN
Qty: 90 TABLET | Refills: 3 | Status: SHIPPED | OUTPATIENT
Start: 2023-02-20 | End: 2023-12-15

## 2023-03-06 DIAGNOSIS — Z98.84 H/O GASTRIC BYPASS: ICD-10-CM

## 2023-03-06 RX ORDER — CYANOCOBALAMIN (VITAMIN B-12) 1000 MCG
1 TABLET, SUBLINGUAL SUBLINGUAL DAILY
Qty: 30 TABLET | Refills: 11 | Status: SHIPPED | OUTPATIENT
Start: 2023-03-06 | End: 2023-06-28

## 2023-03-28 ENCOUNTER — OFFICE VISIT (OUTPATIENT)
Dept: CARDIOLOGY | Facility: CLINIC | Age: 53
End: 2023-03-28
Attending: INTERNAL MEDICINE
Payer: COMMERCIAL

## 2023-03-28 ENCOUNTER — LAB (OUTPATIENT)
Dept: LAB | Facility: CLINIC | Age: 53
End: 2023-03-28
Payer: COMMERCIAL

## 2023-03-28 VITALS
BODY MASS INDEX: 28.61 KG/M2 | WEIGHT: 204.4 LBS | DIASTOLIC BLOOD PRESSURE: 76 MMHG | OXYGEN SATURATION: 99 % | HEART RATE: 81 BPM | HEIGHT: 71 IN | SYSTOLIC BLOOD PRESSURE: 148 MMHG

## 2023-03-28 DIAGNOSIS — N18.6 ANEMIA IN CHRONIC KIDNEY DISEASE, ON CHRONIC DIALYSIS (H): ICD-10-CM

## 2023-03-28 DIAGNOSIS — Z94.0 KIDNEY REPLACED BY TRANSPLANT: ICD-10-CM

## 2023-03-28 DIAGNOSIS — N25.81 SECONDARY RENAL HYPERPARATHYROIDISM (H): Primary | ICD-10-CM

## 2023-03-28 DIAGNOSIS — N18.6 ESRD (END STAGE RENAL DISEASE) ON DIALYSIS (H): ICD-10-CM

## 2023-03-28 DIAGNOSIS — Z99.2 ESRD (END STAGE RENAL DISEASE) ON DIALYSIS (H): ICD-10-CM

## 2023-03-28 DIAGNOSIS — Z99.2 ANEMIA IN CHRONIC KIDNEY DISEASE, ON CHRONIC DIALYSIS (H): ICD-10-CM

## 2023-03-28 DIAGNOSIS — D63.1 ANEMIA IN CHRONIC KIDNEY DISEASE, ON CHRONIC DIALYSIS (H): ICD-10-CM

## 2023-03-28 DIAGNOSIS — I15.1 HTN, KIDNEY TRANSPLANT RELATED: ICD-10-CM

## 2023-03-28 DIAGNOSIS — Z94.0 HTN, KIDNEY TRANSPLANT RELATED: ICD-10-CM

## 2023-03-28 DIAGNOSIS — I48.0 PAROXYSMAL ATRIAL FIBRILLATION (H): ICD-10-CM

## 2023-03-28 LAB
LVEF ECHO: NORMAL
TSH SERPL DL<=0.005 MIU/L-ACNC: 2.33 UIU/ML (ref 0.3–4.2)

## 2023-03-28 PROCEDURE — 84443 ASSAY THYROID STIM HORMONE: CPT | Performed by: PATHOLOGY

## 2023-03-28 PROCEDURE — G0463 HOSPITAL OUTPT CLINIC VISIT: HCPCS | Performed by: INTERNAL MEDICINE

## 2023-03-28 PROCEDURE — 93306 TTE W/DOPPLER COMPLETE: CPT | Performed by: STUDENT IN AN ORGANIZED HEALTH CARE EDUCATION/TRAINING PROGRAM

## 2023-03-28 PROCEDURE — 36415 COLL VENOUS BLD VENIPUNCTURE: CPT | Performed by: PATHOLOGY

## 2023-03-28 PROCEDURE — 99214 OFFICE O/P EST MOD 30 MIN: CPT | Mod: 25 | Performed by: INTERNAL MEDICINE

## 2023-03-28 RX ORDER — VIT B COMP NO.3/FOLIC/C/BIOTIN 1 MG-60 MG
1 TABLET ORAL
COMMUNITY
Start: 2023-02-27 | End: 2023-11-06

## 2023-03-28 ASSESSMENT — PAIN SCALES - GENERAL: PAINLEVEL: NO PAIN (0)

## 2023-03-28 NOTE — LETTER
Date:March 29, 2023      Patient was self referred, no letter generated. Do not send.        [facility] Information

## 2023-03-28 NOTE — NURSING NOTE
Chief Complaint   Patient presents with     Follow Up     Return Cardiology- 1 month follow up, labs & echo prior     Vitals were taken and medications reconciled.    Salvador Armendariz, KILO  9:36 AM

## 2023-03-28 NOTE — PROGRESS NOTES
I am delighted to see Tristian Mckeon in consultation.The primary encounter diagnosis was Secondary renal hyperparathyroidism (H). Diagnoses of Paroxysmal atrial fibrillation (H), HTN, kidney transplant related, Anemia in chronic kidney disease, on chronic dialysis (H), ESRD (end stage renal disease) on dialysis (H), and Kidney replaced by transplant were also pertinent to this visit.   As you know, the patient is a 52 year old -American male. 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    On this visit, the patient states that he has no new episodes of palpitations.  The patient denies chest pressure/discomfort, palpitations, near-syncope, syncope, orthopnea, paroxysmal nocturnal dyspnea and lower extermity edema.    The patient's cardiovascular risk factors include renal disease.    The following portions of the patient's history were reviewed and updated as appropriate: allergies, current medications, past family history, past medical history, past social history, past surgical history, and the problem list.    PMH: The patient's past medical history includes:    Past Medical History:   Diagnosis Date     Anemia      AVN (avascular necrosis of bone) (H)     Left femoral head     Depression      DJD (degenerative joint disease)      Erectile dysfunction      Genital condyloma, male     S/p resection     Gout      History of blood transfusion      Hyperlipidemia      Hypertension       Hypogonadism male      Kidney replaced by transplant     LDKT.  Early ACR, ureteral strictures.  Failed  d/t chronic rejection.  S/p graft nephrectomy.     Kidney replaced by transplant     LDKT.  ACR, non-compliance, CAN.  Failed in .  S/p graft nephrectomy.     Kidney replaced by transplant 3/12/2015    DDKT.  Induction w/ thymo 600mg.     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  3/25/15 DSA:  A1 mfi 1604, A30 mfi 4387, B13 mfi 1013.     Medical non-compliance      Obesity     S/p gastric bypass      Organ transplant candidate      Osteoporosis      Pericardial effusion 2021     Thyroid disease      Vitamin D deficiency       Past Surgical History:   Procedure Laterality Date     BIOPSY      Kidney     CYSTOSCOPY, REMOVE STENT(S), COMBINED Right 2015    Procedure: COMBINED CYSTOSCOPY, REMOVE STENT(S);  Surgeon: Ren Romeo MD;  Location: [facility] OR     GASTRIC BYPASS  2005     HERNIA REPAIR Right     inguinal     HYDROCELECTOMY INGUINAL Right      IR DIALYSIS FISTULOGRAM RIGHT  2022     LAPAROTOMY EXPLORATORY N/A 2021    Procedure: LAPAROTOMY;  Surgeon: Ren Romeo MD;  Location: [facility]     NEPHRECTOMY Right     allograft nephrectomy     NEPHRECTOMY Left     allograft nephrectomy     NEPHRECTOMY N/A 2021    Procedure: NEPHRECTOMY, TOTAL;  Surgeon: Ren Romeo MD;  Location: [facility]     PARATHYROIDECTOMY       TRANSPLANT KIDNEY RECIPIENT  DONOR N/A 3/12/2015    Procedure: TRANSPLANT KIDNEY RECIPIENT  DONOR;  Surgeon: Ren Romeo MD;  Location: [facility]     TRANSPLANT KIDNEY RECIPIENT LIVING RELATED      s/p right allograft nephrectomy     TRANSPLANT KIDNEY RECIPIENT LIVING RELATED      s/p left allograft nephrectomy     VASCULAR SURGERY       [facility] HIP CORE DECOMPRESSION Left      [facility] TOTAL HIP ARTHROPLASTY Right        The patient's medications as of the current  encounter are:     Current Outpatient Medications   Medication Sig Dispense Refill     Calcium Acetate 667 MG TABS Take 2 tablets by mouth 3 times daily (with meals) 540 tablet 3     calcium carbonate (OS-MEERA) 1500 (600 Ca) MG tablet Take 600-1,800 mg by mouth       Cyanocobalamin (VITAMIN B-12) 500 MCG SUBL Place 1 tablet under the tongue daily 30 tablet 11     cyclobenzaprine (FLEXERIL) 10 MG tablet Take 1 tablet (10 mg) by mouth 3 times daily as needed for muscle spasms 90 tablet 3     ferrous fumarate (FERRETTS) 324 (106 Fe) MG TABS tablet Take 106 mg of iron by mouth       multivitamin w/minerals (THERA-VIT-M) tablet Take 1 tablet by mouth daily       B Complex-C-Folic Acid (JUNI-ROSANA RX) 1 mg TABS Take 1 tablet by mouth 2 times daily         Labs:     Ancillary Procedure on 03/28/2023   Component Date Value Ref Range Status     LVEF  03/28/2023 60-65%   Final       Allergies:    Allergies   Allergen Reactions     Cephalosporins Unknown     Cyclosporine      Duricef [Cefadroxil]      Oxycodone Itching       Family History:   Family History   Problem Relation Age of Onset     Diabetes Brother      Blood Disease Sister         sickle cell     Sickle Cell Anemia Sister      Hypertension Father      Heart Disease Father      Arthritis Mother      Heart Disease Mother      No Known Problems Son      No Known Problems Daughter        Psychosocial history:  reports that he has been smoking cigars. He has never used smokeless tobacco. He reports current alcohol use. He reports that he does not use drugs.    Review of systems: negative for, palpitations, exertional chest pain or pressure, paroxysmal nocturnal dyspnea, dyspnea on exertion, orthopnea, lower extremity edema and syncope or near-syncope    In addition,   General: No change in weight, sleep or appetite.  Normal energy.  No fever or chills  Eyes: Negative for vision changes or eye problems  ENT: No problems with ears, nose or throat.  No difficulty  "swallowing.  Resp: No coughing, wheezing or shortness of breath  GI: No nausea, vomiting,  heartburn, abdominal pain, diarrhea, constipation or change in bowel habits  : ESRD on dialysis  Musculoskeletal: No significant muscle or joint pains  Neurologic: bilat hand paraesthesias  Psychiatric: No problems with anxiety, depression or mental health  Heme/immune/allergy: anemia of CKD  Endocrine: No history of thyroid disease, diabetes or other endocrine disorders  Skin: No rashes,worrisome lesions or skin problems  Vascular:  No claudication, lifestyle limiting or otherwise; no ischemic rest pain; no non-healing ulcers. No weakness    Physical examination  Vitals: BP (!) 148/76 (BP Location: Left arm, Patient Position: Chair, Cuff Size: Adult Regular)   Pulse 81   Ht 1.81 m (5' 11.26\")   Wt 92.7 kg (204 lb 6.4 oz)   SpO2 99%   BMI 28.30 kg/m    BMI= Body mass index is 28.3 kg/m .    In general, the patient is a pleasant male in no apparent distress.    HEENT: Normiocephalic and atraumatic.  PERRLA.  EOMI.  Sclerae white, not injected.  Nares clear.  Pharynx without erythema or exudate.  Dentition intact.    Neck: No adenopathy.  No thyromegaly. Carotids +2/2 bilaterally without bruits.  No jugular venous distension.   Heart:  The PMI is in the 5th ICS in the midclavicular line. There is no heave. Regular rate and rhythm. Normal S1, S2 splits physiologically. No murmur, rub, click, or gallop.    Lungs: Clear to asculation.  No ronchi, wheezes, rales.  No dullness to percussion.   Abdomen: Soft, nontender, nondistended. No organomegaly. No AAA.  No bruits.   Extremities: No clubbing, cyanosis, or edema. The pulses were intact bilaterally.   Neurological: The neurological examination reveal a patient who was oriented to person, place, and time.  The remainder of the examination was nonfocal.    Cardiac tests include:    1/4/23 some SVT, no atrial fib  3/28/23 - echo normal EF, atria normal    Assessment and " Plan    1. Paroxysmal atrial fib - was hyperkalemic during dialysis, Ca and Mg normal  - TSH normal  - no evidence of AF on zio  - CADSVASC 1  - expectant management  2. HTN - patient states it is usually under control  - consider adding beta blocker   3. ESRD - on dialysis    The patient is to return  prn. The patient understood the treatment plan as outlined above.  There were no barriers to learning.      Johnny Tuttle MD

## 2023-03-28 NOTE — LETTER
3/28/2023      RE: Tristian Mckeon  [address]  [facility] [address]       Dear Colleague,    Thank you for the opportunity to participate in the care of your patient, Tristian Mckeon, at the [facility] [place] [facility]. Please see a copy of my visit note below.    I am delighted to see Tristian Mckeon in consultation.The primary encounter diagnosis was Secondary renal hyperparathyroidism (H). Diagnoses of Paroxysmal atrial fibrillation (H), HTN, kidney transplant related, Anemia in chronic kidney disease, on chronic dialysis (H), ESRD (end stage renal disease) on dialysis (H), and Kidney replaced by transplant were also pertinent to this visit.   As you know, the patient is a 52 year old -American male. 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    On this visit, the patient states that he has no new episodes of palpitations.  The patient denies chest pressure/discomfort, palpitations, near-syncope, syncope, orthopnea, paroxysmal nocturnal dyspnea and lower extermity edema.    The patient's cardiovascular risk factors include renal disease.    The following portions of the patient's history were reviewed and updated as appropriate: allergies, current medications, past family history, past medical history, past social history, past surgical history, and the problem list.    PMH: The patient's past  medical history includes:    Past Medical History:   Diagnosis Date     Anemia      AVN (avascular necrosis of bone) (H)     Left femoral head     Depression      DJD (degenerative joint disease)      Erectile dysfunction      Genital condyloma, male     S/p resection     Gout      History of blood transfusion      Hyperlipidemia      Hypertension      Hypogonadism male      Kidney replaced by transplant     LDKT.  Early ACR, ureteral strictures.  Failed  d/t chronic rejection.  S/p graft nephrectomy.     Kidney replaced by transplant     LDKT.  ACR, non-compliance, CAN.  Failed in .  S/p graft nephrectomy.     Kidney replaced by transplant 3/12/2015    DDKT.  Induction w/ thymo 600mg.     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  3/25/15 DSA:  A1 mfi 1604, A30 mfi 4387, B13 mfi 1013.     Medical non-compliance      Obesity     S/p gastric bypass      Organ transplant candidate      Osteoporosis      Pericardial effusion 2021     Thyroid disease      Vitamin D deficiency       Past Surgical History:   Procedure Laterality Date     BIOPSY      Kidney     CYSTOSCOPY, REMOVE STENT(S), COMBINED Right 2015    Procedure: COMBINED CYSTOSCOPY, REMOVE STENT(S);  Surgeon: Ren Romeo MD;  Location: [facility] OR     GASTRIC BYPASS  2005     HERNIA REPAIR Right     inguinal     HYDROCELECTOMY INGUINAL Right      IR DIALYSIS FISTULOGRAM RIGHT  2022     LAPAROTOMY EXPLORATORY N/A 2021    Procedure: LAPAROTOMY;  Surgeon: Ren Romeo MD;  Location: [facility] OR     NEPHRECTOMY Right     allograft nephrectomy     NEPHRECTOMY Left     allograft nephrectomy     NEPHRECTOMY N/A 2021    Procedure: NEPHRECTOMY, TOTAL;  Surgeon: Ren Romeo MD;  Location: [facility] OR     PARATHYROIDECTOMY       TRANSPLANT KIDNEY RECIPIENT  DONOR N/A 3/12/2015    Procedure: TRANSPLANT KIDNEY RECIPIENT  DONOR;  Surgeon: Ren Romeo  MD Tay;  Location: [facility] OR     TRANSPLANT KIDNEY RECIPIENT LIVING RELATED  1994    s/p right allograft nephrectomy     TRANSPLANT KIDNEY RECIPIENT LIVING RELATED  2000    s/p left allograft nephrectomy     VASCULAR SURGERY       ZZC HIP CORE DECOMPRESSION Left 2000     [facility] TOTAL HIP ARTHROPLASTY Right        The patient's medications as of the current encounter are:     Current Outpatient Medications   Medication Sig Dispense Refill     Calcium Acetate 667 MG TABS Take 2 tablets by mouth 3 times daily (with meals) 540 tablet 3     calcium carbonate (OS-MEERA) 1500 (600 Ca) MG tablet Take 600-1,800 mg by mouth       Cyanocobalamin (VITAMIN B-12) 500 MCG SUBL Place 1 tablet under the tongue daily 30 tablet 11     cyclobenzaprine (FLEXERIL) 10 MG tablet Take 1 tablet (10 mg) by mouth 3 times daily as needed for muscle spasms 90 tablet 3     ferrous fumarate (FERRETTS) 324 (106 Fe) MG TABS tablet Take 106 mg of iron by mouth       multivitamin w/minerals (THERA-VIT-M) tablet Take 1 tablet by mouth daily       B Complex-C-Folic Acid (JUNI-ROSANA RX) 1 mg TABS Take 1 tablet by mouth 2 times daily         Labs:     Ancillary Procedure on 03/28/2023   Component Date Value Ref Range Status     LVEF  03/28/2023 60-65%   Final       Allergies:    Allergies   Allergen Reactions     Cephalosporins Unknown     Cyclosporine      Duricef [Cefadroxil]      Oxycodone Itching       Family History:   Family History   Problem Relation Age of Onset     Diabetes Brother      Blood Disease Sister         sickle cell     Sickle Cell Anemia Sister      Hypertension Father      Heart Disease Father      Arthritis Mother      Heart Disease Mother      No Known Problems Son      No Known Problems Daughter        Psychosocial history:  reports that he has been smoking cigars. He has never used smokeless tobacco. He reports current alcohol use. He reports that he does not use drugs.    Review of systems: negative for, palpitations, exertional chest  "pain or pressure, paroxysmal nocturnal dyspnea, dyspnea on exertion, orthopnea, lower extremity edema and syncope or near-syncope    In addition,   General: No change in weight, sleep or appetite.  Normal energy.  No fever or chills  Eyes: Negative for vision changes or eye problems  ENT: No problems with ears, nose or throat.  No difficulty swallowing.  Resp: No coughing, wheezing or shortness of breath  GI: No nausea, vomiting,  heartburn, abdominal pain, diarrhea, constipation or change in bowel habits  : ESRD on dialysis  Musculoskeletal: No significant muscle or joint pains  Neurologic: bilat hand paraesthesias  Psychiatric: No problems with anxiety, depression or mental health  Heme/immune/allergy: anemia of CKD  Endocrine: No history of thyroid disease, diabetes or other endocrine disorders  Skin: No rashes,worrisome lesions or skin problems  Vascular:  No claudication, lifestyle limiting or otherwise; no ischemic rest pain; no non-healing ulcers. No weakness    Physical examination  Vitals: BP (!) 148/76 (BP Location: Left arm, Patient Position: Chair, Cuff Size: Adult Regular)   Pulse 81   Ht 1.81 m (5' 11.26\")   Wt 92.7 kg (204 lb 6.4 oz)   SpO2 99%   BMI 28.30 kg/m    BMI= Body mass index is 28.3 kg/m .    In general, the patient is a pleasant male in no apparent distress.    HEENT: Normiocephalic and atraumatic.  PERRLA.  EOMI.  Sclerae white, not injected.  Nares clear.  Pharynx without erythema or exudate.  Dentition intact.    Neck: No adenopathy.  No thyromegaly. Carotids +2/2 bilaterally without bruits.  No jugular venous distension.   Heart:  The PMI is in the 5th ICS in the midclavicular line. There is no heave. Regular rate and rhythm. Normal S1, S2 splits physiologically. No murmur, rub, click, or gallop.    Lungs: Clear to asculation.  No ronchi, wheezes, rales.  No dullness to percussion.   Abdomen: Soft, nontender, nondistended. No organomegaly. No AAA.  No bruits.   Extremities: No " clubbing, cyanosis, or edema. The pulses were intact bilaterally.   Neurological: The neurological examination reveal a patient who was oriented to person, place, and time.  The remainder of the examination was nonfocal.    Cardiac tests include:    1/4/23 some SVT, no atrial fib  3/28/23 - echo normal EF, atria normal    Assessment and Plan    1. Paroxysmal atrial fib - was hyperkalemic during dialysis, Ca and Mg normal  - TSH normal  - no evidence of AF on zio  - CADSVASC 1  - expectant management  2. HTN - patient states it is usually under control  - consider adding beta blocker   3. ESRD - on dialysis    The patient is to return  prn. The patient understood the treatment plan as outlined above.  There were no barriers to learning.      Johnny Tuttle MD         Please do not hesitate to contact me if you have any questions/concerns.     Sincerely,     Johnny Tuttle MD

## 2023-06-28 DIAGNOSIS — Z98.84 H/O GASTRIC BYPASS: ICD-10-CM

## 2023-06-28 RX ORDER — CYANOCOBALAMIN (VITAMIN B-12) 1000 MCG
1 TABLET, SUBLINGUAL SUBLINGUAL DAILY
Qty: 30 TABLET | Refills: 11 | Status: SHIPPED | OUTPATIENT
Start: 2023-06-28

## 2023-08-14 NOTE — H&P
[facility]     History and Physical - [facility] 3 Service        Date of Admission:  1/8/2021    Assessment & Plan   Tristian Mckeon is a 50 year old male with ESRD 2/2 HTN s/p DDKT x3 c/b antibody-mediated rejection admitted on 1/8/2021. He presents with shortness of breath, found to have a pericardial effusion.     Shortness of Breath  Pericardial Effusion   Diastolic Dysfunction  Moderate Pulmonary Hypertension  CKD IV-V  Patient presents with one week history of shortness of breath. No CP, non-ischemic EKG, neg troponin. TTE with IVC >2.1 cm, moderate pericardial effusion without tamponade, also new moderate pulmonary hypertension and diastolic dysfunction. Patient has no prior cardiac history. NT BNP 78493. Received IV lasix 100 mg on 1/7 with improvement. Endorses diuretic non-compliance. Likely due to hypervolemia secondary to CKD and diastolic dysfunction. Uremic pericarditis also considered.  - No indication for pericardiocentesis at this time  - Start Bumex gtt at 0.5 mg/h  - BMP, Mg BID. Maintain K>4, Mg>2  - Strict I&O, daily weight  - Fluid <2L, Na<2g  - Cardiology following  - Nephrology following   - Hold PTA Bumex 2 mg BID while on Bumex gtt     Cough  Patient has one week history of cough and one episode of chills. CRP 80, . Possibly in the setting of volume overload above. Covid negative. Procalcitonin limited utility in CKD patients.   - Continue to monitor    CKD IV-V  ESRD 2/2 HTN  S/p DDKT x3  C/b Antibody Mediated Rejection  Chronic Immunosuppresion  Transplant: 3/12/2015 (Kidney), 4/1/1994 (Kidney), 10/1/2000 (Kidney)   Most recently seen in nephrology clinic 11/2/20. DSA+ 8/2019. Biopsy 12/2015 with mild persistent antibody-mediated rejection. Cr on admission 4.88. Baseline Cr 3.5-3.8, has been trending up c/f worsening kidney function due to progression of disease. Referred for ESRD planning and repeat transplant evaluation.  - PTA  Future Appointments   Date Time Provider [address]   10/31/2023 10:30 AM DO OLGA Murphy 7/18/2023 Tacrolimus (goal 4-6)   - PTA Mycophenolic acid (goal 1-3.5)  - PTA Prednisone 5 mg every day  - No acute indication for dialysis  - Tacrolimus level in AM    Hypertension  Poor control. Goal <130/80.   - Hydralazine 10 mg PRN for SBP>160  - PTA clonidine 0.2 mg BID  - PTA diltiazem 240 mg qd  - PTA chlorthalidone 25 mg every day  - Hold PTA losartan 50 mg BID  - Hold PTA Bumex 2 mg BID while on Bumex gtt as above    Anemia of Chronic Renal Disease  On admission 7. Baseline 8-9. No evidence of bleeding.  Iron studies 10/30 with normal iron, elevated ferritin, consistent with anemia of chronic disease. Previously receiving EPO injections for Hgb <10 every 7 days, but discontinued recently as he did not  a refill of the medication.  - check EPO level  - ferrous sulfate 325 mg q other day       Diet: Fluid restriction 2000 ML FLUID  2 Gram Sodium Diet  Fluids: None  DVT Prophylaxis: Heparin SQ  Julio Catheter: not present  Code Status: Full Code         Disposition Plan   Expected discharge: 2 - 3 days, recommended to prior living arrangement once renal function improved and volume status improved.  Entered: Thien Mulligan MD 01/08/2021, 9:42 PM       The patient's care was discussed with the Attending Physician, Dr. Abreu.    Thien Mulligan MD  [address]   Contact information available via [facility] Paging/Directory  Please see sign in/sign out for up to date coverage information  ______________________________________________________________________    Chief Complaint   Shortness of breath    History is obtained from the patient and review of records    History of Present Illness   Tristian Mckeon is a 50 year old male with ESRD 2/2 HTN s/p DDKT x3 c/b antibody-mediated rejection admitted on 1/8/2021. He presents with shortness of breath.    Patient presents with one week history of shortness of breath and fatigue. He reports he has increased  difficulty breathing when he lies flat on his side. Denies waking up from being unable to breath. He has some discomfort when breathing in deeply. No palpitations, presyncope, syncope. No abdominal or LE edema.     He also reports one week history of cough. He noticed an episode of shivering on 1/5. No known sick contacts. He went and obtained COVID-19 testing subsequently and was found to be negative. No abdominal pain, n/v/d, dysuria, skin lesions.     He reports his blood pressures have been poorly controlled. Generally around 170s/60s. No HA, dizziness, blurry vision.    He has been taking his immunosuppressants. No recent changes.    Review of Systems    The 10 point Review of Systems is negative other than noted in the HPI or here.     Past Medical History    I have reviewed this patient's medical history and updated it with pertinent information if needed.   Past Medical History:   Diagnosis Date     Anemia      AVN (avascular necrosis of bone) (H)     Left femoral head     Depression      DJD (degenerative joint disease)      Erectile dysfunction      Genital condyloma, male     S/p resection     Gout      History of blood transfusion      Hyperlipidemia      Hypertension      Hypogonadism male      Kidney replaced by transplant 1994    LDKT.  Early ACR, ureteral strictures.  Failed 1998 d/t chronic rejection.  S/p graft nephrectomy.     Kidney replaced by transplant 2000    LDKT.  ACR, non-compliance, CAN.  Failed in 2007.  S/p graft nephrectomy.     Kidney replaced by transplant 3/12/2015    DDKT.  Induction w/ thymo 600mg.     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  3/25/15 DSA:  A1 mfi 1604, A30 mfi 4387, B13 mfi 1013.     Medical non-compliance      Obesity     S/p gastric bypass 2005     Organ transplant candidate 2009     Osteoporosis      Thyroid disease      Vitamin D deficiency        Past Surgical History   I have reviewed this patient's surgical history  and updated it with pertinent information if needed.  Past Surgical History:   Procedure Laterality Date     BIOPSY      Kidney     C HIP CORE DECOMPRESSION Left      C TOTAL HIP ARTHROPLASTY Right      CYSTOSCOPY, REMOVE STENT(S), COMBINED Right 2015    Procedure: COMBINED CYSTOSCOPY, REMOVE STENT(S);  Surgeon: Ren Romeo MD;  Location: [facility] OR     GASTRIC BYPASS  2005     HERNIA REPAIR Right     inguinal     HYDROCELECTOMY INGUINAL Right      NEPHRECTOMY Right 1998    allograft nephrectomy     NEPHRECTOMY Left 2007    allograft nephrectomy     PARATHYROIDECTOMY       TRANSPLANT KIDNEY RECIPIENT  DONOR N/A 3/12/2015    Procedure: TRANSPLANT KIDNEY RECIPIENT  DONOR;  Surgeon: Ren Romeo MD;  Location: [facility] OR     TRANSPLANT KIDNEY RECIPIENT LIVING RELATED      s/p right allograft nephrectomy     TRANSPLANT KIDNEY RECIPIENT LIVING RELATED      s/p left allograft nephrectomy     VASCULAR SURGERY          Social History   I have reviewed this patient's social history and updated it with pertinent information if needed. Tristian Mckeon  reports that he has been smoking cigars. He has never used smokeless tobacco. He reports current alcohol use. He reports that he does not use drugs.  5-10 cigars per week   Occasional alcohol use  No drug use  Lives at home with 2 kids (13 and 15 yo)    Family History   I have reviewed this patient's family history and updated it with pertinent information if needed.  Family History   Problem Relation Age of Onset     Diabetes Brother      Blood Disease Sister         sickle cell     Hypertension Father      Heart Disease Father      Cerebrovascular Disease Brother      Arthritis Mother      Heart Disease Mother      Prior to Admission Medications   Prior to Admission Medications   Prescriptions Last Dose Informant Patient Reported? Taking?   Blood Pressure Monitor KIT  Self No No   Sig: Automatic Blood Pressure Monitor   Multiple  Vitamin (DAILY MULTIVITAMIN PO)  Self Yes No   Sig: Take 1 tablet by mouth daily    UNABLE TO FIND   Yes No   Sig: Take 1 tablet by mouth daily MEDICATION NAME: calcium 500 mg - Vitamin B12    bumetanide (BUMEX) 1 MG tablet   No No   Sig: Take 2 tablets (2 mg) by mouth 2 times daily   calcitRIOL (ROCALTROL) 0.25 MCG capsule   No No   Sig: TAKE ONE CAPSULE BY MOUTH ONCE DAILY   chlorthalidone (HYGROTON) 25 MG tablet   No No   Sig: Take 1 tablet (25 mg) by mouth daily   cloNIDine (CATAPRES) 0.1 MG tablet   Yes No   Sig: Take 0.2 mg by mouth 2 times daily   cyclobenzaprine (FLEXERIL) 10 MG tablet   Yes No   Sig: Take 10 mg by mouth 3 times daily as needed for muscle spasms   diltiazem ER COATED BEADS (CARTIA XT) 240 MG 24 hr capsule   No No   Sig: TAKE ONE CAPSULE BY MOUTH ONCE DAILY   epoetin sharon (EPOGEN/PROCRIT) 98986 UNIT/ML injection   No No   Sig: Inject 1 mL (10,000 Units) Subcutaneous every 7 days Administer for Hgb <10. HOLD dose if systolic BP >180.   Patient not taking: Reported on 11/2/2020   ferrous sulfate (IRON) 325 (65 Fe) MG tablet   No No   Sig: Take 1 tablet (325 mg) by mouth daily   Patient taking differently: Take 1 tablet by mouth every other day    losartan (COZAAR) 50 MG tablet   No No   Sig: Take 1 tablet (50 mg) by mouth 2 times daily   mycophenolic acid (GENERIC EQUIVALENT) 360 MG EC tablet   Yes Yes   Sig: Take 1,080 mg by mouth 2 times daily   predniSONE (DELTASONE) 5 MG tablet   No No   Sig: Take 1 tablet (5 mg) by mouth daily   tacrolimus (ASTAGRAF XL) 1 MG 24 hr capsule   No No   Sig: Take 3 capsules (3 mg) by mouth every morning (before breakfast) Total dose 28 mg daily   tacrolimus (ASTAGRAF XL) 5 MG 24 hr capsule   No No   Sig: Take 5 capsules (25 mg) by mouth every morning (before breakfast) Total dose 28 mg daily      Facility-Administered Medications: None     Allergies   Allergies   Allergen Reactions     Cephalosporins Unknown     Cyclosporine      Duricef [Cefadroxil]         Physical Exam   Vital Signs: Temp: 97.4  F (36.3  C) Temp src: Oral BP: (!) 174/73 Pulse: 71   Resp: 16 SpO2: 99 % O2 Device: None (Room air)    Weight: 196 lbs 12.8 oz    Gen: well-appearing, lying in bed with HOB elevated, in NAD  CV: RRR, normal S1 and S2, JVD 10 cm above clavicle at HOB 30 degrees  Pulm: CTAB, no increased WOB on RA  GI: soft, NT, ND.    : no suprapubic tenderness  Neuro: alert, conversant, responds to questions appropriately  Ext: no LE edema.  Psych: normal affect    Data   Data reviewed today: I reviewed all medications, new labs and imaging results over the last 24 hours.    Recent Labs   Lab 01/08/21  1815 01/08/21  1149 01/07/21  1157   WBC  --  6.9 9.5   HGB  --  7.0* 7.0*   MCV  --  90 90   PLT  --  173 161    138 137   POTASSIUM 4.7 4.3 4.5   CHLORIDE 110* 112* 113*   CO2 15* 16* 14*   BUN 71* 72* 69*   CR 4.73* 4.88* 4.47*   ANIONGAP 12 10 10   MEERA 7.8* 7.9* 7.5*   * 168* 152*   ALBUMIN  --  3.1* 3.2*   PROTTOTAL  --  6.4* 6.5*   BILITOTAL  --  0.2 0.3   ALKPHOS  --  55 59   ALT  --  13 14   AST  --  12 7   TROPI  --  <0.015 0.015     No results found for this or any previous visit (from the past 24 hour(s)).     1/7/21 TTE  Global and regional left ventricular function is normal with an EF of 60-65%.  Grade II or moderate diastolic dysfunction.  Global right ventricular function is normal.  Moderate pulmonary hypertension. Estimated pulmonary artery systolic pressure  is 37 mmHg plus right atrial pressure.  IVC diameter >2.1 cm collapsing <50% with sniff suggests a high RA pressure  estimated at 15 mmHg or greater.  Moderate, circumferential pericardial effusion. Largest diameter of 2.2cm  posteriorly. No chamber compression or echocardiographic evidence for  tamponade.

## 2023-09-18 DIAGNOSIS — E83.39 HYPERPHOSPHATEMIA: ICD-10-CM

## 2023-09-18 RX ORDER — CALCIUM ACETATE 667 MG/1
2001 CAPSULE ORAL
Qty: 360 CAPSULE | Refills: 11 | Status: SHIPPED | OUTPATIENT
Start: 2023-09-18

## 2023-10-09 DIAGNOSIS — I12.9 HYPERTENSION, RENAL: Primary | ICD-10-CM

## 2023-10-09 RX ORDER — AMLODIPINE BESYLATE 10 MG/1
10 TABLET ORAL DAILY
Qty: 90 TABLET | Refills: 3 | Status: SHIPPED | OUTPATIENT
Start: 2023-10-09

## 2023-10-29 ENCOUNTER — HEALTH MAINTENANCE LETTER (OUTPATIENT)
Age: 53
End: 2023-10-29

## 2023-11-06 DIAGNOSIS — N18.6 ESRD (END STAGE RENAL DISEASE) (H): Primary | ICD-10-CM

## 2023-11-06 RX ORDER — VIT B COMP NO.3/FOLIC/C/BIOTIN 1 MG-60 MG
1 TABLET ORAL DAILY
Qty: 90 TABLET | Refills: 3 | Status: SHIPPED | OUTPATIENT
Start: 2023-11-06 | End: 2024-08-28

## 2023-12-15 DIAGNOSIS — M62.838 MUSCLE SPASM: ICD-10-CM

## 2023-12-15 RX ORDER — CYCLOBENZAPRINE HCL 10 MG
10 TABLET ORAL 3 TIMES DAILY PRN
Qty: 30 TABLET | Refills: 1 | Status: SHIPPED | OUTPATIENT
Start: 2023-12-15

## 2024-01-23 ENCOUNTER — OFFICE VISIT (OUTPATIENT)
Dept: CARDIOLOGY | Facility: CLINIC | Age: 54
End: 2024-01-23
Attending: INTERNAL MEDICINE
Payer: COMMERCIAL

## 2024-01-23 VITALS
BODY MASS INDEX: 27.9 KG/M2 | OXYGEN SATURATION: 98 % | HEART RATE: 61 BPM | WEIGHT: 201.5 LBS | DIASTOLIC BLOOD PRESSURE: 53 MMHG | SYSTOLIC BLOOD PRESSURE: 149 MMHG

## 2024-01-23 DIAGNOSIS — Z94.0 KIDNEY REPLACED BY TRANSPLANT: ICD-10-CM

## 2024-01-23 DIAGNOSIS — Z94.0 HTN, KIDNEY TRANSPLANT RELATED: Primary | ICD-10-CM

## 2024-01-23 DIAGNOSIS — Z99.2 ESRD (END STAGE RENAL DISEASE) ON DIALYSIS (H): ICD-10-CM

## 2024-01-23 DIAGNOSIS — T86.11 KIDNEY TRANSPLANT REJECTION: ICD-10-CM

## 2024-01-23 DIAGNOSIS — I48.0 PAROXYSMAL ATRIAL FIBRILLATION (H): ICD-10-CM

## 2024-01-23 DIAGNOSIS — N18.6 ESRD (END STAGE RENAL DISEASE) ON DIALYSIS (H): ICD-10-CM

## 2024-01-23 DIAGNOSIS — D84.9 IMMUNOSUPPRESSION (H): ICD-10-CM

## 2024-01-23 DIAGNOSIS — I15.1 HTN, KIDNEY TRANSPLANT RELATED: Primary | ICD-10-CM

## 2024-01-23 PROCEDURE — G0463 HOSPITAL OUTPT CLINIC VISIT: HCPCS | Performed by: INTERNAL MEDICINE

## 2024-01-23 PROCEDURE — 99214 OFFICE O/P EST MOD 30 MIN: CPT | Performed by: INTERNAL MEDICINE

## 2024-01-23 RX ORDER — METOPROLOL TARTRATE 25 MG/1
25 TABLET, FILM COATED ORAL
COMMUNITY
Start: 2024-01-12 | End: 2024-04-15

## 2024-01-23 ASSESSMENT — PAIN SCALES - GENERAL: PAINLEVEL: NO PAIN (0)

## 2024-01-23 NOTE — NURSING NOTE
Chief Complaint   Patient presents with    Follow Up     ollow up after ED admission 1-12-24     Vitals were taken and medications reconciled.    Dain Coon, EMT  8:47 AM

## 2024-01-23 NOTE — LETTER
1/23/2024      RE: Tristian Mckeon  [address]  [facility] [address]       Dear Colleague,    Thank you for the opportunity to participate in the care of your patient, Tristian Mckeon, at the [facility] [place] [facility]. Please see a copy of my visit note below.    I am delighted to see Tristian Mckeon in consultation.The primary encounter diagnosis was HTN, kidney transplant related. Diagnoses of Paroxysmal atrial fibrillation (H), Immunosuppression (H24), Kidney replaced by transplant, ESRD (end stage renal disease) on dialysis (H), and Kidney transplant rejection were also pertinent to this visit.   As you know, the patient is a 53 year old -American male. 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    On this visit, the patient states that he has has episodes of atrial fibrillation, especially on dialysis.  The patient denies chest pressure/discomfort, near-syncope, syncope, orthopnea, paroxysmal nocturnal dyspnea, and lower extermity edema.    The patient's cardiovascular risk factors include hypertension and renal disease.    The following portions of the patient's history were reviewed and updated as appropriate: allergies, current medications, past family history, past medical history, past social history, past surgical history, and the problem list.    PMH: The  patient's past medical history includes:    Past Medical History:   Diagnosis Date     Anemia      AVN (avascular necrosis of bone) (H)     Left femoral head     Depression      Depression 2014     DJD (degenerative joint disease)      Erectile dysfunction      Genital condyloma, male     S/p resection     Gout      History of blood transfusion      Hyperlipidemia      Hypertension      Hypogonadism male      Kidney replaced by transplant     LDKT.  Early ACR, ureteral strictures.  Failed  d/t chronic rejection.  S/p graft nephrectomy.     Kidney replaced by transplant     LDKT.  ACR, non-compliance, CAN.  Failed in .  S/p graft nephrectomy.     Kidney replaced by transplant 2015    DDKT.  Induction w/ thymo 600mg.     Kidney transplant rejection 2015    Antibody and cellular mediated rejection.  3/25/15 DSA:  A1 mfi 1604, A30 mfi 4387, B13 mfi 1013.     Medical non-compliance      Obesity     S/p gastric bypass      Organ transplant candidate      Osteoporosis      Pericardial effusion 2021     Thyroid disease      Vitamin D deficiency       Past Surgical History:   Procedure Laterality Date     BIOPSY      Kidney     CYSTOSCOPY, REMOVE STENT(S), COMBINED Right 2015    Procedure: COMBINED CYSTOSCOPY, REMOVE STENT(S);  Surgeon: Ren Romeo MD;  Location: [facility] OR     GASTRIC BYPASS  2005     HERNIA REPAIR Right     inguinal     HYDROCELECTOMY INGUINAL Right      IR DIALYSIS FISTULOGRAM RIGHT  2022     LAPAROTOMY EXPLORATORY N/A 2021    Procedure: LAPAROTOMY;  Surgeon: Ren Romeo MD;  Location: [facility] OR     NEPHRECTOMY Right     allograft nephrectomy     NEPHRECTOMY Left     allograft nephrectomy     NEPHRECTOMY N/A 2021    Procedure: NEPHRECTOMY, TOTAL;  Surgeon: Ren Romeo MD;  Location: [facility] OR     PARATHYROIDECTOMY       TRANSPLANT KIDNEY RECIPIENT  DONOR N/A 3/12/2015    Procedure: TRANSPLANT KIDNEY  RECIPIENT  DONOR;  Surgeon: Ren Romeo MD;  Location: [facility] OR     TRANSPLANT KIDNEY RECIPIENT LIVING RELATED      s/p right allograft nephrectomy     TRANSPLANT KIDNEY RECIPIENT LIVING RELATED      s/p left allograft nephrectomy     VASCULAR SURGERY       [facility] HIP CORE DECOMPRESSION Left      [facility] TOTAL HIP ARTHROPLASTY Right        The patient's medications as of the current encounter are:     Current Outpatient Medications   Medication Sig Dispense Refill     amLODIPine (NORVASC) 10 MG tablet Take 1 tablet (10 mg) by mouth daily 90 tablet 3     apixaban ANTICOAGULANT (ELIQUIS) 2.5 MG tablet Take 1 tablet (2.5 mg) by mouth 2 times daily 120 tablet 3     B Complex-C-Folic Acid (JUNI-ROSANA RX) 1 mg TABS Take 1 tablet by mouth daily 90 tablet 3     calcium acetate (PHOSLO) 667 MG CAPS capsule Take 3 capsules (2,001 mg) by mouth 3 times daily (with meals) 360 capsule 11     calcium carbonate (OS-MEERA) 1500 (600 Ca) MG tablet Take 600-1,800 mg by mouth       Cyanocobalamin (VITAMIN B-12) 500 MCG SUBL Place 1 tablet under the tongue daily 30 tablet 11     cyclobenzaprine (FLEXERIL) 10 MG tablet Take 1 tablet (10 mg) by mouth 3 times daily as needed for muscle spasms 30 tablet 1     ferrous fumarate (FERRETTS) 324 (106 Fe) MG TABS tablet Take 106 mg of iron by mouth       metoprolol tartrate (LOPRESSOR) 25 MG tablet Take 25 mg by mouth       multivitamin w/minerals (THERA-VIT-M) tablet Take 1 tablet by mouth daily         Labs:     Ancillary Procedure on 2023   Component Date Value Ref Range Status     LVEF  2023 60-65%   Final       Allergies:    Allergies   Allergen Reactions     Cephalosporins Unknown     Cyclosporine      Duricef [Cefadroxil]      Oxycodone Itching       Family History:   Family History   Problem Relation Age of Onset     Diabetes Brother      Blood Disease Sister         sickle cell     Sickle Cell Anemia Sister      Hypertension Father      Heart Disease Father       Arthritis Mother      Heart Disease Mother      No Known Problems Son      No Known Problems Daughter        Psychosocial history:  reports that he has been smoking cigars. He has never used smokeless tobacco. He reports current alcohol use. He reports that he does not use drugs.    Review of systems: negative for, exertional chest pain or pressure, paroxysmal nocturnal dyspnea, dyspnea on exertion, orthopnea, lower extremity edema, syncope or near-syncope, and exercise intolerance    In addition,   General: No change in weight, sleep or appetite.  Normal energy.  No fever or chills  Eyes: Negative for vision changes or eye problems  ENT: No problems with ears, nose or throat.  No difficulty swallowing.  Resp: No coughing, wheezing or shortness of breath  GI: No nausea, vomiting,  heartburn, abdominal pain, diarrhea, constipation or change in bowel habits  : ESRD on dialysis  Musculoskeletal: No significant muscle or joint pains  Neurologic: bilat hand paraesthesias  Psychiatric: No problems with anxiety, depression or mental health  Heme/immune/allergy: anemia of CKD  Endocrine: No history of thyroid disease, diabetes or other endocrine disorders  Skin: No rashes,worrisome lesions or skin problems  Vascular:  No claudication, lifestyle limiting or otherwise; no ischemic rest pain; no non-healing ulcers. No weakness      Physical examination  Vitals: BP (!) 149/53 (BP Location: Left arm, Patient Position: Chair, Cuff Size: Adult Regular)   Pulse 61   Wt 91.4 kg (201 lb 8 oz)   SpO2 98%   BMI 27.90 kg/m    BMI= Body mass index is 27.9 kg/m .    In general, the patient is a pleasant male in no apparent distress.    HEENT: Normiocephalic and atraumatic.  PERRLA.  EOMI.  Sclerae white, not injected.  Nares clear.  Pharynx without erythema or exudate.  Dentition intact.    Neck: No adenopathy.  No thyromegaly. Carotids +2/2 bilaterally without bruits.  No jugular venous distension.   Heart:  The PMI is in the  5th ICS in the midclavicular line. There is no heave. Regular rate and rhythm. Normal S1, S2 splits physiologically. No murmur, rub, click, or gallop.    Lungs: Clear to asculation.  No ronchi, wheezes, rales.  No dullness to percussion.   Abdomen: Soft, nontender, nondistended. No organomegaly. No AAA.  No bruits.   Extremities: No clubbing, cyanosis, or edema. The pulses were intact bilaterally.   Neurological: The neurological examination reveal a patient who was oriented to person, place, and time.  The remainder of the examination was nonfocal.    Cardiac tests include:    3/28/23 - echo - normal EF, atria normal    Assessment and Plan    PAF - CHADSVASC = 1, start renal dose DOAC  - resting HR relatively low, on beta blocker  - refer for AF ablation  2. HTN - on amlodipine    The patient is to return  after procedure. The patient understood the treatment plan as outlined above.  There were no barriers to learning.      Johnny Tuttle MD       Please do not hesitate to contact me if you have any questions/concerns.     Sincerely,     Johnny Tuttle MD

## 2024-01-23 NOTE — PROGRESS NOTES
I am delighted to see Tristian Mckeon in consultation.The primary encounter diagnosis was HTN, kidney transplant related. Diagnoses of Paroxysmal atrial fibrillation (H), Immunosuppression (H24), Kidney replaced by transplant, ESRD (end stage renal disease) on dialysis (H), and Kidney transplant rejection were also pertinent to this visit.   As you know, the patient is a 53 year old -American male. 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    On this visit, the patient states that he has has episodes of atrial fibrillation, especially on dialysis.  The patient denies chest pressure/discomfort, near-syncope, syncope, orthopnea, paroxysmal nocturnal dyspnea, and lower extermity edema.    The patient's cardiovascular risk factors include hypertension and renal disease.    The following portions of the patient's history were reviewed and updated as appropriate: allergies, current medications, past family history, past medical history, past social history, past surgical history, and the problem list.    PMH: The patient's past medical history includes:    Past Medical History:   Diagnosis Date    Anemia     AVN (avascular necrosis of bone) (H)     Left femoral head    Depression     Depression 04/29/2014    DJD (degenerative joint disease)     Erectile dysfunction     Genital condyloma, male     S/p resection    Gout     History of blood transfusion      Hyperlipidemia     Hypertension     Hypogonadism male     Kidney replaced by transplant     LDKT.  Early ACR, ureteral strictures.  Failed  d/t chronic rejection.  S/p graft nephrectomy.    Kidney replaced by transplant     LDKT.  ACR, non-compliance, CAN.  Failed in .  S/p graft nephrectomy.    Kidney replaced by transplant 2015    DDKT.  Induction w/ thymo 600mg.    Kidney transplant rejection 2015    Antibody and cellular mediated rejection.  3/25/15 DSA:  A1 mfi 1604, A30 mfi 4387, B13 mfi 1013.    Medical non-compliance     Obesity     S/p gastric bypass     Organ transplant candidate     Osteoporosis     Pericardial effusion 2021    Thyroid disease     Vitamin D deficiency       Past Surgical History:   Procedure Laterality Date    BIOPSY      Kidney    CYSTOSCOPY, REMOVE STENT(S), COMBINED Right 2015    Procedure: COMBINED CYSTOSCOPY, REMOVE STENT(S);  Surgeon: Ren Romeo MD;  Location: [facility] OR    GASTRIC BYPASS  2005    HERNIA REPAIR Right     inguinal    HYDROCELECTOMY INGUINAL Right     IR DIALYSIS FISTULOGRAM RIGHT  2022    LAPAROTOMY EXPLORATORY N/A 2021    Procedure: LAPAROTOMY;  Surgeon: Ren Romeo MD;  Location: [facility] OR    NEPHRECTOMY Right     allograft nephrectomy    NEPHRECTOMY Left     allograft nephrectomy    NEPHRECTOMY N/A 2021    Procedure: NEPHRECTOMY, TOTAL;  Surgeon: Ren Romeo MD;  Location: [facility]    PARATHYROIDECTOMY      TRANSPLANT KIDNEY RECIPIENT  DONOR N/A 3/12/2015    Procedure: TRANSPLANT KIDNEY RECIPIENT  DONOR;  Surgeon: Ren Romeo MD;  Location: [facility] OR    TRANSPLANT KIDNEY RECIPIENT LIVING RELATED      s/p right allograft nephrectomy    TRANSPLANT KIDNEY RECIPIENT LIVING RELATED      s/p left allograft nephrectomy    VASCULAR SURGERY      [facility] HIP CORE DECOMPRESSION Left     [facility] TOTAL HIP ARTHROPLASTY Right        The patient's medications as of the  current encounter are:     Current Outpatient Medications   Medication Sig Dispense Refill    amLODIPine (NORVASC) 10 MG tablet Take 1 tablet (10 mg) by mouth daily 90 tablet 3    apixaban ANTICOAGULANT (ELIQUIS) 2.5 MG tablet Take 1 tablet (2.5 mg) by mouth 2 times daily 120 tablet 3    B Complex-C-Folic Acid (JUNI-ROSANA RX) 1 mg TABS Take 1 tablet by mouth daily 90 tablet 3    calcium acetate (PHOSLO) 667 MG CAPS capsule Take 3 capsules (2,001 mg) by mouth 3 times daily (with meals) 360 capsule 11    calcium carbonate (OS-MEERA) 1500 (600 Ca) MG tablet Take 600-1,800 mg by mouth      Cyanocobalamin (VITAMIN B-12) 500 MCG SUBL Place 1 tablet under the tongue daily 30 tablet 11    cyclobenzaprine (FLEXERIL) 10 MG tablet Take 1 tablet (10 mg) by mouth 3 times daily as needed for muscle spasms 30 tablet 1    ferrous fumarate (FERRETTS) 324 (106 Fe) MG TABS tablet Take 106 mg of iron by mouth      metoprolol tartrate (LOPRESSOR) 25 MG tablet Take 25 mg by mouth      multivitamin w/minerals (THERA-VIT-M) tablet Take 1 tablet by mouth daily         Labs:     Ancillary Procedure on 03/28/2023   Component Date Value Ref Range Status    LVEF  03/28/2023 60-65%   Final       Allergies:    Allergies   Allergen Reactions    Cephalosporins Unknown    Cyclosporine     Duricef [Cefadroxil]     Oxycodone Itching       Family History:   Family History   Problem Relation Age of Onset    Diabetes Brother     Blood Disease Sister         sickle cell    Sickle Cell Anemia Sister     Hypertension Father     Heart Disease Father     Arthritis Mother     Heart Disease Mother     No Known Problems Son     No Known Problems Daughter        Psychosocial history:  reports that he has been smoking cigars. He has never used smokeless tobacco. He reports current alcohol use. He reports that he does not use drugs.    Review of systems: negative for, exertional chest pain or pressure, paroxysmal nocturnal dyspnea, dyspnea on exertion, orthopnea,  lower extremity edema, syncope or near-syncope, and exercise intolerance    In addition,   General: No change in weight, sleep or appetite.  Normal energy.  No fever or chills  Eyes: Negative for vision changes or eye problems  ENT: No problems with ears, nose or throat.  No difficulty swallowing.  Resp: No coughing, wheezing or shortness of breath  GI: No nausea, vomiting,  heartburn, abdominal pain, diarrhea, constipation or change in bowel habits  : ESRD on dialysis  Musculoskeletal: No significant muscle or joint pains  Neurologic: bilat hand paraesthesias  Psychiatric: No problems with anxiety, depression or mental health  Heme/immune/allergy: anemia of CKD  Endocrine: No history of thyroid disease, diabetes or other endocrine disorders  Skin: No rashes,worrisome lesions or skin problems  Vascular:  No claudication, lifestyle limiting or otherwise; no ischemic rest pain; no non-healing ulcers. No weakness      Physical examination  Vitals: BP (!) 149/53 (BP Location: Left arm, Patient Position: Chair, Cuff Size: Adult Regular)   Pulse 61   Wt 91.4 kg (201 lb 8 oz)   SpO2 98%   BMI 27.90 kg/m    BMI= Body mass index is 27.9 kg/m .    In general, the patient is a pleasant male in no apparent distress.    HEENT: Normiocephalic and atraumatic.  PERRLA.  EOMI.  Sclerae white, not injected.  Nares clear.  Pharynx without erythema or exudate.  Dentition intact.    Neck: No adenopathy.  No thyromegaly. Carotids +2/2 bilaterally without bruits.  No jugular venous distension.   Heart:  The PMI is in the 5th ICS in the midclavicular line. There is no heave. Regular rate and rhythm. Normal S1, S2 splits physiologically. No murmur, rub, click, or gallop.    Lungs: Clear to asculation.  No ronchi, wheezes, rales.  No dullness to percussion.   Abdomen: Soft, nontender, nondistended. No organomegaly. No AAA.  No bruits.   Extremities: No clubbing, cyanosis, or edema. The pulses were intact bilaterally.   Neurological:  The neurological examination reveal a patient who was oriented to person, place, and time.  The remainder of the examination was nonfocal.    Cardiac tests include:    3/28/23 - echo - normal EF, atria normal    Assessment and Plan    PAF - CHADSVASC = 1, start renal dose DOAC  - resting HR relatively low, on beta blocker  - refer for AF ablation  2. HTN - on amlodipine    The patient is to return  after procedure. The patient understood the treatment plan as outlined above.  There were no barriers to learning.      Johnny Tuttle MD

## 2024-01-25 ENCOUNTER — OFFICE VISIT (OUTPATIENT)
Dept: CARDIOLOGY | Facility: CLINIC | Age: 54
End: 2024-01-25
Payer: COMMERCIAL

## 2024-01-25 VITALS
WEIGHT: 199 LBS | SYSTOLIC BLOOD PRESSURE: 155 MMHG | HEART RATE: 66 BPM | OXYGEN SATURATION: 98 % | DIASTOLIC BLOOD PRESSURE: 78 MMHG | BODY MASS INDEX: 27.86 KG/M2 | HEIGHT: 71 IN

## 2024-01-25 DIAGNOSIS — Z99.2 ESRD (END STAGE RENAL DISEASE) ON DIALYSIS (H): ICD-10-CM

## 2024-01-25 DIAGNOSIS — I48.0 PAROXYSMAL ATRIAL FIBRILLATION (H): Primary | ICD-10-CM

## 2024-01-25 DIAGNOSIS — I10 BENIGN ESSENTIAL HYPERTENSION: ICD-10-CM

## 2024-01-25 DIAGNOSIS — N18.6 ESRD (END STAGE RENAL DISEASE) ON DIALYSIS (H): ICD-10-CM

## 2024-01-25 PROCEDURE — 93000 ELECTROCARDIOGRAM COMPLETE: CPT | Performed by: INTERNAL MEDICINE

## 2024-01-25 PROCEDURE — 99215 OFFICE O/P EST HI 40 MIN: CPT | Performed by: INTERNAL MEDICINE

## 2024-01-25 RX ORDER — AMOXICILLIN 250 MG
1-2 CAPSULE ORAL
COMMUNITY
Start: 2023-07-05

## 2024-01-25 NOTE — PROGRESS NOTES
I am delighted to see Tristian Mckeon as a new patient in [facility] clinic for evaluation of atrial fibrillation.    History of Present Illness:  The patient is a 53 year old  male with ESRD s/p multiple failed kidney transplants, now back on hemodialysis. On 1/2/2023 he had an episode of rapid heart rates with dizziness toward the end of dialysis, sent to ED with AF ~ 130 bpm, spontaneously converted to sinus. Over the last year he has frequent episodes of dizziness/flushing sensation, only with dialysis. He feels that these symptoms occur when too much fluid is removed and his systolic BP is in the low 100s - often dialysis will return some volume and he feels better. He says he has taken his BP/HR during some of these episodes and the HRs are not fast. On 1/12/2024 he had another episodes of very rapid and erratic heart beats during dialysis, EMT was called and reported AF on telemetry (no strips available), by the time they reached [facility] ED he was in sinus. He was recently given a prescription for apixaban which he has not yet filled. He was referred to discuss AF ablation.    Mr. Mckeon has had kidney disease since age 20. He continues to work at an [employer]. He is very knowledgeable about his medical condition.      Past Medical History:  Atrial fibrillation, documented episode 1/2/2023, spontaneously converted to sinus  ESRD, s/p multiple transplant, now on hemo dialysis MWF  HTN     Medications:   Amlodipine 10 every day  Metoprolol tartrate 25 bid    Apixaban 2.5 bid - not yet started      Allergies:    Allergies   Allergen Reactions    Cephalosporins Unknown    Cyclosporine     Duricef [Cefadroxil]     Oxycodone Itching         Physical examination  Vitals: 155/78, HR 66  BMI= 27 (90 kg)    Constitutional: In general, the patient is a pleasant male in no acute distress.    Cardiovascular: Carotids +2/2 bilaterally without bruits.  No jugular venous distension. Regular rate and  rhythm. Normal S1, S2. No murmur, rub, click, or gallop.   Extremities: Pulses are normal bilaterally throughout. No peripheral edema.  Respiratory: Clear to asculation.  No ronchi, wheezes, rales.  No dullness to percussion.     I have personally and independently reviewed the following:  Labs:   1/12/2024: cr 6.95, hgb 1, plt 208K, TSH 2.28  7/5/2023: ALT 11, AST 24    Echo 3/28/2023: EF 60-65%, small effusion, normal RV/RA; TERESA 32    EKG:   TODAY 1/25/2024: sinus 62 bpm,  ms  1/12/2024 in ED with palpitations - sinus 98 bpm  1/2/2023 at 11:26 am:  bpm  1/2/2023 at 11:59 am: sinus    Patch monitor 1/4-1/10/2023: sinus average 89 bpm, range  bpm; PAC 1.3%, PVC <1%. No AF.  Patient triggered events correlated with sinus and sinus tachycardia      Assessment :  Atrial fibrillation - one documented episode 1/2/2023, spontaneous conversion to sinus. He may have had another episode when he taken to ED on 1/12/2024 but spontaneously converted to sinus. He has had frequent episodes of flushing and increased heart rates - these do not appear to be AF as his HRs at home were not fast or erratic, and his ambulatory monitor showed symptoms to be in sinus / sinus tachycardia. Given rare episodes of AF, an invasive procedure is not yet indicated. His trigger appears to be excessive volume removal during dialysis which he will address. If he has frequent documented episode of AF, can consider treating first with amiodarone. His [id number] score is 1 for HTN. At this time, long term anticoagulation is not indicated. If anticoagulation is required, the appropriate dose of apixaban is 5 bid (54 yo, 90 kg, cr 6.9).  Hypertension  ESRD on dialysis        Plan:  Mr. Mckeon is very knowledgeable about his medical condition, he plans to advocate for himself at dialysis and ask to avoid excessive volume removal and hypotension. He will keep track of his HR/BP at home. We discussed that his flushing sensation and  increased heart rates to  at end of dialysis is most likely sinus / sinus tach due to hypotension. With AF he felt very rapid and very erratic heart rates - these episodes had happened only twice. He will notify me to discuss AF further if he wishes. If he has increased episodes of AF he will also let me know and we will discuss next steps.        I spent a total of 30 minutes face to face with  Tristian Mckeon during today's office visit. I have spend an additional 30 minutes today on chart review and documentation.        The patient is to return as above . The patient understood the treatment plan as outlined above.  There were no barriers to learning.      Anusha Case MD

## 2024-01-25 NOTE — PATIENT INSTRUCTIONS
Take your medicines every day, as directed     Changes made today:  No need for apixaban  Keep watching for any heart rates that fast (120-130s)       Cardiology Care Coordinators:      Wendy DICKERSON RN     Cardiology Rooming staff:  Carrie CHUN CNA    Phone  [phone]      Fax [phone]    To Contact us     During Business Hours:  [phone]     If you are needing refills please contact your pharmacy.     For urgent after hour care please call the [facility] at [phone] or the [facility] at [phone] and ask to speak to the cardiologist on call.            HOW TO CHECK YOUR BLOOD PRESSURE AT HOME:     Avoid eating, smoking, and exercising for at least 30 minutes before taking a reading.     Be sure you have taken your BP medication at least 2-3 hours before you check it.      Sit quietly for 10 minutes before a reading.      Sit in a chair with your feet flat on the floor. Rest your  arm on a table so that the arm cuff is at the same level as your heart.     Remain still during the reading.  Record your blood pressure and pulse in a log and bring to your next appointment.       [employer] allows you to communicate directly with your heart team through secure messaging.  [employer] can be accessed any time on your phone, computer, or tablet.  If you need assistance, we'd be happy to help!             Keep your Heart Appointments:

## 2024-01-25 NOTE — NURSING NOTE
"Chief Complaint   Patient presents with    New Patient     AFIB, Secondary renal hyperparathyroidism, Kidney replaced by transplant         Initial BP (!) 155/78 (BP Location: Left arm, Patient Position: Chair, Cuff Size: Adult Regular)   Pulse 66   Ht 1.803 m (5' 11\")   Wt 90.3 kg (199 lb)   SpO2 98%   BMI 27.75 kg/m   Estimated body mass index is 27.75 kg/m  as calculated from the following:    Height as of this encounter: 1.803 m (5' 11\").    Weight as of this encounter: 90.3 kg (199 lb)..  BP completed using cuff size: regular    Michelle OVIEDO CNA  "

## 2024-01-26 NOTE — NURSING NOTE
Med Reconcile: Reviewed and verified all current medications with the patient. The updated medication list was printed and given to the patient./No need for Apixaban, Rx discontinued.    Return Appointment:  -Patient to continue to monitor for any heart rates that fast (120-130s)

## 2024-01-29 ENCOUNTER — TELEPHONE (OUTPATIENT)
Dept: CARDIOLOGY | Facility: CLINIC | Age: 54
End: 2024-01-29
Payer: COMMERCIAL

## 2024-01-29 DIAGNOSIS — R00.2 PALPITATIONS: Primary | ICD-10-CM

## 2024-01-29 NOTE — TELEPHONE ENCOUNTER
[facility] Call Center    Phone Message    May a detailed message be left on voicemail: yes     Reason for Call: Other: pt. Called back returning phone call. Was informed it looked like this was passed to Evie to review. Please call back pt. If anything is directly needed from him.      Action Taken: Other: cardiology     Travel Screening: Not Applicable                                                                Thank you!  [facility]

## 2024-01-29 NOTE — TELEPHONE ENCOUNTER
[facility] Call Center    Phone Message    May a detailed message be left on voicemail: yes     Reason for Call: Other: Patient states he has been having some symptoms and is asking for an echocardiogram and heart monitor. His symptoms are interfering with dialysis treatments. He states he is having increased heart rate intermittently. Please call patient back to discuss.      Action Taken: Other: cardiology    Travel Screening: Not Applicable  Thank you!  [facility]

## 2024-01-29 NOTE — TELEPHONE ENCOUNTER
Recs:<<Anusha Case MD Clerge, Ingrid, RN  Caller: Unspecified (Today,  1:58 PM)  Please schedule a 30 day MCOT (not an event monitor).  No need for echo it was done in march and was normal.>>      30 day MCOT order placed in [facility]. The above MD recommendations reviewed with patient.   Patient was instructed regarding the indication for the monitor and aware that the technician will review instructions/purpose of the monitor during placement as well as how to mail it back. Heart monitor placement is scheduled on 2/6, appointment/Date/Time/location reviewed. Pt expressed understanding and agreeable with the plan.

## 2024-01-29 NOTE — TELEPHONE ENCOUNTER
RN attempted to reach patient, message left to call back.   Noted pt requested echo and heart monitor, presumably this was discussed at last visit with Dr Case.

## 2024-02-06 ENCOUNTER — ANCILLARY PROCEDURE (OUTPATIENT)
Dept: CARDIOLOGY | Facility: CLINIC | Age: 54
End: 2024-02-06
Attending: INTERNAL MEDICINE

## 2024-02-06 DIAGNOSIS — R00.2 PALPITATIONS: ICD-10-CM

## 2024-02-06 PROCEDURE — 93228 REMOTE 30 DAY ECG REV/REPORT: CPT | Performed by: INTERNAL MEDICINE

## 2024-03-27 DIAGNOSIS — M54.41 ACUTE RIGHT-SIDED LOW BACK PAIN WITH RIGHT-SIDED SCIATICA: Primary | ICD-10-CM

## 2024-03-27 RX ORDER — ACETAMINOPHEN AND CODEINE PHOSPHATE 300; 30 MG/1; MG/1
1 TABLET ORAL EVERY 8 HOURS PRN
Qty: 15 TABLET | Refills: 0 | Status: SHIPPED | OUTPATIENT
Start: 2024-03-27

## 2024-04-06 ENCOUNTER — MYC MEDICAL ADVICE (OUTPATIENT)
Dept: CARDIOLOGY | Facility: CLINIC | Age: 54
End: 2024-04-06

## 2024-04-06 ENCOUNTER — DOCUMENTATION ONLY (OUTPATIENT)
Dept: CARDIOLOGY | Facility: CLINIC | Age: 54
End: 2024-04-06

## 2024-04-06 NOTE — PROGRESS NOTES
"Patient called 1/29/2024 stating he's having more symptoms of palpitations.  A 30 day monitor was placed.    Results:  Event monitor 2/6-3/6/2024: sinus, average 67 bpm, range  bpm  PACs 2%  No atrial fibrillation.     \"Highest heart rate, 136 bpm\" at time of placement of monitor:  Strips show sinus at 60 bpm.        Anusha Case MD  "

## 2024-04-15 DIAGNOSIS — I12.9 HYPERTENSION, RENAL: Primary | ICD-10-CM

## 2024-04-15 RX ORDER — METOPROLOL TARTRATE 50 MG
50 TABLET ORAL 2 TIMES DAILY
Qty: 180 TABLET | Refills: 3 | Status: SHIPPED | OUTPATIENT
Start: 2024-04-15

## 2024-05-22 DIAGNOSIS — I15.1 HYPERTENSION SECONDARY TO OTHER RENAL DISORDERS: Primary | ICD-10-CM

## 2024-05-22 RX ORDER — NIFEDIPINE 30 MG/1
30 TABLET, EXTENDED RELEASE ORAL DAILY
Qty: 30 TABLET | Refills: 11 | Status: SHIPPED | OUTPATIENT
Start: 2024-05-22 | End: 2024-07-29

## 2024-07-29 DIAGNOSIS — I15.1 HYPERTENSION SECONDARY TO OTHER RENAL DISORDERS: ICD-10-CM

## 2024-07-29 RX ORDER — NIFEDIPINE 30 MG/1
30 TABLET, EXTENDED RELEASE ORAL DAILY
Qty: 90 TABLET | Refills: 3 | Status: SHIPPED | OUTPATIENT
Start: 2024-07-29

## 2024-08-28 DIAGNOSIS — N18.6 ESRD (END STAGE RENAL DISEASE) (H): ICD-10-CM

## 2024-08-28 DIAGNOSIS — Z98.84 HISTORY OF GASTRIC BYPASS: ICD-10-CM

## 2024-08-28 DIAGNOSIS — G47.01 INSOMNIA DUE TO MEDICAL CONDITION: Primary | ICD-10-CM

## 2024-08-28 RX ORDER — VIT B COMP NO.3/FOLIC/C/BIOTIN 1 MG-60 MG
1 TABLET ORAL DAILY
Qty: 90 TABLET | Refills: 3 | Status: SHIPPED | OUTPATIENT
Start: 2024-08-28

## 2024-08-28 RX ORDER — ZOLPIDEM TARTRATE 10 MG/1
10 TABLET ORAL
Qty: 30 TABLET | Refills: 1 | Status: SHIPPED | OUTPATIENT
Start: 2024-08-28

## 2024-10-18 DIAGNOSIS — I15.1 HYPERTENSION SECONDARY TO OTHER RENAL DISORDERS: Primary | ICD-10-CM

## 2024-10-18 DIAGNOSIS — I48.0 PAROXYSMAL ATRIAL FIBRILLATION (H): ICD-10-CM

## 2024-10-18 RX ORDER — CARVEDILOL 25 MG/1
25 TABLET ORAL 2 TIMES DAILY WITH MEALS
Qty: 60 TABLET | Refills: 11 | Status: SHIPPED | OUTPATIENT
Start: 2024-10-18

## 2024-10-18 RX ORDER — NIFEDIPINE 60 MG/1
60 TABLET, EXTENDED RELEASE ORAL 2 TIMES DAILY
Qty: 60 TABLET | Refills: 11 | Status: SHIPPED | OUTPATIENT
Start: 2024-10-18

## 2024-11-11 DIAGNOSIS — E83.39 HYPERPHOSPHATEMIA: ICD-10-CM

## 2024-11-11 RX ORDER — CALCIUM ACETATE 667 MG/1
2001 CAPSULE ORAL
Qty: 81 CAPSULE | Refills: 3 | Status: SHIPPED | OUTPATIENT
Start: 2024-11-11

## 2024-12-09 DIAGNOSIS — I48.0 PAROXYSMAL ATRIAL FIBRILLATION (H): ICD-10-CM

## 2024-12-09 DIAGNOSIS — I15.1 HYPERTENSION SECONDARY TO OTHER RENAL DISORDERS: ICD-10-CM

## 2024-12-09 RX ORDER — CARVEDILOL 25 MG/1
25 TABLET ORAL 2 TIMES DAILY WITH MEALS
Qty: 180 TABLET | Refills: 3 | Status: SHIPPED | OUTPATIENT
Start: 2024-12-09

## 2024-12-21 ENCOUNTER — HEALTH MAINTENANCE LETTER (OUTPATIENT)
Age: 54
End: 2024-12-21

## 2025-01-16 NOTE — Clinical Note
1/9/2017      RE: Tristian Mckeon  [address]  [facility] [address]       ASSESSMENT AND PLAN:   1.  Status post kidney transplant in a highly sensitized patient complicated by multiple episodes of rejection, last one  showing mild glomerulonephritis,persistent, with early transplant glomerulopathy, and severe arteriopathy.  At this point, it very important to maintain his immunosuppression therapeutic and he was switched to long-acting Prograf, which may have had less variability.  Fortunately his creatinine is stable at 3.6mg/dl. He has not has Tacrolimus level since he was switched to Astagraf and I have ordered level for next week.   Referred him for re-evaluation.  2.  Lower extremity edema.  Continue lasix.  3.  Secondary hyperparathyroidism, status post parathyroidectomy on calcium supplements.   4.  Metabolic acidosis, likely secondary to decreased kidney function and RTA with Prograf, continue bicarbonate.   5.  Anemia.  Hemoglobin is low at 9.3 gram/dL but stable, may be due to decreased kidney function.  He does not have recent iron studies which I will order for next time.  6.  Thrombocytopenia, likely secondary to medication, improved.     Will check labs once a month.    REASON FOR VISIT  Mr. Mckeon is here for follow up.    HPI: Tristian Mckeon is a 44 year old male with ESKD from HTN and is status post DDKT on 3/12/15. He has  had  2 previous kidney transplants in 1994 and 2000, both of which had failed and he was back on hemodialysis since 2007. He received   DDKT 3/12/15 complicated by DGF.  He is highly sensitized and did have low level historical DSA to A1 (797) and A30 (1092) from 2012 serum sample, but no DSA at time of transplant and crossmatch was negative.  Patient underwent a kidney transplant biopsy on 3/25/15 due to no improvement in kidney function and was found to have T cell mediated and antibody-mediated rejection.  In addition, it did show moderate interstitial fibrosis  and tubular atrophy. He was treated with PP/IVIG and Thymoglobulin. Creatinine subsequently improved to around 2.2 mg/dL; however, has crept up to more than 3.5 mg/dL;  kidney biopsy was repeated on 05/08/2015 and showed T cell mediated rejection and acute antibody mediated rejection. .  He was subsequently treated with steroids, IVIG, plasmapheresis and Bortezomib. He tolerated treatment well with improved creatinine to about 2s. Creatinine again increased to 3.0mg/dl and he underwent under biopsy on 9/28 /2015, which showed isolated V lesion  -thought to be secondary to [facility], although [place] 2 A T cell emdiated rejection was considered.He was again treated with IVIG/PP with improvement in creatinine and subsequent biopsy on 12/23/2015 showed no V lesion but mild glomerulitis and capillary C4d staining consistent with mild, persistent antibody-mediated rejection. Patient has significant moderate to severe arteriopathy. Patient received a course of steroids. He has had fluctuating prograf level and has been Diltiazem. He was switched to Astagraf in jan 2016, but he has not had any level done since he was switched. He has not had any labs for last four months. He states that his BP is better controlled and lower ext swelling is more or less same.      Transplant Hx:         Tx: DDKT  Date: 3/12/15       Present Maintenance IS: Tacrolimus, Mycophenolic acid and Prednisone       Baseline Creatinine: 2-2.5        Recent DSA: Yes(  trending down) PRA last checked ( July 2015) Class I: 99 Class II: 99       Biopsy: Yes, 3/25/15; borderline acute cellular rejection, antibody-mediated rejection and moderate interstitial fibrosis and tubular atrophy; 5/8-AMR, borderline T cell mediated rejection,  9/28/15: Antibody mediated Rejection, 12/23/2015-mild glomerulitis and capillary C4d staining consistent with mild, persistent antibody-mediated rejection, moderate to severe arteriopathy      ROS:    A comprehensive review of  "systems was obtained and negative, except as noted in the HPI or PMH.    Active Medical Problems:  Patient Active Problem List   Diagnosis     Hypertension     Tobacco abuse     Depression     Kidney replaced by transplant     Immunosuppressed status (H)     Anemia in chronic renal disease     High risk medication use     Secondary renal hyperparathyroidism (H)     Vitamin D deficiency     Kidney transplant rejection     Steroid-induced hyperglycemia     Metabolic acidosis     Antibody mediated rejection of kidney transplant     Hyperglycemia     Hemorrhage following kidney biopsy     Thrombocytopenia (H)       Personal Hx:  Social History     Social History     Marital Status: Single     Spouse Name: N/A     Number of Children: 2     Years of Education: N/A     Occupational History      Unemployed     Social History Main Topics     Smoking status: Light Tobacco Smoker     Types: Cigars     Smokeless tobacco: Not on file     Alcohol Use: 0.0 oz/week     0 Standard drinks or equivalent per week      Comment: Minimal     Drug Use: No     Sexual Activity: Not on file     Other Topics Concern     Blood Transfusions Yes     Caffeine Concern No     Social History Narrative       Allergies:  Allergies   Allergen Reactions     Cephalosporins Unknown     Cyclosporine      Duricef [Cefadroxil]        Vitals:  /91 mmHg  Pulse 87  Temp(Src) 99.1  F (37.3  C) (Oral)  Ht 1.778 m (5' 10\")  SpO2 100%  Exam:   GENERAL APPEARANCE: alert and no distress  HENT: mouth without ulcers or lesions  LYMPHATICS: no cervical or supraclavicular nodes, palpable left inguinal node  RESP: lungs clear to auscultation - no rales, rhonchi or wheezes  CV: regular rhythm, normal rate, no rub, no murmur  EDEMA: 1+ LE edema bilaterally ( L >R)  ABDOMEN: soft, nondistended, nontender, bowel sounds normal  MS: extremities normal - no gross deformities noted, no evidence of inflammation in joints, no muscle tenderness  SKIN: no " rash    Results:  Recent Results (from the past 336 hour(s))   T cell subset profile    Collection Time: 01/12/17 11:21 AM   Result Value Ref Range    IFC Specimen Blood     CD3 Mature T 51 49 - 84 %    CD4 [place] T 24 (L) 28 - 63 %    CD8 Suppressor T 26 10 - 40 %    CD4:CD8 Ratio 0.92 (L) 1.40 - 2.60    Absolute CD3 125 (L) 603 - 2990 cells/uL    Absolute CD4 60 (L) 441 - 2156 cells/uL    Absolute CD8 64 (L) 125 - 1312 cells/uL    T Cell Subset Profile Antibody Interpretation << Do Not Report >>    Renal panel    Collection Time: 01/12/17 11:21 AM   Result Value Ref Range    Sodium 140 133 - 144 mmol/L    Potassium 4.4 3.4 - 5.3 mmol/L    Chloride 112 (H) 94 - 109 mmol/L    Carbon Dioxide 20 20 - 32 mmol/L    Anion Gap 9 3 - 14 mmol/L    Glucose 134 (H) 70 - 99 mg/dL    Urea Nitrogen 48 (H) 7 - 30 mg/dL    Creatinine 2.95 (H) 0.66 - 1.25 mg/dL    GFR Estimate 23 (L) >60 mL/min/1.7m2    GFR Estimate If Black 28 (L) >60 mL/min/1.7m2    Calcium 7.6 (L) 8.5 - 10.1 mg/dL    Phosphorus 3.9 2.5 - 4.5 mg/dL    Albumin 3.6 3.4 - 5.0 g/dL   Iron and iron binding capacity    Collection Time: 01/12/17 11:21 AM   Result Value Ref Range    Iron 74 35 - 180 ug/dL    Iron Binding Cap 194 (L) 240 - 430 ug/dL    Iron Saturation Index 38 15 - 46 %   Ferritin    Collection Time: 01/12/17 11:21 AM   Result Value Ref Range    Ferritin 371 26 - 388 ng/mL   Vitamin B12    Collection Time: 01/12/17 11:21 AM   Result Value Ref Range    Vitamin B12 1093 (H) 193 - 986 pg/mL   Vitamin D Deficiency    Collection Time: 01/12/17 11:21 AM   Result Value Ref Range    Vitamin D Deficiency screening 33 20 - 75 ug/L   Tacrolimus level    Collection Time: 01/12/17 11:21 AM   Result Value Ref Range    Tacrolimus Last Dose 01/11/17   2130     Tacrolimus Level (L) 5.0 - 15.0 ug/L     <3.0  Tacrolimus Reference Range   Kidney Transplant   Pediatric                      ug/L     0-3 months post transplant   10-12     3-6 months post transplant   8-10      6-12 months post transplant  6-8     >12 months post transplant   4-7   Adult     0-6 months post transplant   8-10     6-12 months post transplant  6-8     >12 months post transplant   4-6     >5 years post transplant     3-5   Heart Transplant   Pediatric     0-12 months post transplant  10-15     >12 months post transplant   5-10   Adult     0-3 months post transplant   10-15     3-6 months post transplant   8-12     6-12 months post transplant  6-12     >12 months post transplant   6-10   Lung Transplant     0-12 months post transplant  10-15     >12 months post transplant   8-12   Liver Transplant   Pediatric     0-3 months post transplant   10-15     3-6 months post transplant   8-10     >6 months post transplant    6-8   Adult     0-3 months post transplant   10-12     3-6 months post transplant   8-10     >6  months post transplant    6-8   Pancreas Transplant     0-6 months post transplant   8-10     >6 months post transplant    5-8   This test was developed and its performance characteristics determined by the   [facility],  Special Chemistry Laboratory. It has   not been cleared or approved by the FDA. The laboratory is regulated under CLIA   as qualified to perform high-complexity testing. This test is used for clinical   purposes. It should not be regarded as investigational or for research.     CBC with platelets    Collection Time: 01/12/17 11:21 AM   Result Value Ref Range    WBC 3.0 (L) 4.0 - 11.0 10e9/L    RBC Count 3.19 (L) 4.4 - 5.9 10e12/L    Hemoglobin 8.9 (L) 13.3 - 17.7 g/dL    Hematocrit 27.8 (L) 40.0 - 53.0 %    MCV 87 78 - 100 fl    MCH 27.9 26.5 - 33.0 pg    MCHC 32.0 31.5 - 36.5 g/dL    RDW 15.1 (H) 10.0 - 15.0 %    Platelet Count 129 (L) 150 - 450 10e9/L   Folate    Collection Time: 01/12/17 11:21 AM   Result Value Ref Range    Folate 9.7 >5.4 ng/mL   Parathyroid Hormone Intact    Collection Time: 01/12/17 11:21 AM   Result Value Ref Range    Parathyroid  Hormone Intact 134 (H) 12 - 72 pg/mL   Mycophenolic acid    Collection Time: 01/12/17 11:21 AM   Result Value Ref Range    Last Dose Mycophenolic Acid 01/11/17  2130     Mycophenolic Acid Mg/L 4.51 (H) 1.00 - 3.50 mg/L    MPA Glucuronide Level 100.9 (H) 30.0 - 95.0 mg/L       Kole Collins MD       No

## 2025-01-25 ENCOUNTER — HEALTH MAINTENANCE LETTER (OUTPATIENT)
Age: 55
End: 2025-01-25

## 2025-07-07 DIAGNOSIS — I15.1 HYPERTENSION SECONDARY TO OTHER RENAL DISORDERS: ICD-10-CM

## 2025-07-07 DIAGNOSIS — I48.0 PAROXYSMAL ATRIAL FIBRILLATION (H): ICD-10-CM

## 2025-07-07 RX ORDER — CARVEDILOL 25 MG/1
25 TABLET ORAL 2 TIMES DAILY WITH MEALS
Qty: 180 TABLET | Refills: 3 | Status: SHIPPED | OUTPATIENT
Start: 2025-07-07

## 2025-08-27 DIAGNOSIS — I48.0 PAROXYSMAL ATRIAL FIBRILLATION (H): ICD-10-CM

## 2025-08-27 DIAGNOSIS — I15.1 HYPERTENSION SECONDARY TO OTHER RENAL DISORDERS: ICD-10-CM

## 2025-08-27 RX ORDER — CARVEDILOL 25 MG/1
50 TABLET ORAL 2 TIMES DAILY WITH MEALS
Qty: 360 TABLET | Refills: 11 | Status: SHIPPED | OUTPATIENT
Start: 2025-08-27

## (undated) DEVICE — SPONGE KITTNER 30-101

## (undated) DEVICE — SOL NACL 0.9% IRRIG 1000ML BOTTLE 2F7124

## (undated) DEVICE — Device

## (undated) DEVICE — DRSG PRIMAPORE 04 3/4X13 3/4" 66007140

## (undated) DEVICE — INSERT FOGARTY 33MM TRACTION HYDRAJAW HYDRA33

## (undated) DEVICE — SU PDS II 1 TP-1 54" Z879G

## (undated) DEVICE — PAD CHUX UNDERPAD 23X24" 7136

## (undated) DEVICE — CLIP HORIZON LG ORANGE 004200

## (undated) DEVICE — STPL ENDO LINEAR CUT ECHELON GST 45MM COMPACT PCEE45A

## (undated) DEVICE — ESU LIGASURE IMPACT OPEN SEALER/DVDR CVD LG JAW LF4418

## (undated) DEVICE — CLIP HORIZON SM RED WIDE SLOT 001201

## (undated) DEVICE — SU SILK 3-0 TIE 12X30" A304H

## (undated) DEVICE — SU SILK 2-0 SH 30" K833H

## (undated) DEVICE — SU PROLENE 3-0 SHDA 36" 8522H

## (undated) DEVICE — GLOVE SENSICARE 7.0 MSG1070 LATEX FREE

## (undated) DEVICE — SUCTION MANIFOLD NEPTUNE 2 SYS 4 PORT 0702-020-000

## (undated) DEVICE — SU VICRYL 3-0 SH 27" UND J416H

## (undated) DEVICE — SU PROLENE 4-0 SHDA 36" 8521H

## (undated) DEVICE — CLIP HORIZON MED BLUE 002200

## (undated) DEVICE — SU PDS II 0 TP-1 60" Z991G

## (undated) DEVICE — LINEN TOWEL PACK X6 WHITE 5487

## (undated) DEVICE — PREP CHLORAPREP 26ML TINTED ORANGE  260815

## (undated) DEVICE — LINEN TOWEL PACK X30 5481

## (undated) DEVICE — SU VICRYL 3-0 TIE 12X18" J904T

## (undated) DEVICE — SU SILK 2-0 TIE 12X30" A305H

## (undated) DEVICE — SUCTION TIP YANKAUER W/O VENT K86

## (undated) DEVICE — SU SILK 4-0 TIE 12X30" A303H

## (undated) DEVICE — SU SILK 0 TIE 6X30" A306H

## (undated) DEVICE — SOL WATER IRRIG 1000ML BOTTLE 2F7114

## (undated) DEVICE — SU VICRYL 2-0 TIE 12X18" J905T

## (undated) RX ORDER — HYDROMORPHONE HYDROCHLORIDE 1 MG/ML
INJECTION, SOLUTION INTRAMUSCULAR; INTRAVENOUS; SUBCUTANEOUS
Status: DISPENSED
Start: 2021-02-19

## (undated) RX ORDER — FENTANYL CITRATE 50 UG/ML
INJECTION, SOLUTION INTRAMUSCULAR; INTRAVENOUS
Status: DISPENSED
Start: 2021-02-19

## (undated) RX ORDER — FENTANYL CITRATE 50 UG/ML
INJECTION, SOLUTION INTRAMUSCULAR; INTRAVENOUS
Status: DISPENSED
Start: 2022-01-06

## (undated) RX ORDER — LIDOCAINE HYDROCHLORIDE 10 MG/ML
INJECTION, SOLUTION EPIDURAL; INFILTRATION; INTRACAUDAL; PERINEURAL
Status: DISPENSED
Start: 2022-01-06

## (undated) RX ORDER — SODIUM CHLORIDE 9 MG/ML
INJECTION, SOLUTION INTRAVENOUS
Status: DISPENSED
Start: 2022-01-06

## (undated) RX ORDER — HEPARIN SODIUM 1000 [USP'U]/ML
INJECTION, SOLUTION INTRAVENOUS; SUBCUTANEOUS
Status: DISPENSED
Start: 2022-01-06

## (undated) RX ORDER — SODIUM CHLORIDE, SODIUM LACTATE, POTASSIUM CHLORIDE, CALCIUM CHLORIDE 600; 310; 30; 20 MG/100ML; MG/100ML; MG/100ML; MG/100ML
INJECTION, SOLUTION INTRAVENOUS
Status: DISPENSED
Start: 2021-02-19

## (undated) RX ORDER — OXYCODONE HYDROCHLORIDE 10 MG/1
TABLET ORAL
Status: DISPENSED
Start: 2021-02-19

## (undated) RX ORDER — CLINDAMYCIN PHOSPHATE 900 MG/50ML
INJECTION, SOLUTION INTRAVENOUS
Status: DISPENSED
Start: 2021-02-19

## (undated) RX ORDER — ONDANSETRON 2 MG/ML
INJECTION INTRAMUSCULAR; INTRAVENOUS
Status: DISPENSED
Start: 2021-02-19

## (undated) RX ORDER — BUPIVACAINE HYDROCHLORIDE 2.5 MG/ML
INJECTION, SOLUTION EPIDURAL; INFILTRATION; INTRACAUDAL
Status: DISPENSED
Start: 2021-02-19